# Patient Record
Sex: FEMALE | Race: WHITE | NOT HISPANIC OR LATINO | Employment: STUDENT | URBAN - METROPOLITAN AREA
[De-identification: names, ages, dates, MRNs, and addresses within clinical notes are randomized per-mention and may not be internally consistent; named-entity substitution may affect disease eponyms.]

---
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
## 2019-01-01 NOTE — DISCHARGE SUMMARY
[facility]  Orthopedics  Discharge Summary      Patient Name: Denise Mosher  MRN: [id number]  Admission Date: 12/13/2018  Hospital Length of Stay: 1 days  Discharge Date and Time: 12/15/2018  1:41 PM  Attending Physician: No att. providers found   Discharging Provider: Rosalia Cooper MD  Primary Care Provider: Hayde Roman MD    HPI: see HPI    Procedure(s) (LRB):  OSTEOTOMY, FEMUR - distal to correct valgus (Orthopediatrics plate, MTF bone wedge).  Right knee arthroscopy with MPFL reconstruction, gracilis allograft (Linvatec).  3 hrs. (Right)      Hospital Course: the patient arrived to pre-op area for proper pre-operative management.  Upon completion of pre-operative preparation, the patient was taken back to the operative theatre.  A Right femur osteotomy with MPFL reconstruction was performed without complication and the patient was transported to the post anesthesia care unit in stable condition. After appropriate recovery from the anaesthetic agents used during the surgery the patient was transferred to the floor. She stayed two days, received PT and OT and pain medication and then was safely discharged home.      Consults (From admission, onward)        Status Ordering Provider     Inpatient consult to Social Work  Once     Provider:  (Not yet assigned)    Completed ROSALIA COOPER          Significant Diagnostic Studies: No pertinent studies.    Pending Diagnostic Studies:     None        Final Active Diagnoses:    Diagnosis Date Noted POA    PRINCIPAL PROBLEM:  Patellar instability of right knee [M25.361] 04/21/2018 Yes      Problems Resolved During this Admission:      Discharged Condition: stable    Disposition: Home or Self Care    Follow Up:  Follow-up Information     Ranulfo House MD.    Specialty:  Pediatric Orthopedic Surgery  Why:  For wound re-check  Contact information:  [address]  [facility] [address]  [phone]                 Patient  Instructions:   No discharge procedures on file.  Medications:  Reconciled Home Medications:      Medication List      START taking these medications    aspirin 81 MG EC tablet  Commonly known as:  ECOTRIN  Take 1 tablet (81 mg total) by mouth 2 (two) times daily.     polyethylene glycol 17 gram Pwpk  Commonly known as:  GLYCOLAX  Take 17 g by mouth once daily.        CONTINUE taking these medications    FLUoxetine 10 MG Tab  Take 40 mg by mouth once daily.     naproxen 500 MG tablet  Commonly known as:  NAPROSYN  Take 1 tablet by mouth as needed.     omeprazole 40 MG capsule  Commonly known as:  PRILOSEC  Take 1 capsule by mouth every evening.     ondansetron 8 MG Tbdl  Commonly known as:  ZOFRAN-ODT  Take 1 tablet (8 mg total) by mouth every 8 (eight) hours as needed (nausea or vomiting).        STOP taking these medications    folic acid 1 MG tablet  Commonly known as:  FOLVITE        ASK your doctor about these medications    methocarbamol 500 MG Tab  Commonly known as:  ROBAXIN  Take 1 tablet (500 mg total) by mouth 4 (four) times daily. for 10 days  Ask about: Should I take this medication?            Thomas Cooper MD  Orthopedics  [facility]

## 2019-01-02 ENCOUNTER — TELEPHONE (OUTPATIENT)
Dept: ORTHOPEDICS | Facility: CLINIC | Age: 19
End: 2019-01-02

## 2019-01-02 ENCOUNTER — OFFICE VISIT (OUTPATIENT)
Dept: ORTHOPEDICS | Facility: CLINIC | Age: 19
End: 2019-01-02
Payer: MEDICAID

## 2019-01-02 VITALS — WEIGHT: 200 LBS | BODY MASS INDEX: 37.79 KG/M2

## 2019-01-02 DIAGNOSIS — M25.361 PATELLAR INSTABILITY OF RIGHT KNEE: ICD-10-CM

## 2019-01-02 DIAGNOSIS — M21.061 ACQUIRED GENU VALGUM OF RIGHT KNEE: Primary | ICD-10-CM

## 2019-01-02 PROCEDURE — 99212 OFFICE O/P EST SF 10 MIN: CPT | Mod: PBBFAC | Performed by: ORTHOPAEDIC SURGERY

## 2019-01-02 PROCEDURE — 99024 PR POST-OP FOLLOW-UP VISIT: ICD-10-PCS | Mod: ,,, | Performed by: ORTHOPAEDIC SURGERY

## 2019-01-02 PROCEDURE — 99999 PR PBB SHADOW E&M-EST. PATIENT-LVL II: ICD-10-PCS | Mod: PBBFAC,,, | Performed by: ORTHOPAEDIC SURGERY

## 2019-01-02 PROCEDURE — 99024 POSTOP FOLLOW-UP VISIT: CPT | Mod: ,,, | Performed by: ORTHOPAEDIC SURGERY

## 2019-01-02 PROCEDURE — 99999 PR PBB SHADOW E&M-EST. PATIENT-LVL II: CPT | Mod: PBBFAC,,, | Performed by: ORTHOPAEDIC SURGERY

## 2019-01-02 RX ORDER — HYDROXYZINE HYDROCHLORIDE 25 MG/1
25 TABLET, FILM COATED ORAL 3 TIMES DAILY PRN
Qty: 40 TABLET | Refills: 1 | Status: SHIPPED | OUTPATIENT
Start: 2019-01-02 | End: 2019-01-16

## 2019-01-02 NOTE — TELEPHONE ENCOUNTER
----- Message from Misty Carrillo sent at 1/2/2019  8:04 AM CST -----  Contact: Mom [phone]  She says the pt is having some issues with her leg in regards to itching and would like to come in before 1/4/19. Dr House is out until then so she wants to know if she can see someone else today. Please advise mom.

## 2019-01-02 NOTE — TELEPHONE ENCOUNTER
Spoke with patient mother will schedule with jeremiah hill np for a follow up. She will  Be scheduled at 4 in hopes of dr saldivar being back at [facility] from an outside clinic day.

## 2019-01-04 ENCOUNTER — CLINICAL SUPPORT (OUTPATIENT)
Dept: REHABILITATION | Facility: HOSPITAL | Age: 19
End: 2019-01-04
Attending: ORTHOPAEDIC SURGERY
Payer: MEDICAID

## 2019-01-04 DIAGNOSIS — M25.561 ACUTE PAIN OF RIGHT KNEE: Primary | ICD-10-CM

## 2019-01-04 PROCEDURE — 97110 THERAPEUTIC EXERCISES: CPT

## 2019-01-04 NOTE — PROGRESS NOTES
Physical Therapy Daily Treatment Note     Name: Denise Mosher  Clinic Number: [id number]    Therapy Diagnosis:   Encounter Diagnosis   Name Primary?    Acute pain of right knee Yes     Physician: Ranulfo House MD    Visit Date: 1/4/2019    Physician Orders: PT Eval and Treat Right Knee  12/13/18 distal femoral osteotomy and MPFL reconstruction. PWB with knee brace. ROM, quad strengthening, no restrictions. Please start PT next week.   Medical Diagnosis from Referral: M21.061 (ICD-10-CM) - Acquired genu valgum of right knee M25.361 (ICD-10-CM) - Patellar instability of right knee   Evaluation Date: 12/21/2018  Authorization Period Expiration: 1/31/2019  Plan of Care Expiration: 03/22/2019  Visit # / Visits authorized: 1/1     Time In: 1305  Time Out: 1430   Total Billable Time: 27 minutes     Precautions: Standard    Subjective     Pt reports: w/ mild pn in R knee.  Pt mentioned taking pn meds 30 min prior to tx.  Pt mentioned at the end of the session she has not had anything to eat since last night.    She was compliant with home exercise program.  Response to previous treatment: No adverse effects   Functional change: No change     Pain:3/10  Location: right knee      Objective     Denise received therapeutic exercises to develop strength, endurance, ROM, flexibility, posture and core stabilization for 17 minutes including:  QS 2 x 10 3 sec hold   EOT knee flexion 5 x 20 sec manual assist     Denise received the following supervised modalities after being cleared for contradictions: NMES Electrical Stimulation:  Denise OSBORN received NMES Electrical Stimulation to elicit muscle contraction of the R quad. Pt received stimulation at a rate of 50 pps with symmetric current, ramp of 2 seconds with 10 second on time and 10 second off time. Patient tolerated treatment well without any adverse effects.   E-stim applied for 10 min w/ quad sets     Denise received cold pack for 10 minutes to R knee.      Home  Exercises Provided and Patient Education Provided     Education provided:   - Pt edu on proper exercise technique.  Pt also edu on the importance of eating while taking pn meds and prior to performing exercises.     Written Home Exercises Provided: Patient instructed to cont prior HEP.  Exercises were reviewed and Denise was able to demonstrate them prior to the end of the session.  Denise demonstrated good  understanding of the education provided.       Assessment     Pt mayuri tx well.  Pn level remained same prior to and after tx session.  Tx stopped 2* patient feeling weak requiring several minutes to regain strength.  Pt cont to lack quad tone and function as well as ROM.    Denise is progressing well towards her goals.   Pt prognosis is Good.     Pt will continue to benefit from skilled outpatient physical therapy to address the deficits listed in the problem list box on initial evaluation, provide pt/family education and to maximize pt's level of independence in the home and community environment.     Pt's spiritual, cultural and educational needs considered and pt agreeable to plan of care and goals.    Anticipated barriers to physical therapy: none    Goals: Short Term GOALS: 6 weeks  1. Patient demonstrates independence with HEP. (progressing, not met)   2. Patient demonstrates increased right knee ROM to 90 degrees flexion to improve tolerance to functional activities with no more than 2/10 pain.(progressing, not met)   3. Patient demonstrates improved overall function per FOTO Knee Survey to 30% Limitation or less.(progressing, not met)   4. Patient will ambulate will least restrictive device.(progressing, not met)      Long Term GOALS: 12 weeks  1. Patient demonstrates increased right knee ROM to be equal to left to improve tolerance to functional activities pain free. (progressing, not met)   2. Patient demonstrates increased strength BLE's to 5/5 or greater to improve tolerance to functional  activities pain free. (progressing, not met)   3. Patient demonstrates improved overall function per FOTO Knee Survey to 10% Limitation or less. (progressing, not met)   4. Patient will return to work with no reports of pain or restrictions.(progressing, not met)   5. Patient will ambulate independently with normalized gait pattern.(progressing, not met)       Plan     Cont to progress towards goals set by PT.  Work to improve quad function and ROM next visit.      Ramiro Payne, PTA

## 2019-01-07 NOTE — PROGRESS NOTES
CC: Post-op    HPI: Denise Mosher is now 3 weeks post-op following   DATE OF PROCEDURE:  12/13/18     PREOPERATIVE DIAGNOSES:  1.  Right knee pain.  2.  Right patellar instability.  3.  Right genu valgum.     POSTOPERATIVE DIAGNOSES:  1.  Right knee pain.  2.  Right patellar instability.  3.  Right genu valgum.     PROCEDURES:  1.  Right knee arthroscopy  2.  Right medial patellofemoral ligament reconstruction with hamstring allograft  3.  Right distal femoral varus-producing osteotomy with plate fixation.  Doing well, pain controlled.  Concerned about itching.    PE: Incisions well-healed with no sign of infection.  Well-perfused, neurovascularly intact distally.  Very stiff knee ROM.    Clinical decision-making: Doing well.  Atarax for itching.  Work on ROM, PT.  RTC 3 weeks, X-rays of knee.

## 2019-01-08 ENCOUNTER — CLINICAL SUPPORT (OUTPATIENT)
Dept: REHABILITATION | Facility: HOSPITAL | Age: 19
End: 2019-01-08
Attending: ORTHOPAEDIC SURGERY
Payer: MEDICAID

## 2019-01-08 DIAGNOSIS — M25.561 ACUTE PAIN OF RIGHT KNEE: Primary | ICD-10-CM

## 2019-01-08 PROCEDURE — 97110 THERAPEUTIC EXERCISES: CPT

## 2019-01-08 RX ORDER — HYDROCODONE BITARTRATE AND ACETAMINOPHEN 5; 325 MG/1; MG/1
1 TABLET ORAL EVERY 6 HOURS PRN
Qty: 20 TABLET | Refills: 0 | Status: ON HOLD | OUTPATIENT
Start: 2019-01-08 | End: 2019-12-19 | Stop reason: HOSPADM

## 2019-01-08 NOTE — PROGRESS NOTES
Physical Therapy Daily Treatment Note     Name: Denise Mosher  Clinic Number: [id number]    Therapy Diagnosis:   Encounter Diagnosis   Name Primary?    Acute pain of right knee Yes     Physician: Ranulfo House MD    Visit Date: 1/8/2019    Physician Orders: PT Eval and Treat Right Knee  12/13/18 distal femoral osteotomy and MPFL reconstruction. PWB with knee brace. ROM, quad strengthening, no restrictions. Please start PT next week.   Medical Diagnosis from Referral: M21.061 (ICD-10-CM) - Acquired genu valgum of right knee M25.361 (ICD-10-CM) - Patellar instability of right knee   Evaluation Date: 12/21/2018  Authorization Period Expiration: 1/31/2019  Plan of Care Expiration: 03/22/2019  Visit # / Visits authorized: 2/12  Total visits: 3     Time In: 1400  Time Out: 1500   Total Billable Time: 50 minutes     Precautions: Standard    Subjective     Pt states feeling okay w/ some soreness in R knee but no c/o pn currently.  Pt mentioned being able to perform some knee flexion when getting out of the bed but is very limited 2* pn.  Pt unable to perform more than 2 x 10 QS a day 2* pn as well.  Pt reported that she is out of pn meds and has not been approved by her insurance for more.    She was compliant with home exercise program.  Response to previous treatment: No adverse effects   Functional change: No change     Pain:3/10  Location: right knee      Objective     Denise received therapeutic exercises to develop strength, endurance, ROM, flexibility, posture and core stabilization for 50 minutes including:  QS 3 x 10 3 sec hold   APs 30 x   GSS w/ strap 2 min   EOT knee flexion 5 x 20 sec manual assist unable to perform today 2* pn.      Denise received the following supervised modalities after being cleared for contradictions: NMES Electrical Stimulation:  Denise OSBORN received NMES Electrical Stimulation to elicit muscle contraction of the R quad. Pt received stimulation at a rate of 50 pps with  symmetric current, ramp of 2 seconds with 10 second on time and 10 second off time. Patient tolerated treatment well without any adverse effects.   E-stim applied for 0 min w/ quad sets     Denise received cold pack for 10 minutes to R knee.      Home Exercises Provided and Patient Education Provided     Education provided:   - Pt edu on proper exercise technique.  Pt also edu on the importance of eating while taking pn meds and prior to performing exercises.     Written Home Exercises Provided: Patient instructed to cont prior HEP.  Exercises were reviewed and Denise was able to demonstrate them prior to the end of the session.  Denise demonstrated good  understanding of the education provided.       Assessment   Increased muscle twitch w/ therex today.  Pt had decreased mayuri to pn during therex despite taking pn meds.  Pt has severe guarding when attempting knee flexion.  Pt experienced significant pn today as well which limited therex.       Denise is progressing well towards her goals.   Pt prognosis is Good.     Pt will continue to benefit from skilled outpatient physical therapy to address the deficits listed in the problem list box on initial evaluation, provide pt/family education and to maximize pt's level of independence in the home and community environment.     Pt's spiritual, cultural and educational needs considered and pt agreeable to plan of care and goals.    Anticipated barriers to physical therapy: none    Goals: Short Term GOALS: 6 weeks  1. Patient demonstrates independence with HEP. (progressing, not met)   2. Patient demonstrates increased right knee ROM to 90 degrees flexion to improve tolerance to functional activities with no more than 2/10 pain.(progressing, not met)   3. Patient demonstrates improved overall function per FOTO Knee Survey to 30% Limitation or less.(progressing, not met)   4. Patient will ambulate will least restrictive device.(progressing, not met)      Long Term  GOALS: 12 weeks  1. Patient demonstrates increased right knee ROM to be equal to left to improve tolerance to functional activities pain free. (progressing, not met)   2. Patient demonstrates increased strength BLE's to 5/5 or greater to improve tolerance to functional activities pain free. (progressing, not met)   3. Patient demonstrates improved overall function per FOTO Knee Survey to 10% Limitation or less. (progressing, not met)   4. Patient will return to work with no reports of pain or restrictions.(progressing, not met)   5. Patient will ambulate independently with normalized gait pattern.(progressing, not met)       Plan     Cont to progress towards goals set by PT.  Work to improve quad function and ROM next visit.      Ramiro Payne, PTA

## 2019-01-11 ENCOUNTER — CLINICAL SUPPORT (OUTPATIENT)
Dept: REHABILITATION | Facility: HOSPITAL | Age: 19
End: 2019-01-11
Attending: ORTHOPAEDIC SURGERY
Payer: MEDICAID

## 2019-01-11 DIAGNOSIS — M25.561 ACUTE PAIN OF RIGHT KNEE: Primary | ICD-10-CM

## 2019-01-11 PROCEDURE — 97110 THERAPEUTIC EXERCISES: CPT

## 2019-01-11 NOTE — PROGRESS NOTES
Physical Therapy Daily Treatment Note     Name: Denise Mosher  Clinic Number: [id number]    Therapy Diagnosis:   Encounter Diagnosis   Name Primary?    Acute pain of right knee Yes     Physician: Ranulfo House MD    Visit Date: 1/11/2019    Physician Orders: PT Eval and Treat Right Knee  12/13/18 distal femoral osteotomy and MPFL reconstruction. PWB with knee brace. ROM, quad strengthening, no restrictions. Please start PT next week.   Medical Diagnosis from Referral: M21.061 (ICD-10-CM) - Acquired genu valgum of right knee M25.361 (ICD-10-CM) - Patellar instability of right knee   Evaluation Date: 12/21/2018  Authorization Period Expiration: 1/31/2019  Plan of Care Expiration: 03/22/2019  Visit # / Visits authorized: 3/12  Total visits: 4     Time In: 1500  Time Out: 1600   Total Billable Time: 50 minutes     Precautions: Standard    Subjective     Pt reports w/ no c/o pn in R knee currently and has noticed increased flexion at home.    She was compliant with home exercise program.  Response to previous treatment: No adverse effects   Functional change: No change     Pain:0/10  Location: right knee      Objective     Denise received therapeutic exercises to develop strength, endurance, ROM, flexibility, posture and core stabilization for 50 minutes including:  QS 3 x 10 3 sec hold   APs 30 x yellow band   SLR x 5 manual assist mod-max  GSS w/ strap 2 min   EOT knee flexion 5 x 20 sec manual assist   Heel slides 10 x 10 sec hold manual assist     Denise received the following supervised modalities after being cleared for contradictions: NMES Electrical Stimulation:  Denise OSBORN received NMES Electrical Stimulation to elicit muscle contraction of the R quad. Pt received stimulation at a rate of 50 pps with symmetric current, ramp of 2 seconds with 10 second on time and 10 second off time. Patient tolerated treatment well without any adverse effects.   E-stim applied for 0 min w/ quad sets     Denise  received cold pack for 10 minutes to R knee.      Home Exercises Provided and Patient Education Provided     Education provided:   - Pt edu on proper exercise technique.  Pt also edu on the importance of eating while taking pn meds and prior to performing exercises.     Written Home Exercises Provided: Patient instructed to cont prior HEP.  Exercises were reviewed and Denise was able to demonstrate them prior to the end of the session.  Denise demonstrated good  understanding of the education provided.       Assessment   Increased muscle twitch w/ therex today.  Pt had decreased mayuri to pn during therex despite taking pn meds.  Pt has severe guarding when attempting knee flexion.  Pt experienced significant pn today as well which limited therex.       Denise is progressing well towards her goals.   Pt prognosis is Good.     Pt will continue to benefit from skilled outpatient physical therapy to address the deficits listed in the problem list box on initial evaluation, provide pt/family education and to maximize pt's level of independence in the home and community environment.     Pt's spiritual, cultural and educational needs considered and pt agreeable to plan of care and goals.    Anticipated barriers to physical therapy: none    Goals: Short Term GOALS: 6 weeks  1. Patient demonstrates independence with HEP. (progressing, not met)   2. Patient demonstrates increased right knee ROM to 90 degrees flexion to improve tolerance to functional activities with no more than 2/10 pain.(progressing, not met)   3. Patient demonstrates improved overall function per FOTO Knee Survey to 30% Limitation or less.(progressing, not met)   4. Patient will ambulate will least restrictive device.(progressing, not met)      Long Term GOALS: 12 weeks  1. Patient demonstrates increased right knee ROM to be equal to left to improve tolerance to functional activities pain free. (progressing, not met)   2. Patient demonstrates increased  strength BLE's to 5/5 or greater to improve tolerance to functional activities pain free. (progressing, not met)   3. Patient demonstrates improved overall function per FOTO Knee Survey to 10% Limitation or less. (progressing, not met)   4. Patient will return to work with no reports of pain or restrictions.(progressing, not met)   5. Patient will ambulate independently with normalized gait pattern.(progressing, not met)       Plan     Cont to progress towards goals set by PT.  Work to improve quad function and ROM next visit.      Ramiro Payne, PTA

## 2019-01-14 ENCOUNTER — OFFICE VISIT (OUTPATIENT)
Dept: PEDIATRIC UROLOGY | Facility: CLINIC | Age: 19
End: 2019-01-14
Payer: MEDICAID

## 2019-01-14 ENCOUNTER — HOSPITAL ENCOUNTER (OUTPATIENT)
Dept: RADIOLOGY | Facility: HOSPITAL | Age: 19
Discharge: HOME | End: 2019-01-14
Attending: UROLOGY
Payer: MEDICAID

## 2019-01-14 VITALS — WEIGHT: 180.75 LBS | BODY MASS INDEX: 34.13 KG/M2 | HEIGHT: 61 IN | TEMPERATURE: 98 F

## 2019-01-14 DIAGNOSIS — M25.361 PATELLAR INSTABILITY OF RIGHT KNEE: ICD-10-CM

## 2019-01-14 DIAGNOSIS — K59.00 CONSTIPATION, UNSPECIFIED CONSTIPATION TYPE: ICD-10-CM

## 2019-01-14 DIAGNOSIS — F44.5 PSYCHOGENIC NONEPILEPTIC SEIZURE: ICD-10-CM

## 2019-01-14 DIAGNOSIS — R32 INCONTINENCE IN FEMALE: ICD-10-CM

## 2019-01-14 DIAGNOSIS — E66.01 MORBID OBESITY WITH BMI OF 40.0-44.9, ADULT: ICD-10-CM

## 2019-01-14 DIAGNOSIS — F41.9 ANXIETY: ICD-10-CM

## 2019-01-14 DIAGNOSIS — R32 INCONTINENCE IN FEMALE: Primary | ICD-10-CM

## 2019-01-14 PROCEDURE — 99999 PR PBB SHADOW E&M-EST. PATIENT-LVL III: CPT | Mod: PBBFAC,,, | Performed by: UROLOGY

## 2019-01-14 PROCEDURE — 51798 US URINE CAPACITY MEASURE: CPT | Mod: PBBFAC | Performed by: UROLOGY

## 2019-01-14 PROCEDURE — 74018 RADEX ABDOMEN 1 VIEW: CPT | Mod: TC,PO

## 2019-01-14 PROCEDURE — 99204 OFFICE O/P NEW MOD 45 MIN: CPT | Mod: S$PBB,,, | Performed by: UROLOGY

## 2019-01-14 PROCEDURE — 99999 PR PBB SHADOW E&M-EST. PATIENT-LVL III: ICD-10-PCS | Mod: PBBFAC,,, | Performed by: UROLOGY

## 2019-01-14 PROCEDURE — 81002 URINALYSIS NONAUTO W/O SCOPE: CPT | Mod: PBBFAC | Performed by: UROLOGY

## 2019-01-14 PROCEDURE — 87086 URINE CULTURE/COLONY COUNT: CPT

## 2019-01-14 PROCEDURE — 99213 OFFICE O/P EST LOW 20 MIN: CPT | Mod: PBBFAC,25 | Performed by: UROLOGY

## 2019-01-14 PROCEDURE — 99204 PR OFFICE/OUTPT VISIT, NEW, LEVL IV, 45-59 MIN: ICD-10-PCS | Mod: S$PBB,,, | Performed by: UROLOGY

## 2019-01-14 PROCEDURE — 74018 XR ABDOMEN AP 1 VIEW: ICD-10-PCS | Mod: 26,,, | Performed by: RADIOLOGY

## 2019-01-14 PROCEDURE — 74018 RADEX ABDOMEN 1 VIEW: CPT | Mod: 26,,, | Performed by: RADIOLOGY

## 2019-01-14 NOTE — PROGRESS NOTES
Chief Complaint: No chief complaint on file.      HPI: Denise is a pleasant,17 yo referred by Dr. House for incontinence. This started after her truong catheter was removed after knee surgery in December. She had the catheter for 2 days she states. She has had numerous knee surgeries and has never had a catheter before for those. She went to the ER for the incontinence. She states she was told she may have a UTI and bladder paralysis from her catheter. She was then referred to me. She says initially she really just was incontinent. She could not make it to restroom and would soak herself. She started wearing depends. She now says that she is out of the depends and feels like she is getting back to normal. She can tell when she needs to void and feels like she sits on the toilet now better to void. At the time of the surgery she was on norco and a muscle relaxant. She has chronic constipation. As a younger child she remember 'getting a clean out' and had been to the doctor numerous times for abdominal pain. She has taken miralax in the past but has never been on a consistent regimen for more than 2 weeks. She says now she has hard BM every 2-3 days. She has normal periods. She has never had a UTI she states. She denies dysuria currently. She is overweight and takes prozac for depression. She is here today with her best friend.  She denies any concerns for neurogenic further concerns.     Allergies:  Review of patient's allergies indicates:  No Known Allergies    Medications:  Current Outpatient Medications   Medication Sig Dispense Refill    aspirin (ECOTRIN) 81 MG EC tablet Take 1 tablet (81 mg total) by mouth 2 (two) times daily. 120 tablet 0    fluoxetine (PROZAC) 10 MG Tab Take 40 mg by mouth once daily.       HYDROcodone-acetaminophen (NORCO) 5-325 mg per tablet Take 1 tablet by mouth every 6 (six) hours as needed for Pain. 20 tablet 0    hydrOXYzine HCl (ATARAX) 25 MG tablet Take 1 tablet (25 mg total)  by mouth 3 (three) times daily as needed for Itching. 40 tablet 1    naproxen (NAPROSYN) 500 MG tablet Take 1 tablet by mouth as needed.  1    omeprazole (PRILOSEC) 40 MG capsule Take 1 capsule by mouth every evening.  1    ondansetron (ZOFRAN-ODT) 8 MG TbDL Take 1 tablet (8 mg total) by mouth every 8 (eight) hours as needed (nausea or vomiting). 6 tablet 0    polyethylene glycol (GLYCOLAX) 17 gram PwPk Take 17 g by mouth once daily. 30 each 0     No current facility-administered medications for this visit.        Review of Systems:  General: No fever, chills, fatigability, or weight loss.  Skin: No rashes, itching, or changes in color or texture of skin.  Chest: Denies ESTRADA, cyanosis, wheezing, cough, and sputum production.  Abdomen: Appetite fine. No weight loss. Denies diarrhea, abdominal pain, hematemesis, or blood in stool.  Musculoskeletal: No joint stiffness or swelling. Denies back pain.  : As above.  All other review of systems negative.    PMH:  Past Medical History:   Diagnosis Date    Anxiety     reports panic attacks    Depression     Fibromyalgia     per pt    Insomnia     Precocious female puberty     Seizures     reports 2 incidents of possible seizures while giving blood    Syncope and collapse     Wrist fracture, bilateral        PSH:  Past Surgical History:   Procedure Laterality Date    ARTHROSCOPY-KNEE Right 7/16/2015    [facility]    ARTHROSCOPY-KNEE Left 2/16/2015    [facility] at [facility]    ARTHROSCOPY-KNEE, removal of two Synthes 4-5 cortical screws Right 6/15/2017    [facility] at [facility]    chip      chip implant    closed  manipulation under anesthesia, cortisone injection ARTHROSCOPY-KNEE was aborted Right 11/19/2015    Per[facility] at [facility]    HEMANGIOMA EXCISION      scalp    INGUINAL HERNIA REPAIR Left     KNEE SURGERY Left 2/16/15     OSTEOTOMY, FEMUR - distal to correct valgus (Orthopediatrics plate, MTF bone wedge).  Right knee arthroscopy with MPFL reconstruction, gracilis allograft ([employer]).  3 hrs. Right 12/13/2018    Performed by Ranulfo House MD at [facility]    OSTEOTOMY-TIBIA (Tibial tubercle transfer osteotomy) Right 7/16/2015    Performed by Ranulfo House MD at [facility]ST [facility]    OSTEOTOMY-TIBIA (Tibial tubercle) Left 2/16/2015    Per[facility] by [facility]ulfo [facility] at [facility]    TOE SURGERY Right     5 th toe       FamHx:  Family History   Problem Relation Age of Onset    Seizures Father     Anxiety disorder Father     Depression Father     Mental illness Father     Schizophrenia Father     Deep vein thrombosis Father        SocHx:  Social History     Socioeconomic History    Marital status: Single     Spouse name: Not on file    Number of children: Not on file    Years of education: Not on file    Highest education level: Not on file   Social Needs    Financial resource strain: Not on file    Food insecurity - worry: Not on file    Food insecurity - inability: Not on file    Transportation needs - medical: Not on file    Transportation needs - non-medical: Not on file   Occupational History    Not on file   Tobacco Use    Smoking status: Never Smoker    Smokeless tobacco: Never Used   Substance and Sexual Activity    Alcohol use: No    Drug use: No    Sexual activity: No     Birth control/protection: Abstinence   Other Topics Concern    Not on file   Social History Narrative    9th grader at [employer].  Lives at home with mother and father.       Physical Exam:  Vitals: There were no vitals filed for this visit.  General: A&Ox3. No apparent distress. No deformities.  Neck: No masses. Normal thyroid.  Lungs: CTA yandy. No use of accessory muscles.  Heart: RRR. No arrhythmias.  Abdomen: Soft. NT. ND. No masses. No hernias. No hepatosplenomegaly.  Lymphatic: Neck and groin nodes  negative.  Skin: The skin is warm and dry. No jaundice.  Ext: right leg fixed and in brace in wheel [place]  : External genitalia not examined today    Labs/Studies: urine culture from Er was negative, UA seems contaminated more than infected  No renal imaging    PVR via ultrasound today showed about 15 cc about 10 min after voiding- within normal    Impression/Plan: post surgical incontinence now essentially resolved per patient.   Chronic constipation, obesity, narcotics/muscle relaxants and catheter use likely contributors to this.     I told her we see this commonly in adults and as she ages, her weight, her constipation will affect bladder function. She is at risk for further incontinence in general and tried to encourage her in weight loss and we must correct the constipation.     I explained in detail how bowel function works and will get a KUB today to quantify her constipation- she hasn't had a BM in at least 2 days. She needs likely a clean out and I stressed to her that daily stool softener whether miralax or docusate sodium is needed. She says she hates vitamins and such so does not want to take fiber etc supplements but I stressed need to increase her fiber and water intake.    She will have more surgeries and I told her this incontinence or even retention could happen again and if needed I would be able to help. If we can get her in good place with bowel function at least and ideally weight loss this would help lessen the risk.   She says she understands and knows that if incontinence returns or any voiding troubles, she needs to see me.   Otherwise I will see her in about a month- her friend is travelling for a week in Feb so as long as no issues, she can come see after that so that her friend can drive her.   Will get another KUB at time of her follow up.

## 2019-01-14 NOTE — LETTER
January 14, 2019      Ranulfo House MD  [address]  [facility] [address]           [facility]  [address]  [facility] [address]  Phone: [phone]          Patient: Denise Mosher   MR Number: [id number]   YOB: 2000   Date of Visit: 1/14/2019       Dear Dr. Ranulfo House:    Thank you for referring Denise Mosher to me for evaluation. Attached you will find relevant portions of my assessment and plan of care.    If you have questions, please do not hesitate to call me. I look forward to following Denise Mosher along with you.    Sincerely,    Radha Green MD    Enclosure  CC:  No Recipients    If you would like to receive this communication electronically, please contact [email or web address] or [phone] to request more information on [employer] access.    For providers and/or their staff who would like to refer a patient to [facility], please contact us through our one-stop-shop provider referral line, [facility] , at [phone].    If you feel you have received this communication in error or would no longer like to receive these types of communications, please e-mail [email or web address]

## 2019-01-15 ENCOUNTER — CLINICAL SUPPORT (OUTPATIENT)
Dept: REHABILITATION | Facility: HOSPITAL | Age: 19
End: 2019-01-15
Attending: ORTHOPAEDIC SURGERY
Payer: MEDICAID

## 2019-01-15 DIAGNOSIS — M25.561 ACUTE PAIN OF RIGHT KNEE: Primary | ICD-10-CM

## 2019-01-15 PROCEDURE — 97110 THERAPEUTIC EXERCISES: CPT

## 2019-01-15 NOTE — PROGRESS NOTES
Physical Therapy Daily Treatment Note     Name: Denise Mosher  Clinic Number: [id number]    Therapy Diagnosis:   Encounter Diagnosis   Name Primary?    Acute pain of right knee Yes     Physician: Ranulfo House MD    Visit Date: 1/15/2019    Physician Orders: PT Eval and Treat Right Knee  12/13/18 distal femoral osteotomy and MPFL reconstruction. PWB with knee brace. ROM, quad strengthening, no restrictions. Please start PT next week.   Medical Diagnosis from Referral: M21.061 (ICD-10-CM) - Acquired genu valgum of right knee M25.361 (ICD-10-CM) - Patellar instability of right knee   Evaluation Date: 12/21/2018  Authorization Period Expiration: 1/31/2019  Plan of Care Expiration: 03/22/2019  Visit # / Visits authorized: 4/12  Total visits: 5     Time In: 1455  Time Out: 1600   Total Billable Time: 50 minutes     Precautions: Standard    Subjective     Pt states feeling okay w/light bruising distal of R knee.  Pt reports w/ no c/o pn in R knee currently.  Pt also mentioned not iching as much but now does have her medication for this problem.    She was compliant with home exercise program.  Response to previous treatment: No adverse effects   Functional change: No change     Pain:0/10  Location: right knee      Objective   ROM: 20 deg knee flexion PROM 1/17/19    Denise received therapeutic exercises to develop strength, endurance, ROM, flexibility, posture and core stabilization for 50 minutes including:  QS 3 x 10 3 sec hold   APs 30 x yellow band   SLR x 5 manual assist mod-max  GSS w/ strap 2 min   EOT knee flexion 5 x 20 sec manual assist   Heel slides 10 x 10 sec hold manual assist     Denise received the following supervised modalities after being cleared for contradictions: NMES Electrical Stimulation:  Denise OSBORN received NMES Electrical Stimulation to elicit muscle contraction of the R quad. Pt received stimulation at a rate of 50 pps with symmetric current, ramp of 2 seconds with 10 second on  time and 10 second off time. Patient tolerated treatment well without any adverse effects.   E-stim applied for 10 min w/ quad sets     Denise received cold pack for 10 minutes to R knee w/ e-stim.      Home Exercises Provided and Patient Education Provided     Education provided:   - Pt edu on proper exercise technique.  Pt also edu on the importance of eating while taking pn meds and prior to performing exercises.     Written Home Exercises Provided: Patient instructed to cont prior HEP.  Exercises were reviewed and Denise was able to demonstrate them prior to the end of the session.  Denise demonstrated good  understanding of the education provided.       Assessment   I  Pt cont to have increased quad activation but cont to lack strength to actively move R leg.  Pt has severe muscle guarding and lacks knee flexion.  Pt mayuri tx was fair.  Pn level remained same prior to and after tx session.    Denise is progressing well towards her goals.   Pt prognosis is Good.     Pt will continue to benefit from skilled outpatient physical therapy to address the deficits listed in the problem list box on initial evaluation, provide pt/family education and to maximize pt's level of independence in the home and community environment.     Pt's spiritual, cultural and educational needs considered and pt agreeable to plan of care and goals.    Anticipated barriers to physical therapy: none    Goals: Short Term GOALS: 6 weeks  1. Patient demonstrates independence with HEP. (progressing, not met)   2. Patient demonstrates increased right knee ROM to 90 degrees flexion to improve tolerance to functional activities with no more than 2/10 pain.(progressing, not met)   3. Patient demonstrates improved overall function per FOTO Knee Survey to 30% Limitation or less.(progressing, not met)   4. Patient will ambulate will least restrictive device.(progressing, not met)      Long Term GOALS: 12 weeks  1. Patient demonstrates increased  right knee ROM to be equal to left to improve tolerance to functional activities pain free. (progressing, not met)   2. Patient demonstrates increased strength BLE's to 5/5 or greater to improve tolerance to functional activities pain free. (progressing, not met)   3. Patient demonstrates improved overall function per FOTO Knee Survey to 10% Limitation or less. (progressing, not met)   4. Patient will return to work with no reports of pain or restrictions.(progressing, not met)   5. Patient will ambulate independently with normalized gait pattern.(progressing, not met)       Plan     Cont to progress towards goals set by PT.  Work to improve quad function and ROM next visit.      Ramiro Payne, PTA

## 2019-01-16 LAB
BACTERIA UR CULT: NORMAL
BACTERIA UR CULT: NORMAL

## 2019-01-18 ENCOUNTER — TELEPHONE (OUTPATIENT)
Dept: ORTHOPEDICS | Facility: CLINIC | Age: 19
End: 2019-01-18

## 2019-01-18 ENCOUNTER — CLINICAL SUPPORT (OUTPATIENT)
Dept: REHABILITATION | Facility: HOSPITAL | Age: 19
End: 2019-01-18
Attending: ORTHOPAEDIC SURGERY
Payer: MEDICAID

## 2019-01-18 DIAGNOSIS — M25.561 ACUTE PAIN OF RIGHT KNEE: Primary | ICD-10-CM

## 2019-01-18 PROCEDURE — 97110 THERAPEUTIC EXERCISES: CPT

## 2019-01-18 RX ORDER — MELOXICAM 7.5 MG/1
7.5 TABLET ORAL DAILY
Qty: 30 TABLET | Refills: 0 | Status: SHIPPED | OUTPATIENT
Start: 2019-01-18 | End: 2019-02-17

## 2019-01-18 NOTE — PROGRESS NOTES
Physical Therapy Daily Treatment Note     Name: Denise Mosher  Clinic Number: [id number]    Therapy Diagnosis:   Encounter Diagnosis   Name Primary?    Acute pain of right knee Yes     Physician: Ranulfo House MD    Visit Date: 1/18/2019    Physician Orders: PT Eval and Treat Right Knee  12/13/18 distal femoral osteotomy and MPFL reconstruction. PWB with knee brace. ROM, quad strengthening, no restrictions. Please start PT next week.   Medical Diagnosis from Referral: M21.061 (ICD-10-CM) - Acquired genu valgum of right knee M25.361 (ICD-10-CM) - Patellar instability of right knee   Evaluation Date: 12/21/2018  Authorization Period Expiration: 1/31/2019  Plan of Care Expiration: 03/22/2019  Visit # / Visits authorized: 5/12  Total visits: 6     Time In: 1300  Time Out: 1400  Total Billable Time: 25 minutes     Precautions: Standard    Subjective     Pt reports w/ n o c/o pn in R knee and no c/o itcing in R LE.     She was compliant with home exercise program.  Response to previous treatment: No adverse effects   Functional change: No change     Pain:0/10  Location: right knee      Objective   ROM: 27 deg knee flexion PROM 1/18/19    Denise received therapeutic exercises to develop strength, endurance, ROM, flexibility, posture and core stabilization for  25 minutes including:    GSS w/ strap 2 min   EOT knee flexion 3 x 20 sec manual assist   Heel slides 10 x 10 sec hold manual assist   Knee flexion on towel roll 3 different sizes 2 min ea (PROM flexion 27 degrees)    Not performed today:   QS 3 x 10 3 sec hold   APs 30 x yellow band   SLR x 5 manual assist mod-max    Denise received the following supervised modalities after being cleared for contradictions: NMES Electrical Stimulation:  Denise OSBORN received NMES Electrical Stimulation to elicit muscle contraction of the R quad. Pt received stimulation at a rate of 50 pps with symmetric current, ramp of 2 seconds with 10 second on time and 10 second off  time. Patient tolerated treatment well without any adverse effects.   E-stim applied for 10 min w/ quad sets and cp    Denise received cold pack for 10 minutes to R knee.       Home Exercises Provided and Patient Education Provided     Education provided:   - Pt edu on proper exercise technique.  Pt also edu on the importance of eating while taking pn meds and prior to performing exercises.     Written Home Exercises Provided: Patient instructed to cont prior HEP.  Exercises were reviewed and Denise was able to demonstrate them prior to the end of the session.  Denise demonstrated good  understanding of the education provided.       Assessment   Pt had increased knee flexion although still lacking.  Pt mayuri tx well.  Pn level remained same prior to and after tx session.  Pt showed increased quad activation as well.  Pt became lethargic at the end of treatment today but remained coherent.      Denise is progressing well towards her goals.   Pt prognosis is Good.     Pt will continue to benefit from skilled outpatient physical therapy to address the deficits listed in the problem list box on initial evaluation, provide pt/family education and to maximize pt's level of independence in the home and community environment.     Pt's spiritual, cultural and educational needs considered and pt agreeable to plan of care and goals.    Anticipated barriers to physical therapy: none    Goals: Short Term GOALS: 6 weeks  1. Patient demonstrates independence with HEP. (progressing, not met)   2. Patient demonstrates increased right knee ROM to 90 degrees flexion to improve tolerance to functional activities with no more than 2/10 pain.(progressing, not met)   3. Patient demonstrates improved overall function per FOTO Knee Survey to 30% Limitation or less.(progressing, not met)   4. Patient will ambulate will least restrictive device.(progressing, not met)      Long Term GOALS: 12 weeks  1. Patient demonstrates increased  right knee ROM to be equal to left to improve tolerance to functional activities pain free. (progressing, not met)   2. Patient demonstrates increased strength BLE's to 5/5 or greater to improve tolerance to functional activities pain free. (progressing, not met)   3. Patient demonstrates improved overall function per FOTO Knee Survey to 10% Limitation or less. (progressing, not met)   4. Patient will return to work with no reports of pain or restrictions.(progressing, not met)   5. Patient will ambulate independently with normalized gait pattern.(progressing, not met)       Plan     Cont to progress towards goals set by PT.  Work to improve quad function and ROM next visit.      Ramiro Payne, PTA

## 2019-01-18 NOTE — TELEPHONE ENCOUNTER
Spoke with patient sw sending mobic to the pharmacy patient will call Monday if not feeling better

## 2019-01-18 NOTE — TELEPHONE ENCOUNTER
----- Message from Rosalba Victor sent at 1/18/2019  2:56 PM CST -----  Contact: Self/ [phone]  Patient would like a call back to speak with Dr. House.

## 2019-01-22 ENCOUNTER — CLINICAL SUPPORT (OUTPATIENT)
Dept: REHABILITATION | Facility: HOSPITAL | Age: 19
End: 2019-01-22
Attending: ORTHOPAEDIC SURGERY
Payer: MEDICAID

## 2019-01-22 DIAGNOSIS — R52 PAIN: ICD-10-CM

## 2019-01-22 PROCEDURE — 97014 ELECTRIC STIMULATION THERAPY: CPT | Performed by: PHYSICAL THERAPIST

## 2019-01-22 PROCEDURE — 97110 THERAPEUTIC EXERCISES: CPT | Performed by: PHYSICAL THERAPIST

## 2019-01-22 NOTE — PROGRESS NOTES
"  Physical Therapy Daily Treatment Note     Name: Denise Mosher  Clinic Number: [id number]    Therapy Diagnosis:   Encounter Diagnosis   Name Primary?    Pain      Physician: Ranulfo House MD    Visit Date: 1/22/2019    Physician Orders: PT Eval and Treat Right Knee  12/13/18 distal femoral osteotomy and MPFL reconstruction. PWB with knee brace. ROM, quad strengthening, no restrictions. Please start PT next week.   Medical Diagnosis from Referral: M21.061 (ICD-10-CM) - Acquired genu valgum of right knee M25.361 (ICD-10-CM) - Patellar instability of right knee   Evaluation Date: 12/21/2018  Authorization Period Expiration: 1/31/2019  Plan of Care Expiration: 03/22/2019  Visit # / Visits authorized: 6/12  Total visits: 7     Time In: 1:10 pm  Time Out: 2:05 pm  Total Billable Time: 35 minutes     Precautions: Standard    Subjective     Pt reports R knee is "sore"  She was compliant with home exercise program.  Response to previous treatment: No adverse effects   Functional change: No change     Pain: 3/10  Location: right knee      Objective   R knee AROM is 0-30 deg flexion, 32 passive.  POOR QS ability.  Unable to perform a SLR.  Ambulates slowly with crutches, pt est 50% WB on R, while wearing knee brace.  TTP peripatella.  Mild swelling.      Denise received therapeutic exercises to develop strength, endurance, ROM, flexibility, posture and core stabilization for  25 minutes including:    GSS w/ strap 2 min   EOT knee flexion 3 x 20 sec manual assist   Heel slides x 5 min  Knee flexion on towel roll 3 different sizes 2 min ea (PROM flexion 27 degrees)  QS  GS  Manual resisted hip abd  Isometric hip add  NMES quads x 10 min    Denise received cold pack for 10 minutes to R knee.       Home Exercises Provided and Patient Education Provided     Education provided:   - Pt educated on proper exercise technique.  Pt also educated on the importance of eating while taking pn meds and prior to performing " exercises.     Written Home Exercises Provided: Patient instructed to cont prior HEP.  Exercises were reviewed and Denise was able to demonstrate them prior to the end of the session.  Denise demonstrated good  understanding of the education provided.       Assessment   Tolerated treatment well but with much fatigue noted with MIN exercise.  Progressing very slowly.  Low pain tolerance      Denise is progressing well towards her goals.   Pt prognosis is Good.     Pt will continue to benefit from skilled outpatient physical therapy to address the deficits listed in the problem list box on initial evaluation, provide pt/family education and to maximize pt's level of independence in the home and community environment.     Pt's spiritual, cultural and educational needs considered and pt agreeable to plan of care and goals.    Anticipated barriers to physical therapy: none    Goals: Short Term GOALS: 6 weeks  1. Patient demonstrates independence with HEP. (progressing, not met)   2. Patient demonstrates increased right knee ROM to 90 degrees flexion to improve tolerance to functional activities with no more than 2/10 pain.(progressing, not met)   3. Patient demonstrates improved overall function per FOTO Knee Survey to 30% Limitation or less.(progressing, not met)   4. Patient will ambulate will least restrictive device.(progressing, not met)      Long Term GOALS: 12 weeks  1. Patient demonstrates increased right knee ROM to be equal to left to improve tolerance to functional activities pain free. (progressing, not met)   2. Patient demonstrates increased strength BLE's to 5/5 or greater to improve tolerance to functional activities pain free. (progressing, not met)   3. Patient demonstrates improved overall function per FOTO Knee Survey to 10% Limitation or less. (progressing, not met)   4. Patient will return to work with no reports of pain or restrictions.(progressing, not met)   5. Patient will ambulate  independently with normalized gait pattern.(progressing, not met)       Plan     Cont to progress towards goals set by PT.       Ney Epstein, PT

## 2019-01-24 DIAGNOSIS — M25.569 KNEE PAIN, UNSPECIFIED CHRONICITY, UNSPECIFIED LATERALITY: Primary | ICD-10-CM

## 2019-01-25 ENCOUNTER — OFFICE VISIT (OUTPATIENT)
Dept: ORTHOPEDICS | Facility: CLINIC | Age: 19
End: 2019-01-25
Payer: MEDICAID

## 2019-01-25 ENCOUNTER — CLINICAL SUPPORT (OUTPATIENT)
Dept: REHABILITATION | Facility: HOSPITAL | Age: 19
End: 2019-01-25
Attending: ORTHOPAEDIC SURGERY
Payer: MEDICAID

## 2019-01-25 ENCOUNTER — HOSPITAL ENCOUNTER (OUTPATIENT)
Dept: RADIOLOGY | Facility: HOSPITAL | Age: 19
Discharge: HOME | End: 2019-01-25
Attending: ORTHOPAEDIC SURGERY
Payer: MEDICAID

## 2019-01-25 DIAGNOSIS — M25.361 PATELLAR INSTABILITY OF RIGHT KNEE: ICD-10-CM

## 2019-01-25 DIAGNOSIS — M25.561 ACUTE PAIN OF RIGHT KNEE: Primary | ICD-10-CM

## 2019-01-25 DIAGNOSIS — M25.569 KNEE PAIN, UNSPECIFIED CHRONICITY, UNSPECIFIED LATERALITY: ICD-10-CM

## 2019-01-25 DIAGNOSIS — M21.061 ACQUIRED GENU VALGUM OF RIGHT KNEE: Primary | ICD-10-CM

## 2019-01-25 PROCEDURE — 99999 PR PBB SHADOW E&M-EST. PATIENT-LVL I: CPT | Mod: PBBFAC,,, | Performed by: ORTHOPAEDIC SURGERY

## 2019-01-25 PROCEDURE — 73560 X-RAY EXAM OF KNEE 1 OR 2: CPT | Mod: 26,RT,, | Performed by: RADIOLOGY

## 2019-01-25 PROCEDURE — 99024 POSTOP FOLLOW-UP VISIT: CPT | Mod: ,,, | Performed by: ORTHOPAEDIC SURGERY

## 2019-01-25 PROCEDURE — 73560 X-RAY EXAM OF KNEE 1 OR 2: CPT | Mod: TC,PO,RT

## 2019-01-25 PROCEDURE — 97110 THERAPEUTIC EXERCISES: CPT

## 2019-01-25 PROCEDURE — 73560 XR KNEE 1 OR 2 VIEW RIGHT: ICD-10-PCS | Mod: 26,RT,, | Performed by: RADIOLOGY

## 2019-01-25 PROCEDURE — 99211 OFF/OP EST MAY X REQ PHY/QHP: CPT | Mod: PBBFAC,25 | Performed by: ORTHOPAEDIC SURGERY

## 2019-01-25 PROCEDURE — 99024 PR POST-OP FOLLOW-UP VISIT: ICD-10-PCS | Mod: ,,, | Performed by: ORTHOPAEDIC SURGERY

## 2019-01-25 PROCEDURE — 99999 PR PBB SHADOW E&M-EST. PATIENT-LVL I: ICD-10-PCS | Mod: PBBFAC,,, | Performed by: ORTHOPAEDIC SURGERY

## 2019-01-25 NOTE — PROGRESS NOTES
Physical Therapy Daily Treatment Note     Name: Denise Mosher  Clinic Number: [id number]    Therapy Diagnosis:   Encounter Diagnosis   Name Primary?    Acute pain of right knee Yes     Physician: Ranulfo House MD    Visit Date: 1/25/2019    Physician Orders: PT Eval and Treat Right Knee  12/13/18 distal femoral osteotomy and MPFL reconstruction. PWB with knee brace. ROM, quad strengthening, no restrictions. Please start PT next week.   Medical Diagnosis from Referral: M21.061 (ICD-10-CM) - Acquired genu valgum of right knee M25.361 (ICD-10-CM) - Patellar instability of right knee   Evaluation Date: 12/21/2018  Authorization Period Expiration: 1/31/2019  Plan of Care Expiration: 03/22/2019  Visit # / Visits authorized: 7/12  Total visits: 8     Time In: 1322  Time Out: 1430  Total Billable Time: 55 minutes     Precautions: Standard    Subjective     Pt states feeling okay w/ no c/o pn in R knee currently.  Pt unable to take pn meds 2* drowsiness that happens as a result.  Patient's mother mentioned the physician put in a new order for a different pn med.    She was compliant with home exercise program.  Response to previous treatment: No adverse effects   Functional change: No change     Pain: 3/10  Location: right knee      Objective     Denise received therapeutic exercises to develop strength, endurance, ROM, flexibility, posture and core stabilization for  55 minutes including:    GSS w/ strap 2 min   HSS plinthe 2 min  Knee flexion on towel roll 3 different sizes 2 min ea (PROM flexion  degrees)  EOT knee flexion 3 x 20 sec manual assist   QS 30 x 5 sec hold   Manual resisted hip abd 30 x 3 sec hold   Isometric hip add ball sq 30 x 3 sec hold     Denise received cold pack for 10 minutes to R knee w/ NMES R quads x 10 min      Home Exercises Provided and Patient Education Provided     Education provided:   - Pt educated on proper exercise technique.  Pt also educated on the importance of eating while  taking pn meds and prior to performing exercises.     Written Home Exercises Provided: Patient instructed to cont prior HEP.  Exercises were reviewed and Denise was able to demonstrate them prior to the end of the session.  Denise demonstrated good  understanding of the education provided.       Assessment     Pt unable to mayuri pn today 2* not taking pn med.  Pt becomes lethargic and somewhat unresponsive at times when on pn medication.  Pt experiences moments of severe pn one second followed by no c/o pn the next.  Knee flexion ROM increased by 2 degrees but cont to be very limited.  Quad activation remains poor.    Denise is progressing well towards her goals.   Pt prognosis is Good.     Pt will continue to benefit from skilled outpatient physical therapy to address the deficits listed in the problem list box on initial evaluation, provide pt/family education and to maximize pt's level of independence in the home and community environment.     Pt's spiritual, cultural and educational needs considered and pt agreeable to plan of care and goals.    Anticipated barriers to physical therapy: none    Goals: Short Term GOALS: 6 weeks  1. Patient demonstrates independence with HEP. (progressing, not met)   2. Patient demonstrates increased right knee ROM to 90 degrees flexion to improve tolerance to functional activities with no more than 2/10 pain.(progressing, not met)   3. Patient demonstrates improved overall function per FOTO Knee Survey to 30% Limitation or less.(progressing, not met)   4. Patient will ambulate will least restrictive device.(progressing, not met)      Long Term GOALS: 12 weeks  1. Patient demonstrates increased right knee ROM to be equal to left to improve tolerance to functional activities pain free. (progressing, not met)   2. Patient demonstrates increased strength BLE's to 5/5 or greater to improve tolerance to functional activities pain free. (progressing, not met)   3. Patient  demonstrates improved overall function per FOTO Knee Survey to 10% Limitation or less. (progressing, not met)   4. Patient will return to work with no reports of pain or restrictions.(progressing, not met)   5. Patient will ambulate independently with normalized gait pattern.(progressing, not met)       Plan     Cont to progress towards goals set by PT.   Pt work to increase ROM next visit.      Ramiro Payne, PTA

## 2019-01-28 ENCOUNTER — CLINICAL SUPPORT (OUTPATIENT)
Dept: REHABILITATION | Facility: HOSPITAL | Age: 19
End: 2019-01-28
Attending: ORTHOPAEDIC SURGERY
Payer: MEDICAID

## 2019-01-28 DIAGNOSIS — R52 PAIN: ICD-10-CM

## 2019-01-28 PROCEDURE — 97110 THERAPEUTIC EXERCISES: CPT

## 2019-01-28 NOTE — PROGRESS NOTES
Physical Therapy Daily Treatment Note     Name: Denise Mosher  Clinic Number: [id number]    Therapy Diagnosis:   No diagnosis found.  Physician: Ranulfo House MD    Visit Date: 1/28/2019    Physician Orders: PT Eval and Treat Right Knee  12/13/18 distal femoral osteotomy and MPFL reconstruction. PWB with knee brace. ROM, quad strengthening, no restrictions. Please start PT next week.   Medical Diagnosis from Referral: M21.061 (ICD-10-CM) - Acquired genu valgum of right knee M25.361 (ICD-10-CM) - Patellar instability of right knee   Evaluation Date: 12/21/2018  Authorization Period Expiration: 1/31/2019  Plan of Care Expiration: 03/22/2019  Visit # / Visits authorized: 8/12  Total visits: 9     Time In: 1400  Time Out: 1505  Total Billable Time: 25 minutes     Precautions: Standard    Subjective     Pt reports w/ no c/o pn in L knee currently.  Pt mentioned taking mother's flexeril for pn 2* not being able to receive prescription form the pharmacy.     She was compliant with home exercise program.  Response to previous treatment: No adverse effects   Functional change: No change     Pain: 3/10  Location: right knee      Objective     Denise received therapeutic exercises to develop strength, endurance, ROM, flexibility, posture and core stabilization for 25  minutes including:    GSS w/ strap 2 min   HSS plinthe 2 min  Knee flexion on towel roll 3 different sizes 2 min ea (PROM flexion  degrees)  EOT knee flexion 3 x 20 sec manual assist   QS 30 x 5 sec hold   SLR 2 x 5 max- mod manual assist     Not performed today  Manual resisted hip abd 30 x 3 sec hold   Isometric hip add ball sq 30 x 3 sec hold     Denise received cold pack for 10 minutes to R knee w/ NMES R quads x 10 min      Home Exercises Provided and Patient Education Provided     Education provided:   - Pt educated on proper exercise technique.  Pt also educated on the importance of eating while taking pn meds and prior to performing exercises.      Written Home Exercises Provided: Patient instructed to cont prior HEP.  Exercises were reviewed and Denise was able to demonstrate them prior to the end of the session.  Denise demonstrated good  understanding of the education provided.       Assessment   Pt cont to be limited in knee flexion 2* pn and stiffness.  Pt able to mayuri manual assistive leg raise for quad strength.  Pt mayuri to tx was fair.      Denise is progressing well towards her goals.   Pt prognosis is Good.     Pt will continue to benefit from skilled outpatient physical therapy to address the deficits listed in the problem list box on initial evaluation, provide pt/family education and to maximize pt's level of independence in the home and community environment.     Pt's spiritual, cultural and educational needs considered and pt agreeable to plan of care and goals.    Anticipated barriers to physical therapy: none    Goals: Short Term GOALS: 6 weeks  1. Patient demonstrates independence with HEP. (progressing, not met)   2. Patient demonstrates increased right knee ROM to 90 degrees flexion to improve tolerance to functional activities with no more than 2/10 pain.(progressing, not met)   3. Patient demonstrates improved overall function per FOTO Knee Survey to 30% Limitation or less.(progressing, not met)   4. Patient will ambulate will least restrictive device.(progressing, not met)      Long Term GOALS: 12 weeks  1. Patient demonstrates increased right knee ROM to be equal to left to improve tolerance to functional activities pain free. (progressing, not met)   2. Patient demonstrates increased strength BLE's to 5/5 or greater to improve tolerance to functional activities pain free. (progressing, not met)   3. Patient demonstrates improved overall function per FOTO Knee Survey to 10% Limitation or less. (progressing, not met)   4. Patient will return to work with no reports of pain or restrictions.(progressing, not met)   5. Patient will  ambulate independently with normalized gait pattern.(progressing, not met)       Plan     Cont to progress towards goals set by PT.   Cont to increase ROM next visit.      Ramiro Payne, PTA

## 2019-01-28 NOTE — PROGRESS NOTES
CC: Post-op    HPI: Denise Mosher is now 6 weeks post-op following   DATE OF PROCEDURE:  12/13/18     PREOPERATIVE DIAGNOSES:  1.  Right knee pain.  2.  Right patellar instability.  3.  Right genu valgum.     POSTOPERATIVE DIAGNOSES:  1.  Right knee pain.  2.  Right patellar instability.  3.  Right genu valgum.     PROCEDURES:  1.  Right knee arthroscopy  2.  Right medial patellofemoral ligament reconstruction with hamstring allograft  3.  Right distal femoral varus-producing osteotomy with plate fixation.  Doing well, pain controlled.  Has a new bruise around tibial tubercle.    PE: Incisions well-healed with no sign of infection.  Well-perfused, neurovascularly intact distally.  Very stiff knee ROM (0-45 deg).  Bruising over tibial tubercle.  Tender.  No evidence of infection.    X-ray: healing osteotomy in good alignment.    Clinical decision-making: Doing well.  Need better flexion.  Encouraged to push ROM.  Unclear cause of bruising, will monitor.  RTC 6 weeks, EOS hip to ankle X-rays and right knee X-rays.

## 2019-01-31 DIAGNOSIS — M25.361 PATELLAR INSTABILITY OF RIGHT KNEE: ICD-10-CM

## 2019-01-31 DIAGNOSIS — M24.661 FIBROSIS OF RIGHT KNEE JOINT: ICD-10-CM

## 2019-01-31 DIAGNOSIS — M21.061 ACQUIRED GENU VALGUM OF RIGHT KNEE: Primary | ICD-10-CM

## 2019-02-01 ENCOUNTER — CLINICAL SUPPORT (OUTPATIENT)
Dept: REHABILITATION | Facility: HOSPITAL | Age: 19
End: 2019-02-01
Attending: ORTHOPAEDIC SURGERY
Payer: MEDICAID

## 2019-02-01 DIAGNOSIS — R52 PAIN: ICD-10-CM

## 2019-02-01 PROCEDURE — 97110 THERAPEUTIC EXERCISES: CPT

## 2019-02-01 NOTE — PROGRESS NOTES
Physical Therapy Daily Treatment Note     Name: Denise Mosher  Clinic Number: [id number]    Therapy Diagnosis:   No diagnosis found.  Physician: Ranulfo House MD    Visit Date: 2/1/2019    Physician Orders: PT Eval and Treat Right Knee  12/13/18 distal femoral osteotomy and MPFL reconstruction. PWB with knee brace. ROM, quad strengthening, no restrictions. Please start PT next week.   Medical Diagnosis from Referral: M21.061 (ICD-10-CM) - Acquired genu valgum of right knee M25.361 (ICD-10-CM) - Patellar instability of right knee   Evaluation Date: 12/21/2018  Authorization Period Expiration: 1/31/2019  Plan of Care Expiration: 03/22/2019  Visit # / Visits authorized: 9/12  Total visits: 10     Time In: 1320  Time Out: 1405  Total Billable Time: 17 minutes     Precautions: Standard    Subjective     Pt cont to have stiffness in R knee but no c/o pn currently.      She was compliant with home exercise program.  Response to previous treatment: No adverse effects   Functional change: No change     Pain: 0/10  Location: right knee      Objective   FOTO: 2/1/19  68% limitation     Denise received therapeutic exercises to develop strength, endurance, ROM, flexibility, posture and core stabilization for 17 minutes including:    GSS w/ strap 1 min   HSS plinthe 1 min  Knee flexion on towel roll 3 different sizes 2 min ea (PROM flexion  Degrees)  SLR 2 x 5 max- mod manual assist     Not performed today  Manual resisted hip abd 30 x 3 sec hold   Isometric hip add ball sq 30 x 3 sec hold   EOT knee flexion 3 x 20 sec manual assist   QS 30 x 5 sec hold     Denise received cold pack for 10 minutes to R knee w/ NMES R quads x 10 min      Home Exercises Provided and Patient Education Provided     Education provided:   - Pt educated on proper exercise technique.  Pt also educated on the importance of eating while taking pn meds and prior to performing exercises.     Written Home Exercises Provided: Patient instructed to  cont prior HEP.  Exercises were reviewed and Denise was able to demonstrate them prior to the end of the session.  Denise demonstrated good  understanding of the education provided.       Assessment     Therex reduced 2* Pt arriving 20 min late.  Pt displayed increased knee flexion during therex.  Pt cont to have ROM and quad strength deficits.        Denise is progressing well towards her goals.   Pt prognosis is Good.     Pt will continue to benefit from skilled outpatient physical therapy to address the deficits listed in the problem list box on initial evaluation, provide pt/family education and to maximize pt's level of independence in the home and community environment.     Pt's spiritual, cultural and educational needs considered and pt agreeable to plan of care and goals.    Anticipated barriers to physical therapy: none    Goals: Short Term GOALS: 6 weeks  1. Patient demonstrates independence with HEP. (progressing, not met)   2. Patient demonstrates increased right knee ROM to 90 degrees flexion to improve tolerance to functional activities with no more than 2/10 pain.(progressing, not met)   3. Patient demonstrates improved overall function per FOTO Knee Survey to 30% Limitation or less.(progressing, not met)   4. Patient will ambulate will least restrictive device.(progressing, not met)      Long Term GOALS: 12 weeks  1. Patient demonstrates increased right knee ROM to be equal to left to improve tolerance to functional activities pain free. (progressing, not met)   2. Patient demonstrates increased strength BLE's to 5/5 or greater to improve tolerance to functional activities pain free. (progressing, not met)   3. Patient demonstrates improved overall function per FOTO Knee Survey to 10% Limitation or less. (progressing, not met)   4. Patient will return to work with no reports of pain or restrictions.(progressing, not met)   5. Patient will ambulate independently with normalized gait  pattern.(progressing, not met)       Plan     Cont to progress towards goals set by PT.   Cont to increase ROM next visit.      Ramiro Payne, PTA

## 2019-02-04 ENCOUNTER — TELEPHONE (OUTPATIENT)
Dept: ORTHOPEDICS | Facility: CLINIC | Age: 19
End: 2019-02-04

## 2019-02-04 DIAGNOSIS — M25.361 PATELLAR INSTABILITY OF RIGHT KNEE: Primary | ICD-10-CM

## 2019-02-05 ENCOUNTER — CLINICAL SUPPORT (OUTPATIENT)
Dept: REHABILITATION | Facility: HOSPITAL | Age: 19
End: 2019-02-05
Attending: ORTHOPAEDIC SURGERY
Payer: MEDICAID

## 2019-02-05 DIAGNOSIS — R52 PAIN: ICD-10-CM

## 2019-02-05 PROCEDURE — 97110 THERAPEUTIC EXERCISES: CPT

## 2019-02-05 NOTE — PROGRESS NOTES
Physical Therapy Daily Treatment Note     Name: Denise Mosher  Clinic Number: [id number]    Therapy Diagnosis:   Encounter Diagnosis   Name Primary?    Pain      Physician: Ranulfo House MD    Visit Date: 2/5/2019    Physician Orders: PT Eval and Treat Right Knee  12/13/18 distal femoral osteotomy and MPFL reconstruction. PWB with knee brace. ROM, quad strengthening, no restrictions. Please start PT next week.   Medical Diagnosis from Referral: M21.061 (ICD-10-CM) - Acquired genu valgum of right knee M25.361 (ICD-10-CM) - Patellar instability of right knee   Evaluation Date: 12/21/2018  Authorization Period Expiration: 1/31/2019  Plan of Care Expiration: 03/22/2019  Visit # / Visits authorized: 10/12  Total visits: 11     Time In: 1500  Time Out: 1605  Total Billable Time: 27 minutes     Precautions: Standard    Subjective     Pt reports w/ no c/o pn in R knee.  Pt mentioned getting 60 degrees of flexion on CPM at home since last PT visit.      She was compliant with home exercise program.  Response to previous treatment: No adverse effects   Functional change: No change     Pain: 0/10  Location: right knee      Objective   FOTO: 2/1/19  68% limitation   Pt amb into clinic w/out crutches and not performing any knee flexion w/ gait pattern.    ROM: 2/5/19  42 degrees knee flexion PROM    Denise received therapeutic exercises to develop strength, endurance, ROM, flexibility, posture and core stabilization for 27 minutes including:    GSS w/ strap 1 min   HSS plinthe 1 min  Heel slides 2 x 10 x 10 sec hold   SLR 3 x 8 max- mod manual assist   QS 30 x 5 sec hold   EOT knee flexion 3 x 20 sec manual assist     Not performed today  Manual resisted hip abd 30 x 3 sec hold   Isometric hip add ball sq 30 x 3 sec hold   Knee flexion on towel roll 3 different sizes 2 min ea    Denise received cold pack for 10 minutes to R knee w/ NMES R quads x 10 min      Home Exercises Provided and Patient Education Provided      Education provided:   - Pt educated on proper exercise technique.  Pt also educated on the importance of eating while taking pn meds and prior to performing exercises.     Written Home Exercises Provided: Patient instructed to cont prior HEP.  Exercises were reviewed and Denise was able to demonstrate them prior to the end of the session.  Denise demonstrated good  understanding of the education provided.       Assessment     Pt mayuri tx well.  Pn level remained same prior to and after tx session.  Pt showed improved knee flexion and quad activation during therex.  Pt cont to lack ROM and strength.         Denise is progressing well towards her goals.   Pt prognosis is Good.     Pt will continue to benefit from skilled outpatient physical therapy to address the deficits listed in the problem list box on initial evaluation, provide pt/family education and to maximize pt's level of independence in the home and community environment.     Pt's spiritual, cultural and educational needs considered and pt agreeable to plan of care and goals.    Anticipated barriers to physical therapy: none    Goals: Short Term GOALS: 6 weeks  1. Patient demonstrates independence with HEP. (progressing, not met)   2. Patient demonstrates increased right knee ROM to 90 degrees flexion to improve tolerance to functional activities with no more than 2/10 pain.(progressing, not met)   3. Patient demonstrates improved overall function per FOTO Knee Survey to 30% Limitation or less.(progressing, not met)   4. Patient will ambulate will least restrictive device.(progressing, not met)      Long Term GOALS: 12 weeks  1. Patient demonstrates increased right knee ROM to be equal to left to improve tolerance to functional activities pain free. (progressing, not met)   2. Patient demonstrates increased strength BLE's to 5/5 or greater to improve tolerance to functional activities pain free. (progressing, not met)   3. Patient demonstrates  improved overall function per FOTO Knee Survey to 10% Limitation or less. (progressing, not met)   4. Patient will return to work with no reports of pain or restrictions.(progressing, not met)   5. Patient will ambulate independently with normalized gait pattern.(progressing, not met)       Plan     Cont to progress towards goals set by PT.   Cont to increase ROM next visit.      Ramiro Payne, PTA

## 2019-02-08 ENCOUNTER — CLINICAL SUPPORT (OUTPATIENT)
Dept: REHABILITATION | Facility: HOSPITAL | Age: 19
End: 2019-02-08
Attending: ORTHOPAEDIC SURGERY
Payer: MEDICAID

## 2019-02-08 DIAGNOSIS — R52 PAIN: ICD-10-CM

## 2019-02-08 PROCEDURE — 97110 THERAPEUTIC EXERCISES: CPT | Performed by: PHYSICAL THERAPIST

## 2019-02-08 PROCEDURE — 97014 ELECTRIC STIMULATION THERAPY: CPT | Performed by: PHYSICAL THERAPIST

## 2019-02-08 NOTE — PROGRESS NOTES
"  Physical Therapy Daily Treatment Note     Name: Denise Mosher  Clinic Number: [id number]    Therapy Diagnosis:   Encounter Diagnosis   Name Primary?    Pain      Physician: Ranulfo House MD    Visit Date: 2/8/2019    Physician Orders: PT Eval and Treat Right Knee  12/13/18 distal femoral osteotomy and MPFL reconstruction. PWB with knee brace. ROM, quad strengthening, no restrictions. Please start PT next week.   Medical Diagnosis from Referral: M21.061 (ICD-10-CM) - Acquired genu valgum of right knee M25.361 (ICD-10-CM) - Patellar instability of right knee   Evaluation Date: 12/21/2018  Authorization Period Expiration: 1/31/2019  Plan of Care Expiration: 03/22/2019  Visit # / Visits authorized: 11/12     Time In: 1:30 pm  Time Out: 2:30 pm  Total Billable Time: 45 minutes     Precautions: Standard    Subjective     Pt reports R knee is "feeling OK"  She was compliant with home exercise program.  Response to previous treatment: No adverse effects   Functional change: No change     Pain: 0/10  Location: right knee      Objective   R knee AROM is 0-45 deg flexion, 50 passive.  FAIR QS ability.  Able to perform a SLR with MOD assistance.  Ambulates slowly without crutches.  Hyper sensitive to light touch peripatella.  Mild swelling.      Denise received therapeutic exercises to develop strength, endurance, ROM, flexibility, posture and core stabilization for  25 minutes including:    GSS w/ strap 2 min   EOT knee flexion 3 x 20 sec manual assist   Heel slides x 5 min  Knee flexion  QS  GS  Manual resisted hip abd  Isometric hip add  NMES quads x 10 min    Denise received cold pack for 10 minutes to R knee.       Home Exercises Provided and Patient Education Provided     Education provided:   - Pt educated on proper exercise technique.  Pt also educated on the importance of eating while taking pn meds and prior to performing exercises.     Written Home Exercises Provided: Patient instructed to cont prior " HEP.  Exercises were reviewed and Denise was able to demonstrate them prior to the end of the session.  Denise demonstrated good  understanding of the education provided.       Assessment   Tolerated treatment well but still exhibits POOR flexion and is scared to allow passive flexion..        Denise is progressing well towards her goals.   Pt prognosis is Good.     Pt will continue to benefit from skilled outpatient physical therapy to address the deficits listed in the problem list box on initial evaluation, provide pt/family education and to maximize pt's level of independence in the home and community environment.     Pt's spiritual, cultural and educational needs considered and pt agreeable to plan of care and goals.    Anticipated barriers to physical therapy: none    Goals: Short Term GOALS: 6 weeks  1. Patient demonstrates independence with HEP. (progressing, not met)   2. Patient demonstrates increased right knee ROM to 90 degrees flexion to improve tolerance to functional activities with no more than 2/10 pain.(progressing, not met)   3. Patient demonstrates improved overall function per FOTO Knee Survey to 30% Limitation or less.(progressing, not met)   4. Patient will ambulate will least restrictive device.(progressing, not met)      Long Term GOALS: 12 weeks  1. Patient demonstrates increased right knee ROM to be equal to left to improve tolerance to functional activities pain free. (progressing, not met)   2. Patient demonstrates increased strength BLE's to 5/5 or greater to improve tolerance to functional activities pain free. (progressing, not met)   3. Patient demonstrates improved overall function per FOTO Knee Survey to 10% Limitation or less. (progressing, not met)   4. Patient will return to work with no reports of pain or restrictions.(progressing, not met)   5. Patient will ambulate independently with normalized gait pattern.(progressing, not met)       Plan     Cont to progress towards  goals set by PT.       Ney Epstein, PT

## 2019-02-12 ENCOUNTER — CLINICAL SUPPORT (OUTPATIENT)
Dept: REHABILITATION | Facility: HOSPITAL | Age: 19
End: 2019-02-12
Attending: ORTHOPAEDIC SURGERY
Payer: MEDICAID

## 2019-02-12 DIAGNOSIS — R52 PAIN: ICD-10-CM

## 2019-02-12 PROCEDURE — 97110 THERAPEUTIC EXERCISES: CPT

## 2019-02-12 NOTE — PROGRESS NOTES
Physical Therapy Daily Treatment Note     Name: Denise Mosher  Clinic Number: [id number]    Therapy Diagnosis:   Encounter Diagnosis   Name Primary?    Pain      Physician: Ranulfo House MD    Visit Date: 2/12/2019    Physician Orders: PT Eval and Treat Right Knee  12/13/18 distal femoral osteotomy and MPFL reconstruction. PWB with knee brace. ROM, quad strengthening, no restrictions. Please start PT next week.   Medical Diagnosis from Referral: M21.061 (ICD-10-CM) - Acquired genu valgum of right knee M25.361 (ICD-10-CM) - Patellar instability of right knee   Evaluation Date: 12/21/2018  Authorization Period Expiration: 1/31/2019  Plan of Care Expiration: 03/22/2019  Visit # / Visits authorized: 12/12   Total visits: 13  Time In: 1500  Time Out: 1600  Total Billable Time: 50 minutes     Precautions: Standard    Subjective     Pt states feeling okay w/ no c/o pn currently.    She was compliant with home exercise program.  Response to previous treatment: No adverse effects   Functional change: No change     Pain: 0/10  Location: right knee      Objective   PROM: 50 degrees flexion 2/12/19    Denise received therapeutic exercises to develop strength, endurance, ROM, flexibility, posture and core stabilization for 50 minutes including:    GSS w/ strap 2 min   HSS plinthe 2 min   EOT knee flexion 3 x 20 sec manual assist   Heel slides 10 x 10 sec hold   SLR 3 x 8 min-mod assist   Seated knee flexion in chair w/ strap 2 x 30 sec   QS 30 x 5 sec hold   Manual PROM knee flexion     Not performed today   GS  Manual resisted hip abd  Isometric hip add  NMES quads x 10 min      Denise received cold pack for 10 minutes to R knee.       Home Exercises Provided and Patient Education Provided     Education provided:   - Pt educated on proper exercise technique.      Written Home Exercises Provided: Patient instructed to cont prior HEP.  Exercises were reviewed and Denise was able to demonstrate them prior to the end  of the session.  Denise demonstrated good  understanding of the education provided.       Assessment     Pt mayuri to tx was fair.  Pt demonstrated improved quad contraction as mentioned above with SLR.  Pt cont to lack quad strength and ROM.    Denise is progressing well towards her goals.   Pt prognosis is Good.     Pt will continue to benefit from skilled outpatient physical therapy to address the deficits listed in the problem list box on initial evaluation, provide pt/family education and to maximize pt's level of independence in the home and community environment.     Pt's spiritual, cultural and educational needs considered and pt agreeable to plan of care and goals.    Anticipated barriers to physical therapy: none    Goals: Short Term GOALS: 6 weeks  1. Patient demonstrates independence with HEP. (progressing, not met)   2. Patient demonstrates increased right knee ROM to 90 degrees flexion to improve tolerance to functional activities with no more than 2/10 pain.(progressing, not met)   3. Patient demonstrates improved overall function per FOTO Knee Survey to 30% Limitation or less.(progressing, not met)   4. Patient will ambulate will least restrictive device.(progressing, not met)      Long Term GOALS: 12 weeks  1. Patient demonstrates increased right knee ROM to be equal to left to improve tolerance to functional activities pain free. (progressing, not met)   2. Patient demonstrates increased strength BLE's to 5/5 or greater to improve tolerance to functional activities pain free. (progressing, not met)   3. Patient demonstrates improved overall function per FOTO Knee Survey to 10% Limitation or less. (progressing, not met)   4. Patient will return to work with no reports of pain or restrictions.(progressing, not met)   5. Patient will ambulate independently with normalized gait pattern.(progressing, not met)       Plan     Cont to progress towards goals set by PT.       Ramiro Payne, PTA

## 2019-02-15 ENCOUNTER — CLINICAL SUPPORT (OUTPATIENT)
Dept: REHABILITATION | Facility: HOSPITAL | Age: 19
End: 2019-02-15
Attending: ORTHOPAEDIC SURGERY
Payer: MEDICAID

## 2019-02-15 DIAGNOSIS — R52 PAIN: ICD-10-CM

## 2019-02-15 PROCEDURE — 97110 THERAPEUTIC EXERCISES: CPT

## 2019-02-15 NOTE — PROGRESS NOTES
Physical Therapy Daily Treatment Note     Name: Denise Mosher  Clinic Number: [id number]    Therapy Diagnosis:   Encounter Diagnosis   Name Primary?    Pain      Physician: Ranulfo House MD    Visit Date: 2/15/2019    Physician Orders: PT Eval and Treat Right Knee  12/13/18 distal femoral osteotomy and MPFL reconstruction. PWB with knee brace. ROM, quad strengthening, no restrictions. Please start PT next week.   Medical Diagnosis from Referral: M21.061 (ICD-10-CM) - Acquired genu valgum of right knee M25.361 (ICD-10-CM) - Patellar instability of right knee   Evaluation Date: 12/21/2018  Authorization Period Expiration: 1/31/2019  Plan of Care Expiration: 03/22/2019  Visit # / Visits authorized: 12/12   Total visits: 14  Time In: 0900  Time Out: 1003   Total Billable Time: 50 minutes     Precautions: Standard    Subjective     Pt reports w/ no c/o pn in R knee but cont to have some stiffness.  Pt has stopped using the CPM 2* not being able to achieve more than 80 degrees for a week.    She was compliant with home exercise program.  Response to previous treatment: No adverse effects   Functional change: No change     Pain: 0/10  Location: right knee      Objective   PROM: 54 degrees flexion 2/15/19    Denise received therapeutic exercises to develop strength, endurance, ROM, flexibility, posture and core stabilization for 50 minutes including:    GSS w/ strap 2 min   HSS plinthe 2 min   Heel slides 10 x 10 sec hold   Seated knee flexion over bolster and towel 3 x 30 sec   SLR 3 x 8   LLR 3 x 8   SLS 4 x 30 sec   Knee flexion over cone for gait 2 x 30   QS 30 x 5 sec hold w/ ball sq     Not performed today   GS  Manual resisted hip abd  Isometric hip add  NMES quads x 10 min      Denise received cold pack for 10 minutes to R knee.       Home Exercises Provided and Patient Education Provided     Education provided:   - Pt educated on proper exercise technique.      Written Home Exercises Provided: Patient  instructed to cont prior HEP.  Exercises were reviewed and Denise was able to demonstrate them prior to the end of the session.  Denise demonstrated good  understanding of the education provided.       Assessment   Pt instructed to cont using CPM at home and contact physician in regards to ROM plateau experienced for the past week.  Pt appeared to get stronger with the more sets of SLR she performed today.  Pt showed increased quad strength and improved knee flexion with gait activity during therex.  Pt cont to lack some strength and knee flexion.  Pt mayuri to tx was fair.  Pn level..after tx session.      Denise is progressing well towards her goals.   Pt prognosis is Good.     Pt will continue to benefit from skilled outpatient physical therapy to address the deficits listed in the problem list box on initial evaluation, provide pt/family education and to maximize pt's level of independence in the home and community environment.     Pt's spiritual, cultural and educational needs considered and pt agreeable to plan of care and goals.    Anticipated barriers to physical therapy: none    Goals: Short Term GOALS: 6 weeks  1. Patient demonstrates independence with HEP. (progressing, not met)   2. Patient demonstrates increased right knee ROM to 90 degrees flexion to improve tolerance to functional activities with no more than 2/10 pain.(progressing, not met)   3. Patient demonstrates improved overall function per FOTO Knee Survey to 30% Limitation or less.(progressing, not met)   4. Patient will ambulate will least restrictive device.(progressing, not met)      Long Term GOALS: 12 weeks  1. Patient demonstrates increased right knee ROM to be equal to left to improve tolerance to functional activities pain free. (progressing, not met)   2. Patient demonstrates increased strength BLE's to 5/5 or greater to improve tolerance to functional activities pain free. (progressing, not met)   3. Patient demonstrates improved  overall function per FOTO Knee Survey to 10% Limitation or less. (progressing, not met)   4. Patient will return to work with no reports of pain or restrictions.(progressing, not met)   5. Patient will ambulate independently with normalized gait pattern.(progressing, not met)       Plan     Cont to progress towards goals set by PT.   Cont to increase ROM and strength next visit.      Ramiro Payne, PTA

## 2019-02-19 ENCOUNTER — CLINICAL SUPPORT (OUTPATIENT)
Dept: REHABILITATION | Facility: HOSPITAL | Age: 19
End: 2019-02-19
Attending: ORTHOPAEDIC SURGERY
Payer: MEDICAID

## 2019-02-19 DIAGNOSIS — R52 PAIN: ICD-10-CM

## 2019-02-19 PROCEDURE — 97110 THERAPEUTIC EXERCISES: CPT

## 2019-02-19 NOTE — PROGRESS NOTES
Physical Therapy Daily Treatment Note     Name: Denise Mosher  Clinic Number: [id number]    Therapy Diagnosis:   Encounter Diagnosis   Name Primary?    Pain      Physician: Ranulfo House MD    Visit Date: 2/19/2019    Physician Orders: PT Eval and Treat Right Knee  12/13/18 distal femoral osteotomy and MPFL reconstruction. PWB with knee brace. ROM, quad strengthening, no restrictions. Please start PT next week.   Medical Diagnosis from Referral: M21.061 (ICD-10-CM) - Acquired genu valgum of right knee M25.361 (ICD-10-CM) - Patellar instability of right knee   Evaluation Date: 12/21/2018  Authorization Period Expiration: 1/31/2019  Plan of Care Expiration: 03/22/2019  Visit # / Visits authorized: 13/12   Total visits: 15  Time In: 1500  Time Out: 1600  Total Billable Time:25  minutes     Precautions: Standard    Subjective     Pt states feeling well w/ no c/o pn in R knee.  Pt mentioned cont to use CPM and reached 82 degrees at most.    She was compliant with home exercise program.  Response to previous treatment: No adverse effects   Functional change: No change     Pain: 0/10  Location: right knee      Objective   PROM: 54 degrees flexion 2/15/19    Denise received therapeutic exercises to develop strength, endurance, ROM, flexibility, posture and core stabilization for 25 minutes including:    GSS w/ strap 2 min   HSS plinthe 2 min   EOT knee PROM flexion 3 min   Seated knee flexion over bolster and towel 3 min    SLR 3 x 8   LLR 3 x 10  SLS 3 x 30 sec on blue oval   Knee flexion over cone for gait 3 x 20     Not performed today   GS  Manual resisted hip abd  Isometric hip add  NMES quads x 10 min  QS 30 x 5 sec hold w/ ball sq     Denise received cold pack for 10 minutes to R knee.       Home Exercises Provided and Patient Education Provided     Education provided:   - Pt educated on proper exercise technique.      Written Home Exercises Provided: Patient instructed to cont prior HEP.  Exercises were  reviewed and Denise was able to demonstrate them prior to the end of the session.  Denise demonstrated good  understanding of the education provided.       Assessment     Pt mayuri tx well.  Pn level remained same prior to and after tx session.  Pt demonstrated improved balance and strength.  Pt cont to lack PROM knee flexion.    Denise is progressing well towards her goals.   Pt prognosis is Good.     Pt will continue to benefit from skilled outpatient physical therapy to address the deficits listed in the problem list box on initial evaluation, provide pt/family education and to maximize pt's level of independence in the home and community environment.     Pt's spiritual, cultural and educational needs considered and pt agreeable to plan of care and goals.    Anticipated barriers to physical therapy: none    Goals: Short Term GOALS: 6 weeks  1. Patient demonstrates independence with HEP. (progressing, not met)   2. Patient demonstrates increased right knee ROM to 90 degrees flexion to improve tolerance to functional activities with no more than 2/10 pain.(progressing, not met)   3. Patient demonstrates improved overall function per FOTO Knee Survey to 30% Limitation or less.(progressing, not met)   4. Patient will ambulate will least restrictive device.(progressing, not met)      Long Term GOALS: 12 weeks  1. Patient demonstrates increased right knee ROM to be equal to left to improve tolerance to functional activities pain free. (progressing, not met)   2. Patient demonstrates increased strength BLE's to 5/5 or greater to improve tolerance to functional activities pain free. (progressing, not met)   3. Patient demonstrates improved overall function per FOTO Knee Survey to 10% Limitation or less. (progressing, not met)   4. Patient will return to work with no reports of pain or restrictions.(progressing, not met)   5. Patient will ambulate independently with normalized gait pattern.(progressing, not met)        Plan     Cont to progress towards goals set by PT.   Cont to increase ROM and strength next visit.      Ramiro Payne, PTA

## 2019-02-19 NOTE — PROGRESS NOTES
Expand All Collapse All          Acute Pain Service Progress Note     Denise Mosher is a 18 y.o., female, [id number].     Surgery:  Femur Osteotomy and MPFL reconstruction     Post Op Day #: 2     Catheter type: perineural  femoral     Infusion type: Ropivacaine 0.2%  8 basal     Problem List:    There are no hospital problems to display for this patient.        Subjective:                 General appearance of alert, oriented, no complaints              Pain with rest: 1    Numbers              Pain with movement: 6    Numbers              Side Effects                          1. Pruritis No                          2. Nausea No                          3. Motor Blockade No, 0=Ability to raise lower extremities off bed                          4. Sedation No, 1=awake and alert     Objective:                               Catheter site clean, dry, intact                                          Vitals   There were no vitals filed for this visit.                 Labs            No visits with results within 2 Day(s) from this visit.   Latest known visit with results is:   Admission on 11/03/2017, Discharged on 11/04/2017   Component Date Value Ref Range Status    WBC 11/03/2017 12.85* 3.90 - 12.70 K/uL Final    RBC 11/03/2017 4.69  4.00 - 5.40 M/uL Final    Hemoglobin 11/03/2017 12.1  12.0 - 16.0 g/dL Final    Hematocrit 11/03/2017 37.0  37.0 - 48.5 % Final    MCV 11/03/2017 79* 82 - 98 fL Final    MCH 11/03/2017 25.8* 27.0 - 31.0 pg Final    MCHC 11/03/2017 32.7  32.0 - 36.0 g/dL Final    RDW 11/03/2017 14.5  11.5 - 14.5 % Final    Platelets 11/03/2017 351* 150 - 350 K/uL Final    MPV 11/03/2017 9.7  9.2 - 12.9 fL Final    Immature Granulocytes 11/03/2017 0.4  0.0 - 0.5 % Final    Gran # (ANC) 11/03/2017 7.8* 1.8 - 7.7 K/uL Final    Immature Grans (Abs) 11/03/2017 0.05* 0.00 - 0.04 K/uL Final    Lymph # 11/03/2017 3.9  1.0 - 4.8 K/uL Final    Mono # 11/03/2017 0.9  0.3 - 1.0 K/uL Final     Eos # 11/03/2017 0.2  0.0 - 0.5 K/uL Final    Baso # 11/03/2017 0.04  0.00 - 0.20 K/uL Final    nRBC 11/03/2017 0  0 /100 WBC Final    Gran% 11/03/2017 60.6  38.0 - 73.0 % Final    Lymph% 11/03/2017 30.6  18.0 - 48.0 % Final    Mono% 11/03/2017 6.8  4.0 - 15.0 % Final    Eosinophil% 11/03/2017 1.3  0.0 - 8.0 % Final    Basophil% 11/03/2017 0.3  0.0 - 1.9 % Final    Differential Method 11/03/2017 Automated    Final    D-Dimer 11/03/2017 0.48  <0.50 mg/L FEU Final    Sodium 11/03/2017 139  136 - 145 mmol/L Final    Potassium 11/03/2017 3.8  3.5 - 5.1 mmol/L Final    Chloride 11/03/2017 104  95 - 110 mmol/L Final    CO2 11/03/2017 26  23 - 29 mmol/L Final    Glucose 11/03/2017 85  70 - 110 mg/dL Final    BUN, Bld 11/03/2017 10  6 - 20 mg/dL Final    Creatinine 11/03/2017 0.8  0.5 - 1.4 mg/dL Final    Calcium 11/03/2017 9.4  8.7 - 10.5 mg/dL Final    Total Protein 11/03/2017 7.5  6.0 - 8.4 g/dL Final    Albumin 11/03/2017 3.5  3.2 - 4.7 g/dL Final    Total Bilirubin 11/03/2017 0.4  0.1 - 1.0 mg/dL Final    Alkaline Phosphatase 11/03/2017 98  52 - 171 U/L Final    AST 11/03/2017 19  10 - 40 U/L Final    ALT 11/03/2017 39  10 - 44 U/L Final    Anion Gap 11/03/2017 9  8 - 16 mmol/L Final    eGFR if African American 11/03/2017 >60.0  >60 mL/min/1.73 m^2 Final    eGFR if non African American 11/03/2017 >60.0  >60 mL/min/1.73 m^2 Final    POC Preg Test, Ur 11/03/2017 Negative  Negative Final     Acceptable 11/03/2017 Yes    Final                     Meds   Current Medications   No current facility-administered medications for this visit.              Current Outpatient Medications   Medication Sig Dispense Refill    aspirin (ECOTRIN) 81 MG EC tablet Take 1 tablet (81 mg total) by mouth 2 (two) times daily. 120 tablet 0    methocarbamol (ROBAXIN) 500 MG Tab Take 1 tablet (500 mg total) by mouth 4 (four) times daily. for 10 days 40 tablet 0    oxyCODONE-acetaminophen (PERCOCET)  5-325 mg per tablet Take 1 tablet by mouth every 4 (four) hours as needed for Pain. 50 tablet 0    polyethylene glycol (GLYCOLAX) 17 gram PwPk Take 17 g by mouth once daily. 30 each 0                Facility-Administered Medications Ordered in Other Visits   Medication Dose Route Frequency Provider Last Rate Last Dose    acetaminophen tablet 1,000 mg  1,000 mg Oral Q6H Nikunj Mosley MD   1,000 mg at 12/15/18 0620    aspirin EC tablet 81 mg  81 mg Oral BID Thomas Cooper MD   81 mg at 12/15/18 0858    methocarbamol tablet 500 mg  500 mg Oral TID Thomas Cooper MD   500 mg at 12/15/18 0858    morphine injection 2 mg  2 mg Intravenous Q1H PRN Ranulfo House MD   2 mg at 12/14/18 2111    morphine injection 2 mg  2 mg Intravenous Q1H PRN Ranulfo House MD        morphine injection 2 mg  2 mg Intravenous Q1H PRN Ranulfo House MD   2 mg at 12/14/18 1637    mupirocin 2 % ointment 1 g  1 g Nasal BID Thomas Cooper MD   1 g at 12/15/18 0858    ondansetron injection 4 mg  4 mg Intravenous Q12H PRN Nikunj Mosley MD   4 mg at 12/13/18 2208    oxyCODONE immediate release tablet 10 mg  10 mg Oral Q3H PRN Nikunj Mosley MD   10 mg at 12/15/18 0858    oxyCODONE immediate release tablet 5 mg  5 mg Oral Q3H PRN Nikunj Mosley MD        pregabalin capsule 150 mg  150 mg Oral QHS Nikunj Mosley MD   150 mg at 12/14/18 2102    promethazine (PHENERGAN) 6.25 mg in dextrose 5 % 50 mL IVPB  6.25 mg Intravenous Q6H PRN Nikunj Mosley MD        ropivacaine (PF) 2 mg/ml (0.2%) infusion  8 mL/hr Perineural Continuous Nikunj Moslye MD 8 mL/hr at 12/15/18 0244 8 mL/hr at 12/15/18 0244    ropivacaine 0.2% ON-Q C-BLOC 400 ML (SELECT A FLOW)   Perineural Continuous Mal C. Yoo, MD                                 Assessment:                 Pain control adequate     Plan:                 Patient doing well, continue present treatment. Muscle spasms well controlled with  joaquín. Plan to discharge home with OnQ today.     Evaluator Pedro Angelo          Cosigned by: Hema Nowak MD at 1/14/2019  8:58 AM   I have reviewed and concur with the resident's history, physical, assessment, and plan.  I have personally interviewed and examined the patient at bedside.  See below addendum for my evaluation and additional findings.  Hema Nowak MD

## 2019-02-20 DIAGNOSIS — M25.569 KNEE PAIN, UNSPECIFIED CHRONICITY, UNSPECIFIED LATERALITY: ICD-10-CM

## 2019-02-20 DIAGNOSIS — M21.70 LEG LENGTH DIFFERENCE, ACQUIRED: Primary | ICD-10-CM

## 2019-02-22 ENCOUNTER — CLINICAL SUPPORT (OUTPATIENT)
Dept: REHABILITATION | Facility: HOSPITAL | Age: 19
End: 2019-02-22
Attending: ORTHOPAEDIC SURGERY
Payer: MEDICAID

## 2019-02-22 ENCOUNTER — HOSPITAL ENCOUNTER (OUTPATIENT)
Dept: RADIOLOGY | Facility: HOSPITAL | Age: 19
Discharge: HOME | End: 2019-02-22
Attending: ORTHOPAEDIC SURGERY
Payer: MEDICAID

## 2019-02-22 ENCOUNTER — OFFICE VISIT (OUTPATIENT)
Dept: ORTHOPEDICS | Facility: CLINIC | Age: 19
End: 2019-02-22
Payer: MEDICAID

## 2019-02-22 DIAGNOSIS — M25.361 PATELLAR INSTABILITY OF RIGHT KNEE: ICD-10-CM

## 2019-02-22 DIAGNOSIS — M25.569 KNEE PAIN, UNSPECIFIED CHRONICITY, UNSPECIFIED LATERALITY: ICD-10-CM

## 2019-02-22 DIAGNOSIS — M21.061 ACQUIRED GENU VALGUM OF RIGHT KNEE: Primary | ICD-10-CM

## 2019-02-22 DIAGNOSIS — M21.70 LEG LENGTH DIFFERENCE, ACQUIRED: ICD-10-CM

## 2019-02-22 DIAGNOSIS — M24.661 FIBROSIS OF RIGHT KNEE JOINT: ICD-10-CM

## 2019-02-22 PROCEDURE — 99024 PR POST-OP FOLLOW-UP VISIT: ICD-10-PCS | Mod: ,,, | Performed by: ORTHOPAEDIC SURGERY

## 2019-02-22 PROCEDURE — 97110 THERAPEUTIC EXERCISES: CPT

## 2019-02-22 PROCEDURE — 77073 XR HIP TO ANKLE: ICD-10-PCS | Mod: 26,,, | Performed by: RADIOLOGY

## 2019-02-22 PROCEDURE — 99024 POSTOP FOLLOW-UP VISIT: CPT | Mod: ,,, | Performed by: ORTHOPAEDIC SURGERY

## 2019-02-22 PROCEDURE — 77073 BONE LENGTH STUDIES: CPT | Mod: TC

## 2019-02-22 PROCEDURE — 73560 X-RAY EXAM OF KNEE 1 OR 2: CPT | Mod: TC,RT

## 2019-02-22 PROCEDURE — 99999 PR PBB SHADOW E&M-EST. PATIENT-LVL II: ICD-10-PCS | Mod: PBBFAC,,, | Performed by: ORTHOPAEDIC SURGERY

## 2019-02-22 PROCEDURE — 99999 PR PBB SHADOW E&M-EST. PATIENT-LVL II: CPT | Mod: PBBFAC,,, | Performed by: ORTHOPAEDIC SURGERY

## 2019-02-22 PROCEDURE — 77073 BONE LENGTH STUDIES: CPT | Mod: 26,,, | Performed by: RADIOLOGY

## 2019-02-22 PROCEDURE — 73560 XR KNEE 1 OR 2 VIEW RIGHT: ICD-10-PCS | Mod: 26,59,RT, | Performed by: RADIOLOGY

## 2019-02-22 PROCEDURE — 99212 OFFICE O/P EST SF 10 MIN: CPT | Mod: PBBFAC,25 | Performed by: ORTHOPAEDIC SURGERY

## 2019-02-22 PROCEDURE — 73560 X-RAY EXAM OF KNEE 1 OR 2: CPT | Mod: 26,59,RT, | Performed by: RADIOLOGY

## 2019-02-22 NOTE — PROGRESS NOTES
Physical Therapy Daily Treatment Note     Name: Denise Mosher  Clinic Number: [id number]    Therapy Diagnosis:   No diagnosis found.  Physician: Ranulfo House MD    Visit Date: 2/22/2019    Physician Orders: PT Eval and Treat Right Knee  12/13/18 distal femoral osteotomy and MPFL reconstruction. PWB with knee brace. ROM, quad strengthening, no restrictions. Please start PT next week.   Medical Diagnosis from Referral: M21.061 (ICD-10-CM) - Acquired genu valgum of right knee M25.361 (ICD-10-CM) - Patellar instability of right knee   Evaluation Date: 12/21/2018  Authorization Period Expiration: 1/31/2019  Plan of Care Expiration: 03/22/2019  Visit # / Visits authorized: 14/12   Total visits: 16  Time In: 1400  Time Out: 1505  Total Billable Time:25  minutes     Precautions: Standard    Subjective     Pt reports w/ 6/10 R knee pn today.  Pt mentioned she will be walking in a 5K race on [place]arch 9th.  Pt also mentioned her physician wants to cont tx for 6 more weeks in order to achieve more flexion prior to attempting a manipulation.    She was compliant with home exercise program.  Response to previous treatment: No adverse effects   Functional change: No change     Pain: 6/10  Location: right knee      Objective   PROM: 60 degrees flexion 2/22/19    Denise received therapeutic exercises to develop strength, endurance, ROM, flexibility, posture and core stabilization for 25 minutes including:    GSS w/ strap 2 min   HSS plinthe 2 min   EOT knee PROM flexion 3 x 30 sec   Seated knee flexion over bolster and towel 3 min    SLR 3 x 8   LLR 3 x 10    Not performed today   GS  Manual resisted hip abd  Isometric hip add  NMES quads x 10 min  QS 30 x 5 sec hold w/ ball sq   SLS 3 x 30 sec on blue oval   Knee flexion over cone for gait 3 x 20     Manual treatment:   Joint mobes to R knee 7 min    Denise received cold pack for 10 minutes to R knee.       Home Exercises Provided and Patient Education Provided      Education provided:   - Pt educated on proper exercise technique.      Written Home Exercises Provided: Patient instructed to cont prior HEP.  Exercises were reviewed and Denise was able to demonstrate them prior to the end of the session.  Denise demonstrated good  understanding of the education provided.       Assessment   Pt cont to lack strength and ROM.  Pt showed an increase in knee flexion during therex.  Pt mayuri to tx was fair.      Denise is progressing well towards her goals.   Pt prognosis is Good.     Pt will continue to benefit from skilled outpatient physical therapy to address the deficits listed in the problem list box on initial evaluation, provide pt/family education and to maximize pt's level of independence in the home and community environment.     Pt's spiritual, cultural and educational needs considered and pt agreeable to plan of care and goals.    Anticipated barriers to physical therapy: none    Goals: Short Term GOALS: 6 weeks  1. Patient demonstrates independence with HEP. (progressing, not met)   2. Patient demonstrates increased right knee ROM to 90 degrees flexion to improve tolerance to functional activities with no more than 2/10 pain.(progressing, not met)   3. Patient demonstrates improved overall function per FOTO Knee Survey to 30% Limitation or less.(progressing, not met)   4. Patient will ambulate will least restrictive device.(progressing, not met)      Long Term GOALS: 12 weeks  1. Patient demonstrates increased right knee ROM to be equal to left to improve tolerance to functional activities pain free. (progressing, not met)   2. Patient demonstrates increased strength BLE's to 5/5 or greater to improve tolerance to functional activities pain free. (progressing, not met)   3. Patient demonstrates improved overall function per FOTO Knee Survey to 10% Limitation or less. (progressing, not met)   4. Patient will return to work with no reports of pain or  restrictions.(progressing, not met)   5. Patient will ambulate independently with normalized gait pattern.(progressing, not met)       Plan     Cont to progress towards goals set by PT.   Cont to increase ROM and strength next visit.      Ramiro Payne, PTA

## 2019-02-25 NOTE — PROGRESS NOTES
CC: Post-op    HPI: Denise Mosher is now 12 weeks post-op following   DATE OF PROCEDURE:  12/13/18     PREOPERATIVE DIAGNOSES:  1.  Right knee pain.  2.  Right patellar instability.  3.  Right genu valgum.     POSTOPERATIVE DIAGNOSES:  1.  Right knee pain.  2.  Right patellar instability.  3.  Right genu valgum.     PROCEDURES:  1.  Right knee arthroscopy  2.  Right medial patellofemoral ligament reconstruction with hamstring allograft  3.  Right distal femoral varus-producing osteotomy with plate fixation.  Doing well, pain controlled.     PE: Incisions well-healed with no sign of infection.  Well-perfused, neurovascularly intact distally.  Very stiff knee ROM (0-60 deg).  Nontender.  No evidence of infection.    X-ray: healing osteotomy in good alignment.    Clinical decision-making: Doing well.  Need better flexion.  Encouraged to push ROM.  Will try dynamic flexion brace.   RTC 6 weeks, right knee X-rays.  Scope/OMID if no improvement.

## 2019-02-26 ENCOUNTER — CLINICAL SUPPORT (OUTPATIENT)
Dept: REHABILITATION | Facility: HOSPITAL | Age: 19
End: 2019-02-26
Attending: ORTHOPAEDIC SURGERY
Payer: MEDICAID

## 2019-02-26 ENCOUNTER — TELEPHONE (OUTPATIENT)
Dept: ORTHOPEDICS | Facility: CLINIC | Age: 19
End: 2019-02-26

## 2019-02-26 DIAGNOSIS — R52 PAIN: ICD-10-CM

## 2019-02-26 PROCEDURE — 97110 THERAPEUTIC EXERCISES: CPT

## 2019-02-26 NOTE — PROGRESS NOTES
Physical Therapy Daily Treatment Note     Name: Denise Mosher  Clinic Number: [id number]    Therapy Diagnosis:   Encounter Diagnosis   Name Primary?    Pain      Physician: Ranulfo House MD    Visit Date: 2/26/2019    Physician Orders: PT Eval and Treat Right Knee  12/13/18 distal femoral osteotomy and MPFL reconstruction. PWB with knee brace. ROM, quad strengthening, no restrictions. Please start PT next week.   Medical Diagnosis from Referral: M21.061 (ICD-10-CM) - Acquired genu valgum of right knee M25.361 (ICD-10-CM) - Patellar instability of right knee   Evaluation Date: 12/21/2018  Authorization Period Expiration: 1/31/2019  Plan of Care Expiration: 03/22/2019  Visit # / Visits authorized: 15/12   Total visits: 17  Time In: 1400  Time Out: 1500   Total Billable Time: 19 minutes     Precautions: Standard    Subjective     Pt states feeling okay with no c/o pn in R knee currently.    She was compliant with home exercise program.  Response to previous treatment: No adverse effects   Functional change: No change     Pain: 0/10  Location: right knee      Objective   PROM: 60 degrees flexion 2/26/19    Denise received therapeutic exercises to develop strength, endurance, ROM, flexibility, posture and core stabilization for 19 minutes including:    GSS w/ strap 2 min   HSS plinthe 2 min   EOT knee PROM flexion 3 x 30 sec   Seated knee flexion over bolster and towel 3 min    SLR 3 x 10   LLR 3 x 10  QS 3 x 10 10 sec hold     Not performed today   GS  Manual resisted hip abd  Isometric hip add  NMES quads x 10 min  SLS 3 x 30 sec on blue oval   Knee flexion over cone for gait 3 x 20     Manual treatment:   Joint mobes to R knee 5 min    Denise received cold pack for 10 minutes to R knee.       Home Exercises Provided and Patient Education Provided     Education provided:   - Pt educated on proper exercise technique.      Written Home Exercises Provided: Patient instructed to cont prior HEP.  Exercises were  reviewed and Denise was able to demonstrate them prior to the end of the session.  Denise demonstrated good  understanding of the education provided.       Assessment       Pt demonstrated increased muscular endurance and strength.  Pt cont to lack knee flexion.  Pt had no adverse effects from tx.    Denise is progressing well towards her goals.   Pt prognosis is Good.     Pt will continue to benefit from skilled outpatient physical therapy to address the deficits listed in the problem list box on initial evaluation, provide pt/family education and to maximize pt's level of independence in the home and community environment.     Pt's spiritual, cultural and educational needs considered and pt agreeable to plan of care and goals.    Anticipated barriers to physical therapy: none    Goals: Short Term GOALS: 6 weeks  1. Patient demonstrates independence with HEP. (progressing, not met)   2. Patient demonstrates increased right knee ROM to 90 degrees flexion to improve tolerance to functional activities with no more than 2/10 pain.(progressing, not met)   3. Patient demonstrates improved overall function per FOTO Knee Survey to 30% Limitation or less.(progressing, not met)   4. Patient will ambulate will least restrictive device.(progressing, not met)      Long Term GOALS: 12 weeks  1. Patient demonstrates increased right knee ROM to be equal to left to improve tolerance to functional activities pain free. (progressing, not met)   2. Patient demonstrates increased strength BLE's to 5/5 or greater to improve tolerance to functional activities pain free. (progressing, not met)   3. Patient demonstrates improved overall function per FOTO Knee Survey to 10% Limitation or less. (progressing, not met)   4. Patient will return to work with no reports of pain or restrictions.(progressing, not met)   5. Patient will ambulate independently with normalized gait pattern.(progressing, not met)       Plan     Cont to progress  towards goals set by PT.   Cont to increase ROM and strength next visit.      Ramiro Payne, PTA

## 2019-02-26 NOTE — TELEPHONE ENCOUNTER
----- Message from Ranulfo House MD sent at 2/26/2019  8:02 AM CST -----  Regarding: FW:   Please let Denise's mom know that [facility] does not do the lab test she requested, so I can't order it.  See message from Saleem below.  ----- Message -----  From: Doe Monge MD  Sent: 2/25/2019   4:26 PM  To: Ranulfo House MD    No [facility] specifically does not allow this testing because it's useless and over-abused. I stopped seeing patients for this because they have too many complaints that have nothing to do with MTHFR.  ----- Message -----  From: Ranulfo House MD  Sent: 2/25/2019  12:58 PM  To: MD Doe Angeles,    This patient's parents both have MTHFR mutations.  She wanted to know if she had it and wanted me to order it.  Is this an important test to order?  If so, where can she go to have it drawn?  Thanks.    Ranulfo

## 2019-03-01 ENCOUNTER — CLINICAL SUPPORT (OUTPATIENT)
Dept: REHABILITATION | Facility: HOSPITAL | Age: 19
End: 2019-03-01
Attending: ORTHOPAEDIC SURGERY
Payer: MEDICAID

## 2019-03-01 DIAGNOSIS — R52 PAIN: ICD-10-CM

## 2019-03-01 PROCEDURE — 97110 THERAPEUTIC EXERCISES: CPT

## 2019-03-01 NOTE — PROGRESS NOTES
Physical Therapy Daily Treatment Note     Name: Denise Mosher  Clinic Number: [id number]    Therapy Diagnosis:   Encounter Diagnosis   Name Primary?    Pain      Physician: Ranulfo House MD    Visit Date: 3/1/2019    Physician Orders: PT Eval and Treat Right Knee  12/13/18 distal femoral osteotomy and MPFL reconstruction. PWB with knee brace. ROM, quad strengthening, no restrictions. Please start PT next week.   Medical Diagnosis from Referral: M21.061 (ICD-10-CM) - Acquired genu valgum of right knee M25.361 (ICD-10-CM) - Patellar instability of right knee   Evaluation Date: 12/21/2018  Authorization Period Expiration: 1/31/2019  Plan of Care Expiration: 03/22/2019  Visit # / Visits authorized: 16/12   Total visits: 18  Time In: 1356  Time Out:  1500  Total Billable Time: 54 minutes     Precautions: Standard    Subjective     Pt reports w/ no c/o pn in R knee.    She was compliant with home exercise program.  Response to previous treatment: No adverse effects   Functional change: No change     Pain: 0/10  Location: right knee      Objective   PROM: 63 degrees flexion 3/1/19    Denise received therapeutic exercises to develop strength, endurance, ROM, flexibility, posture and core stabilization for 54 minutes including:    GSS w/ strap 2 min   HSS plinthe 2 min   EOT knee PROM flexion 3 x 30 sec   Seated knee flexion over bolster and towel 3 min    SLR 3 x 10   LLR 3 x 10  TKE 30 xgreen band   SLS 3 x 30 sec on blue oval   QS on 1/2 roll 3 x 10 3 sec hold   Supine knee ext stretch 3 min w/ strap and bolster     Not performed today   GS  Manual resisted hip abd  Isometric hip add  NMES quads x 10 min    Gait training: 3 min   Knee flexion over cone for gait 30 x (toe off to heel strike)     Manual treatment:   Joint mobes to R knee 5 min    Denise received cold pack for 10 minutes to R knee.       Home Exercises Provided and Patient Education Provided     Education provided:   - Pt educated on proper  exercise technique.      Written Home Exercises Provided: Patient instructed to cont prior HEP.  Exercises were reviewed and Denise was able to demonstrate them prior to the end of the session.  Denise demonstrated good  understanding of the education provided.       Assessment    R knee remains limited in flexion but cont to slowly improve.  Pt cont to lack some strength and quad control.  Pt showed increased ROM and hip strength during therex.  Pt had no adverse effects from tx.      Denise is progressing well towards her goals.   Pt prognosis is Good.     Pt will continue to benefit from skilled outpatient physical therapy to address the deficits listed in the problem list box on initial evaluation, provide pt/family education and to maximize pt's level of independence in the home and community environment.     Pt's spiritual, cultural and educational needs considered and pt agreeable to plan of care and goals.    Anticipated barriers to physical therapy: none    Goals: Short Term GOALS: 6 weeks  1. Patient demonstrates independence with HEP. (progressing, not met)   2. Patient demonstrates increased right knee ROM to 90 degrees flexion to improve tolerance to functional activities with no more than 2/10 pain.(progressing, not met)   3. Patient demonstrates improved overall function per FOTO Knee Survey to 30% Limitation or less.(progressing, not met)   4. Patient will ambulate will least restrictive device.(progressing, not met)      Long Term GOALS: 12 weeks  1. Patient demonstrates increased right knee ROM to be equal to left to improve tolerance to functional activities pain free. (progressing, not met)   2. Patient demonstrates increased strength BLE's to 5/5 or greater to improve tolerance to functional activities pain free. (progressing, not met)   3. Patient demonstrates improved overall function per FOTO Knee Survey to 10% Limitation or less. (progressing, not met)   4. Patient will return to work  with no reports of pain or restrictions.(progressing, not met)   5. Patient will ambulate independently with normalized gait pattern.(progressing, not met)       Plan     Cont to progress towards goals set by PT.   Cont to increase ROM and strength next visit.      Ramiro Payne, PTA

## 2019-03-04 ENCOUNTER — CLINICAL SUPPORT (OUTPATIENT)
Dept: REHABILITATION | Facility: HOSPITAL | Age: 19
End: 2019-03-04
Attending: ORTHOPAEDIC SURGERY
Payer: MEDICAID

## 2019-03-04 DIAGNOSIS — R52 PAIN: ICD-10-CM

## 2019-03-04 PROCEDURE — 97110 THERAPEUTIC EXERCISES: CPT | Performed by: PHYSICAL THERAPIST

## 2019-03-04 PROCEDURE — 97014 ELECTRIC STIMULATION THERAPY: CPT | Performed by: PHYSICAL THERAPIST

## 2019-03-04 NOTE — PROGRESS NOTES
Physical Therapy Daily Treatment Note     Name: Denise Mosher  Clinic Number: [id number]    Therapy Diagnosis:   Encounter Diagnosis   Name Primary?    Pain      Physician: Ranulfo House MD    Visit Date: 3/4/2019    Physician Orders: PT Eval and Treat Right Knee  12/13/18 distal femoral osteotomy and MPFL reconstruction. PWB with knee brace. ROM, quad strengthening, no restrictions. Please start PT next week.   Medical Diagnosis from Referral: M21.061 (ICD-10-CM) - Acquired genu valgum of right knee M25.361 (ICD-10-CM) - Patellar instability of right knee   Evaluation Date: 12/21/2018  Authorization Period Expiration: 1/31/2019  Plan of Care Expiration: 03/22/2019  Visit # / Visits authorized: 17/12   Total visits: 19  Time In: 9:30 am  Time Out: 10:30 am  Total Billable Time: 50 minutes     Precautions: Standard    Subjective     Pt reports w/ no c/o pn in R knee.    She was compliant with home exercise program.  Response to previous treatment: No adverse effects   Functional change: No change     Pain: 0/10  Location: right knee      Objective   PROM: 67 degrees flexion.  Ambulates unassisted with little knee flexion present.  QS ability increased.     Denise received therapeutic exercises to develop strength, endurance, ROM, flexibility, posture and core stabilization for 40 minutes including:    GSS w/ strap 2 min   HSS plinthe 2 min   PROM flexion x 5 min   Seated knee flexion over bolster and towel 3 min    SLR 3 x 10   LLR 3 x 10  TKE 30 xgreen band   SLS 3 x 30 sec on blue oval   QS on 1/2 roll 3 x 10 3 sec hold   Supine knee ext stretch 3 min w/ strap and bolster   NMES quads  CP x 10 min    Denise received cold pack for 10 minutes to R knee.       Home Exercises Provided and Patient Education Provided     Education provided:   - Pt educated on proper exercise technique.      Written Home Exercises Provided: Patient instructed to cont prior HEP.  Exercises were reviewed and Denise was able  to demonstrate them prior to the end of the session.  Denise demonstrated good  understanding of the education provided.       Assessment   Flexion improving slowly.  Better tolerance to exercise.      Denise is progressing well towards her goals.   Pt prognosis is Good.     Pt will continue to benefit from skilled outpatient physical therapy to address the deficits listed in the problem list box on initial evaluation, provide pt/family education and to maximize pt's level of independence in the home and community environment.     Pt's spiritual, cultural and educational needs considered and pt agreeable to plan of care and goals.    Anticipated barriers to physical therapy: none    Goals: Short Term GOALS: 6 weeks  1. Patient demonstrates independence with HEP. (progressing, not met)   2. Patient demonstrates increased right knee ROM to 90 degrees flexion to improve tolerance to functional activities with no more than 2/10 pain.(progressing, not met)   3. Patient demonstrates improved overall function per FOTO Knee Survey to 30% Limitation or less.(progressing, not met)   4. Patient will ambulate will least restrictive device.(progressing, not met)      Long Term GOALS: 12 weeks  1. Patient demonstrates increased right knee ROM to be equal to left to improve tolerance to functional activities pain free. (progressing, not met)   2. Patient demonstrates increased strength BLE's to 5/5 or greater to improve tolerance to functional activities pain free. (progressing, not met)   3. Patient demonstrates improved overall function per FOTO Knee Survey to 10% Limitation or less. (progressing, not met)   4. Patient will return to work with no reports of pain or restrictions.(progressing, not met)   5. Patient will ambulate independently with normalized gait pattern.(progressing, not met)       Plan     Cont to progress towards goals set by PT.   Cont to increase ROM and strength next visit.      Ney Epstein, PT

## 2019-03-08 ENCOUNTER — CLINICAL SUPPORT (OUTPATIENT)
Dept: REHABILITATION | Facility: HOSPITAL | Age: 19
End: 2019-03-08
Attending: ORTHOPAEDIC SURGERY
Payer: MEDICAID

## 2019-03-08 DIAGNOSIS — R52 PAIN: ICD-10-CM

## 2019-03-08 PROCEDURE — 97110 THERAPEUTIC EXERCISES: CPT

## 2019-03-08 NOTE — PROGRESS NOTES
Physical Therapy Daily Treatment Note     Name: Denise Mosher  Clinic Number: [id number]    Therapy Diagnosis:   Encounter Diagnosis   Name Primary?    Pain      Physician: Ranulfo House MD    Visit Date: 3/8/2019    Physician Orders: PT Eval and Treat Right Knee  18 distal femoral osteotomy and MPFL reconstruction. PWB with knee brace. ROM, quad strengthening, no restrictions. Please start PT next week.   Medical Diagnosis from Referral: M21.061 (ICD-10-CM) - Acquired genu valgum of right knee M25.361 (ICD-10-CM) - Patellar instability of right knee   Evaluation Date: 2018  Authorization Period Expiration: 2019  Plan of Care Expiration: 2019  Visit # / Visits authorized:    Total visits: 20  Time In: 1400  Time Out: 1500   Total Billable Time: 50 minutes     Precautions: Standard    Subjective   FOTO: 3/8/19  50% limitation    Pt states cont to have stiffness in R knee but no c/o pn.    She was compliant with home exercise program.  Response to previous treatment: No adverse effects   Functional change: No change     Pain: 0/10  Location: right knee      Objective   PROM: 3/8/19  71-    Denise received therapeutic exercises to develop strength, endurance, ROM, flexibility, posture and core stabilization for 50 minutes including:    GSS w/ strap 2 min   HSS plinthe 2 min   PROM flexion x 5 min EOT   SLR 3 x 10   LLR 2 x 15    Not performed today:   TKE 30 xgreen band   SLS 3 x 30 sec on blue oval   Seated knee flexion over bolster and towel 3 min    QS on / roll 3 x 10 3 sec hold     Gait trainin min   Cone hesitation walk 3 laps B UE assist      Denise received cold pack for 10 minutes to R knee.       Home Exercises Provided and Patient Education Provided     Education provided:   - Pt educated on proper exercise technique.      Written Home Exercises Provided: Patient instructed to cont prior HEP.  Exercises were reviewed and Denise was able to demonstrate them  prior to the end of the session.  Denise demonstrated good  understanding of the education provided.       Assessment   Flexion improving slowly.  Better tolerance to exercise.      Denise is progressing well towards her goals.   Pt prognosis is Good.     Pt will continue to benefit from skilled outpatient physical therapy to address the deficits listed in the problem list box on initial evaluation, provide pt/family education and to maximize pt's level of independence in the home and community environment.     Pt's spiritual, cultural and educational needs considered and pt agreeable to plan of care and goals.    Anticipated barriers to physical therapy: none    Goals: Short Term GOALS: 6 weeks  1. Patient demonstrates independence with HEP. (progressing, not met)   2. Patient demonstrates increased right knee ROM to 90 degrees flexion to improve tolerance to functional activities with no more than 2/10 pain.(progressing, not met)   3. Patient demonstrates improved overall function per FOTO Knee Survey to 30% Limitation or less.(progressing, not met)   4. Patient will ambulate will least restrictive device.(progressing, not met)      Long Term GOALS: 12 weeks  1. Patient demonstrates increased right knee ROM to be equal to left to improve tolerance to functional activities pain free. (progressing, not met)   2. Patient demonstrates increased strength BLE's to 5/5 or greater to improve tolerance to functional activities pain free. (progressing, not met)   3. Patient demonstrates improved overall function per FOTO Knee Survey to 10% Limitation or less. (progressing, not met)   4. Patient will return to work with no reports of pain or restrictions.(progressing, not met)   5. Patient will ambulate independently with normalized gait pattern.(progressing, not met)       Plan     Cont to progress towards goals set by PT.   Cont to increase ROM and strength next visit.      Ramiro Payne, MIKAYLA

## 2019-03-13 ENCOUNTER — CLINICAL SUPPORT (OUTPATIENT)
Dept: REHABILITATION | Facility: HOSPITAL | Age: 19
End: 2019-03-13
Attending: ORTHOPAEDIC SURGERY
Payer: MEDICAID

## 2019-03-13 DIAGNOSIS — R52 PAIN: ICD-10-CM

## 2019-03-13 PROCEDURE — 97110 THERAPEUTIC EXERCISES: CPT | Performed by: PHYSICAL THERAPIST

## 2019-03-13 PROCEDURE — 97014 ELECTRIC STIMULATION THERAPY: CPT | Performed by: PHYSICAL THERAPIST

## 2019-03-13 NOTE — PROGRESS NOTES
"  Physical Therapy Daily Treatment Note     Name: Denise Mosher  Clinic Number: [id number]    Therapy Diagnosis:   Encounter Diagnosis   Name Primary?    Pain      Physician: Ranulfo House MD    Visit Date: 3/13/2019    Physician Orders: PT Eval and Treat Right Knee  12/13/18 distal femoral osteotomy and MPFL reconstruction. PWB with knee brace. ROM, quad strengthening, no restrictions. Please start PT next week.   Medical Diagnosis from Referral: M21.061 (ICD-10-CM) - Acquired genu valgum of right knee M25.361 (ICD-10-CM) - Patellar instability of right knee   Evaluation Date: 12/21/2018  Authorization Period Expiration: 1/31/2019  Plan of Care Expiration: 03/22/2019  Visit # / Visits authorized: 17/12 (3-19-19 last visit approved)   Total visits: 21  Time In: 7:40 am  Time Out: 8:40 am  Total Billable Time: 45 minutes     Precautions: Standard    Subjective     Pt reports R knee feels "OK" and rates pain as 0/10.  She was compliant with home exercise program.  Response to previous treatment: No adverse effects   Functional change: No change     Pain: 0/10  Location: right knee      Objective     AROM 0-70 deg flexion, 73 passive with end range pain.  Ambulates unassisted with little knee flexion present.  QS ability increased. Able to perform SLR.  MIN swelling. Pt still sensitive to touch patella and lower quad.    Denise received therapeutic exercises to develop strength, endurance, ROM, flexibility, posture and core stabilization for 40 minutes including:    GSS   HSS    Pat MOBS/PROM flexion x 8 min   SLR x 3 ways  SAQ  Hams curls  Standing BTB TKE  Heel slides  GTR with emphasis on knee flexion   NMES quads    Denise received cold pack for 10 minutes to R knee.       Home Exercises Provided and Patient Education Provided     Education provided:   - Pt educated on proper exercise technique.      Written Home Exercises Provided: Patient instructed to cont prior HEP.  Exercises were reviewed and " Denise was able to demonstrate them prior to the end of the session.  Denise demonstrated good  understanding of the education provided.       Assessment   Flexion improving slowly.  Tolerated treatment well.      Denise is progressing well towards her goals.   Pt prognosis is Good.     Pt will continue to benefit from skilled outpatient physical therapy to address the deficits listed in the problem list box on initial evaluation, provide pt/family education and to maximize pt's level of independence in the home and community environment.     Pt's spiritual, cultural and educational needs considered and pt agreeable to plan of care and goals.    Anticipated barriers to physical therapy: none    Goals: Short Term GOALS: 6 weeks  1. Patient demonstrates independence with HEP. (progressing, not met)   2. Patient demonstrates increased right knee ROM to 90 degrees flexion to improve tolerance to functional activities with no more than 2/10 pain.(progressing, not met)   3. Patient demonstrates improved overall function per FOTO Knee Survey to 30% Limitation or less.(progressing, not met)   4. Patient will ambulate will least restrictive device.(progressing, not met)      Long Term GOALS: 12 weeks  1. Patient demonstrates increased right knee ROM to be equal to left to improve tolerance to functional activities pain free. (progressing, not met)   2. Patient demonstrates increased strength BLE's to 5/5 or greater to improve tolerance to functional activities pain free. (progressing, not met)   3. Patient demonstrates improved overall function per FOTO Knee Survey to 10% Limitation or less. (progressing, not met)   4. Patient will return to work with no reports of pain or restrictions.(progressing, not met)   5. Patient will ambulate independently with normalized gait pattern.(progressing, not met)       Plan     Cont to progress towards goals set by PT.   Cont to increase ROM and strength next visit.      Ney SHAH  Britsch, PT

## 2019-03-15 ENCOUNTER — CLINICAL SUPPORT (OUTPATIENT)
Dept: REHABILITATION | Facility: HOSPITAL | Age: 19
End: 2019-03-15
Attending: ORTHOPAEDIC SURGERY
Payer: MEDICAID

## 2019-03-15 DIAGNOSIS — R52 PAIN: ICD-10-CM

## 2019-03-15 PROCEDURE — 97116 GAIT TRAINING THERAPY: CPT

## 2019-03-15 PROCEDURE — 97110 THERAPEUTIC EXERCISES: CPT

## 2019-03-15 NOTE — PROGRESS NOTES
Physical Therapy Daily Treatment Note     Name: Denise Mosehr  Clinic Number: [id number]    Therapy Diagnosis:   No diagnosis found.  Physician: Ranulfo House MD    Visit Date: 3/15/2019    Physician Orders: PT Eval and Treat Right Knee  12/13/18 distal femoral osteotomy and MPFL reconstruction. PWB with knee brace. ROM, quad strengthening, no restrictions. Please start PT next week.   Medical Diagnosis from Referral: M21.061 (ICD-10-CM) - Acquired genu valgum of right knee M25.361 (ICD-10-CM) - Patellar instability of right knee   Evaluation Date: 12/21/2018  Authorization Period Expiration: 1/31/2019  Plan of Care Expiration: 03/22/2019  Visit # / Visits authorized: 17/12 (3-19-19 last visit approved)   Total visits: 22  Time In: 1400   Time Out: 1505  Total Billable Time: 32 minutes     Precautions: Standard    Subjective     Pt states feeling well w/ no c/o pn in R knee but does have some increased joint stiffness today.    She was compliant with home exercise program.  Response to previous treatment: No adverse effects   Functional change: No change     Pain: 0/10  Location: right knee      Objective       Denise received therapeutic exercises to develop strength, endurance, ROM, flexibility, posture and core stabilization for 22 minutes including:    GSS 2 min   HSS  2 min   SLR x 3 ways 30 x ea   Knee flexion on stairs 3 x 30 sec   Heel slides 10 x 10 sec hold   B HR 30 x     Gsit training: 10 min    with emphasis on knee flexion  Cone hesitation walk 5 cone 3 laps single UE assist     Denise received cold pack for 10 minutes to R knee.       Home Exercises Provided and Patient Education Provided     Education provided:   - Pt educated on proper exercise technique.      Written Home Exercises Provided: Patient instructed to cont prior HEP.  Exercises were reviewed and Denise was able to demonstrate them prior to the end of the session.  Denise demonstrated good  understanding of the education  provided.       Assessment     Pt had no adverse effects from tx.  Gait pattern improved during gait training some but cont to lack knee AROM knee flexion.  Pt showed increased muscular endurance during therex.    Denise is progressing well towards her goals.   Pt prognosis is Good.     Pt will continue to benefit from skilled outpatient physical therapy to address the deficits listed in the problem list box on initial evaluation, provide pt/family education and to maximize pt's level of independence in the home and community environment.     Pt's spiritual, cultural and educational needs considered and pt agreeable to plan of care and goals.    Anticipated barriers to physical therapy: none    Goals: Short Term GOALS: 6 weeks  1. Patient demonstrates independence with HEP. (progressing, not met)   2. Patient demonstrates increased right knee ROM to 90 degrees flexion to improve tolerance to functional activities with no more than 2/10 pain.(progressing, not met)   3. Patient demonstrates improved overall function per FOTO Knee Survey to 30% Limitation or less.(progressing, not met)   4. Patient will ambulate will least restrictive device.(progressing, not met)      Long Term GOALS: 12 weeks  1. Patient demonstrates increased right knee ROM to be equal to left to improve tolerance to functional activities pain free. (progressing, not met)   2. Patient demonstrates increased strength BLE's to 5/5 or greater to improve tolerance to functional activities pain free. (progressing, not met)   3. Patient demonstrates improved overall function per FOTO Knee Survey to 10% Limitation or less. (progressing, not met)   4. Patient will return to work with no reports of pain or restrictions.(progressing, not met)   5. Patient will ambulate independently with normalized gait pattern.(progressing, not met)       Plan     Cont to progress towards goals set by PT.   Cont to increase ROM and strength next visit.      Ramiro Payne,  PTA

## 2019-03-19 ENCOUNTER — CLINICAL SUPPORT (OUTPATIENT)
Dept: REHABILITATION | Facility: HOSPITAL | Age: 19
End: 2019-03-19
Attending: ORTHOPAEDIC SURGERY
Payer: MEDICAID

## 2019-03-19 DIAGNOSIS — R52 PAIN: ICD-10-CM

## 2019-03-19 PROCEDURE — 97110 THERAPEUTIC EXERCISES: CPT

## 2019-03-19 NOTE — PROGRESS NOTES
Physical Therapy Daily Treatment Note     Name: Denise Mosher  Clinic Number: [id number]    Therapy Diagnosis:   Encounter Diagnosis   Name Primary?    Pain      Physician: Ranulfo House MD    Visit Date: 3/19/2019    Physician Orders: PT Eval and Treat Right Knee  18 distal femoral osteotomy and MPFL reconstruction. PWB with knee brace. ROM, quad strengthening, no restrictions. Please start PT next week.   Medical Diagnosis from Referral: M21.061 (ICD-10-CM) - Acquired genu valgum of right knee M25.361 (ICD-10-CM) - Patellar instability of right knee   Evaluation Date: 2018  Authorization Period Expiration: 2019  Plan of Care Expiration: 2019  Visit # / Visits authorized:  (3-19-19 last visit approved)   Total visits: 23  Time In: 1520  Time Out: 1605  Total Billable Time: 15 minutes     Precautions: Standard    Subjective     Pt reports w/ 6/10 R knee pn at rest and 9/10 pn when walking or standing.  Pt mentioned twisting her knee while walking yesterday and feeling as if her knee was unstable today.    She was compliant with home exercise program.  Response to previous treatment: No adverse effects   Functional change: No change     Pain: 0/10  Location: right knee      Objective   ROM: 3/19/19   KneeFlexion 70 degrees  Knee Ext 0 AROM 4 at rest     MMT: 3/19/19  knee ext 3+/5  Knee flexion 4+/5    Denise received therapeutic exercises to develop strength, endurance, ROM, flexibility, posture and core stabilization for 15 minutes including:    GSS 2 min   HSS  2 min   SLR 2 x 15  LLR 2 x 15   Prone hip ext x 30     Not performed today:   Knee flexion on stairs 3 x 30 sec   Heel slides 10 x 10 sec hold   B HR 30 x     Gsit trainin min    with emphasis on knee flexion  Cone hesitation walk 5 cone 3 laps single UE assist     Denise received cold pack for 10 minutes to R knee.       Home Exercises Provided and Patient Education Provided     Education provided:   - Pt  educated on proper exercise technique and to use one crutch when ambulating until instability improves.      Written Home Exercises Provided: Patient instructed to cont prior HEP.  Exercises were reviewed and Denise was able to demonstrate them prior to the end of the session.  Denise demonstrated good  understanding of the education provided.       Assessment   Therex limited 2* pn.  Pt showed increased quad and hip endurance during therex.  Pt cont to lack some ROM and strength.  Pt arrived 20 min late to tx today.       Denise is progressing well towards her goals.   Pt prognosis is Good.     Pt will continue to benefit from skilled outpatient physical therapy to address the deficits listed in the problem list box on initial evaluation, provide pt/family education and to maximize pt's level of independence in the home and community environment.     Pt's spiritual, cultural and educational needs considered and pt agreeable to plan of care and goals.    Anticipated barriers to physical therapy: none    Goals: Short Term GOALS: 6 weeks  1. Patient demonstrates independence with HEP. (progressing, not met)   2. Patient demonstrates increased right knee ROM to 90 degrees flexion to improve tolerance to functional activities with no more than 2/10 pain.(progressing, not met)   3. Patient demonstrates improved overall function per FOTO Knee Survey to 30% Limitation or less.(progressing, not met)   4. Patient will ambulate will least restrictive device.(progressing, not met)      Long Term GOALS: 12 weeks  1. Patient demonstrates increased right knee ROM to be equal to left to improve tolerance to functional activities pain free. (progressing, not met)   2. Patient demonstrates increased strength BLE's to 5/5 or greater to improve tolerance to functional activities pain free. (progressing, not met)   3. Patient demonstrates improved overall function per FOTO Knee Survey to 10% Limitation or less. (progressing, not  met)   4. Patient will return to work with no reports of pain or restrictions.(progressing, not met)   5. Patient will ambulate independently with normalized gait pattern.(progressing, not met)       Plan     Cont to progress towards goals set by PT.   Cont to increase ROM and strength next visit.      Ramiro Payne, PTA

## 2019-03-22 ENCOUNTER — CLINICAL SUPPORT (OUTPATIENT)
Dept: REHABILITATION | Facility: HOSPITAL | Age: 19
End: 2019-03-22
Attending: ORTHOPAEDIC SURGERY
Payer: MEDICAID

## 2019-03-22 DIAGNOSIS — R52 PAIN: ICD-10-CM

## 2019-03-22 PROCEDURE — 97110 THERAPEUTIC EXERCISES: CPT

## 2019-03-22 NOTE — PROGRESS NOTES
"  Physical Therapy Daily Treatment Note     Name: Denise Mosher  Clinic Number: [id number]    Therapy Diagnosis:   Encounter Diagnosis   Name Primary?    Pain      Physician: Ranulfo House MD    Visit Date: 3/22/2019    Physician Orders: PT Eval and Treat Right Knee  18 distal femoral osteotomy and MPFL reconstruction. PWB with knee brace. ROM, quad strengthening, no restrictions. Please start PT next week.   Medical Diagnosis from Referral: M21.061 (ICD-10-CM) - Acquired genu valgum of right knee M25.361 (ICD-10-CM) - Patellar instability of right knee   Evaluation Date: 2018  Authorization Period Expiration: 2019  Plan of Care Expiration: 2019  Visit # / Visits authorized:  (3-19-19 last visit approved)   Total visits: 24  Time In: 1500  Time Out: 1550  Total Billable Time: 50 minutes     Precautions: Standard    Subjective     Pt states " feeling 3/10 pn ini R knee and increased stiffness 2* working and constantly being on the run."   She was compliant with home exercise program.  Response to previous treatment: No adverse effects   Functional change: No change     Pain: 3/10  Location: right knee      Objective   ROM: 3/19/19   KneeFlexion 70 degrees  Knee Ext 0 AROM 4 at rest     Denise received therapeutic exercises to develop strength, endurance, ROM, flexibility, posture and core stabilization for 50 minutes including:    GSS 2 min   HSS  2 min   Heel slides 10 x 10 sec hold   Shuttle leg press 50# x 20 Slow ecc lowering with B LE    Not performed today:   Knee flexion on stairs 3 x 30 sec   Heel slides 10 x 10 sec hold   B HR 30 x   SLR 2 x 15  LLR 2 x 15   Prone hip ext x 30     Gsit trainin min   Heel toe rock w/ R knee flexion B 30 x ea     Denise received cold pack for 0 minutes to R knee.       Home Exercises Provided and Patient Education Provided     Education provided:   - Pt educated on proper exercise technique and to use one crutch when ambulating until " instability improves.      Written Home Exercises Provided: Patient instructed to cont prior HEP.  Exercises were reviewed and Denise was able to demonstrate them prior to the end of the session.  Denise demonstrated good  understanding of the education provided.       Assessment      Pt showed improved gait and strength during therex.   Pt cont to lack some strength and ROM.  Pt mayuri tx wewll.  Pn level decreased some after tx session.    Denise is progressing well towards her goals.   Pt prognosis is Good.     Pt will continue to benefit from skilled outpatient physical therapy to address the deficits listed in the problem list box on initial evaluation, provide pt/family education and to maximize pt's level of independence in the home and community environment.     Pt's spiritual, cultural and educational needs considered and pt agreeable to plan of care and goals.    Anticipated barriers to physical therapy: none    Goals: Short Term GOALS: 6 weeks  1. Patient demonstrates independence with HEP. (progressing, not met)   2. Patient demonstrates increased right knee ROM to 90 degrees flexion to improve tolerance to functional activities with no more than 2/10 pain.(progressing, not met)   3. Patient demonstrates improved overall function per FOTO Knee Survey to 30% Limitation or less.(progressing, not met)   4. Patient will ambulate will least restrictive device.(progressing, not met)      Long Term GOALS: 12 weeks  1. Patient demonstrates increased right knee ROM to be equal to left to improve tolerance to functional activities pain free. (progressing, not met)   2. Patient demonstrates increased strength BLE's to 5/5 or greater to improve tolerance to functional activities pain free. (progressing, not met)   3. Patient demonstrates improved overall function per FOTO Knee Survey to 10% Limitation or less. (progressing, not met)   4. Patient will return to work with no reports of pain or  restrictions.(progressing, not met)   5. Patient will ambulate independently with normalized gait pattern.(progressing, not met)       Plan     Cont to progress towards goals set by PT.   Cont to increase ROM and strength next visit.      Ramiro Payne, PTA

## 2019-03-26 ENCOUNTER — CLINICAL SUPPORT (OUTPATIENT)
Dept: REHABILITATION | Facility: HOSPITAL | Age: 19
End: 2019-03-26
Attending: ORTHOPAEDIC SURGERY
Payer: MEDICAID

## 2019-03-26 DIAGNOSIS — R52 PAIN: ICD-10-CM

## 2019-03-26 PROCEDURE — 97110 THERAPEUTIC EXERCISES: CPT

## 2019-03-26 NOTE — PROGRESS NOTES
Physical Therapy Daily Treatment Note     Name: Denise Mosher  Clinic Number: [id number]    Therapy Diagnosis:   Encounter Diagnosis   Name Primary?    Pain      Physician: Ranulfo House MD    Visit Date: 3/26/2019    Physician Orders: PT Eval and Treat Right Knee  18 distal femoral osteotomy and MPFL reconstruction. PWB with knee brace. ROM, quad strengthening, no restrictions. Please start PT next week.   Medical Diagnosis from Referral: M21.061 (ICD-10-CM) - Acquired genu valgum of right knee M25.361 (ICD-10-CM) - Patellar instability of right knee   Evaluation Date: 2018  Authorization Period Expiration: 2019  Plan of Care Expiration: 2019  Visit # / Visits authorized:  (3-19-19 last visit approved)   Total visits: 25  Time In: 1300  Time Out: 1400  Total Billable Time: 25 minutes     Precautions: Standard    Subjective     Pt reports w/ no c/o pn in R knee but did mention twisting at the knee a few days ago while in a crowd.    She was compliant with home exercise program.  Response to previous treatment: No adverse effects   Functional change: No change     Pain: 0/10  Location: right knee      Objective   ROM: 3/19/19   KneeFlexion 78 degrees  Knee Ext 0 AROM 4 at rest     Denise received therapeutic exercises to develop strength, endurance, ROM, flexibility, posture and core stabilization for 25 minutes including:    GSS 2 min   HSS  2 min   Heel slides 10 x 10 sec hold   Shuttle leg press 50# x 20 Slow ecc lowering with B LE  SLR 2 x 15  LLR 2 x 10 5 sec hold   EOT knee flexion manual 3 x 20 sec    Not performed today:   Knee flexion on stairs 3 x 30 sec   B HR 30 x   Prone hip ext x 30     Gsit trainin min   Heel toe rock w/ R knee flexion B 30 x ea   Cone hesitation walk 5 cone w/ B UE assist     Denise received cold pack for 10 minutes to R knee.       Home Exercises Provided and Patient Education Provided     Education provided:   - Pt educated on proper  exercise technique and to use one crutch when ambulating until instability improves.      Written Home Exercises Provided: Patient instructed to cont prior HEP.  Exercises were reviewed and Denise was able to demonstrate them prior to the end of the session.  Denise demonstrated good  understanding of the education provided.       Assessment   Pt unable to amb w/ necessary knee flexion 2* popping and pn in R knee.   Pt had no adverse effects from tx.  Pt displayed increased PROM and muscular endurance during therex.      Denise is progressing well towards her goals.   Pt prognosis is Good.     Pt will continue to benefit from skilled outpatient physical therapy to address the deficits listed in the problem list box on initial evaluation, provide pt/family education and to maximize pt's level of independence in the home and community environment.     Pt's spiritual, cultural and educational needs considered and pt agreeable to plan of care and goals.    Anticipated barriers to physical therapy: none    Goals: Short Term GOALS: 6 weeks  1. Patient demonstrates independence with HEP. (progressing, not met)   2. Patient demonstrates increased right knee ROM to 90 degrees flexion to improve tolerance to functional activities with no more than 2/10 pain.(progressing, not met)   3. Patient demonstrates improved overall function per FOTO Knee Survey to 30% Limitation or less.(progressing, not met)   4. Patient will ambulate will least restrictive device.(progressing, not met)      Long Term GOALS: 12 weeks  1. Patient demonstrates increased right knee ROM to be equal to left to improve tolerance to functional activities pain free. (progressing, not met)   2. Patient demonstrates increased strength BLE's to 5/5 or greater to improve tolerance to functional activities pain free. (progressing, not met)   3. Patient demonstrates improved overall function per FOTO Knee Survey to 10% Limitation or less. (progressing, not  met)   4. Patient will return to work with no reports of pain or restrictions.(progressing, not met)   5. Patient will ambulate independently with normalized gait pattern.(progressing, not met)       Plan     Cont to progress towards goals set by PT.   Cont to increase ROM and strength next visit.      Ramiro Payne, PTA

## 2019-04-02 ENCOUNTER — CLINICAL SUPPORT (OUTPATIENT)
Dept: REHABILITATION | Facility: HOSPITAL | Age: 19
End: 2019-04-02
Attending: ORTHOPAEDIC SURGERY
Payer: MEDICAID

## 2019-04-02 DIAGNOSIS — R52 PAIN: ICD-10-CM

## 2019-04-02 PROCEDURE — 97110 THERAPEUTIC EXERCISES: CPT | Performed by: PHYSICAL THERAPIST

## 2019-04-02 PROCEDURE — 97140 MANUAL THERAPY 1/> REGIONS: CPT | Performed by: PHYSICAL THERAPIST

## 2019-04-02 NOTE — PROGRESS NOTES
"  Physical Therapy Daily Treatment Note     Name: Denise Mosher  Clinic Number: [id number]    Therapy Diagnosis:   Encounter Diagnosis   Name Primary?    Pain      Physician: Ranulfo House MD    Visit Date: 4/2/2019    Physician Orders: PT Eval and Treat Right Knee  12/13/18 distal femoral osteotomy and MPFL reconstruction. PWB with knee brace. ROM, quad strengthening, no restrictions. Please start PT next week.   Medical Diagnosis from Referral: M21.061 (ICD-10-CM) - Acquired genu valgum of right knee M25.361 (ICD-10-CM) - Patellar instability of right knee   Evaluation Date: 12/21/2018  Authorization Period Expiration: 1/31/2019  Plan of Care Expiration: 05/07/2019  Visit # / Visits authorized: 3/12      Time In: 12:45 pm  Time Out: 1:45 pm  Total Billable Time: 45 minutes     Precautions: Standard    Subjective     Pt reports R knee feels "OK" and rates pain as 0/10.  States depressed over losing her job yesterday.  She was compliant with home exercise program.  Response to previous treatment: No adverse effects   Functional change: No change     Pain: 0/10  Location: right knee      Objective     AROM 0-75 deg flexion, 80 passive with end range pain.  Ambulates unassisted with knee flexion gait.  QS ability increased.  No lag with SLR.  MIN swelling.  Pt still sensitive to touch over patella and lower quad.    Denise received therapeutic exercises to develop strength, endurance, ROM, flexibility, posture and core stabilization for 40 minutes including:    GSS   HSS    Pat MOBS/PROM flexion x 10 min   SLR x 3 ways  SAQ  Hams curls  Standing BTB TKE  Heel slides  Shuttle leg press and toe raises with 2 bands  Single leg press with 1 band   GTR  CR quads for increased flexion  HEP review    Denise received cold pack for 10 minutes to R knee.       Home Exercises Provided and Patient Education Provided     Education provided:   - Pt educated on proper exercise technique.      Written Home Exercises " Provided: Patient instructed to cont prior HEP.  Exercises were reviewed and Denise was able to demonstrate them prior to the end of the session.  Denise demonstrated good  understanding of the education provided.       Assessment   Flexion improving slowly.  Tolerated treatment well.      Denise is progressing well towards her goals.   Pt prognosis is Good.     Pt will continue to benefit from skilled outpatient physical therapy to address the deficits listed in the problem list box on initial evaluation, provide pt/family education and to maximize pt's level of independence in the home and community environment.     Pt's spiritual, cultural and educational needs considered and pt agreeable to plan of care and goals.    Anticipated barriers to physical therapy: none    Goals: Short Term GOALS: 6 weeks  1. Patient demonstrates independence with HEP. (progressing, not met)   2. Patient demonstrates increased right knee ROM to 90 degrees flexion to improve tolerance to functional activities with no more than 2/10 pain.(progressing, not met)   3. Patient demonstrates improved overall function per FOTO Knee Survey to 30% Limitation or less.(progressing, not met)   4. Patient will ambulate will least restrictive device.(met)      Long Term GOALS: 12 weeks  1. Patient demonstrates increased right knee ROM to be equal to left to improve tolerance to functional activities pain free. (progressing, not met)   2. Patient demonstrates increased strength BLE's to 5/5 or greater to improve tolerance to functional activities pain free. (progressing, not met)   3. Patient demonstrates improved overall function per FOTO Knee Survey to 10% Limitation or less. (progressing, not met)   4. Patient will return to work with no reports of pain or restrictions.(progressing, not met)   5. Patient will ambulate independently with normalized gait pattern.(progressing, not met)       Plan     Cont PT to increase ROM and strength per POC.       Ney Epstein, PT

## 2019-04-05 ENCOUNTER — HOSPITAL ENCOUNTER (OUTPATIENT)
Dept: RADIOLOGY | Facility: HOSPITAL | Age: 19
Discharge: HOME | End: 2019-04-05
Attending: ORTHOPAEDIC SURGERY
Payer: MEDICAID

## 2019-04-05 ENCOUNTER — OFFICE VISIT (OUTPATIENT)
Dept: ORTHOPEDICS | Facility: CLINIC | Age: 19
End: 2019-04-05
Payer: MEDICAID

## 2019-04-05 VITALS — WEIGHT: 180.75 LBS | HEIGHT: 61 IN | BODY MASS INDEX: 34.13 KG/M2

## 2019-04-05 DIAGNOSIS — M25.361 PATELLAR INSTABILITY OF RIGHT KNEE: ICD-10-CM

## 2019-04-05 DIAGNOSIS — M21.061 ACQUIRED GENU VALGUM OF RIGHT KNEE: Primary | ICD-10-CM

## 2019-04-05 DIAGNOSIS — M25.569 KNEE PAIN, UNSPECIFIED CHRONICITY, UNSPECIFIED LATERALITY: ICD-10-CM

## 2019-04-05 DIAGNOSIS — M25.569 KNEE PAIN, UNSPECIFIED CHRONICITY, UNSPECIFIED LATERALITY: Primary | ICD-10-CM

## 2019-04-05 PROCEDURE — 99212 OFFICE O/P EST SF 10 MIN: CPT | Mod: PBBFAC,25 | Performed by: ORTHOPAEDIC SURGERY

## 2019-04-05 PROCEDURE — 99999 PR PBB SHADOW E&M-EST. PATIENT-LVL II: ICD-10-PCS | Mod: PBBFAC,,, | Performed by: ORTHOPAEDIC SURGERY

## 2019-04-05 PROCEDURE — 99213 PR OFFICE/OUTPT VISIT, EST, LEVL III, 20-29 MIN: ICD-10-PCS | Mod: S$PBB,,, | Performed by: ORTHOPAEDIC SURGERY

## 2019-04-05 PROCEDURE — 73562 XR KNEE 3 VIEW RIGHT: ICD-10-PCS | Mod: 26,RT,, | Performed by: RADIOLOGY

## 2019-04-05 PROCEDURE — 73562 X-RAY EXAM OF KNEE 3: CPT | Mod: TC,PO,RT

## 2019-04-05 PROCEDURE — 73562 X-RAY EXAM OF KNEE 3: CPT | Mod: 26,RT,, | Performed by: RADIOLOGY

## 2019-04-05 PROCEDURE — 99213 OFFICE O/P EST LOW 20 MIN: CPT | Mod: S$PBB,,, | Performed by: ORTHOPAEDIC SURGERY

## 2019-04-05 PROCEDURE — 99999 PR PBB SHADOW E&M-EST. PATIENT-LVL II: CPT | Mod: PBBFAC,,, | Performed by: ORTHOPAEDIC SURGERY

## 2019-04-06 NOTE — PROGRESS NOTES
CC: Post-op    HPI: Denise Mosher is now 4 months post-op following   DATE OF PROCEDURE:  12/13/18     PREOPERATIVE DIAGNOSES:  1.  Right knee pain.  2.  Right patellar instability.  3.  Right genu valgum.     POSTOPERATIVE DIAGNOSES:  1.  Right knee pain.  2.  Right patellar instability.  3.  Right genu valgum.     PROCEDURES:  1.  Right knee arthroscopy  2.  Right medial patellofemoral ligament reconstruction with hamstring allograft  3.  Right distal femoral varus-producing osteotomy with plate fixation.  Doing well, pain controlled.     PE: Incisions well-healed with no sign of infection.  Well-perfused, neurovascularly intact distally.  Very stiff knee ROM (0-80 deg).  Nontender.  No evidence of infection.    X-ray: healing osteotomy in good alignment.    Clinical decision-making: Doing well.  Need better flexion.  Encouraged to push ROM.  I recommended scope/OMID, but patient does not want surgery.  She wants to continue with PT as long as she is making progress.  RTC 2 months, right knee AP/lat XR.

## 2019-04-09 ENCOUNTER — CLINICAL SUPPORT (OUTPATIENT)
Dept: REHABILITATION | Facility: HOSPITAL | Age: 19
End: 2019-04-09
Attending: ORTHOPAEDIC SURGERY
Payer: MEDICAID

## 2019-04-09 DIAGNOSIS — R52 PAIN: ICD-10-CM

## 2019-04-09 PROCEDURE — 97110 THERAPEUTIC EXERCISES: CPT

## 2019-04-09 NOTE — PROGRESS NOTES
"  Physical Therapy Daily Treatment Note     Name: Denise Mosher  Clinic Number: [id number]    Therapy Diagnosis:   Encounter Diagnosis   Name Primary?    Pain      Physician: Ranulfo House MD    Visit Date: 4/9/2019    Physician Orders: PT Eval and Treat Right Knee  12/13/18 distal femoral osteotomy and MPFL reconstruction. PWB with knee brace. ROM, quad strengthening, no restrictions. Please start PT next week.   Medical Diagnosis from Referral: M21.061 (ICD-10-CM) - Acquired genu valgum of right knee M25.361 (ICD-10-CM) - Patellar instability of right knee   Evaluation Date: 12/21/2018  Authorization Period Expiration: 1/31/2019  Plan of Care Expiration: 05/07/2019  Visit # / Visits authorized: 4/12    Total visits: 27  Time In: 1400  Time Out: 1500   Total Billable Time: 25 minutes     Precautions: Standard    Subjective     Pt states feeling well w/ no c/o pn in R knee currently.  She was compliant with home exercise program.  Response to previous treatment: No adverse effects   Functional change: No change     Pain: 0/10  Location: right knee      Objective     ROM: 4/9/19  Flexion 82     Denise received therapeutic exercises to develop strength, endurance, ROM, flexibility, posture and core stabilization for 25 minutes including:    GSS 2 min strap   HSS  2 min plinthe   Heel slides 10 x 10 sec hold   Shuttle leg press and toe raises with 2 bands 2 x 15  Single leg press with 1 band 3 x 10  Step ups 3" 3 x 10     Not performed today:   SLR x 3 ways  SAQ  Hams curls  Standing BTB TKE    Gait training 5 min   Cone hesitation walk 5 cones 5 laps    Manual treatment:   Pat MOBS/PROM flexion x 7 min     Denise received cold pack for 10 minutes to R knee.       Home Exercises Provided and Patient Education Provided     Education provided:   - Pt educated on proper exercise technique.      Written Home Exercises Provided: Patient instructed to cont prior HEP.  Exercises were reviewed and Denise was able " to demonstrate them prior to the end of the session.  Denise demonstrated good  understanding of the education provided.       Assessment   Pt had no adverse effects from tx.  Pt showed increased strength and improved gait during therex.  Pt would benefit from further skilled care to cont to improve ROm, gait and LE strength.    Denise is progressing well towards her goals.   Pt prognosis is Good.     Pt will continue to benefit from skilled outpatient physical therapy to address the deficits listed in the problem list box on initial evaluation, provide pt/family education and to maximize pt's level of independence in the home and community environment.     Pt's spiritual, cultural and educational needs considered and pt agreeable to plan of care and goals.    Anticipated barriers to physical therapy: none    Goals: Short Term GOALS: 6 weeks  1. Patient demonstrates independence with HEP. (progressing, not met)   2. Patient demonstrates increased right knee ROM to 90 degrees flexion to improve tolerance to functional activities with no more than 2/10 pain.(progressing, not met)   3. Patient demonstrates improved overall function per FOTO Knee Survey to 30% Limitation or less.(progressing, not met)   4. Patient will ambulate will least restrictive device.(met)      Long Term GOALS: 12 weeks  1. Patient demonstrates increased right knee ROM to be equal to left to improve tolerance to functional activities pain free. (progressing, not met)   2. Patient demonstrates increased strength BLE's to 5/5 or greater to improve tolerance to functional activities pain free. (progressing, not met)   3. Patient demonstrates improved overall function per FOTO Knee Survey to 10% Limitation or less. (progressing, not met)   4. Patient will return to work with no reports of pain or restrictions.(progressing, not met)   5. Patient will ambulate independently with normalized gait pattern.(progressing, not met)       Plan     Cont PT  to increase ROM and strength per POC.      Ramiro Payne, PTA

## 2019-04-15 ENCOUNTER — HOSPITAL ENCOUNTER (EMERGENCY)
Facility: HOSPITAL | Age: 19
Discharge: HOME | End: 2019-04-16
Attending: EMERGENCY MEDICINE
Payer: MEDICAID

## 2019-04-15 DIAGNOSIS — R07.9 CHEST PAIN: ICD-10-CM

## 2019-04-15 DIAGNOSIS — R05.9 COUGH: ICD-10-CM

## 2019-04-15 DIAGNOSIS — J69.0 ASPIRATION PNEUMONITIS: Primary | ICD-10-CM

## 2019-04-15 LAB
B-HCG UR QL: NEGATIVE
CTP QC/QA: YES

## 2019-04-15 PROCEDURE — 93010 EKG 12-LEAD: ICD-10-PCS | Mod: ,,, | Performed by: PEDIATRICS

## 2019-04-15 PROCEDURE — 93010 ELECTROCARDIOGRAM REPORT: CPT | Mod: ,,, | Performed by: PEDIATRICS

## 2019-04-15 PROCEDURE — 81025 URINE PREGNANCY TEST: CPT | Performed by: PHYSICIAN ASSISTANT

## 2019-04-15 PROCEDURE — 99284 EMERGENCY DEPT VISIT MOD MDM: CPT | Mod: ,,, | Performed by: PHYSICIAN ASSISTANT

## 2019-04-15 PROCEDURE — 85379 FIBRIN DEGRADATION QUANT: CPT

## 2019-04-15 PROCEDURE — 93005 ELECTROCARDIOGRAM TRACING: CPT

## 2019-04-15 PROCEDURE — 99285 EMERGENCY DEPT VISIT HI MDM: CPT | Mod: 25

## 2019-04-15 PROCEDURE — 85025 COMPLETE CBC W/AUTO DIFF WBC: CPT

## 2019-04-15 PROCEDURE — 80048 BASIC METABOLIC PNL TOTAL CA: CPT

## 2019-04-15 PROCEDURE — 99284 PR EMERGENCY DEPT VISIT,LEVEL IV: ICD-10-PCS | Mod: ,,, | Performed by: PHYSICIAN ASSISTANT

## 2019-04-15 RX ORDER — ALBUTEROL SULFATE 90 UG/1
2 AEROSOL, METERED RESPIRATORY (INHALATION) ONCE
Status: COMPLETED | OUTPATIENT
Start: 2019-04-16 | End: 2019-04-15

## 2019-04-15 RX ADMIN — ALBUTEROL SULFATE 2 PUFF: 90 AEROSOL, METERED RESPIRATORY (INHALATION) at 11:04

## 2019-04-16 ENCOUNTER — CLINICAL SUPPORT (OUTPATIENT)
Dept: REHABILITATION | Facility: HOSPITAL | Age: 19
End: 2019-04-16
Attending: ORTHOPAEDIC SURGERY
Payer: MEDICAID

## 2019-04-16 VITALS
HEIGHT: 60 IN | SYSTOLIC BLOOD PRESSURE: 123 MMHG | OXYGEN SATURATION: 97 % | BODY MASS INDEX: 41.82 KG/M2 | TEMPERATURE: 98 F | WEIGHT: 213 LBS | HEART RATE: 73 BPM | RESPIRATION RATE: 17 BRPM | DIASTOLIC BLOOD PRESSURE: 81 MMHG

## 2019-04-16 DIAGNOSIS — R52 PAIN: ICD-10-CM

## 2019-04-16 LAB
ANION GAP SERPL CALC-SCNC: 10 MMOL/L (ref 8–16)
BASOPHILS # BLD AUTO: 0.05 K/UL (ref 0–0.2)
BASOPHILS NFR BLD: 0.5 % (ref 0–1.9)
BUN SERPL-MCNC: 12 MG/DL (ref 6–20)
CALCIUM SERPL-MCNC: 10 MG/DL (ref 8.7–10.5)
CHLORIDE SERPL-SCNC: 102 MMOL/L (ref 95–110)
CO2 SERPL-SCNC: 28 MMOL/L (ref 23–29)
CREAT SERPL-MCNC: 0.8 MG/DL (ref 0.5–1.4)
D DIMER PPP IA.FEU-MCNC: 1.09 MG/L FEU
DIFFERENTIAL METHOD: ABNORMAL
EOSINOPHIL # BLD AUTO: 0.1 K/UL (ref 0–0.5)
EOSINOPHIL NFR BLD: 1.1 % (ref 0–8)
ERYTHROCYTE [DISTWIDTH] IN BLOOD BY AUTOMATED COUNT: 14.7 % (ref 11.5–14.5)
EST. GFR  (AFRICAN AMERICAN): >60 ML/MIN/1.73 M^2
EST. GFR  (NON AFRICAN AMERICAN): >60 ML/MIN/1.73 M^2
GLUCOSE SERPL-MCNC: 81 MG/DL (ref 70–110)
HCT VFR BLD AUTO: 43.2 % (ref 37–48.5)
HGB BLD-MCNC: 13.4 G/DL (ref 12–16)
IMM GRANULOCYTES # BLD AUTO: 0.04 K/UL (ref 0–0.04)
IMM GRANULOCYTES NFR BLD AUTO: 0.4 % (ref 0–0.5)
LYMPHOCYTES # BLD AUTO: 3.2 K/UL (ref 1–4.8)
LYMPHOCYTES NFR BLD: 30.7 % (ref 18–48)
MCH RBC QN AUTO: 24.4 PG (ref 27–31)
MCHC RBC AUTO-ENTMCNC: 31 G/DL (ref 32–36)
MCV RBC AUTO: 79 FL (ref 82–98)
MONOCYTES # BLD AUTO: 0.9 K/UL (ref 0.3–1)
MONOCYTES NFR BLD: 8.9 % (ref 4–15)
NEUTROPHILS # BLD AUTO: 6.2 K/UL (ref 1.8–7.7)
NEUTROPHILS NFR BLD: 58.4 % (ref 38–73)
NRBC BLD-RTO: 0 /100 WBC
PLATELET # BLD AUTO: 335 K/UL (ref 150–350)
PMV BLD AUTO: 9.5 FL (ref 9.2–12.9)
POTASSIUM SERPL-SCNC: 3.5 MMOL/L (ref 3.5–5.1)
RBC # BLD AUTO: 5.5 M/UL (ref 4–5.4)
SODIUM SERPL-SCNC: 140 MMOL/L (ref 136–145)
WBC # BLD AUTO: 10.55 K/UL (ref 3.9–12.7)

## 2019-04-16 PROCEDURE — 25000242 PHARM REV CODE 250 ALT 637 W/ HCPCS: Performed by: PHYSICIAN ASSISTANT

## 2019-04-16 PROCEDURE — 25500020 PHARM REV CODE 255: Performed by: EMERGENCY MEDICINE

## 2019-04-16 PROCEDURE — 97110 THERAPEUTIC EXERCISES: CPT

## 2019-04-16 RX ORDER — AZITHROMYCIN 250 MG/1
TABLET, FILM COATED ORAL
Qty: 6 TABLET | Refills: 0 | Status: ON HOLD | OUTPATIENT
Start: 2019-04-16 | End: 2019-07-05

## 2019-04-16 RX ADMIN — IOHEXOL 100 ML: 350 INJECTION, SOLUTION INTRAVENOUS at 12:04

## 2019-04-16 NOTE — DISCHARGE INSTRUCTIONS
Take oral antibiotics as directed.   Use inhaler every 6 hours for coughing and shortness of breath.  Call and follow up with your primary care physician.   Return to the emergency department for new or worsening.    Future Appointments   Date Time Provider Department Center   4/16/2019  3:00 PM Ramiro Payne, PTA [facility] [place]   4/18/2019  1:00 PM Ramiro Payne, PTA [facility] [place]   4/23/2019  1:00 PM Ramiro Payne PTA [facility] [place]   4/26/2019  1:30 PM Ney Epstein, PT [facility] [place]   6/7/2019  9:00 AM Ranulfo House MD [facility] [address] [facility]       Our goal in the emergency department is to always give you outstanding care and exceptional service. You may receive a survey by mail or e-mail in the next week regarding your experience in our ED. We would greatly appreciate your completing and returning the survey. Your feedback provides us with a way to recognize our staff who give very good care and it helps us learn how to improve when your experience was below our aspiration of excellence.

## 2019-04-16 NOTE — ED PROVIDER NOTES
"Encounter Date: 4/15/2019    SCRIBE #1 NOTE: I, Shima Walton, am scribing for, and in the presence of,  Dr. Curry. I have scribed the following portions of the note - the EKG reading.       History     Chief Complaint   Patient presents with    Chest Pain     reports substernal cp that started tonight, reports SOB and cough      11:05 PM  Patient is a 18-year-old female with a history of anxiety, fibromyalgia, insomnia, depression, seizures presents the ED with cough, chest pain, and shortness of breath.  Patient states that she did the like light run on Saturday, 2 days ago.  She states that she inhaled powder, because she was spitting up neon Powder and blowing out of her nose.  Yesterday, she started having substernal chest pain with associated cough and shortness of breath after coughing spells.  She she is complaining of 6/10 pain to her upper and middle sternal region.  She is coughing throughout exam.  She denies history of asthma.  She denies tobacco use, hormone use, history of DVT/PE, lower extremity pain or swelling, recent travel or surgery. Father reports strong family history of "genetic disorder" that causes VTEs; he has a hx of DVT and is on Xalrelto.        Review of patient's allergies indicates:  No Known Allergies  Past Medical History:   Diagnosis Date    Anxiety     reports panic attacks    Depression     Fibromyalgia     per pt    Insomnia     Precocious female puberty     Seizures     reports 2 incidents of possible seizures while giving blood    Syncope and collapse     Wrist fracture, bilateral      Past Surgical History:   Procedure Laterality Date    ARTHROSCOPY-KNEE Right 7/16/2015    Performed by Ranulfo House MD at [facility] OR [address] [place] [address]    ARTHROSCOPY-KNEE Left 2/16/2015    Performed by Ranulfo House MD at [facility] OR [address] [place] [address]    ARTHROSCOPY-KNEE, removal of two Synthes 4-5 cortical screws Right 6/15/2017    Performed by Ranulfo House MD at [facility] OR [address] [place] [address]    chip      chip " implant    closed  manipulation under anesthesia, cortisone injection ARTHROSCOPY-KNEE was aborted Right 11/19/2015    Performed by [facility]ulfo [facility] at [facility] OR [facility]    HEMANGIOMA EXCISION      scalp    INGUINAL HERNIA REPAIR Left     KNEE SURGERY Left 2/16/15    OSTEOTOMY, FEMUR - distal to correct valgus (Orthopediatrics plate, MTF bone wedge).  Right knee arthroscopy with MPFL reconstruction, gracilis allograft (Linvatec).  3 hrs. Right 12/13/2018    Performed by Ranulfo House [facility] at [facility] OR [facility]    OSTEOTOMY-TIBIA (Tibial tubercle transfer osteotomy) Right 7/16/2015    Performed by Ranulfo [facility] at [facility] OR [facility]    OSTEOTOMY-TIBIA (Tibial tubercle) Left 2/16/2015    Performed by Ranulfo House [facility] at [facility] OR [facility]    TOE SURGERY Right     5 th toe     Family History   Problem Relation Age of Onset    Seizures Father     Anxiety disorder Father     Depression Father     Mental illness Father     Schizophrenia Father     Deep vein thrombosis Father      Social History     Tobacco Use    Smoking status: Never Smoker    Smokeless tobacco: Never Used   Substance Use Topics    Alcohol use: No    Drug use: No     Review of Systems    Physical Exam     Initial Vitals [04/15/19 2222]   BP Pulse Resp Temp SpO2   133/74 94 18 98.2 °F (36.8 °C) 98 %      MAP       --         Physical Exam    Vitals reviewed.  Constitutional: She appears well-developed and well-nourished. She is not diaphoretic. No distress.   HENT:   Head: Normocephalic and atraumatic.   Nose: Nose normal.   Eyes: Conjunctivae and EOM are normal.   Neck: Normal range of motion.   Cardiovascular: Normal rate, regular rhythm and normal heart sounds. Exam reveals no friction rub.    No murmur heard.  Pulmonary/Chest: Breath sounds normal. No respiratory distress. She has no wheezes. She has no rales. She exhibits bony tenderness.       Coughing throughout exam.   Abdominal: Soft. Bowel sounds are normal. She exhibits no  distension. There is no tenderness. There is no rebound.   Musculoskeletal: Normal range of motion.   Neurological: She is alert and oriented to person, place, and time. She has normal strength. No sensory deficit.   Skin: Skin is warm and dry. No erythema. No pallor.   Psychiatric: She has a normal mood and affect. Her behavior is normal. Judgment and thought content normal.         ED Course   Procedures  Labs Reviewed   CBC W/ AUTO DIFFERENTIAL - Abnormal; Notable for the following components:       Result Value    RBC 5.50 (*)     MCV 79 (*)     MCH 24.4 (*)     MCHC 31.0 (*)     RDW 14.7 (*)     All other components within normal limits   D DIMER, QUANTITATIVE - Abnormal; Notable for the following components:    D-Dimer 1.09 (*)     All other components within normal limits   BASIC METABOLIC PANEL   POCT URINE PREGNANCY     EKG Readings: (Independently Interpreted)   Initial Reading: No STEMI. Rhythm: Normal Sinus Rhythm. Heart Rate: 85.   Baseline artifact present making interpretation difficult.       ECG Results          EKG 12-lead (Final result)  Result time 04/16/19 08:14:05    Final result by Interface, Lab In [facility] (04/16/19 08:14:05)                 Narrative:    Test Reason : R07.9,    Vent. Rate : 085 BPM     Atrial Rate : 085 BPM     P-R Int : 150 ms          QRS Dur : 092 ms      QT Int : 344 ms       P-R-T Axes : 044 044 016 degrees     QTc Int : 409 ms    Normal sinus rhythm  When compared with ECG of 07-OCT-2016 13:12,  No significant change was found  Confirmed by Justin SAUL, Cee Canales ([id number]) on 4/16/2019 8:13:57 AM    Referred By: JAMES   SELF           Confirmed By:Cee Anderson MD                            Imaging Results          CTA Chest Non-Coronary - PE Study (Final result)  Result time 04/16/19 01:27:24    Final result by Speedy Daniel MD (04/16/19 01:27:24)                 Impression:      Airspace opacification in the lower lobe of right lung, possibly  representing early pneumonia or aspiration pneumonitis.  There is some reactive lymph node enlargement in the right hilum.    No evidence of pulmonary thromboembolism up to the segmental arteries.    Electronically signed by resident: Annette Marie  Date:    04/16/2019  Time:    00:52    Electronically signed by: Speedy Daniel MD  Date:    04/16/2019  Time:    01:27             Narrative:    EXAMINATION:  CTA CHEST NON CORONARY    CLINICAL HISTORY:  chest pain, sob, cough, elevated d-dimer;    TECHNIQUE:  Low dose axial images, sagittal and coronal reformations were obtained from the thoracic inlet to the lung bases following the IV administration of 100 mL of Omnipaque 350.  Contrast timing was optimized to evaluate the pulmonary arteries.  MIP images were performed.    COMPARISON:  None    FINDINGS:  Neck base: Unremarkable.    Chest wall/axilla: Unremarkable.    Lungs/pleura: Airspace opacification in the lower lobe of right lung, possibly representing inflammation from infectious or non infectious process, or aspiration pneumonitis.  There is some reactive lymph node enlargement in the right hilum.  No pleural effusion or pneumothorax.    Heart/pericardium/mediastinum: Heart is normal in size.  No pericardial effusion.    Vasculature: 3 vessels left aortic arch.  Aorta is normal in course and caliber without aneurysmal dilatation.  No evidence of pulmonary thromboembolism up to the segmental arteries.    Upper abdomen: Unremarkable.    Bones: No suspicious lytic or blastic lesion.                               X-Ray Chest PA And Lateral (Final result)  Result time 04/15/19 23:48:36    Final result by Speedy Daniel MD (04/15/19 23:48:36)                 Impression:      No acute cardiopulmonary process.      Electronically signed by: Speedy Daniel MD  Date:    04/15/2019  Time:    23:48             Narrative:    EXAMINATION:  XR CHEST PA AND LATERAL    CLINICAL HISTORY:  Cough    TECHNIQUE:  PA and  "lateral views of the chest were performed.    COMPARISON:  None.    FINDINGS:  There is no consolidation, effusion, or pneumothorax.    Cardiomediastinal silhouette is unremarkable.    Regional osseous structures are unremarkable.                                 Medical Decision Making:   History:   Old Medical Records: I decided to obtain old medical records.  Initial Assessment:   Patient is a 18 year old female that presents to the ED with cough, SOB, and chest pains.   Differential Diagnosis:   Includes but is not limited to musculoskeletal pain, bronchitis, aspiration pneumonia.  I have considered venous suspect ACS given no risk factors for CAD.  She is PERC negative.  Independently Interpreted Test(s):   I have ordered and independently interpreted EKG Reading(s) - see prior notes  Clinical Tests:   Lab Tests: Reviewed and Ordered  Radiological Study: Ordered and Reviewed  Medical Tests: Ordered and Reviewed  ED Management:  Patient with S1Q3T3 sign on ECG. No STEMI. Her father reports family history of "genetic disorder" that causes VTEs. Will initiate work up including d-dimer, and continue to monitor. Will give puff of albuterol rescue inhaler.    Elevated d-dimer. Will obtain CTA.    CTA negative for PE. Airspace opacification in the lower lobe of R lung representing early pneumonia or aspiration pnuemonitis.     I will treat patient with Z-pack. She is to continue albuterol inhaler prn. All questions answered. Instructions given to follow up with PCP and/or return to ED for new or worsening symptoms. Father and patient voice their understanding.     I have reviewed patient's chart and discussed this case with my supervising MD.     Lorna Rehman PA-C  Emergent Department  [facility]  Spectralink #[phone] or #[phone]      Additional MDM:   PERC Rule:   Age is greater than or equal to 50 = 0.0  Heart Rate is greater than or equal to 100 = 0.0  SaO2 on room air < 95% = 0.0  Unilateral leg swelling = " 0.0  Hemoptysis = 0.0  Recent surgery or trauma = 0.0  Prior PE or DVT =  0.0  Hormone use = 0.00  PERC Score = 0         Scribe Attestation:   Scribe #1: I performed the above scribed service and the documentation accurately describes the services I performed. I attest to the accuracy of the note.    Attending Attestation:     Physician Attestation Statement for NP/PA:   I discussed this assessment and plan of this patient with the NP/PA, but I did not personally examine the patient. The face to face encounter was performed by the NP/PA.                     Clinical Impression:       ICD-10-CM ICD-9-CM   1. Aspiration pneumonitis J69.0 507.0   2. Chest pain R07.9 786.50   3. Cough R05 786.2         Disposition:   Disposition: Discharged  Condition: Stable                        Lorna Rehman PA-C  04/17/19 0204

## 2019-04-16 NOTE — PROGRESS NOTES
"  Physical Therapy Daily Treatment Note     Name: Denise Mosher  Clinic Number: [id number]    Therapy Diagnosis:   No diagnosis found.  Physician: Ranulfo House MD    Visit Date: 4/16/2019    Physician Orders: PT Eval and Treat Right Knee  12/13/18 distal femoral osteotomy and MPFL reconstruction. PWB with knee brace. ROM, quad strengthening, no restrictions. Please start PT next week.   Medical Diagnosis from Referral: M21.061 (ICD-10-CM) - Acquired genu valgum of right knee M25.361 (ICD-10-CM) - Patellar instability of right knee   Evaluation Date: 12/21/2018  Authorization Period Expiration: 1/31/2019  Plan of Care Expiration: 05/07/2019  Visit # / Visits authorized: 5/12    Total visits: 28  Time In: 1500  Time Out: 1600   Total Billable Time: 25 minutes     Precautions: Standard    Subjective     Pt reports w/ no c/o pn in R knee.  Pt mentioned inhaling powder at a race and having difficulty breathing since then.  Pt "checked into the ER for this issue and was given an inhaler. "  She was compliant with home exercise program.  Response to previous treatment: No adverse effects   Functional change: No change     Pain: 0/10  Location: right knee      Objective       Denise received therapeutic exercises to develop strength, endurance, ROM, flexibility, posture and core stabilization for 25 minutes including:    GSS 2 min strap   HSS  2 min plinthe   Heel slides 10 x 10 sec hold    leg press and toe raises    Single leg press with 50 # 3 x 10  Step ups 3" 3 x 10   LLR 32 x 10    Not performed today:   SLR x 3 ways  SAQ  Hams curls  Standing BTB TKE    Gait training 0 min   Cone hesitation walk 5 cones 5 laps    Manual treatment:   Pat MOBS/PROM flexion x 0 min     Denise received cold pack for 10 minutes to R knee.       Home Exercises Provided and Patient Education Provided     Education provided:   - Pt educated on proper exercise technique.      Written Home Exercises Provided: Patient instructed to " cont prior HEP.  Exercises were reviewed and Denise was able to demonstrate them prior to the end of the session.  Denise demonstrated good  understanding of the education provided.       Assessment   Pt had no adverse effects from tx.  Pt showed increased strength and improved gait during therex.  Pt would benefit from further skilled care to cont to improve ROm, gait and LE strength.    Denise is progressing well towards her goals.   Pt prognosis is Good.     Pt will continue to benefit from skilled outpatient physical therapy to address the deficits listed in the problem list box on initial evaluation, provide pt/family education and to maximize pt's level of independence in the home and community environment.     Pt's spiritual, cultural and educational needs considered and pt agreeable to plan of care and goals.    Anticipated barriers to physical therapy: none    Goals: Short Term GOALS: 6 weeks  1. Patient demonstrates independence with HEP. (progressing, not met)   2. Patient demonstrates increased right knee ROM to 90 degrees flexion to improve tolerance to functional activities with no more than 2/10 pain.(progressing, not met)   3. Patient demonstrates improved overall function per FOTO Knee Survey to 30% Limitation or less.(progressing, not met)   4. Patient will ambulate will least restrictive device.(met)      Long Term GOALS: 12 weeks  1. Patient demonstrates increased right knee ROM to be equal to left to improve tolerance to functional activities pain free. (progressing, not met)   2. Patient demonstrates increased strength BLE's to 5/5 or greater to improve tolerance to functional activities pain free. (progressing, not met)   3. Patient demonstrates improved overall function per FOTO Knee Survey to 10% Limitation or less. (progressing, not met)   4. Patient will return to work with no reports of pain or restrictions.(progressing, not met)   5. Patient will ambulate independently with  normalized gait pattern.(progressing, not met)       Plan     Cont to progress towards goals set by PT.  Work to improve ROM and strength next visit.      Ramiro Payne, PTA

## 2019-04-16 NOTE — ED NOTES
PT had a vasovagal episode and fainted while IV was being inserted. PT was lying on bd and did not hit head. Provider was made aware and gave instructions to give IV bolus of 1000 ml normal saline.

## 2019-04-23 ENCOUNTER — CLINICAL SUPPORT (OUTPATIENT)
Dept: REHABILITATION | Facility: HOSPITAL | Age: 19
End: 2019-04-23
Attending: ORTHOPAEDIC SURGERY
Payer: MEDICAID

## 2019-04-23 DIAGNOSIS — R52 PAIN: ICD-10-CM

## 2019-04-23 PROCEDURE — 97110 THERAPEUTIC EXERCISES: CPT

## 2019-04-23 PROCEDURE — 97140 MANUAL THERAPY 1/> REGIONS: CPT

## 2019-04-23 NOTE — PROGRESS NOTES
"  Physical Therapy Daily Treatment Note     Name: Denise Mosher  Clinic Number: [id number]    Therapy Diagnosis:   Encounter Diagnosis   Name Primary?    Pain      Physician: Ranulfo House MD    Visit Date: 4/23/2019    Physician Orders: PT Eval and Treat Right Knee  12/13/18 distal femoral osteotomy and MPFL reconstruction. PWB with knee brace. ROM, quad strengthening, no restrictions. Please start PT next week.   Medical Diagnosis from Referral: M21.061 (ICD-10-CM) - Acquired genu valgum of right knee M25.361 (ICD-10-CM) - Patellar instability of right knee   Evaluation Date: 12/21/2018  Authorization Period Expiration: 1/31/2019  Plan of Care Expiration: 05/07/2019  Visit # / Visits authorized: 6/12    Total visits: 30  Time In: 1300  Time Out: 1400   Total Billable Time: 28 minutes     Precautions: Standard    Subjective     Pt staates feeling better but cont to have breathing issues.  Pt has no c/o pn in R knee.    She was compliant with home exercise program.  Response to previous treatment: No adverse effects   Functional change: No change     Pain: 0/10  Location: right knee      Objective   ROM: 4/23/19  88 degrees of R knee flexion     Denise received therapeutic exercises to develop strength, endurance, ROM, flexibility, posture and core stabilization for 20 minutes including:    GSS 2 min strap   HSS  2 min plinthe   EOT flexion 5 x 20 sec hold   Bike 5 min for ROM  Lat step turf 1 lap   Heel slides 10 x 10 sec hold   Single leg press shuttle with 1 band 3 x 10  Step ups 4" 3 x 10       Not performed today:   SLR x 3 ways  SAQ  Hams curls  Standing BTB TKE  LLR 2 x 15   leg press and toe raises      Gait training 0 min   Cone hesitation walk 5 cones 5 laps    Manual treatment:   Pat MOBS/PROM flexion x 8 min     Denise received cold pack for 10 minutes to R knee.       Home Exercises Provided and Patient Education Provided     Education provided:   - Pt educated on proper exercise technique.  "     Written Home Exercises Provided: Patient instructed to cont prior HEP.  Exercises were reviewed and Denise was able to demonstrate them prior to the end of the session.  Denise demonstrated good  understanding of the education provided.       Assessment     Pt cont to have slight R sided hip drop during gait and some weakness.  ROM cont to be lacking but has improved with knee flexion since last visit.  Pt mayuri tx well w/ no c/o pn.  Pt showed increased strength during therex.    Denise is progressing well towards her goals.   Pt prognosis is Good.     Pt will continue to benefit from skilled outpatient physical therapy to address the deficits listed in the problem list box on initial evaluation, provide pt/family education and to maximize pt's level of independence in the home and community environment.     Pt's spiritual, cultural and educational needs considered and pt agreeable to plan of care and goals.    Anticipated barriers to physical therapy: none    Goals: Short Term GOALS: 6 weeks  1. Patient demonstrates independence with HEP. (progressing, not met)   2. Patient demonstrates increased right knee ROM to 90 degrees flexion to improve tolerance to functional activities with no more than 2/10 pain.(progressing, not met)   3. Patient demonstrates improved overall function per FOTO Knee Survey to 30% Limitation or less.(progressing, not met)   4. Patient will ambulate will least restrictive device.(met)      Long Term GOALS: 12 weeks  1. Patient demonstrates increased right knee ROM to be equal to left to improve tolerance to functional activities pain free. (progressing, not met)   2. Patient demonstrates increased strength BLE's to 5/5 or greater to improve tolerance to functional activities pain free. (progressing, not met)   3. Patient demonstrates improved overall function per FOTO Knee Survey to 10% Limitation or less. (progressing, not met)   4. Patient will return to work with no reports of  pain or restrictions.(progressing, not met)   5. Patient will ambulate independently with normalized gait pattern.(progressing, not met)       Plan     Cont to progress towards goals set by PT.  Work to improve ROM and strength next visit.      Ramiro Payne, PTA

## 2019-04-26 ENCOUNTER — CLINICAL SUPPORT (OUTPATIENT)
Dept: REHABILITATION | Facility: HOSPITAL | Age: 19
End: 2019-04-26
Attending: ORTHOPAEDIC SURGERY
Payer: MEDICAID

## 2019-04-26 DIAGNOSIS — R52 PAIN: ICD-10-CM

## 2019-04-26 PROCEDURE — 97110 THERAPEUTIC EXERCISES: CPT | Performed by: PHYSICAL THERAPIST

## 2019-04-26 NOTE — PROGRESS NOTES
"  Physical Therapy Daily Treatment Note     Name: Denise Mosher  Clinic Number: [id number]    Therapy Diagnosis:   Encounter Diagnosis   Name Primary?    Pain      Physician: Ranulfo House MD    Visit Date: 4/26/2019    Physician Orders: PT Eval and Treat Right Knee  12/13/18 distal femoral osteotomy and MPFL reconstruction. PWB with knee brace. ROM, quad strengthening, no restrictions. Please start PT next week.   Medical Diagnosis from Referral: M21.061 (ICD-10-CM) - Acquired genu valgum of right knee M25.361 (ICD-10-CM) - Patellar instability of right knee   Evaluation Date: 12/21/2018  Authorization Period Expiration: 1/31/2019  Plan of Care Expiration: 5/07/2019  Visit # / Visits authorized: 7/12      Time In: 1:20 pm  Time Out: 2:15 pm  Total Billable Time: 45 minutes     Precautions: Standard    Subjective     Pt reports R knee feels "good" and rates pain as 0/10.    She was compliant with home exercise program.  Response to previous treatment: No adverse effects   Functional change: improved gait     Pain: 0/10  Location: right knee      Objective     AROM 0-92 deg flexion, 96 passive with end range pain.  Ambulates unassisted with MIN knee flexion gait.  QS ability increased.  No lag with SLR.  MIN swelling.  Pt still sensitive to touch over patella and lower quad.    Denise received therapeutic exercises to develop strength, endurance, ROM, flexibility, posture and core stabilization for 40 minutes including:    GSS   HSS    Pat MOBS/PROM flexion x 10 min   SLR x 3 ways  SAQ  Hams curls  Standing BTB TKE  Heel slides  Shuttle leg press and toe raises with 2.5 bands  Single leg press with 1.5 band   GTR  CR quads for increased flexion  HEP review    Denise received cold pack for 10 minutes to R knee.       Home Exercises Provided and Patient Education Provided     Education provided:   - Pt educated on proper exercise technique.      Written Home Exercises Provided: Patient instructed to cont " prior HEP.  Exercises were reviewed and Denise was able to demonstrate them prior to the end of the session.  Denise demonstrated good  understanding of the education provided.       Assessment   Progressing with flexion.  No pain with exercise.    Denise is progressing well towards her goals.   Pt prognosis is Good.     Pt will continue to benefit from skilled outpatient physical therapy to address the deficits listed in the problem list box on initial evaluation, provide pt/family education and to maximize pt's level of independence in the home and community environment.     Pt's spiritual, cultural and educational needs considered and pt agreeable to plan of care and goals.    Anticipated barriers to physical therapy: none    Goals: Short Term GOALS: 6 weeks  1. Patient demonstrates independence with HEP. (progressing, not met)   2. Patient demonstrates increased right knee ROM to 90 degrees flexion to improve tolerance to functional activities with no more than 2/10 pain.(progressing, not met)   3. Patient demonstrates improved overall function per FOTO Knee Survey to 30% Limitation or less.(progressing, not met)   4. Patient will ambulate will least restrictive device.(met)      Long Term GOALS: 12 weeks  1. Patient demonstrates increased right knee ROM to be equal to left to improve tolerance to functional activities pain free. (progressing, not met)   2. Patient demonstrates increased strength BLE's to 5/5 or greater to improve tolerance to functional activities pain free. (progressing, not met)   3. Patient demonstrates improved overall function per FOTO Knee Survey to 10% Limitation or less. (progressing, not met)   4. Patient will return to work with no reports of pain or restrictions.(progressing, not met)   5. Patient will ambulate independently with normalized gait pattern.(progressing, not met)       Plan     Cont PT to increase ROM and strength per POC.      Ney Epstein, PT

## 2019-05-07 ENCOUNTER — CLINICAL SUPPORT (OUTPATIENT)
Dept: REHABILITATION | Facility: HOSPITAL | Age: 19
End: 2019-05-07
Attending: ORTHOPAEDIC SURGERY
Payer: MEDICAID

## 2019-05-07 DIAGNOSIS — R52 PAIN: ICD-10-CM

## 2019-05-07 PROCEDURE — 97110 THERAPEUTIC EXERCISES: CPT

## 2019-05-07 NOTE — PROGRESS NOTES
Physical Therapy Daily Treatment Note     Name: Denise Mosher  Clinic Number: [id number]    Therapy Diagnosis:   Encounter Diagnosis   Name Primary?    Pain      Physician: Ranulfo House MD    Visit Date: 5/7/2019    Physician Orders: PT Eval and Treat Right Knee  12/13/18 distal femoral osteotomy and MPFL reconstruction. PWB with knee brace. ROM, quad strengthening, no restrictions. Please start PT next week.   Medical Diagnosis from Referral: M21.061 (ICD-10-CM) - Acquired genu valgum of right knee M25.361 (ICD-10-CM) - Patellar instability of right knee   Evaluation Date: 12/21/2018  Authorization Period Expiration: 1/31/2019  Plan of Care Expiration: 05/07/2019  Visit # / Visits authorized: 8/12      Time In: 1305  Time Out: 1345  Total Billable Time: 20 minutes     Precautions: Standard    Subjective     Pt states feeling well w/ no c/o pn in R knee currently.     She was compliant with home exercise program.  Response to previous treatment: No adverse effects   Functional change: improved gait     Pain: 0/10  Location: right knee      Objective       Denise received therapeutic exercises to develop strength, endurance, ROM, flexibility, posture and core stabilization for 20 minutes including:    GSS 1 min strap  HSS  1 min plinthe   Pat MOBS/PROM flexion x 10 min   Heel slides 10 x 10 sec hold   EOT flexion 4 x 20 sec hold 95 degrees  Bike 5 min (stopped 2* pt feeling weak, faint and tired    Not performed today:   SLR x 3 ways  SAQ  Hams curls  Standing BTB TKE  Shuttle leg press and toe raises with 2.5 bands  Single leg press with 1.5 band   GTR  CR quads for increased flexion      Denise received cold pack for 0 minutes to R knee.       Home Exercises Provided and Patient Education Provided     Education provided:   - Pt educated on proper exercise technique.      Written Home Exercises Provided: Patient instructed to cont prior HEP.  Exercises were reviewed and Denise was able to demonstrate  them prior to the end of the session.  Denise demonstrated good  understanding of the education provided.       Assessment   Therex stopped 2* pt feeling weak, faint and tired.  Pt needed assistance getting off of bike.  PTA assessed pt prior to her departure from clinic.  Pt appeared alert and stable upon leaving today.      Denise is progressing well towards her goals.   Pt prognosis is Good.     Pt will continue to benefit from skilled outpatient physical therapy to address the deficits listed in the problem list box on initial evaluation, provide pt/family education and to maximize pt's level of independence in the home and community environment.     Pt's spiritual, cultural and educational needs considered and pt agreeable to plan of care and goals.    Anticipated barriers to physical therapy: none    Goals: Short Term GOALS: 6 weeks  1. Patient demonstrates independence with HEP. (progressing, not met)   2. Patient demonstrates increased right knee ROM to 90 degrees flexion to improve tolerance to functional activities with no more than 2/10 pain.(progressing, not met)   3. Patient demonstrates improved overall function per FOTO Knee Survey to 30% Limitation or less.(progressing, not met)   4. Patient will ambulate will least restrictive device.(met)      Long Term GOALS: 12 weeks  1. Patient demonstrates increased right knee ROM to be equal to left to improve tolerance to functional activities pain free. (progressing, not met)   2. Patient demonstrates increased strength BLE's to 5/5 or greater to improve tolerance to functional activities pain free. (progressing, not met)   3. Patient demonstrates improved overall function per FOTO Knee Survey to 10% Limitation or less. (progressing, not met)   4. Patient will return to work with no reports of pain or restrictions.(progressing, not met)   5. Patient will ambulate independently with normalized gait pattern.(progressing, not met)       Plan     Cont to  progress towards goals set by SHIRLEY Payne, PTA

## 2019-05-10 ENCOUNTER — CLINICAL SUPPORT (OUTPATIENT)
Dept: REHABILITATION | Facility: HOSPITAL | Age: 19
End: 2019-05-10
Attending: ORTHOPAEDIC SURGERY
Payer: MEDICAID

## 2019-05-10 DIAGNOSIS — R52 PAIN: ICD-10-CM

## 2019-05-10 PROCEDURE — 97110 THERAPEUTIC EXERCISES: CPT

## 2019-05-10 NOTE — PROGRESS NOTES
Physical Therapy Daily Treatment Note     Name: Denise Mosher  Clinic Number: [id number]    Therapy Diagnosis:   Encounter Diagnosis   Name Primary?    Pain      Physician: Ranulfo House MD    Visit Date: 5/10/2019    Physician Orders: PT Eval and Treat Right Knee  12/13/18 distal femoral osteotomy and MPFL reconstruction. PWB with knee brace. ROM, quad strengthening, no restrictions. Please start PT next week.   Medical Diagnosis from Referral: M21.061 (ICD-10-CM) - Acquired genu valgum of right knee M25.361 (ICD-10-CM) - Patellar instability of right knee   Evaluation Date: 12/21/2018  Authorization Period Expiration: 1/31/2019  Plan of Care Expiration: 05/07/2019  Visit # / Visits authorized: 9/12    Total visits: 33  Time In: 1500  Time Out: 1605  Total Billable Time:55  minutes     Precautions: Standard    Subjective     Pt reports feeling better and has no c/o pn in R knee.      She was compliant with home exercise program.  Response to previous treatment: No adverse effects   Functional change: improved gait     Pain: 0/10  Location: right knee      Objective       Denise received therapeutic exercises to develop strength, endurance, ROM, flexibility, posture and core stabilization for 55 minutes including:    Recumbent Bike 5 min to increase ROM and blood flow   GSS 1 min strap  HSS  1 min plinthe   Heel slides 10 x 10 sec hold   Pat MOBS/PROM flexion x 5 min   Mini squats 3 x 10   B HR 30 x   LLR 3 x 10  QS 30 x 3 sec hold     Not performed today:   SAQ  Hams curls  Standing BTB TKE  Shuttle leg press and toe raises with 2.5 bands  Single leg press with 1.5 band   GTR  CR quads for increased flexion    Denise received cold pack for 10 minutes to R knee.       Home Exercises Provided and Patient Education Provided     Education provided:   - Pt educated on proper exercise technique.      Written Home Exercises Provided: Patient instructed to cont prior HEP.  Exercises were reviewed and Denise  was able to demonstrate them prior to the end of the session.  Denise demonstrated good  understanding of the education provided.       Assessment     Pt mayuri tx well w/ no c/om pn in R knee.  Pt showed increased strength and knee flexion during therex.  Pt cont to lack some ROM and strength.    Denise is progressing well towards her goals.   Pt prognosis is Good.     Pt will continue to benefit from skilled outpatient physical therapy to address the deficits listed in the problem list box on initial evaluation, provide pt/family education and to maximize pt's level of independence in the home and community environment.     Pt's spiritual, cultural and educational needs considered and pt agreeable to plan of care and goals.    Anticipated barriers to physical therapy: none    Goals: Short Term GOALS: 6 weeks  1. Patient demonstrates independence with HEP. (progressing, not met)   2. Patient demonstrates increased right knee ROM to 90 degrees flexion to improve tolerance to functional activities with no more than 2/10 pain.(progressing, not met)   3. Patient demonstrates improved overall function per FOTO Knee Survey to 30% Limitation or less.(progressing, not met)   4. Patient will ambulate will least restrictive device.(met)      Long Term GOALS: 12 weeks  1. Patient demonstrates increased right knee ROM to be equal to left to improve tolerance to functional activities pain free. (progressing, not met)   2. Patient demonstrates increased strength BLE's to 5/5 or greater to improve tolerance to functional activities pain free. (progressing, not met)   3. Patient demonstrates improved overall function per FOTO Knee Survey to 10% Limitation or less. (progressing, not met)   4. Patient will return to work with no reports of pain or restrictions.(progressing, not met)   5. Patient will ambulate independently with normalized gait pattern.(progressing, not met)       Plan     Cont to progress towards goals set by PT.   Cont to increase ROM and strength.      Ramiro Payne, PTA

## 2019-05-14 ENCOUNTER — CLINICAL SUPPORT (OUTPATIENT)
Dept: REHABILITATION | Facility: HOSPITAL | Age: 19
End: 2019-05-14
Attending: ORTHOPAEDIC SURGERY
Payer: MEDICAID

## 2019-05-14 DIAGNOSIS — R52 PAIN: ICD-10-CM

## 2019-05-14 PROCEDURE — 97164 PT RE-EVAL EST PLAN CARE: CPT | Performed by: PHYSICAL THERAPIST

## 2019-05-14 PROCEDURE — 97140 MANUAL THERAPY 1/> REGIONS: CPT | Performed by: PHYSICAL THERAPIST

## 2019-05-14 PROCEDURE — 97110 THERAPEUTIC EXERCISES: CPT | Performed by: PHYSICAL THERAPIST

## 2019-05-14 NOTE — PLAN OF CARE
"  Physical Therapy Daily Treatment Note     Name: Denise Mosher  Clinic Number: [id number]    Therapy Diagnosis:   Encounter Diagnosis   Name Primary?    Pain      Physician: Ranulfo House MD    Visit Date: 5/14/2019    Physician Orders: PT Eval and Treat Right Knee  12/13/18 distal femoral osteotomy and MPFL reconstruction. PWB with knee brace. ROM, quad strengthening, no restrictions. Please start PT next week.   Medical Diagnosis from Referral: M21.061 (ICD-10-CM) - Acquired genu valgum of right knee M25.361 (ICD-10-CM) - Patellar instability of right knee   Evaluation Date: 12/21/2018  Authorization Period Expiration: 1/31/2019  Plan of Care Expiration: 7/07/2019  Visit # / Visits authorized: 8/12      Time In: 2:00 pm  Time Out: 3:00 pm  Total Billable Time: 45 minutes     Precautions: Standard    Subjective     Pt reports R knee feels "good" and rates pain as 0/10.    She was compliant with home exercise program.  Response to previous treatment: No adverse effects   Functional change: improved gait     Pain: 0/10  Location: right knee      Objective     AROM 0-94 deg flexion, 100 passive with end range pain.  Ambulates unassisted with MIN knee flexion gait.  QS ability increased.  No lag with SLR.  MIN swelling.  Gross strength 4+/5 quads and 5/5 hams..    Denise received therapeutic exercises to develop strength, endurance, ROM, flexibility, posture and core stabilization for 40 minutes including:    GSS   HSS    Pat MOBS/PROM flexion x 10 min   SLR x 3 ways with 3#  SAQ with 3#  Hams curls  Standing BTB TKE  Heel slides  Shuttle leg press and toe raises with 2.5 bands  Single leg press with 1.5 band   CR quads for increased flexion  HEP review    Denise received cold pack for 10 minutes to R knee.       Home Exercises Provided and Patient Education Provided     Education provided:   - Pt educated on proper exercise technique.      Written Home Exercises Provided: Patient instructed to cont prior " HEP.  Exercises were reviewed and Denise was able to demonstrate them prior to the end of the session.  Denise demonstrated good  understanding of the education provided.       Assessment   Progressing well with flexion.    Denise is progressing well towards her goals.   Pt prognosis is Good.     Pt will continue to benefit from skilled outpatient physical therapy to address the deficits listed in the problem list box on initial evaluation, provide pt/family education and to maximize pt's level of independence in the home and community environment.     Pt's spiritual, cultural and educational needs considered and pt agreeable to plan of care and goals.    Anticipated barriers to physical therapy: none    Goals: Short Term GOALS: 6 weeks  1. Patient demonstrates independence with HEP. (progressing, not met)   2. Patient demonstrates increased right knee ROM to 90 degrees flexion to improve tolerance to functional activities with no more than 2/10 pain.(progressing, not met)   3. Patient demonstrates improved overall function per FOTO Knee Survey to 30% Limitation or less.(progressing, not met)   4. Patient will ambulate will least restrictive device.(met)      Long Term GOALS: 12 weeks  1. Patient demonstrates increased right knee ROM to be equal to left to improve tolerance to functional activities pain free. (progressing, not met)   2. Patient demonstrates increased strength BLE's to 5/5 or greater to improve tolerance to functional activities pain free. (progressing, not met)   3. Patient demonstrates improved overall function per FOTO Knee Survey to 10% Limitation or less. (progressing, not met)   4. Patient will return to work with no reports of pain or restrictions.(progressing, not met)   5. Patient will ambulate independently with normalized gait pattern.(progressing, not met)       Plan     Cont PT to increase ROM and strength per updated POC.      Ney Epstein, PT

## 2019-05-21 ENCOUNTER — CLINICAL SUPPORT (OUTPATIENT)
Dept: REHABILITATION | Facility: HOSPITAL | Age: 19
End: 2019-05-21
Attending: ORTHOPAEDIC SURGERY
Payer: MEDICAID

## 2019-05-21 DIAGNOSIS — R52 PAIN: ICD-10-CM

## 2019-05-21 PROCEDURE — 97110 THERAPEUTIC EXERCISES: CPT

## 2019-05-21 NOTE — PROGRESS NOTES
Physical Therapy Daily Treatment Note     Name: Denise Mosher  Clinic Number: [id number]    Therapy Diagnosis:   Encounter Diagnosis   Name Primary?    Pain      Physician: Ranulfo House MD    Visit Date: 5/21/2019    Physician Orders: PT Eval and Treat Right Knee  12/13/18 distal femoral osteotomy and MPFL reconstruction. PWB with knee brace. ROM, quad strengthening, no restrictions. Please start PT next week.   Medical Diagnosis from Referral: M21.061 (ICD-10-CM) - Acquired genu valgum of right knee M25.361 (ICD-10-CM) - Patellar instability of right knee   Evaluation Date: 12/21/2018  Authorization Period Expiration: 1/31/2019  Plan of Care Expiration: 05/07/2019  Visit # / Visits authorized: 11/12    Total visits: 35  Time In: 0800  Time Out: 900  Total Billable Time:   minutes     Precautions: Standard    Subjective     Pt states having no c/o pn in R knee.  Pt mentioned not eating anything this morning and being awake for the past 30 hours.  Despite her condition pt would like to attempt treatment today.    She was compliant with home exercise program.  Response to previous treatment: No adverse effects   Functional change: improved gait     Pain: 0/10  Location: right knee      Objective   ROM: 5/21/19  Flexion 100  Extension 1    Denise received therapeutic exercises to develop strength, endurance, ROM, flexibility, posture and core stabilization for 50 minutes including:      GSS 1 min strap  HSS  1 min plinthe   Heel slides 10 x 10 sec hold   Pat MOBS/PROM flexion x 5 min   B HR 30 x   LLR 3 x 10 ( not performed 2* L hip pn)   Supine clamshells btb 5 sec hold SL  SLR 3 x 10   Bridges 3 x 10   QS 30 x 3 sec hold     Not performed today:   Recumbent Bike 5 min to increase ROM and blood flow   Mini squats 3 x 10   SAQ  Hams curls  Standing BTB TKE  Shuttle leg press and toe raises with 2.5 bands  Single leg press with 1.5 band   GTR  CR quads for increased flexion    Denise received cold pack for  10 minutes to R knee.       Home Exercises Provided and Patient Education Provided     Education provided:   - Pt educated on proper exercise technique.      Written Home Exercises Provided: Patient instructed to cont prior HEP.  Exercises were reviewed and Denise was able to demonstrate them prior to the end of the session.  Denise demonstrated good  understanding of the education provided.       Assessment     Pt mayuri tx well w/ no c/om pn in R knee.  Pt showed increased strength and knee flexion during therex.  Pt cont to lack some ROM and strength.    Denise is progressing well towards her goals.   Pt prognosis is Good.     Pt will continue to benefit from skilled outpatient physical therapy to address the deficits listed in the problem list box on initial evaluation, provide pt/family education and to maximize pt's level of independence in the home and community environment.     Pt's spiritual, cultural and educational needs considered and pt agreeable to plan of care and goals.    Anticipated barriers to physical therapy: none    Goals: Short Term GOALS: 6 weeks  1. Patient demonstrates independence with HEP. (progressing, not met)   2. Patient demonstrates increased right knee ROM to 90 degrees flexion to improve tolerance to functional activities with no more than 2/10 pain.(progressing, not met)   3. Patient demonstrates improved overall function per FOTO Knee Survey to 30% Limitation or less.(progressing, not met)   4. Patient will ambulate will least restrictive device.(met)      Long Term GOALS: 12 weeks  1. Patient demonstrates increased right knee ROM to be equal to left to improve tolerance to functional activities pain free. (progressing, not met)   2. Patient demonstrates increased strength BLE's to 5/5 or greater to improve tolerance to functional activities pain free. (progressing, not met)   3. Patient demonstrates improved overall function per FOTO Knee Survey to 10% Limitation or less.  (progressing, not met)   4. Patient will return to work with no reports of pain or restrictions.(progressing, not met)   5. Patient will ambulate independently with normalized gait pattern.(progressing, not met)       Plan     Cont to progress towards goals set by PT.  Cont to increase ROM and strength.      Ramiro Payne, PTA

## 2019-05-24 ENCOUNTER — CLINICAL SUPPORT (OUTPATIENT)
Dept: REHABILITATION | Facility: HOSPITAL | Age: 19
End: 2019-05-24
Attending: ORTHOPAEDIC SURGERY
Payer: MEDICAID

## 2019-05-24 DIAGNOSIS — R52 PAIN: ICD-10-CM

## 2019-05-24 PROCEDURE — 97110 THERAPEUTIC EXERCISES: CPT

## 2019-05-24 NOTE — PROGRESS NOTES
"  Physical Therapy Daily Treatment Note     Name: Denise Mosher  Clinic Number: [id number]    Therapy Diagnosis:   Encounter Diagnosis   Name Primary?    Pain      Physician: Ranulfo House MD    Visit Date: 5/24/2019    Physician Orders: PT Eval and Treat Right Knee  12/13/18 distal femoral osteotomy and MPFL reconstruction. PWB with knee brace. ROM, quad strengthening, no restrictions. Please start PT next week.   Medical Diagnosis from Referral: M21.061 (ICD-10-CM) - Acquired genu valgum of right knee M25.361 (ICD-10-CM) - Patellar instability of right knee   Evaluation Date: 12/21/2018  Authorization Period Expiration: 1/31/2019  Plan of Care Expiration: 05/07/2019  Visit # / Visits authorized: 12/12    Total visits: 35  Time In: 1320  Time Out: 1420  Total Billable Time:  40 minutes  TX time: 60'     Precautions: Standard    Subjective     Pt states mod pain with R knee today. No problems driving but problem squatting down and kneeling. Pt has difficulty putting on socks and uses slip on shoes.   She was compliant with home exercise program.  Response to previous treatment: no recollection   Functional change: improved gait     Pain: 0/10  Location: right knee      Objective     Denise received therapeutic exercises to develop strength, endurance, ROM, flexibility, posture and core stabilization for 40 minutes including:    Recumbent Bike 10 min to increase ROM, increased circulation, and mm endurance   GSS 1 min strap  HSS  1 min plinthe   Heel slides 10x/10"  B HR 30x   Supine clamshells BTB R LE  3x10 (BTB aggravated knee)   SLR 3x10   Bridges 3x10  QS 30 x 3 sec hold     Manual therapy for R knee including patella mobs, GSS, HSS, and knee flexion 10'    Denise received cold pack for 10 minutes to R knee for decreased circulation, edema, and pain        Home Exercises Provided and Patient Education Provided     Education provided:   - Pt educated on proper exercise technique.      Written Home " Exercises Provided: Patient instructed to cont prior HEP.  Exercises were reviewed and Denise was able to demonstrate them prior to the end of the session.  Denise demonstrated good  understanding of the education provided.       Assessment     Pt tolerating tx fair. R knee sensitivity with patella mobs. Continued with manual therapy for increased knee flexion to achieving marivel/doffing socks and shoes w/o problem along with kneeling if needed. VC/TC for correcting form/technique.   Denise is progressing well towards her goals.   Pt prognosis is Good.     Pt will continue to benefit from skilled outpatient physical therapy to address the deficits listed in the problem list box on initial evaluation, provide pt/family education and to maximize pt's level of independence in the home and community environment.     Pt's spiritual, cultural and educational needs considered and pt agreeable to plan of care and goals.    Anticipated barriers to physical therapy: none    Goals: Short Term GOALS: 6 weeks  1. Patient demonstrates independence with HEP. (progressing, not met)   2. Patient demonstrates increased right knee ROM to 90 degrees flexion to improve tolerance to functional activities with no more than 2/10 pain.(progressing, not met)   3. Patient demonstrates improved overall function per FOTO Knee Survey to 30% Limitation or less.(progressing, not met)   4. Patient will ambulate will least restrictive device.(met)      Long Term GOALS: 12 weeks  1. Patient demonstrates increased right knee ROM to be equal to left to improve tolerance to functional activities pain free. (progressing, not met)   2. Patient demonstrates increased strength BLE's to 5/5 or greater to improve tolerance to functional activities pain free. (progressing, not met)   3. Patient demonstrates improved overall function per FOTO Knee Survey to 10% Limitation or less. (progressing, not met)   4. Patient will return to work with no reports of  pain or restrictions.(progressing, not met)   5. Patient will ambulate independently with normalized gait pattern.(progressing, not met)       Plan     Cont to progress towards goals set by PT.  Cont to increase ROM and strength.      Vern Garduno, PTA, STS

## 2019-05-28 ENCOUNTER — CLINICAL SUPPORT (OUTPATIENT)
Dept: REHABILITATION | Facility: HOSPITAL | Age: 19
End: 2019-05-28
Attending: ORTHOPAEDIC SURGERY
Payer: MEDICAID

## 2019-05-28 DIAGNOSIS — R52 PAIN: ICD-10-CM

## 2019-05-28 PROCEDURE — 97110 THERAPEUTIC EXERCISES: CPT

## 2019-05-28 NOTE — PROGRESS NOTES
"  Physical Therapy Daily Treatment Note     Name: Denise Mosher  Clinic Number: [id number]    Therapy Diagnosis:   Encounter Diagnosis   Name Primary?    Pain      Physician: Ranulfo House MD    Visit Date: 5/28/2019    Physician Orders: PT Eval and Treat Right Knee  12/13/18 distal femoral osteotomy and MPFL reconstruction. PWB with knee brace. ROM, quad strengthening, no restrictions. Please start PT next week.   Medical Diagnosis from Referral: M21.061 (ICD-10-CM) - Acquired genu valgum of right knee M25.361 (ICD-10-CM) - Patellar instability of right knee   Evaluation Date: 12/21/2018  Authorization Period Expiration: 1/31/2019  Plan of Care Expiration: 05/07/2019  Visit # / Visits authorized: /12    Total visits: 35  Time In: 1500  Time Out: 1605  Total Billable Time: 55  minutes     Precautions: Standard    Subjective     Pt reports w/ no c/o pn in R knee.    She was compliant with home exercise program.  Response to previous treatment: No adverse effects   Functional change: no chnage    Pain: 0/10  Location: right knee      Objective     Denise received therapeutic exercises to develop strength, endurance, ROM, flexibility, posture and core stabilization for 55 minutes including:    Recumbent Bike 10 min to increase ROM, increased circulation, and mm endurance  GSS 2 min strap  HSS  2 min plinthe   Heel slides 10x/10"  Mini squats 3 x 10   Shuttle Sl press 25# 3 x 10   Step ups 3 x 10 4"   LLR 30 x     Not performed today:   B HR 30x 3 sec hold   Supine clamshells BTB R LE  3x10 (BTB aggravated knee)   SLR 3x10   Bridges 3x10  QS 30 x 3 sec hold     Manual therapy for R knee including patella mobs and knee flexion 0'    Denise received cold pack for 10 minutes to R knee for decreased circulation, edema, and pain        Home Exercises Provided and Patient Education Provided     Education provided:   - Pt educated on proper exercise technique.      Written Home Exercises Provided: Patient instructed " to cont prior HEP.  Exercises were reviewed and Denise was able to demonstrate them prior to the end of the session.  Denise demonstrated good  understanding of the education provided.       Assessment   Pt displayed increased quad strength during therex.  Pt cont to have knee flexion deficit and LE weakness.  Pt mayuri tx well.  Pn level remained same prior to and after tx session.       Denise is progressing well towards her goals.   Pt prognosis is Good.     Pt will continue to benefit from skilled outpatient physical therapy to address the deficits listed in the problem list box on initial evaluation, provide pt/family education and to maximize pt's level of independence in the home and community environment.     Pt's spiritual, cultural and educational needs considered and pt agreeable to plan of care and goals.    Anticipated barriers to physical therapy: none    Goals: Short Term GOALS: 6 weeks  1. Patient demonstrates independence with HEP. (progressing, not met)   2. Patient demonstrates increased right knee ROM to 90 degrees flexion to improve tolerance to functional activities with no more than 2/10 pain.(progressing, not met)   3. Patient demonstrates improved overall function per FOTO Knee Survey to 30% Limitation or less.(progressing, not met)   4. Patient will ambulate will least restrictive device.(met)      Long Term GOALS: 12 weeks  1. Patient demonstrates increased right knee ROM to be equal to left to improve tolerance to functional activities pain free. (progressing, not met)   2. Patient demonstrates increased strength BLE's to 5/5 or greater to improve tolerance to functional activities pain free. (progressing, not met)   3. Patient demonstrates improved overall function per FOTO Knee Survey to 10% Limitation or less. (progressing, not met)   4. Patient will return to work with no reports of pain or restrictions.(progressing, not met)   5. Patient will ambulate independently with normalized  gait pattern.(progressing, not met)       Plan     Cont to progress towards goals set by PT.  Cont to increase ROM and strength.      Ramiro Payne, PTA

## 2019-05-31 ENCOUNTER — CLINICAL SUPPORT (OUTPATIENT)
Dept: REHABILITATION | Facility: HOSPITAL | Age: 19
End: 2019-05-31
Attending: ORTHOPAEDIC SURGERY
Payer: MEDICAID

## 2019-05-31 DIAGNOSIS — R52 PAIN: ICD-10-CM

## 2019-05-31 PROCEDURE — 97110 THERAPEUTIC EXERCISES: CPT

## 2019-05-31 NOTE — PROGRESS NOTES
"  Physical Therapy Daily Treatment Note     Name: Denise Mosher  Clinic Number: [id number]    Therapy Diagnosis:   Encounter Diagnosis   Name Primary?    Pain      Physician: Ranulfo House MD    Visit Date: 5/31/2019    Physician Orders: PT Eval and Treat Right Knee  12/13/18 distal femoral osteotomy and MPFL reconstruction. PWB with knee brace. ROM, quad strengthening, no restrictions. Please start PT next week.   Medical Diagnosis from Referral: M21.061 (ICD-10-CM) - Acquired genu valgum of right knee M25.361 (ICD-10-CM) - Patellar instability of right knee   Evaluation Date: 12/21/2018  Authorization Period Expiration: 1/31/2019  Plan of Care Expiration: 05/07/2019  Visit # / Visits authorized: /12    Total visits: 36  Time In: 1605  Time Out: 1705   Total Billable Time: 25 minutes     Precautions: Standard    Subjective     Pt reports w/ no c/o pn in R knee.    She was compliant with home exercise program.  Response to previous treatment: No adverse effects   Functional change: no chnage    Pain: 0/10  Location: right knee      Objective     Denise received therapeutic exercises to develop strength, endurance, ROM, flexibility, posture and core stabilization for 25 minutes including:    Recumbent Bike 10 min to increase ROM, increased circulation, and mm endurance  GSS 2 min strap  HSS  2 min plinthe   Heel slides 10x/10"  Mini squats 3 x 10   Shuttle Sl press 25# 3 x 10   Step ups 3 x 10 4"   Step down 2" 3 x 10   LLR 30 x     Not performed today:   B HR 30x 3 sec hold   Supine clamshells BTB R LE  3x10 (BTB aggravated knee)   SLR 3x10   Bridges 3x10  QS 30 x 3 sec hold     Manual therapy for R knee including patella mobs and knee flexion 0'    Denise received cold pack for 10 minutes to R knee for decreased circulation, edema, and pain        Home Exercises Provided and Patient Education Provided     Education provided:   - Pt educated on proper exercise technique.      Written Home Exercises " Provided: Patient instructed to cont prior HEP.  Exercises were reviewed and Denise was able to demonstrate them prior to the end of the session.  Denise demonstrated good  understanding of the education provided.       Assessment   Pt cont to lack knee flexion beyond 100 degrees.  Pt mayuri tx w/ no c/o pn.  Pt demonstrated increased quad and hip strength.    Denise is progressing well towards her goals.   Pt prognosis is Good.     Pt will continue to benefit from skilled outpatient physical therapy to address the deficits listed in the problem list box on initial evaluation, provide pt/family education and to maximize pt's level of independence in the home and community environment.     Pt's spiritual, cultural and educational needs considered and pt agreeable to plan of care and goals.    Anticipated barriers to physical therapy: none    Goals: Short Term GOALS: 6 weeks  1. Patient demonstrates independence with HEP. (progressing, not met)   2. Patient demonstrates increased right knee ROM to 90 degrees flexion to improve tolerance to functional activities with no more than 2/10 pain.(progressing, not met)   3. Patient demonstrates improved overall function per FOTO Knee Survey to 30% Limitation or less.(progressing, not met)   4. Patient will ambulate will least restrictive device.(met)      Long Term GOALS: 12 weeks  1. Patient demonstrates increased right knee ROM to be equal to left to improve tolerance to functional activities pain free. (progressing, not met)   2. Patient demonstrates increased strength BLE's to 5/5 or greater to improve tolerance to functional activities pain free. (progressing, not met)   3. Patient demonstrates improved overall function per FOTO Knee Survey to 10% Limitation or less. (progressing, not met)   4. Patient will return to work with no reports of pain or restrictions.(progressing, not met)   5. Patient will ambulate independently with normalized gait pattern.(progressing,  not met)       Plan     Cont to progress towards goals set by PT.  Cont to increase ROM and strength.      Ramiro Payne, PTA

## 2019-06-03 ENCOUNTER — CLINICAL SUPPORT (OUTPATIENT)
Dept: REHABILITATION | Facility: HOSPITAL | Age: 19
End: 2019-06-03
Attending: ORTHOPAEDIC SURGERY
Payer: MEDICAID

## 2019-06-03 DIAGNOSIS — R52 PAIN: ICD-10-CM

## 2019-06-03 PROCEDURE — 97110 THERAPEUTIC EXERCISES: CPT

## 2019-06-03 NOTE — PROGRESS NOTES
"  Physical Therapy Daily Treatment Note     Name: Denise Mosher  Clinic Number: [id number]    Therapy Diagnosis:   Encounter Diagnosis   Name Primary?    Pain      Physician: Ranulfo House MD    Visit Date: 6/3/2019    Physician Orders: PT Eval and Treat Right Knee  12/13/18 distal femoral osteotomy and MPFL reconstruction. PWB with knee brace. ROM, quad strengthening, no restrictions. Please start PT next week.   Medical Diagnosis from Referral: M21.061 (ICD-10-CM) - Acquired genu valgum of right knee M25.361 (ICD-10-CM) - Patellar instability of right knee   Evaluation Date: 12/21/2018  Authorization Period Expiration: 1/31/2019  Plan of Care Expiration: 05/07/2019  Visit # / Visits authorized: 5/12    Total visits: 37  Time In: 1510  Time Out:  1605  Total Billable Time: 45 minutes     Precautions: Standard    Subjective     Pt states feeling okay w/ no c/o pn in R knee but does have tenderness 2* being sun burnt.  She was compliant with home exercise program.  Response to previous treatment: No adverse effects   Functional change: no chnage    Pain: 0/10  Location: right knee      Objective     Denise received therapeutic exercises to develop strength, endurance, ROM, flexibility, posture and core stabilization for 45 minutes including:    Recumbent Bike 5 min to increase ROM, increased circulation, and mm endurance  GSS 2 min strap  HSS  2 min plinthe   Heel slides 10x/10"  Mini squats 2 x 15  Shuttle Sl press 25# 30 x   Step ups 2 x 15 4"   Step down 2" 3 x 10 (VCs needed)   LLR 30 x     Not performed today:   B HR 30x 3 sec hold   Supine clamshells BTB R LE  3x10 (BTB aggravated knee)   SLR 3x10   Bridges 3x10  QS 30 x 3 sec hold     Manual therapy for R knee including patella mobs and knee flexion 0'    Denise received cold pack for 10 minutes to R knee for decreased circulation, edema, and pain        Home Exercises Provided and Patient Education Provided     Education provided:   - Pt educated " on proper exercise technique.      Written Home Exercises Provided: Patient instructed to cont prior HEP.  Exercises were reviewed and Denise was able to demonstrate them prior to the end of the session.  Denise demonstrated good  understanding of the education provided.       Assessment       Pt arrived 8 min late to tx today.  Pt mayuri tx well w/ no c/o pn.  Pt cont to need skilled care to increase knee flexion and quad/hip strength.  Pt showed improved muscular endurance during therex.    Denise is progressing well towards her goals.   Pt prognosis is Good.     Pt will continue to benefit from skilled outpatient physical therapy to address the deficits listed in the problem list box on initial evaluation, provide pt/family education and to maximize pt's level of independence in the home and community environment.     Pt's spiritual, cultural and educational needs considered and pt agreeable to plan of care and goals.    Anticipated barriers to physical therapy: none    Goals: Short Term GOALS: 6 weeks  1. Patient demonstrates independence with HEP. (progressing, not met)   2. Patient demonstrates increased right knee ROM to 90 degrees flexion to improve tolerance to functional activities with no more than 2/10 pain.(progressing, not met)   3. Patient demonstrates improved overall function per FOTO Knee Survey to 30% Limitation or less.(progressing, not met)   4. Patient will ambulate will least restrictive device.(met)      Long Term GOALS: 12 weeks  1. Patient demonstrates increased right knee ROM to be equal to left to improve tolerance to functional activities pain free. (progressing, not met)   2. Patient demonstrates increased strength BLE's to 5/5 or greater to improve tolerance to functional activities pain free. (progressing, not met)   3. Patient demonstrates improved overall function per FOTO Knee Survey to 10% Limitation or less. (progressing, not met)   4. Patient will return to work with no  reports of pain or restrictions.(progressing, not met)   5. Patient will ambulate independently with normalized gait pattern.(progressing, not met)       Plan   PT/PTA met face to face to discuss patient's treatment plan and progress towards established goals.  Patient will be seen by physical therapist every sixth visit and minimally once per month.    Cont to progress towards goals set by PT.  Cont to increase ROM and strength.      Ramiro Payne, PTA

## 2019-06-04 DIAGNOSIS — M25.569 KNEE PAIN, UNSPECIFIED CHRONICITY, UNSPECIFIED LATERALITY: Primary | ICD-10-CM

## 2019-06-07 ENCOUNTER — OFFICE VISIT (OUTPATIENT)
Dept: ORTHOPEDICS | Facility: CLINIC | Age: 19
End: 2019-06-07
Payer: MEDICAID

## 2019-06-07 ENCOUNTER — HOSPITAL ENCOUNTER (OUTPATIENT)
Dept: RADIOLOGY | Facility: HOSPITAL | Age: 19
Discharge: HOME | End: 2019-06-07
Attending: ORTHOPAEDIC SURGERY
Payer: MEDICAID

## 2019-06-07 VITALS — WEIGHT: 212.94 LBS | HEIGHT: 60 IN | BODY MASS INDEX: 41.81 KG/M2

## 2019-06-07 DIAGNOSIS — M24.661 FIBROSIS OF RIGHT KNEE JOINT: Primary | ICD-10-CM

## 2019-06-07 DIAGNOSIS — M25.569 KNEE PAIN, UNSPECIFIED CHRONICITY, UNSPECIFIED LATERALITY: ICD-10-CM

## 2019-06-07 DIAGNOSIS — M25.361 PATELLAR INSTABILITY OF RIGHT KNEE: ICD-10-CM

## 2019-06-07 PROCEDURE — 73560 X-RAY EXAM OF KNEE 1 OR 2: CPT | Mod: 26,RT,, | Performed by: RADIOLOGY

## 2019-06-07 PROCEDURE — 99212 OFFICE O/P EST SF 10 MIN: CPT | Mod: PBBFAC,25 | Performed by: ORTHOPAEDIC SURGERY

## 2019-06-07 PROCEDURE — 99999 PR PBB SHADOW E&M-EST. PATIENT-LVL II: ICD-10-PCS | Mod: PBBFAC,,, | Performed by: ORTHOPAEDIC SURGERY

## 2019-06-07 PROCEDURE — 99213 OFFICE O/P EST LOW 20 MIN: CPT | Mod: S$PBB,,, | Performed by: ORTHOPAEDIC SURGERY

## 2019-06-07 PROCEDURE — 73560 XR KNEE 1 OR 2 VIEW RIGHT: ICD-10-PCS | Mod: 26,RT,, | Performed by: RADIOLOGY

## 2019-06-07 PROCEDURE — 99999 PR PBB SHADOW E&M-EST. PATIENT-LVL II: CPT | Mod: PBBFAC,,, | Performed by: ORTHOPAEDIC SURGERY

## 2019-06-07 PROCEDURE — 99213 PR OFFICE/OUTPT VISIT, EST, LEVL III, 20-29 MIN: ICD-10-PCS | Mod: S$PBB,,, | Performed by: ORTHOPAEDIC SURGERY

## 2019-06-07 PROCEDURE — 73560 X-RAY EXAM OF KNEE 1 OR 2: CPT | Mod: TC,PO,RT

## 2019-06-08 NOTE — PROGRESS NOTES
CC: Post-op    HPI: Denise Mosher is now 6 months post-op following   DATE OF PROCEDURE:  12/13/18     PREOPERATIVE DIAGNOSES:  1.  Right knee pain.  2.  Right patellar instability.  3.  Right genu valgum.     POSTOPERATIVE DIAGNOSES:  1.  Right knee pain.  2.  Right patellar instability.  3.  Right genu valgum.     PROCEDURES:  1.  Right knee arthroscopy  2.  Right medial patellofemoral ligament reconstruction with hamstring allograft  3.  Right distal femoral varus-producing osteotomy with plate fixation.  Doing well, pain controlled.     PE: Incisions well-healed with no sign of infection.  Well-perfused, neurovascularly intact distally.  Very stiff knee ROM (0-100 deg).  Nontender.  No evidence of infection.    X-ray: healing osteotomy in good alignment.    Clinical decision-making: Flexion has plateaued.  Will check MRI.  Will decide further treatment depending on MRI.

## 2019-06-11 ENCOUNTER — CLINICAL SUPPORT (OUTPATIENT)
Dept: REHABILITATION | Facility: HOSPITAL | Age: 19
End: 2019-06-11
Attending: ORTHOPAEDIC SURGERY
Payer: MEDICAID

## 2019-06-11 DIAGNOSIS — R52 PAIN: ICD-10-CM

## 2019-06-11 PROCEDURE — 97140 MANUAL THERAPY 1/> REGIONS: CPT | Performed by: PHYSICAL THERAPIST

## 2019-06-11 PROCEDURE — 97110 THERAPEUTIC EXERCISES: CPT | Performed by: PHYSICAL THERAPIST

## 2019-06-11 NOTE — PROGRESS NOTES
"  Physical Therapy Daily Treatment Note     Name: Denise Mosher  Clinic Number: [id number]    Therapy Diagnosis:   Encounter Diagnosis   Name Primary?    Pain      Physician: Ranulfo House MD    Visit Date: 6/11/2019    Physician Orders: PT Eval and Treat Right Knee  12/13/18 distal femoral osteotomy and MPFL reconstruction. PWB with knee brace. ROM, quad strengthening, no restrictions. Please start PT next week.   Medical Diagnosis from Referral: M21.061 (ICD-10-CM) - Acquired genu valgum of right knee M25.361 (ICD-10-CM) - Patellar instability of right knee   Evaluation Date: 12/21/2018  Authorization Period Expiration: 1/31/2019  Plan of Care Expiration: 05/07/2019  Visit # / Visits authorized: 6/12      Time In: 10:30 am  Time Out: 11:20 am   Total Billable Time: 40 minutes     Precautions: Standard    Subjective     Pt states R knee feels "pretty good".  States having MRI in 3 days.  She was compliant with home exercise program.  Response to previous treatment: No adverse effects   Functional change: no chnage    Pain: 0/10  Location: right knee      Objective     AROM is 0-100 deg flexion, 105 passive.  FAIR QS ability.  4/5 quad strength.  MIN swelling.  MIN limp with gait.    Denise received therapeutic exercises to develop strength, endurance, ROM, flexibility, posture and core stabilization for 40 minutes including:    Recumbent Bike 5 min to increase ROM, increased circulation, and mm endurance  GSS 2 min strap  HSS  2 min plinthe   Heel slides 10x/10"  Mini squats 2 x 15  Shuttle leg press 25#   Step ups 2 x 15 4"   Step down 2" 3 x 10   Standing hip abd and ext   BTB TKE in standing  STM with rolling stick  Pat MOBS/PROM x 6 min  CR for flexion x 5 min  CP x 10 min  HEP review      Home Exercises Provided and Patient Education Provided     Education provided:   - Pt educated on proper exercise technique.      Written Home Exercises Provided: Patient instructed to cont prior HEP.  Exercises were " reviewed and Denise was able to demonstrate them prior to the end of the session.  Denise demonstrated good  understanding of the education provided.       Assessment       Pt tolerated exercises well and without pain.  Flexion plateauing.  Denise is progressing well towards her goals.   Pt prognosis is Good.     Pt will continue to benefit from skilled outpatient physical therapy to address the deficits listed in the problem list box on initial evaluation, provide pt/family education and to maximize pt's level of independence in the home and community environment.     Pt's spiritual, cultural and educational needs considered and pt agreeable to plan of care and goals.    Anticipated barriers to physical therapy: none    Goals: Short Term GOALS: 6 weeks  1. Patient demonstrates independence with HEP. (progressing, not met)   2. Patient demonstrates increased right knee ROM to 90 degrees flexion to improve tolerance to functional activities with no more than 2/10 pain.(progressing, not met)   3. Patient demonstrates improved overall function per FOTO Knee Survey to 30% Limitation or less.(progressing, not met)   4. Patient will ambulate will least restrictive device.(met)      Long Term GOALS: 12 weeks  1. Patient demonstrates increased right knee ROM to be equal to left to improve tolerance to functional activities pain free. (progressing, not met)   2. Patient demonstrates increased strength BLE's to 5/5 or greater to improve tolerance to functional activities pain free. (progressing, not met)   3. Patient demonstrates improved overall function per FOTO Knee Survey to 10% Limitation or less. (progressing, not met)   4. Patient will return to work with no reports of pain or restrictions.(progressing, not met)   5. Patient will ambulate independently with normalized gait pattern.(progressing, not met)       Plan     No further PT until Dr. House reviews MRI and issues new order.      Ney Epstein, PT

## 2019-06-14 ENCOUNTER — HOSPITAL ENCOUNTER (OUTPATIENT)
Dept: RADIOLOGY | Facility: HOSPITAL | Age: 19
Discharge: HOME | End: 2019-06-14
Attending: ORTHOPAEDIC SURGERY
Payer: MEDICAID

## 2019-06-14 DIAGNOSIS — M25.361 PATELLAR INSTABILITY OF RIGHT KNEE: ICD-10-CM

## 2019-06-14 DIAGNOSIS — M24.661 FIBROSIS OF RIGHT KNEE JOINT: ICD-10-CM

## 2019-06-14 PROCEDURE — 73721 MRI JNT OF LWR EXTRE W/O DYE: CPT | Mod: TC,RT

## 2019-06-14 PROCEDURE — 73721 MRI KNEE WITHOUT CONTRAST RIGHT: ICD-10-PCS | Mod: 26,RT,, | Performed by: RADIOLOGY

## 2019-06-14 PROCEDURE — 73721 MRI JNT OF LWR EXTRE W/O DYE: CPT | Mod: 26,RT,, | Performed by: RADIOLOGY

## 2019-06-17 ENCOUNTER — CLINICAL SUPPORT (OUTPATIENT)
Dept: REHABILITATION | Facility: HOSPITAL | Age: 19
End: 2019-06-17
Attending: ORTHOPAEDIC SURGERY
Payer: MEDICAID

## 2019-06-17 ENCOUNTER — TELEPHONE (OUTPATIENT)
Dept: ORTHOPEDICS | Facility: CLINIC | Age: 19
End: 2019-06-17

## 2019-06-17 DIAGNOSIS — R52 PAIN: ICD-10-CM

## 2019-06-17 PROCEDURE — 97110 THERAPEUTIC EXERCISES: CPT

## 2019-06-17 NOTE — PROGRESS NOTES
"  Physical Therapy Daily Treatment Note     Name: Denise Mosher  Clinic Number: [id number]    Therapy Diagnosis:   Encounter Diagnosis   Name Primary?    Pain      Physician: Ranulfo House MD    Visit Date: 6/17/2019    Physician Orders: PT Eval and Treat Right Knee  12/13/18 distal femoral osteotomy and MPFL reconstruction. PWB with knee brace. ROM, quad strengthening, no restrictions. Please start PT next week.   Medical Diagnosis from Referral: M21.061 (ICD-10-CM) - Acquired genu valgum of right knee M25.361 (ICD-10-CM) - Patellar instability of right knee   Evaluation Date: 12/21/2018  Authorization Period Expiration: 1/31/2019  Plan of Care Expiration: 05/07/2019  Visit # / Visits authorized: 7/12   Total visits: 39  Time In: 1500  Time Out: 1605   Total Billable Time: 25 minutes     Precautions: Standard    Subjective     Pt reports w/ no c/o pn in R knee currently.  Pt mentioned her physician suggested possible surgery to address knee flexion deficit.    She was compliant with home exercise program.  Response to previous treatment: No adverse effects   Functional change: no chnage    Pain: 0/10  Location: right knee      Objective     ROM: 6/17/19  Flex 101  Ext 4 hyper extension      MMT: 6/17/19  Quad 4+/5  Hamstring 4+/5      Denise received therapeutic exercises to develop strength, endurance, ROM, flexibility, posture and core stabilization for 25 minutes including:    Recumbent Bike 5 min to increase ROM, increased circulation, and mm endurance  GSS 2 min strap  HSS  2 min plinthe   Heel slides 10x/10"  Mini squats 2 x 15  B HR 30 x   LLR 3 x 10 3#     Not performed today:   Standing hip abd and ext   BTB TKE in standing  STM with rolling stick  Pat MOBS/PROM x 6 min  CR for flexion x 5 min  Step ups 2 x 15 4"   Step down 2" 3 x 10   Shuttle leg press 25#     CP x 10 min to R knee.     HEP review      Home Exercises Provided and Patient Education Provided     Education provided:   - Pt educated " on proper exercise technique.      Written Home Exercises Provided: Patient instructed to cont prior HEP.  Exercises were reviewed and Denise was able to demonstrate them prior to the end of the session.  Denise demonstrated good  understanding of the education provided.       Assessment     Pt had no adverse effects from tx.  Pt showed increased glute med strength during therex.  Pt cont to lack some knee flexion.  Therex limited 2* pt motivation being low.  PTA discussed with pt possible discharge with in the next two weeks.     Denise is progressing well towards her goals.   Pt prognosis is Good.     Pt will continue to benefit from skilled outpatient physical therapy to address the deficits listed in the problem list box on initial evaluation, provide pt/family education and to maximize pt's level of independence in the home and community environment.     Pt's spiritual, cultural and educational needs considered and pt agreeable to plan of care and goals.    Anticipated barriers to physical therapy: none    Goals: Short Term GOALS: 6 weeks  1. Patient demonstrates independence with HEP. (progressing, not met)   2. Patient demonstrates increased right knee ROM to 90 degrees flexion to improve tolerance to functional activities with no more than 2/10 pain.(progressing, not met)   3. Patient demonstrates improved overall function per FOTO Knee Survey to 30% Limitation or less.(progressing, not met)   4. Patient will ambulate will least restrictive device.(met)      Long Term GOALS: 12 weeks  1. Patient demonstrates increased right knee ROM to be equal to left to improve tolerance to functional activities pain free. (progressing, not met)   2. Patient demonstrates increased strength BLE's to 5/5 or greater to improve tolerance to functional activities pain free. (progressing, not met)   3. Patient demonstrates improved overall function per FOTO Knee Survey to 10% Limitation or less. (progressing, not met)   4.  Patient will return to work with no reports of pain or restrictions.(progressing, not met)   5. Patient will ambulate independently with normalized gait pattern.(progressing, not met)       Plan     Cont to progress towards goals set by PT.      Ramiro Payne, MIKAYLA

## 2019-06-17 NOTE — TELEPHONE ENCOUNTER
----- Message from Ranulfo House MD sent at 6/17/2019  1:08 PM CDT -----  MRI looks perfect.  Only option for her limited ROM is manipulation/scope in OR.  It's up to her.

## 2019-06-17 NOTE — TELEPHONE ENCOUNTER
Spoke with patient and discussed mri results. Patient will call back when she decides what she would like to do.

## 2019-06-20 ENCOUNTER — CLINICAL SUPPORT (OUTPATIENT)
Dept: REHABILITATION | Facility: HOSPITAL | Age: 19
End: 2019-06-20
Attending: ORTHOPAEDIC SURGERY
Payer: MEDICAID

## 2019-06-20 DIAGNOSIS — R52 PAIN: ICD-10-CM

## 2019-06-20 PROCEDURE — 97110 THERAPEUTIC EXERCISES: CPT

## 2019-06-20 NOTE — PROGRESS NOTES
"  Physical Therapy Daily Treatment Note     Name: Denise Mosher  Clinic Number: [id number]    Therapy Diagnosis:   Encounter Diagnosis   Name Primary?    Pain      Physician: Ranulfo House MD    Visit Date: 6/20/2019    Physician Orders: PT Eval and Treat Right Knee  12/13/18 distal femoral osteotomy and MPFL reconstruction. PWB with knee brace. ROM, quad strengthening, no restrictions. Please start PT next week.   Medical Diagnosis from Referral: M21.061 (ICD-10-CM) - Acquired genu valgum of right knee M25.361 (ICD-10-CM) - Patellar instability of right knee   Evaluation Date: 12/21/2018  Authorization Period Expiration: 1/31/2019  Plan of Care Expiration: 05/07/2019  Visit # / Visits authorized: 8/12   Total visits: 40  Time In:  1300  Time Out: 1403   Total Billable Time: 25 minutes     Precautions: Standard    Subjective     Pt states feeling 4/10 pn in R knee.  Pt mentioned she opted to have surgery suggested to improve knee flexion.    She was compliant with home exercise program.  Response to previous treatment: No adverse effects   Functional change: no chnage    Pain: 0/10  Location: right knee      Objective         Denise received therapeutic exercises to develop strength, endurance, ROM, flexibility, posture and core stabilization for 25 minutes including:    GSS 2 min strap  HSS  2 min plinthe   Heel slides 10x/10"  LAQ 2# 3 x 10   Mini squats 2 x 15  B HR 30 x   Clamshells supine 30 x 5 sec hold orange band       Not performed today:   Recumbent Bike 5 min to increase ROM, increased circulation, and mm endurance  Standing hip abd and ext   BTB TKE in standing  STM with rolling stick  Pat MOBS/PROM x 6 min  CR for flexion x 5 min  Step ups 2 x 15 4"   Step down 2" 3 x 10   Shuttle leg press 25#     CP x 10 min to R knee.           Home Exercises Provided and Patient Education Provided     Education provided:   - Pt educated on proper exercise technique.      Written Home Exercises Provided: " Patient instructed to cont prior HEP.  Exercises were reviewed and Denise was able to demonstrate them prior to the end of the session.  Denise demonstrated good  understanding of the education provided.       Assessment      Pt dispalyed increased muscular endurance during therex.  Pt mayuri to tx was fair.  Therex limited 2* R hip pn.  Pt cont to lack knee flexion and strength.    Denise is progressing well towards her goals.   Pt prognosis is Good.     Pt will continue to benefit from skilled outpatient physical therapy to address the deficits listed in the problem list box on initial evaluation, provide pt/family education and to maximize pt's level of independence in the home and community environment.     Pt's spiritual, cultural and educational needs considered and pt agreeable to plan of care and goals.    Anticipated barriers to physical therapy: none    Goals: Short Term GOALS: 6 weeks  1. Patient demonstrates independence with HEP. (progressing, not met)   2. Patient demonstrates increased right knee ROM to 90 degrees flexion to improve tolerance to functional activities with no more than 2/10 pain.(progressing, not met)   3. Patient demonstrates improved overall function per FOTO Knee Survey to 30% Limitation or less.(progressing, not met)   4. Patient will ambulate will least restrictive device.(met)      Long Term GOALS: 12 weeks  1. Patient demonstrates increased right knee ROM to be equal to left to improve tolerance to functional activities pain free. (progressing, not met)   2. Patient demonstrates increased strength BLE's to 5/5 or greater to improve tolerance to functional activities pain free. (progressing, not met)   3. Patient demonstrates improved overall function per FOTO Knee Survey to 10% Limitation or less. (progressing, not met)   4. Patient will return to work with no reports of pain or restrictions.(progressing, not met)   5. Patient will ambulate independently with normalized gait  pattern.(progressing, not met)       Plan     Cont to progress towards goals set by PT.      Ramiro Payne, PTA

## 2019-06-21 DIAGNOSIS — M24.661 ARTHROFIBROSIS OF KNEE JOINT, RIGHT: Primary | ICD-10-CM

## 2019-06-21 RX ORDER — MUPIROCIN 20 MG/G
OINTMENT TOPICAL
Status: CANCELLED | OUTPATIENT
Start: 2019-06-21

## 2019-06-21 RX ORDER — SODIUM CHLORIDE 9 MG/ML
INJECTION, SOLUTION INTRAVENOUS CONTINUOUS
Status: CANCELLED | OUTPATIENT
Start: 2019-06-21

## 2019-06-24 ENCOUNTER — CLINICAL SUPPORT (OUTPATIENT)
Dept: REHABILITATION | Facility: HOSPITAL | Age: 19
End: 2019-06-24
Attending: ORTHOPAEDIC SURGERY
Payer: MEDICAID

## 2019-06-24 DIAGNOSIS — R52 PAIN: ICD-10-CM

## 2019-06-24 PROCEDURE — 97110 THERAPEUTIC EXERCISES: CPT | Performed by: PHYSICAL THERAPIST

## 2019-06-24 NOTE — PROGRESS NOTES
"  Physical Therapy Daily Treatment Note     Name: Denise Mosher  Clinic Number: [id number]    Therapy Diagnosis:   Encounter Diagnosis   Name Primary?    Pain      Physician: Ranulfo House MD    Visit Date: 6/24/2019    Physician Orders: PT Eval and Treat Right Knee  12/13/18 distal femoral osteotomy and MPFL reconstruction. PWB with knee brace. ROM, quad strengthening, no restrictions. Please start PT next week.   Medical Diagnosis from Referral: M21.061 (ICD-10-CM) - Acquired genu valgum of right knee M25.361 (ICD-10-CM) - Patellar instability of right knee   Evaluation Date: 12/21/2018  Authorization Period Expiration: 1/31/2019  Plan of Care Expiration: 05/07/2019  Visit # / Visits authorized: 9/12      Time In: 7:40 am  Time Out: 8:40 am   Total Billable Time: 40 minutes     Precautions: Standard    Subjective     Pt states R knee feels "sore".  States having R knee manipulation in 2 wks.  She was compliant with home exercise program.  Response to previous treatment: No adverse effects   Functional change: no chnage    Pain: 3/10  Location: right knee      Objective     AROM is 0-100 deg flexion, 107 passive.  FAIR QS ability.  4/5 quad strength.  MIN swelling.  MIN limp with gait.    Denise received therapeutic exercises to develop strength, endurance, ROM, flexibility, posture and core stabilization for 40 minutes including:    Bike 10 min to increase ROM, increased circulation, and mm endurance  GSS 2 min strap  HSS  2 min plinthe   Heel slides 10x/10"  Mini squats 2 x 15  Shuttle leg press 25#   Step ups 2 x 15 4"   Step down 2" 3 x 10   Standing hip abd and ext   BTB TKE in standing  STM with rolling stick  Pat MOBS/PROM x 6 min  CP x 10 min  HEP review      Home Exercises Provided and Patient Education Provided     Education provided:   - Pt educated on proper exercise technique.      Written Home Exercises Provided: Patient instructed to cont prior HEP.  Exercises were reviewed and Denise was " able to demonstrate them prior to the end of the session.  Denise demonstrated good  understanding of the education provided.       Assessment       Pt tolerated exercises well and without pain.  Reached PT rehab MAX potential.      Plan     D/C PT with HEP secondary to pt plateauing.  To have manipulation soon.    Ney Epstein, PT

## 2019-07-03 ENCOUNTER — TELEPHONE (OUTPATIENT)
Dept: ORTHOPEDICS | Facility: CLINIC | Age: 19
End: 2019-07-03

## 2019-07-03 ENCOUNTER — ANESTHESIA EVENT (OUTPATIENT)
Dept: SURGERY | Facility: HOSPITAL | Age: 19
End: 2019-07-03
Payer: MEDICAID

## 2019-07-04 NOTE — ANESTHESIA PREPROCEDURE EVALUATION
[facility]  Anesthesia Pre-Operative Evaluation         Patient Name: Denise Mosher  YOB: 2000  MRN: [id number]    SUBJECTIVE:     Pre-operative evaluation for Procedure(s) (LRB):  ARTHROSCOPY, KNEE (Right) - lysis of adhesions and manipulation under anesthesia.  Possible removal of distal femoral plate and screws (Orthopediatrics). (Right)     07/04/2019    Denise Mosher is a 19 y.o. female w/ a significant PMHx of anxiety, depression, fibromyalgia, and knee pain who now presents for the above procedures.  Patient underwent osteotomy right femur to correct valgus deformity with MPFL reconstruction.  APS was consulted for postoperative pain management at that time.       LDA: None documented.    Prev airway:   Direct laryngoscopy; Inserted by: CRNA; Airway Device: Endotracheal Tube; Mask Ventilation: Easy; Intubated: Postinduction; Blade: Esteban #2; Airway Device Size: 7.0; Style: Cuffed; Cuff Inflation: Minimal occlusive pressure; Inflation Amount: 7; Placement Verified By: Auscultation, Capnometry, ETT Condensation; Grade: Grade I; Complicating Factors: Obesity (Patient ramped)    Drips: None documented.    Patient Active Problem List   Diagnosis    Bilateral hand pain    Right knee pain    Bilateral knee pain    Wound infection after surgery    Arthrofibrosis of knee joint    Altered mental status    Cervicalgia    Neck pain    Abnormal EEG    Family history of epilepsy    Anxiety    Syncope    Morbid obesity with BMI of 40.0-44.9, adult    Psychogenic nonepileptic seizure    Trochanteric bursitis of both hips    Chronic pain of both knees    Patellofemoral pain syndrome of right knee    Pain from implanted hardware    Right calf pain    Patellar instability of right knee    Acquired genu valgum of right knee    Incontinence in female    Pain       Review of patient's allergies indicates:  No Known Allergies    Current Outpatient  Medications:  No current facility-administered medications for this encounter.     Current Outpatient Medications:     aspirin (ECOTRIN) 81 MG EC tablet, Take 1 tablet (81 mg total) by mouth 2 (two) times daily., Disp: 120 tablet, Rfl: 0    azithromycin (Z-LINDA) 250 MG tablet, Take 2 tablets by mouth on day 1; Take 1 tablet by mouth on days 2-5, Disp: 6 tablet, Rfl: 0    fluoxetine (PROZAC) 10 MG Tab, Take 40 mg by mouth once daily. , Disp: , Rfl:     HYDROcodone-acetaminophen (NORCO) 5-325 mg per tablet, Take 1 tablet by mouth every 6 (six) hours as needed for Pain., Disp: 20 tablet, Rfl: 0    naproxen (NAPROSYN) 500 MG tablet, Take 1 tablet by mouth as needed., Disp: , Rfl: 1    omeprazole (PRILOSEC) 40 MG capsule, Take 1 capsule by mouth every evening., Disp: , Rfl: 1    ondansetron (ZOFRAN-ODT) 8 MG TbDL, Take 1 tablet (8 mg total) by mouth every 8 (eight) hours as needed (nausea or vomiting)., Disp: 6 tablet, Rfl: 0    polyethylene glycol (GLYCOLAX) 17 gram PwPk, Take 17 g by mouth once daily., Disp: 30 each, Rfl: 0    Past Surgical History:   Procedure Laterality Date    ARTHROSCOPY-KNEE Right 7/16/2015    [facility]    ARTHROSCOPY-KNEE Left 2/16/2015    [facility]    ARTHROSCOPY-KNEE, removal of two Synthes 4-5 cortical screws Right 6/15/2017    Performed by Ranulfo House MD at [facility] OR [address] [place] [address]    chip      chip implant    closed  manipulation under anesthesia, cortisone injection ARTHROSCOPY-KNEE was aborted Right 11/19/2015    Performed by Ranulfo House MD at [facility] OR [address] [place] [address]    HEMANGIOMA EXCISION      scalp    INGUINAL HERNIA REPAIR Left     KNEE SURGERY Left 2/16/15    OSTEOTOMY, FEMUR - distal to correct valgus (Orthopediatrics plate, MTF bone wedge).  Right knee arthroscopy with MPFL reconstruction, gracilis allograft (Linvatec).  3 hrs. Right 12/13/2018    Per[facility] by Ranulfo [facility] at [facility] OR  [facility]    OSTEOTOMY-TIBIA (Tibial tubercle transfer osteotomy) Right 7/16/2015    [facility]    OSTEOTOMY-TIBIA (Tibial tubercle) Left 2/16/2015    [facility]    TOE SURGERY Right     5 th toe       Social History     Socioeconomic History    Marital status: Single     Spouse name: Not on file    Number of children: Not on file    Years of education: Not on file    Highest education level: Not on file   Occupational History    Not on file   Social Needs    Financial resource strain: Not on file    Food insecurity:     Worry: Not on file     Inability: Not on file    Transportation needs:     Medical: Not on file     Non-medical: Not on file   Tobacco Use    Smoking status: Never Smoker    Smokeless tobacco: Never Used   Substance and Sexual Activity    Alcohol use: No    Drug use: No    Sexual activity: Never     Birth control/protection: Abstinence   Lifestyle    Physical activity:     Days per week: Not on file     Minutes per session: Not on file    Stress: Not on file   Relationships    Social connections:     Talks on phone: Not on file     Gets together: Not on file     Attends Yazdanism service: Not on file     Active member of club or organization: Not on file     Attends meetings of clubs or organizations: Not on file     Relationship status: Not on file   Other Topics Concern    Not on file   Social History Narrative    Lives at home with mother and father.       OBJECTIVE:     Vital Signs Range (Last 24H):         Significant Labs:  Lab Results   Component Value Date    WBC 10.55 04/15/2019    HGB 13.4 04/15/2019    HCT 43.2 04/15/2019     04/15/2019    ALT 39 11/03/2017    AST 19 11/03/2017     04/15/2019    K 3.5 04/15/2019     04/15/2019    CREATININE 0.8 04/15/2019    BUN 12 04/15/2019    CO2 28 04/15/2019    TSH 1.13 05/12/2010       Diagnostic Studies:    EKG:  Vent. Rate : 085  BPM     Atrial Rate : 085 BPM     P-R Int : 150 ms          QRS Dur : 092 ms      QT Int : 344 ms       P-R-T Axes : 044 044 016 degrees     QTc Int : 409 ms    Normal sinus rhythm  When compared with ECG of 07-OCT-2016 13:12,  No significant change was found  Confirmed by Justin SAUL, Cee Canales ([id number]) on 4/16/2019 8:13:57 AM        ASSESSMENT/PLAN:       Anesthesia Evaluation    I have reviewed the Patient Summary Reports.    I have reviewed the Nursing Notes.   I have reviewed the Medications.     Review of Systems  Anesthesia Hx:  No problems with previous Anesthesia  History of prior surgery of interest to airway management or planning: Denies Family Hx of Anesthesia complications.   Denies Personal Hx of Anesthesia complications.   Social:  Non-Smoker, No Alcohol Use    Hematology/Oncology:     Oncology Normal     EENT/Dental:EENT/Dental Normal   Cardiovascular:  Cardiovascular Normal Exercise tolerance: good     Pulmonary:  Pulmonary Normal    Renal/:  Renal/ Normal     Hepatic/GI:   Denies GERD.    Neurological:   Seizures, well controlled Patient had syncopal episode with associated loss of tone. EEG in 2016 with spikes concerning for epileptic activity.  Multiple EEG since that time have been normal.  Patient has not been on anti-epileptic medications.    Patient diagnosed with fibromyalgia   Endocrine:  Endocrine Normal    Psych:   anxiety depression          Physical Exam  General:  Obesity    Airway/Jaw/Neck:  Airway Findings: Mouth Opening: Normal Tongue: Normal  General Airway Assessment: Adult  Mallampati: I  TM Distance: Normal, at least 6 cm        Eyes/Ears/Nose:  EYES/EARS/NOSE FINDINGS: Normal   Dental:  DENTAL FINDINGS: Normal   Chest/Lungs:  Chest/Lungs Findings: Clear to auscultation, Normal Respiratory Rate     Heart/Vascular:  Heart Findings: Rate: Normal  Rhythm: Regular Rhythm        Mental Status:  Mental Status Findings:  Cooperative, Alert and Oriented         Anesthesia  Plan  Type of Anesthesia, risks & benefits discussed:  Anesthesia Type:  general  Patient's Preference:   Intra-op Monitoring Plan: standard ASA monitors  Intra-op Monitoring Plan Comments:   Post Op Pain Control Plan: per primary service following discharge from PACU, IV/PO Opioids PRN and multimodal analgesia  Post Op Pain Control Plan Comments:   Induction:   IV  Beta Blocker:  Patient is not currently on a Beta-Blocker (No further documentation required).       Informed Consent: Patient understands risks and agrees with Anesthesia plan.  Questions answered. Anesthesia consent signed with patient.  ASA Score: 2     Day of Surgery Review of History & Physical:    H&P update referred to the surgeon.         Ready For Surgery From Anesthesia Perspective.

## 2019-07-05 ENCOUNTER — ANESTHESIA (OUTPATIENT)
Dept: SURGERY | Facility: HOSPITAL | Age: 19
End: 2019-07-05
Payer: MEDICAID

## 2019-07-05 ENCOUNTER — HOSPITAL ENCOUNTER (OUTPATIENT)
Facility: HOSPITAL | Age: 19
Discharge: HOME | End: 2019-07-05
Attending: ORTHOPAEDIC SURGERY | Admitting: ORTHOPAEDIC SURGERY
Payer: MEDICAID

## 2019-07-05 DIAGNOSIS — M24.661 ARTHROFIBROSIS OF KNEE JOINT, RIGHT: ICD-10-CM

## 2019-07-05 LAB
B-HCG UR QL: NEGATIVE
CTP QC/QA: YES

## 2019-07-05 PROCEDURE — 25000003 PHARM REV CODE 250: Performed by: NURSE ANESTHETIST, CERTIFIED REGISTERED

## 2019-07-05 PROCEDURE — D9220A PRA ANESTHESIA: Mod: ANES,,, | Performed by: ANESTHESIOLOGY

## 2019-07-05 PROCEDURE — 01380 ANES ALL CLSD PX KNEE JOINT: CPT | Performed by: ORTHOPAEDIC SURGERY

## 2019-07-05 PROCEDURE — 27570 PR MANIPULATN KNEE JT+ANESTHESIA: ICD-10-PCS | Mod: RT,,, | Performed by: ORTHOPAEDIC SURGERY

## 2019-07-05 PROCEDURE — 36000705 HC OR TIME LEV I EA ADD 15 MIN: Performed by: ORTHOPAEDIC SURGERY

## 2019-07-05 PROCEDURE — 25000003 PHARM REV CODE 250: Performed by: ANESTHESIOLOGY

## 2019-07-05 PROCEDURE — 63600175 PHARM REV CODE 636 W HCPCS: Performed by: ANESTHESIOLOGY

## 2019-07-05 PROCEDURE — 27570 FIXATION OF KNEE JOINT: CPT | Mod: RT,,, | Performed by: ORTHOPAEDIC SURGERY

## 2019-07-05 PROCEDURE — 27201423 OPTIME MED/SURG SUP & DEVICES STERILE SUPPLY: Performed by: ORTHOPAEDIC SURGERY

## 2019-07-05 PROCEDURE — 36000704 HC OR TIME LEV I 1ST 15 MIN: Performed by: ORTHOPAEDIC SURGERY

## 2019-07-05 PROCEDURE — 20610 DRAIN/INJ JOINT/BURSA W/O US: CPT | Mod: 59,51,RT, | Performed by: ORTHOPAEDIC SURGERY

## 2019-07-05 PROCEDURE — 37000009 HC ANESTHESIA EA ADD 15 MINS: Performed by: ORTHOPAEDIC SURGERY

## 2019-07-05 PROCEDURE — 63600175 PHARM REV CODE 636 W HCPCS: Performed by: NURSE ANESTHETIST, CERTIFIED REGISTERED

## 2019-07-05 PROCEDURE — 71000015 HC POSTOP RECOV 1ST HR: Performed by: ORTHOPAEDIC SURGERY

## 2019-07-05 PROCEDURE — 25000003 PHARM REV CODE 250: Performed by: ORTHOPAEDIC SURGERY

## 2019-07-05 PROCEDURE — 81025 URINE PREGNANCY TEST: CPT | Performed by: ORTHOPAEDIC SURGERY

## 2019-07-05 PROCEDURE — D9220A PRA ANESTHESIA: ICD-10-PCS | Mod: CRNA,,, | Performed by: NURSE ANESTHETIST, CERTIFIED REGISTERED

## 2019-07-05 PROCEDURE — 63600175 PHARM REV CODE 636 W HCPCS: Performed by: ORTHOPAEDIC SURGERY

## 2019-07-05 PROCEDURE — D9220A PRA ANESTHESIA: Mod: CRNA,,, | Performed by: NURSE ANESTHETIST, CERTIFIED REGISTERED

## 2019-07-05 PROCEDURE — D9220A PRA ANESTHESIA: ICD-10-PCS | Mod: ANES,,, | Performed by: ANESTHESIOLOGY

## 2019-07-05 PROCEDURE — 20610 PR DRAIN/INJECT LARGE JOINT/BURSA: ICD-10-PCS | Mod: 59,51,RT, | Performed by: ORTHOPAEDIC SURGERY

## 2019-07-05 PROCEDURE — 71000044 HC DOSC ROUTINE RECOVERY FIRST HOUR: Performed by: ORTHOPAEDIC SURGERY

## 2019-07-05 PROCEDURE — 71000016 HC POSTOP RECOV ADDL HR: Performed by: ORTHOPAEDIC SURGERY

## 2019-07-05 PROCEDURE — 37000008 HC ANESTHESIA 1ST 15 MINUTES: Performed by: ORTHOPAEDIC SURGERY

## 2019-07-05 RX ORDER — FENTANYL CITRATE 50 UG/ML
25 INJECTION, SOLUTION INTRAMUSCULAR; INTRAVENOUS EVERY 5 MIN PRN
Status: DISCONTINUED | OUTPATIENT
Start: 2019-07-05 | End: 2020-07-09 | Stop reason: HOSPADM

## 2019-07-05 RX ORDER — DEXMEDETOMIDINE HYDROCHLORIDE 100 UG/ML
INJECTION, SOLUTION INTRAVENOUS
Status: COMPLETED
Start: 2019-07-05 | End: 2019-07-05

## 2019-07-05 RX ORDER — EPINEPHRINE 1 MG/ML
INJECTION INTRAMUSCULAR; INTRAVENOUS; SUBCUTANEOUS
Status: DISCONTINUED
Start: 2019-07-05 | End: 2019-07-05 | Stop reason: HOSPADM

## 2019-07-05 RX ORDER — GLYCOPYRROLATE 0.2 MG/ML
INJECTION INTRAMUSCULAR; INTRAVENOUS
Status: DISCONTINUED | OUTPATIENT
Start: 2019-07-05 | End: 2019-07-05

## 2019-07-05 RX ORDER — TRIAMCINOLONE ACETONIDE 40 MG/ML
INJECTION, SUSPENSION INTRA-ARTICULAR; INTRAMUSCULAR
Status: DISCONTINUED
Start: 2019-07-05 | End: 2019-07-05 | Stop reason: HOSPADM

## 2019-07-05 RX ORDER — KETAMINE HCL IN 0.9 % NACL 50 MG/5 ML
SYRINGE (ML) INTRAVENOUS
Status: DISCONTINUED
Start: 2019-07-05 | End: 2019-07-05 | Stop reason: HOSPADM

## 2019-07-05 RX ORDER — BUPIVACAINE HYDROCHLORIDE 2.5 MG/ML
INJECTION, SOLUTION EPIDURAL; INFILTRATION; INTRACAUDAL
Status: DISCONTINUED | OUTPATIENT
Start: 2019-07-05 | End: 2019-07-05 | Stop reason: HOSPADM

## 2019-07-05 RX ORDER — SODIUM CHLORIDE 0.9 % (FLUSH) 0.9 %
10 SYRINGE (ML) INJECTION
Status: DISCONTINUED | OUTPATIENT
Start: 2019-07-05 | End: 2019-07-05 | Stop reason: HOSPADM

## 2019-07-05 RX ORDER — ACETAMINOPHEN 10 MG/ML
INJECTION, SOLUTION INTRAVENOUS
Status: DISCONTINUED
Start: 2019-07-05 | End: 2019-07-05 | Stop reason: HOSPADM

## 2019-07-05 RX ORDER — LIDOCAINE HCL/PF 100 MG/5ML
SYRINGE (ML) INTRAVENOUS
Status: DISCONTINUED | OUTPATIENT
Start: 2019-07-05 | End: 2019-07-05

## 2019-07-05 RX ORDER — KETAMINE HCL IN 0.9 % NACL 50 MG/5 ML
SYRINGE (ML) INTRAVENOUS
Status: DISCONTINUED | OUTPATIENT
Start: 2019-07-05 | End: 2019-07-05

## 2019-07-05 RX ORDER — MIDAZOLAM HYDROCHLORIDE 1 MG/ML
INJECTION, SOLUTION INTRAMUSCULAR; INTRAVENOUS
Status: DISCONTINUED | OUTPATIENT
Start: 2019-07-05 | End: 2019-07-05

## 2019-07-05 RX ORDER — SODIUM CHLORIDE 9 MG/ML
INJECTION, SOLUTION INTRAVENOUS CONTINUOUS
Status: DISCONTINUED | OUTPATIENT
Start: 2019-07-05 | End: 2019-07-05 | Stop reason: HOSPADM

## 2019-07-05 RX ORDER — FENTANYL CITRATE 50 UG/ML
INJECTION, SOLUTION INTRAMUSCULAR; INTRAVENOUS
Status: DISCONTINUED | OUTPATIENT
Start: 2019-07-05 | End: 2019-07-05

## 2019-07-05 RX ORDER — ONDANSETRON 2 MG/ML
4 INJECTION INTRAMUSCULAR; INTRAVENOUS DAILY PRN
Status: DISCONTINUED | OUTPATIENT
Start: 2019-07-05 | End: 2019-07-05 | Stop reason: HOSPADM

## 2019-07-05 RX ORDER — PROPOFOL 10 MG/ML
VIAL (ML) INTRAVENOUS
Status: DISCONTINUED | OUTPATIENT
Start: 2019-07-05 | End: 2019-07-05

## 2019-07-05 RX ORDER — TRIAMCINOLONE ACETONIDE 40 MG/ML
INJECTION, SUSPENSION INTRA-ARTICULAR; INTRAMUSCULAR
Status: DISCONTINUED | OUTPATIENT
Start: 2019-07-05 | End: 2019-07-05 | Stop reason: HOSPADM

## 2019-07-05 RX ORDER — BUPIVACAINE HYDROCHLORIDE 2.5 MG/ML
INJECTION, SOLUTION EPIDURAL; INFILTRATION; INTRACAUDAL
Status: DISCONTINUED
Start: 2019-07-05 | End: 2019-07-05 | Stop reason: HOSPADM

## 2019-07-05 RX ORDER — MIDAZOLAM HYDROCHLORIDE 1 MG/ML
0.5 INJECTION INTRAMUSCULAR; INTRAVENOUS
Status: DISCONTINUED | OUTPATIENT
Start: 2019-07-05 | End: 2020-07-10

## 2019-07-05 RX ORDER — DEXMEDETOMIDINE HYDROCHLORIDE 100 UG/ML
INJECTION, SOLUTION INTRAVENOUS
Status: DISCONTINUED | OUTPATIENT
Start: 2019-07-05 | End: 2019-07-05

## 2019-07-05 RX ORDER — ONDANSETRON 2 MG/ML
INJECTION INTRAMUSCULAR; INTRAVENOUS
Status: DISCONTINUED | OUTPATIENT
Start: 2019-07-05 | End: 2019-07-05

## 2019-07-05 RX ORDER — MUPIROCIN 20 MG/G
OINTMENT TOPICAL
Status: DISCONTINUED | OUTPATIENT
Start: 2019-07-05 | End: 2019-07-05 | Stop reason: HOSPADM

## 2019-07-05 RX ADMIN — ONDANSETRON 4 MG: 2 INJECTION INTRAMUSCULAR; INTRAVENOUS at 10:07

## 2019-07-05 RX ADMIN — Medication 20 MG: at 10:07

## 2019-07-05 RX ADMIN — PROMETHAZINE HYDROCHLORIDE 6.25 MG: 25 INJECTION INTRAMUSCULAR; INTRAVENOUS at 01:07

## 2019-07-05 RX ADMIN — LIDOCAINE HYDROCHLORIDE 100 MG: 20 INJECTION, SOLUTION INTRAVENOUS at 10:07

## 2019-07-05 RX ADMIN — FENTANYL CITRATE 50 MCG: 50 INJECTION, SOLUTION INTRAMUSCULAR; INTRAVENOUS at 10:07

## 2019-07-05 RX ADMIN — GLYCOPYRROLATE 0.2 MG: 0.2 INJECTION, SOLUTION INTRAMUSCULAR; INTRAVENOUS at 10:07

## 2019-07-05 RX ADMIN — PROPOFOL 200 MG: 10 INJECTION, EMULSION INTRAVENOUS at 10:07

## 2019-07-05 RX ADMIN — SODIUM CHLORIDE: 0.9 INJECTION, SOLUTION INTRAVENOUS at 09:07

## 2019-07-05 RX ADMIN — MUPIROCIN: 20 OINTMENT TOPICAL at 09:07

## 2019-07-05 RX ADMIN — DEXMEDETOMIDINE HYDROCHLORIDE 4 MCG: 100 INJECTION, SOLUTION, CONCENTRATE INTRAVENOUS at 10:07

## 2019-07-05 RX ADMIN — MIDAZOLAM HYDROCHLORIDE 2 MG: 1 INJECTION, SOLUTION INTRAMUSCULAR; INTRAVENOUS at 10:07

## 2019-07-05 NOTE — TRANSFER OF CARE
"Anesthesia Transfer of Care Note    Patient: Denise Mosher    Procedure(s) Performed: Procedure(s) (LRB):  MANIPULATION, KNEE (Right)  INJECTION, STEROID (Right)    Patient location: [facility]    Anesthesia Type: general    Transport from OR: Transported from OR on 6-10 L/min O2 by face mask with adequate spontaneous ventilation    Post pain: adequate analgesia    Post assessment: no apparent anesthetic complications    Post vital signs: stable    Level of consciousness: awake, alert and oriented    Nausea/Vomiting: no nausea/vomiting    Complications: none    Transfer of care protocol was followed      Last vitals:   Visit Vitals  /60 (BP Location: Left arm, Patient Position: Lying)   Pulse 65   Temp 36.6 °C (97.9 °F) (Skin)   Resp 18   Ht 5' 0.5" (1.537 m)   Wt 99.8 kg (220 lb)   SpO2 99%   Breastfeeding? No   BMI 42.26 kg/m²     "

## 2019-07-05 NOTE — H&P
Subjective:    Denise Mosher is here for pre-op.    Past Medical History:   Diagnosis Date    Anxiety     reports panic attacks    Depression     Fibromyalgia     per pt    Insomnia     Precocious female puberty     Seizures     reports 2 incidents of possible seizures while giving blood    Syncope and collapse     Wrist fracture, bilateral        Past Surgical History:   Procedure Laterality Date    ARTHROSCOPY-KNEE Right 7/16/2015    [facility]    ARTHROSCOPY-KNEE Left 2/16/2015    [facility]    ARTHROSCOPY-KNEE, removal of two Synthes 4-5 cortical screws Right 6/15/2017    Performed by Ranulfo House MD at [facility] OR [address] [place] [address]    chip      chip implant    closed  manipulation under anesthesia, cortisone injection ARTHROSCOPY-KNEE was aborted Right 11/19/2015    Performed by Ranulfo House MD at [facility] OR [address] [place] [address]    HEMANGIOMA EXCISION      scalp    INGUINAL HERNIA REPAIR Left     KNEE SURGERY Left 2/16/15    OSTEOTOMY, FEMUR - distal to correct valgus (Orthopediatrics plate, MTF bone wedge).  Right knee arthroscopy with MPFL reconstruction, gracilis allograft (Linvatec).  3 hrs. Right 12/13/2018    Performed by [facility]ul[facility] at [facility]    OSTEOTOMY-TIBIA (Tibial tubercle transfer osteotomy) Right 7/16/2015    Performed by [facility]ul[facility] at [facility]    OSTEOTOMY-TIBIA (Tibial tubercle) Left 2/16/2015    Performed by [facility] at [facility]    TOE SURGERY Right     5 th toe       No current facility-administered medications on file prior to encounter.      Current Outpatient Medications on File Prior to Encounter   Medication Sig Dispense Refill    aspirin (ECOTRIN) 81 MG EC tablet Take 1 tablet (81 mg total) by mouth 2 (two) times daily. 120 tablet 0    azithromycin (Z-LINDA) 250 MG tablet Take 2 tablets by mouth on day 1; Take 1 tablet by mouth on days 2-5 6 tablet 0     fluoxetine (PROZAC) 10 MG Tab Take 40 mg by mouth once daily.       HYDROcodone-acetaminophen (NORCO) 5-325 mg per tablet Take 1 tablet by mouth every 6 (six) hours as needed for Pain. 20 tablet 0    naproxen (NAPROSYN) 500 MG tablet Take 1 tablet by mouth as needed.  1    omeprazole (PRILOSEC) 40 MG capsule Take 1 capsule by mouth every evening.  1    ondansetron (ZOFRAN-ODT) 8 MG TbDL Take 1 tablet (8 mg total) by mouth every 8 (eight) hours as needed (nausea or vomiting). 6 tablet 0    polyethylene glycol (GLYCOLAX) 17 gram PwPk Take 17 g by mouth once daily. 30 each 0       Review of patient's allergies indicates:  No Known Allergies    Social History     Socioeconomic History    Marital status: Single     Spouse name: Not on file    Number of children: Not on file    Years of education: Not on file    Highest education level: Not on file   Occupational History    Not on file   Social Needs    Financial resource strain: Not on file    Food insecurity:     Worry: Not on file     Inability: Not on file    Transportation needs:     Medical: Not on file     Non-medical: Not on file   Tobacco Use    Smoking status: Never Smoker    Smokeless tobacco: Never Used   Substance and Sexual Activity    Alcohol use: No    Drug use: No    Sexual activity: Never     Birth control/protection: Abstinence   Lifestyle    Physical activity:     Days per week: Not on file     Minutes per session: Not on file    Stress: Not on file   Relationships    Social connections:     Talks on phone: Not on file     Gets together: Not on file     Attends Jain service: Not on file     Active member of club or organization: Not on file     Attends meetings of clubs or organizations: Not on file     Relationship status: Not on file   Other Topics Concern    Not on file   Social History Narrative    Lives at home with mother and father.         Objective:    There were no vitals filed for this visit.    Afebrile, Vital  signs stable   Gen - well-developed, well-nourished, no acute distress  HEENT - Pupils equal/round/reactive to light, normocephalic, atraumatic   Neuro - Normal reflexes, normal sensation, normal motor exam  CV - Regular rate and rhythm, palpable distal pulses   Pulm - Good inspiratory effort with unlaboured breathing  Abd - +Bowel sounds, non-tender, non-distended      Plan: right knee scope, OMID    I have discussed the risks, benefits, and alternatives of surgery with the patient's guardian and obtained informed consent.

## 2019-07-05 NOTE — OP NOTE
Orthopaedic Surgery Operative Report      DATE OF PROCEDURE: 07/05/2019   PREOPERATIVE DIAGNOSIS: Arthrofibrosis of knee joint, right [M24.661], Right knee pain  POSTOPERATIVE DIAGNOSIS: Arthrofibrosis of Right knee joint, Right knee pain  PROCEDURE PERFORMED: Manipulation of right knee under anesthesia, Cortisone injection into right knee joint  SURGEON: Ranulfo House M.D.   ASSISTANT: Linda Sanders  ANESTHESIA: general   ESTIMATED BLOOD LOSS: 0 cc.     INDICATIONS FOR PROCEDURE: The patient is an 19 y.o. female who has a hx of R knee arthroscopy and right femoral osteotomy for valgus deformity. It was decided that the best plan of action was to take the patient back to the operative theatre for manipulation of right knee under anesthesia versus arthroscopic lysis of adhesions.  This procedure, as well as, alternatives to this procedure was discussed at length with the patient. Risks and benefits were also discussed. Risks include but are not limited to bleeding, infection, numbness, scarring, damage to major neurovascular structures, limb length/rotation discrepancy, failure of hardware, need for further surgery, loss of function, myocardial infarction, deep venous thrombosis, pulmonary embolism, nonunion, malunion and death. Patient understood these well and consented for the procedure as described.    PROCEDURE IN DETAIL: The patient was identified in the preoperative holding area and site was marked. The patient was wheeled into the Operating Room and general anesthesia was induced. Attempt was made to range right knee with successful manipulation of right knee from 0-130 degrees without further intervention. The right knee was then sterilly prepped with chloraprep and an intrarticular CSI was injected into the right knee using a medial approach.      The patient was extubated, awakened and taken to the post anesthesia care unit in stable condition.     PLAN FOR THE PATIENT: Pt will followup in clinic in 2  weeks. WBAT RLE. Return to PT for aggressive ROM.    I was present and participated in all pertinent parts of the operation.  I agree with the operative report as dictated by the resident.    Ranulfo House

## 2019-07-05 NOTE — PROGRESS NOTES
Pt's parents at bedside, discharge instructions reviewed with mother and father, questions answered, verbalized understanding. Pt still very drowsy, VSS, c/o sore throat, no nausea. WCTM

## 2019-07-05 NOTE — BRIEF OP NOTE
[facility]  Brief Operative Note     SUMMARY     Surgery Date: 7/5/2019     Surgeon(s) and Role:     * Ranulfo House MD - Primary     * Stanley Gillis MD - Resident - Assisting        Pre-op Diagnosis:  Arthrofibrosis of knee joint, right [M24.661]    Post-op Diagnosis:  Post-Op Diagnosis Codes:     * Arthrofibrosis of knee joint, right [M24.661]    Procedure(s) (LRB):  ARTHROSCOPY, KNEE (Right) - lysis of adhesions and manipulation under anesthesia.  Possible removal of distal femoral plate and screws (Orthopediatrics). (Right)    Anesthesia: General    Description of the findings of the procedure: R knee manipulation with CSI    Findings/Key Components: R knee manipulation    Estimated Blood Loss: 0         Specimens:   Specimen (12h ago, onward)    None

## 2019-07-05 NOTE — DISCHARGE SUMMARY
Discharge Note    SUMMARY     Admit Date: 7/5/2019    Discharge Date and Time:  07/05/2019 11:13 AM    Hospital Course (synopsis of major diagnoses, care, treatment, and services provided during the course of the hospital stay): Pt admitted for outpatient procedure, tolerated well.  Recovered in PACU and was discharged home on day of surgery.        Final Diagnosis: Post-Op Diagnosis Codes:     * Arthrofibrosis of knee joint, right [M24.661]    Disposition: Home or Self Care    Follow Up/Patient Instructions:     Medications:  Reconciled Home Medications:      Medication List      ASK your doctor about these medications    HYDROcodone-acetaminophen 5-325 mg per tablet  Commonly known as:  NORCO  Take 1 tablet by mouth every 6 (six) hours as needed for Pain.          No discharge procedures on file.

## 2019-07-05 NOTE — DISCHARGE INSTRUCTIONS
Anesthesia: General Anesthesia     You are watched continuously during your procedure by your anesthesia provider.     Youre due to have surgery. During surgery, youll be given medicine called anesthesia or anesthetic. This will keep you comfortable and pain-free. Your anesthesia provider will use general anesthesia.  What is general anesthesia?  General anesthesia puts you into a state like deep sleep. It goes into the bloodstream (IV anesthetics), into the lungs (gas anesthetics), or both. You feel nothing during the procedure. You will not remember it. During the procedure, the anesthesia provider monitors you continuously. He or she checks your heart rate and rhythm, blood pressure, breathing, and blood oxygen.  · IV anesthetics. IV anesthetics are given through an IV line in your arm. Theyre often given first. This is so you are asleep before a gas anesthetic is started. Some kinds of IV anesthetics relieve pain. Others relax you. Your doctor will decide which kind is best in your case.  · Gas anesthetics. Gas anesthetics are breathed into the lungs. They are often used to keep you asleep. They can be given through a facemask or a tube placed in your larynx or trachea (breathing tube).  ¨ If you have a facemask, your anesthesia provider will most likely place it over your nose and mouth while youre still awake. Youll breathe oxygen through the mask as your IV anesthetic is started. Gas anesthetic may be added through the mask.  ¨ If you have a tube in the larynx or trachea, it will be inserted into your throat after youre asleep.  Anesthesia tools and medicines  You will likely have:  · IV anesthetics. These are put into an IV line into your bloodstream.  · Gas anesthetics. You breathe these anesthetics into your lungs, where they pass into your bloodstream.  · Pulse oximeter. This is a small clip that is attached to the end of your finger. This measures your blood oxygen  level.  · Electrocardiography leads (electrodes). These are small sticky pads that are placed on your chest. They record your heart rate and rhythm.  · Blood pressure cuff. This reads your blood pressure.  Risks and possible complications  General anesthesia has some risks. These include:  · Breathing problems  · Nausea and vomiting  · Sore throat or hoarseness (usually temporary)  · Allergic reaction to the anesthetic  · Irregular heartbeat (rare)  · Cardiac arrest (rare)   Anesthesia safety  · Follow all instructions you are given for how long not to eat or drink before your procedure.  · Be sure your doctor knows what medicines and drugs you take. This includes over-the-counter medicines, herbs, supplements, alcohol or other drugs. You will be asked when those were last taken.  · Have an adult family member or friend drive you home after the procedure.  · For the first 24 hours after your surgery:  ¨ Do not drive or use heavy equipment.  ¨ Do not make important decisions or sign legal documents. If important decisions or signing legal documents is necessary during the first 24 hours after surgery, have a trusted family member or spouse act on your behalf.  ¨ Avoid alcohol.  ¨ Have a responsible adult stay with you. He or she can watch for problems and help keep you safe.  Date Last Reviewed: 12/1/2016  © 2957-6595 [employer]. [address] [place] [address], [place] [address]. All rights reserved. This information is not intended as a substitute for professional medical care. Always follow your healthcare professional's instructions.

## 2019-07-07 DIAGNOSIS — M22.2X1 PATELLOFEMORAL PAIN SYNDROME OF RIGHT KNEE: ICD-10-CM

## 2019-07-07 DIAGNOSIS — M24.661 ARTHROFIBROSIS OF KNEE JOINT, RIGHT: Primary | ICD-10-CM

## 2019-07-08 VITALS
TEMPERATURE: 98 F | HEIGHT: 61 IN | HEART RATE: 81 BPM | RESPIRATION RATE: 20 BRPM | DIASTOLIC BLOOD PRESSURE: 61 MMHG | WEIGHT: 220 LBS | BODY MASS INDEX: 41.54 KG/M2 | SYSTOLIC BLOOD PRESSURE: 106 MMHG | OXYGEN SATURATION: 99 %

## 2019-07-08 NOTE — ANESTHESIA POSTPROCEDURE EVALUATION
Anesthesia Post Evaluation    Patient: Denise Mosher    Procedure(s) Performed: Procedure(s) (LRB):  MANIPULATION, KNEE (Right)  INJECTION, STEROID (Right)    Final Anesthesia Type: general  Patient location during evaluation: PACU  Patient participation: Yes- Able to Participate  Level of consciousness: awake and alert and oriented  Post-procedure vital signs: reviewed and stable  Pain management: adequate  Airway patency: patent  PONV status at discharge: No PONV  Anesthetic complications: no      Cardiovascular status: blood pressure returned to baseline  Respiratory status: unassisted  Hydration status: euvolemic  Follow-up not needed.      Patient complaining of sore throat. Explained to her that she was not intubated (as she believes she was) and that soreness was a normal side effect of LMA placement.      Vitals Value Taken Time   BP 90/53 7/5/2019  1:46 PM   Temp 36.7 °C (98.1 °F) 7/5/2019  1:45 PM   Pulse 84 7/5/2019  1:57 PM   Resp 28 7/5/2019  1:57 PM   SpO2 99 % 7/5/2019  1:56 PM   Vitals shown include unvalidated device data.      No case tracking events are documented in the log.      Pain/Fabiola Score: No data recorded

## 2019-07-09 ENCOUNTER — CLINICAL SUPPORT (OUTPATIENT)
Dept: REHABILITATION | Facility: HOSPITAL | Age: 19
End: 2019-07-09
Attending: ORTHOPAEDIC SURGERY
Payer: MEDICAID

## 2019-07-09 DIAGNOSIS — R53.1 WEAKNESS: ICD-10-CM

## 2019-07-09 DIAGNOSIS — M25.60 RANGE OF MOTION DEFICIT: ICD-10-CM

## 2019-07-09 PROCEDURE — 97162 PT EVAL MOD COMPLEX 30 MIN: CPT

## 2019-07-09 PROCEDURE — 97110 THERAPEUTIC EXERCISES: CPT

## 2019-07-09 NOTE — PROGRESS NOTES
"[facility]  Physical Therapy Initial Evaluation    Name: Denise Mosher  Clinic Number: [id number]    Therapy Diagnosis:   Encounter Diagnoses   Name Primary?    Range of motion deficit     Weakness      Physician: Ranulfo House MD    Physician Orders: PT Eval and Treat   Medical Diagnosis from Referral:   M24.661 (ICD-10-CM) - Arthrofibrosis of knee joint, right   M22.2X1 (ICD-10-CM) - Patellofemoral pain syndrome of right knee     Surgery Date (OMID): 7/5/2019  Evaluation Date: 7/9/2019  Authorization Period Expiration: 7/6/2020  Plan of Care Expiration: 9/3/2019  Visit # / Visits authorized: 1/ 1    Time In: 11:00  Time Out: 11:55  Total Billable Time: 55 minutes    Precautions: Standard    Subjective   Date of onset: s/p OMID on 7/5/2019  History of current condition - Denise reports: she's been feeling "ok" since her manipulation.   She states she still has the most difficulty with squatting (bending down to reach for object, bending down to clean, etc).     Pt goes to [employer], she states she has no difficulty with getting to/from class or in/out of her car.   Pt currently doesn't work but is looking for a job.        Past Medical History:   Diagnosis Date    Anxiety     reports panic attacks    Depression     Fibromyalgia     per pt    Insomnia     Precocious female puberty     Seizures     reports 2 incidents of possible seizures while giving blood    Syncope and collapse     Wrist fracture, bilateral      Denise Mosher  has a past surgical history that includes Knee surgery (Left, 2/16/15); Toe Surgery (Right); Inguinal hernia repair (Left); Hemangioma excision; chip; Femur Osteotomy (Right, 12/13/2018); Knee joint manipulation (Right, 7/5/2019); and Injection of steroid (Right, 7/5/2019).    Denise OSBORN has a current medication list which includes the following prescription(s): hydrocodone-acetaminophen, and the following Facility-Administered Medications: " fentanyl and midazolam.    Review of patient's allergies indicates:  No Known Allergies     Imaging, MRI studies: (R) knee 6/7/2019  FINDINGS:  Menisci:  There is no tear of the medial or lateral meniscus.The root attachment of the menisci are normal.    Ligaments:  ACL and PCL are intact.MCL, and LCL complex are intact.    Tendons:  The quadriceps and patellar tendons are normal.    Cartilage:    Patellofemoral: The medial and lateral patellar facets demonstrate normal articular cartilagewith the exception of minimal hyperintensity identified, focally at the level of the patellar ridge on a single image..  The retinacular attachments are intact.    Medial tibiofemoral: Articular cartilage is maintained.    Lateral tibiofemoral: Articular cartilage is maintained.    Bone: No fracture or marrow replacing process.  Chronic postoperative changes level of the tibia in addition to femoral osteotomy site.  Mild pretibial edema identified.    Miscellaneous: There is small joint effusion.  Mild subcutaneous edema identified.  Mild edema identified level of the VMO (vastus medialis).  Posterior medial and posterior lateral corners of the knee are intact.The gastrocnemius muscles are normal.Superolateral fat pad edema which may represent patellar tendon lateral femoral condyle friction syndrome (possibly related to patella maltracking).    S/P MPFL reconstruction.  S/P distal femoral osteotomy.  Osteotomy site visualized.  The MPFL hamstring graft is seen to be intact in its entirety.  No definite evidence for arthrofibrosis.      Impression       S/P MPFL repair, and distal femoral osteotomy as noted above with intact MPFL graft.    Small joint effusion as above.  Minimal cartilaginous abnormality at the level of the patellar ridge.  Additional findings as above.         Prior Therapy: Previously known to this facility for treatment after previous knee surgeries.   Social History: She lives with their family in a  single-story home.   Occupation: student  Prior Level of Function: Limited mobility due to pain/dysfunction  Current Level of Function: Pt has not attempted squatting since her surgery, but has had her dad help her bend her knee.       Pain:  Current 0/10, worst 8/10, best 0/10   Location: right knee   Description: Aching  Aggravating Factors: bending her knee  Easing Factors: rest    Pts goals: be able to bend knee without pain.     Objective     Observation: Pt is stated age, pleasant, no acute distress, oriented x3.       Gait: normal, efficient      Range of Motion: A(PROM):    Knee Left Right   Extension  0 degrees  0 degrees   Flexion  130(145) degrees  100(110) degrees       Strength:    Knee Left Right   Extension 5/5 4/5   Flexion 5/5 4+/5       Special Tests:  Not indicated     Joint Mobility: firm end-feel noted end ROM (R) knee flexion. Hypomobility sup/inf patellar glides      TREATMENT   Treatment Time In: 11:30  Treatment Time Out: 11:55  Total Treatment time separate from Evaluation: 25 minutes    Denise received therapeutic exercises to develop strength and ROM for 25 minutes including:  - supine bridging - VC for increased knee flexion after each set 3x10, 3x/day for HEP  - lunge on steps for passive flexion 3x20, 3x/day for HEP   Time includes education  See Patient Instructions for HEP2go handout       Home Exercises and Patient Education Provided    Education provided:   - Findings of evaluation, prognosis, PT plan of care, HEP  - need to participate in PT 4-5x/week for 2 weeks for aggressive ROM    Written Home Exercises Provided: yes.  Exercises were reviewed and Denise was able to demonstrate them prior to the end of the session.  Denise demonstrated good  understanding of the education provided.     See EMR under Patient Instructions for exercises provided 7/9/2019.    Assessment   Denise OSBORN is a 19 y.o. female referred to outpatient Physical Therapy with a medical diagnosis of  "  M24.661 (ICD-10-CM) - Arthrofibrosis of knee joint, right   M22.2X1 (ICD-10-CM) - Patellofemoral pain syndrome of right knee     Pt presents with joint hypomobility and ROM restrictions of (R) knee. Improvement has been noted following OMID compared to previous measurements, however, ROM is still lacking compared bilaterally.     Patient was educated on prognosis and need to attend PT 4-5x/week for 2 weeks following OMID, however, pt stated that "twice a week is already pushing it" and declined to attend more than twice weekly despite education and rationale for increased visits. Patient informed that she needed to perform HEP 3x/day "religiously" in order to promote improved mobility since she would not be attending PT more than 2x/week, pt stated she understood.     Pt prognosis is Good.   Pt will benefit from skilled outpatient Physical Therapy to address the deficits stated above and in the chart below, provide pt/family education, and to maximize pt's level of independence.     Plan of care discussed with patient: Yes  Pt's spiritual, cultural and educational needs considered and patient is agreeable to the plan of care and goals as stated below:     Anticipated Barriers for therapy: Chronicity of condition.     Medical Necessity is demonstrated by the following  History  Co-morbidities and personal factors that may impact the plan of care Co-morbidities:   Anxiety   reports panic attacks   Depression   Fibromyalgia   per pt   Insomnia   Precocious female puberty   Seizures   reports 2 incidents of possible seizures while giving blood   Syncope and collapse   Wrist fracture, bilateral       Personal Factors:   age  coping style  social background  lifestyle  character  attitudes     high   Examination  Body Structures and Functions, activity limitations and participation restrictions that may impact the plan of care Body Regions:   lower extremities    Body Systems:    gross symmetry  ROM  strength  gross " coordinated movement  motor control    Participation Restrictions:   Bending, squatting, stairs    Activity limitations:   Learning and applying knowledge  no deficits    General Tasks and Commands  no deficits    Communication  no deficits    Mobility  lifting and carrying objects  bending/squatting, stairs    Self care  no deficits    Domestic Life  shopping  doing house work (cleaning house, washing dishes, laundry)  assisting others    Interactions/Relationships  complex interpersonal interactions    Life Areas  employment  basic economic transactions    Community and Social Life  community life  recreation and leisure         moderate   Clinical Presentation evolving clinical presentation with changing clinical characteristics moderate   Decision Making/ Complexity Score: moderate     Goals:  Short Term Goals: 2 weeks   - Pt will demonstrate excellent knowledge and adherence to HEP to facilitate optimal recovery.  - Pt will demonstrate (R) knee PROM of 0 to 120 deg or greater for increased mobility tolerance.     Long Term Goals: 8 weeks   - Pt will demonstrate (R) knee AROM of 0 to 120 deg or greater for increased mobility tolerance.   - Pt will demonstrate (R) knee strength of 5/5 MMT for increased stability needed for functional activity.   - Pt will report at least 75% improvement in activity tolerance with respect to (R) knee mobility.     Plan   Plan of care Certification: 7/9/2019 to 9/3/2019.    Outpatient Physical Therapy 2 times weekly for 8 weeks to include the following interventions: Gait Training, Manual Therapy, Moist Heat/ Ice, Neuromuscular Re-ed, Patient Education, Therapeutic Activites and Therapeutic Exercise.     Lucia Barnett, PT

## 2019-07-10 PROBLEM — R52 PAIN: Status: RESOLVED | Noted: 2019-01-22 | Resolved: 2019-07-10

## 2019-07-10 PROBLEM — R53.1 WEAKNESS: Status: ACTIVE | Noted: 2019-07-10

## 2019-07-10 PROBLEM — M25.60 RANGE OF MOTION DEFICIT: Status: ACTIVE | Noted: 2019-07-10

## 2019-07-19 ENCOUNTER — CLINICAL SUPPORT (OUTPATIENT)
Dept: REHABILITATION | Facility: HOSPITAL | Age: 19
End: 2019-07-19
Attending: ORTHOPAEDIC SURGERY
Payer: MEDICAID

## 2019-07-19 DIAGNOSIS — R53.1 WEAKNESS: ICD-10-CM

## 2019-07-19 DIAGNOSIS — M25.60 RANGE OF MOTION DEFICIT: ICD-10-CM

## 2019-07-19 PROCEDURE — 97110 THERAPEUTIC EXERCISES: CPT

## 2019-07-19 NOTE — PROGRESS NOTES
"Physical Therapy Daily Treatment Note     Name: Denise Mosher  Clinic Number: [id number]    Therapy Diagnosis:   Encounter Diagnoses   Name Primary?    Range of motion deficit     Weakness      Physician: Ranulfo House MD    Visit Date: 7/19/2019  Physician Orders: PT Eval and Treat   Medical Diagnosis from Referral:   M24.661 (ICD-10-CM) - Arthrofibrosis of knee joint, right   M22.2X1 (ICD-10-CM) - Patellofemoral pain syndrome of right knee      Surgery Date (OMID): 7/5/2019  Evaluation Date: 7/9/2019  Authorization Period Expiration: 7/6/2020  Plan of Care Expiration: 9/3/2019  Visit # / Visits authorized: 10/ 12      Time In: 11:00  Time Out: 11:49  Total Billable Time: 49 minutes    Precautions: Standard    Subjective     Pt reports: knee is tired.  She was compliant with home exercise program.  Response to previous treatment: N/A  Functional change: improved ROM    Pain: 0/10  Location: right knee      Objective     FL PROM: 115    Denise received therapeutic exercises to develop strength, endurance, ROM and core stabilization for 49 minutes including:  Bike x 5 min lvl 2.5  QS w/ SLR 20x 3" hold  Heel slides 20x 5" hold  Prone Quad S 10x10"  LAQ 2x10 3" hold  Bridge 20x 5" hold  Shuttle 1 blk  ecc R 2x10  TKE w/ L hip FL yellow crossfit band 2x10  Gait S 20x 5" hold  RDL to cone     PROM FL    Denise participated in dynamic functional therapeutic activities to improve functional performance for 00  minutes, including:  Sit to stand  Step up yellow step    received the following manual therapy techniques: Joint mobilizations and Soft tissue Mobilization were applied to the: R knee for 00 minutes, including:  Patella mobs        Home Exercises Provided and Patient Education Provided     Education provided:   - cont HEP    Written Home Exercises Provided: Patient instructed to cont prior HEP.  Exercises were reviewed and Denise was able to demonstrate them prior to the end of the session.  Denise " demonstrated good  understanding of the education provided.     See EMR under Media for exercises provided prior visit.    Assessment     Pt w/ improved knee FL ROM. challenged w/ balance on RDL.  Denise is progressing well towards her goals.   Pt prognosis is Good.     Pt will continue to benefit from skilled outpatient physical therapy to address the deficits listed in the problem list box on initial evaluation, provide pt/family education and to maximize pt's level of independence in the home and community environment.     Pt's spiritual, cultural and educational needs considered and pt agreeable to plan of care and goals.    Anticipated barriers to physical therapy: Chronicity of condition    Goals: Short Term Goals: 2 weeks   - Pt will demonstrate excellent knowledge and adherence to HEP to facilitate optimal recovery.  - Pt will demonstrate (R) knee PROM of 0 to 120 deg or greater for increased mobility tolerance.      Long Term Goals: 8 weeks   - Pt will demonstrate (R) knee AROM of 0 to 120 deg or greater for increased mobility tolerance.   - Pt will demonstrate (R) knee strength of 5/5 MMT for increased stability needed for functional activity.   - Pt will report at least 75% improvement in activity tolerance with respect to (R) knee mobility.     Plan     Cont w/ poc to work on R knee ROM and LE strength/stability    Sejal El, PTA

## 2019-07-23 ENCOUNTER — CLINICAL SUPPORT (OUTPATIENT)
Dept: REHABILITATION | Facility: HOSPITAL | Age: 19
End: 2019-07-23
Attending: ORTHOPAEDIC SURGERY
Payer: MEDICAID

## 2019-07-23 DIAGNOSIS — M25.60 RANGE OF MOTION DEFICIT: ICD-10-CM

## 2019-07-23 DIAGNOSIS — R53.1 WEAKNESS: ICD-10-CM

## 2019-07-23 PROCEDURE — 97110 THERAPEUTIC EXERCISES: CPT

## 2019-07-23 NOTE — PROGRESS NOTES
"Physical Therapy Daily Treatment Note     Name: Denise Mosher  Clinic Number: [id number]    Therapy Diagnosis:   Encounter Diagnoses   Name Primary?    Range of motion deficit     Weakness      Physician: Ranulfo House MD    Visit Date: 7/23/2019  Physician Orders: PT Eval and Treat   Medical Diagnosis from Referral:   M24.661 (ICD-10-CM) - Arthrofibrosis of knee joint, right   M22.2X1 (ICD-10-CM) - Patellofemoral pain syndrome of right knee      Surgery Date (OMID): 7/5/2019  Evaluation Date: 7/9/2019  Authorization Period Expiration: 7/6/2020  Plan of Care Expiration: 9/3/2019  Visit # / Visits authorized: 3/12    Time In: 2:00  Time Out: 2:53  Total Billable Time: 53 minutes    Precautions: Standard    Subjective     Pt reports: she feels fine  She was compliant with home exercise program.  Response to previous treatment: tolerated well  Functional change: improved ROM    Pain: 0/10  Location: right knee      Objective     AROM flexion: 120 deg    Denise received therapeutic exercises to develop strength, endurance, ROM and core stabilization for 53 minutes including:  Bike x 10 min lvl 3.5  Supine bridging 4x10 increased knee flexion each set  QS w/ SLR 2.5# 3x10 3" hold  Heel slides 20x 5" hold  Prone Quad S 10x10"  LAQ 2.5# 3x10 3" hold  Shuttle 1 blk  ecc R 2x15  Lunge stretch on stairs 3x15  TKE w/ L hip FL yellow crossfit band 2x10  Gait S 20x 5" hold  RDL to cone     PROM FL    Denise participated in dynamic functional therapeutic activities to improve functional performance for 00  minutes, including:  Sit to stand  Step up yellow step    received the following manual therapy techniques: Joint mobilizations and Soft tissue Mobilization were applied to the: R knee for 00 minutes, including:  Patella mobs        Home Exercises Provided and Patient Education Provided     Education provided:   - cont HEP    Written Home Exercises Provided: Patient instructed to cont prior HEP.  Exercises were " reviewed and Denise was able to demonstrate them prior to the end of the session.  Denise demonstrated good  understanding of the education provided.     See EMR under Media for exercises provided prior visit.    Assessment     Pt demonstrated AROM flexion of 120 deg after using bike. Continued with emphasis on mobility and quad strength. No symptom provocation during session. Patient reported feeling well upon departure.     Denise is progressing well towards her goals.   Pt prognosis is Good.     Pt will continue to benefit from skilled outpatient physical therapy to address the deficits listed in the problem list box on initial evaluation, provide pt/family education and to maximize pt's level of independence in the home and community environment.     Pt's spiritual, cultural and educational needs considered and pt agreeable to plan of care and goals.    Anticipated barriers to physical therapy: Chronicity of condition    Goals: Short Term Goals: 2 weeks   - Pt will demonstrate excellent knowledge and adherence to HEP to facilitate optimal recovery.  - Pt will demonstrate (R) knee PROM of 0 to 120 deg or greater for increased mobility tolerance.      Long Term Goals: 8 weeks   - Pt will demonstrate (R) knee AROM of 0 to 120 deg or greater for increased mobility tolerance.   - Pt will demonstrate (R) knee strength of 5/5 MMT for increased stability needed for functional activity.   - Pt will report at least 75% improvement in activity tolerance with respect to (R) knee mobility.     Plan     Continue with emphasis on increasing flexion and LE strength.     Lucia Barnett, PT, DPT

## 2019-07-30 ENCOUNTER — CLINICAL SUPPORT (OUTPATIENT)
Dept: REHABILITATION | Facility: HOSPITAL | Age: 19
End: 2019-07-30
Attending: ORTHOPAEDIC SURGERY
Payer: MEDICAID

## 2019-07-30 DIAGNOSIS — R53.1 WEAKNESS: ICD-10-CM

## 2019-07-30 DIAGNOSIS — M25.60 RANGE OF MOTION DEFICIT: ICD-10-CM

## 2019-07-30 PROCEDURE — 97110 THERAPEUTIC EXERCISES: CPT

## 2019-07-30 NOTE — PROGRESS NOTES
"Physical Therapy Daily Treatment Note     Name: Denise Mosher  Clinic Number: [id number]    Therapy Diagnosis:   Encounter Diagnoses   Name Primary?    Range of motion deficit     Weakness      Physician: Ranulfo House MD    Visit Date: 7/30/2019  Physician Orders: PT Eval and Treat   Medical Diagnosis from Referral:   M24.661 (ICD-10-CM) - Arthrofibrosis of knee joint, right   M22.2X1 (ICD-10-CM) - Patellofemoral pain syndrome of right knee      Surgery Date (OMID): 7/5/2019  Evaluation Date: 7/9/2019  Authorization Period Expiration: 7/6/2020  Plan of Care Expiration: 9/3/2019  Visit # / Visits authorized: 4/12    Time In: 11:00  Time Out: 12:00  Total Billable Time: 30 minutes    Precautions: Standard    Subjective     Pt reports: she feels fine. She hasn't done her HEP in 3 days because she recently got a job and she has been busy starting that.   She was compliant with home exercise program.  Response to previous treatment: tolerated well  Functional change: improved ROM    Pain: 0/10  Location: right knee      Objective     AROM flexion: 117 deg    Denise received therapeutic exercises to develop strength, endurance, ROM and core stabilization for 53 minutes including:  Bike x 10 min lvl 3.5  Supine bridging 4x10 increased knee flexion each set  QS w/ SLR 2.5# 3x10 3" hold  Heel slides 20x 5" hold  Prone Quad S 10x10"  LAQ 2.5# 3x10 3" hold  DL leg press 80# 3x10  Shuttle 1 blk  ecc R 2x15  Lunge stretch on stairs 3x15  TKE w/ L hip FL yellow crossfit band 2x10  Gait S 20x 5" hold  RDL to cone     PROM FL    Denise participated in dynamic functional therapeutic activities to improve functional performance for 00  minutes, including:  Sit to stand  Step up yellow step    received the following manual therapy techniques: Joint mobilizations and Soft tissue Mobilization were applied to the: R knee for 00 minutes, including:  Patella mobs        Home Exercises Provided and Patient Education Provided "     Education provided:   - cont HEP    Written Home Exercises Provided: Patient instructed to cont prior HEP.  Exercises were reviewed and Dneise was able to demonstrate them prior to the end of the session.  Denise demonstrated good  understanding of the education provided.     See EMR under Media for exercises provided prior visit.    Assessment     Pt demonstrated AROM flexion of 117 deg at end of session; slight decrease in ROM likely due to pt's non-compliance with HEP over the last several days. Continued with emphasis on mobility and quad strength. No symptom provocation during session. Patient reported feeling well upon departure.     Denise is progressing well towards her goals.   Pt prognosis is Good.     Pt will continue to benefit from skilled outpatient physical therapy to address the deficits listed in the problem list box on initial evaluation, provide pt/family education and to maximize pt's level of independence in the home and community environment.     Pt's spiritual, cultural and educational needs considered and pt agreeable to plan of care and goals.    Anticipated barriers to physical therapy: Chronicity of condition    Goals: Short Term Goals: 2 weeks   - Pt will demonstrate excellent knowledge and adherence to HEP to facilitate optimal recovery.  - Pt will demonstrate (R) knee PROM of 0 to 120 deg or greater for increased mobility tolerance.      Long Term Goals: 8 weeks   - Pt will demonstrate (R) knee AROM of 0 to 120 deg or greater for increased mobility tolerance.   - Pt will demonstrate (R) knee strength of 5/5 MMT for increased stability needed for functional activity.   - Pt will report at least 75% improvement in activity tolerance with respect to (R) knee mobility.     Plan     Continue with emphasis on increasing flexion and LE strength.     Lucia Barnett, PT, DPT

## 2019-08-08 ENCOUNTER — OFFICE VISIT (OUTPATIENT)
Dept: OBSTETRICS AND GYNECOLOGY | Facility: CLINIC | Age: 19
End: 2019-08-08
Payer: MEDICAID

## 2019-08-08 VITALS
WEIGHT: 217.94 LBS | BODY MASS INDEX: 42.79 KG/M2 | DIASTOLIC BLOOD PRESSURE: 74 MMHG | HEIGHT: 60 IN | SYSTOLIC BLOOD PRESSURE: 116 MMHG

## 2019-08-08 DIAGNOSIS — N92.6 IRREGULAR PERIODS/MENSTRUAL CYCLES: ICD-10-CM

## 2019-08-08 DIAGNOSIS — Z01.419 WELL WOMAN EXAM WITH ROUTINE GYNECOLOGICAL EXAM: Primary | ICD-10-CM

## 2019-08-08 PROCEDURE — 99999 PR PBB SHADOW E&M-EST. PATIENT-LVL III: ICD-10-PCS | Mod: PBBFAC,,, | Performed by: OBSTETRICS & GYNECOLOGY

## 2019-08-08 PROCEDURE — 99213 OFFICE O/P EST LOW 20 MIN: CPT | Mod: PBBFAC | Performed by: OBSTETRICS & GYNECOLOGY

## 2019-08-08 PROCEDURE — 99385 PREV VISIT NEW AGE 18-39: CPT | Mod: S$PBB,,, | Performed by: OBSTETRICS & GYNECOLOGY

## 2019-08-08 PROCEDURE — 99385 PR PREVENTIVE VISIT,NEW,18-39: ICD-10-PCS | Mod: S$PBB,,, | Performed by: OBSTETRICS & GYNECOLOGY

## 2019-08-08 PROCEDURE — 99999 PR PBB SHADOW E&M-EST. PATIENT-LVL III: CPT | Mod: PBBFAC,,, | Performed by: OBSTETRICS & GYNECOLOGY

## 2019-08-08 RX ORDER — ACETAMINOPHEN AND CODEINE PHOSPHATE 120; 12 MG/5ML; MG/5ML
1 SOLUTION ORAL DAILY
Qty: 90 TABLET | Refills: 3 | Status: SHIPPED | OUTPATIENT
Start: 2019-08-08 | End: 2022-12-21

## 2019-08-16 ENCOUNTER — HOSPITAL ENCOUNTER (OUTPATIENT)
Dept: RADIOLOGY | Facility: HOSPITAL | Age: 19
Discharge: HOME | End: 2019-08-16
Attending: ORTHOPAEDIC SURGERY
Payer: MEDICAID

## 2019-08-16 DIAGNOSIS — M24.661 ARTHROFIBROSIS OF KNEE JOINT, RIGHT: ICD-10-CM

## 2019-08-16 PROCEDURE — 73560 X-RAY EXAM OF KNEE 1 OR 2: CPT | Mod: TC,RT

## 2019-08-16 PROCEDURE — 73560 XR KNEE 1 OR 2 VIEW RIGHT: ICD-10-PCS | Mod: 26,RT,, | Performed by: RADIOLOGY

## 2019-08-16 PROCEDURE — 73560 X-RAY EXAM OF KNEE 1 OR 2: CPT | Mod: 26,RT,, | Performed by: RADIOLOGY

## 2019-08-19 NOTE — PROGRESS NOTES
[facility]  Ambulatory Clinic  Obstetrics & Gynecology    Visit Date:  8/8/2019    Chief Complaint:  Annual GYN exam, irregular periods    History of Present Illness:      Denise Mosher is a 19 y.o. G0, new pt to me, here for a gynecologic exam with c/o irregular periods.      Menses are irregular, q3-4 months, last 3-5 days, not heavy or painful.  Pt reports precocious puberty.      Pt current method of family planning is abstinence, and reports no problems with this method.      Pt denies family h/o adverse reaction to hormonal contraception.    Pt denies h/o abnormal pap.    Pt denies active sexually transmitted infections.    Pt performs monthly self breast examination, non-smoker, uses seat belts, and denies abuse.     Pt denies vaginal discharge, dysmenorrhea, dyspareunia, pelvic pain, bloating, early satiety, unintentional weight loss, breast mass/skin changes, incontinence, GI or urinary complaints.      Otherwise, the pt is in her usual state of health.    Pt mother present for visit.    Past History:  Gynecologic history as noted above.    Review of Systems:      GENERAL:  No fever, fatigue, excessive weight gain or loss  HEENT:  No headaches, hearing changes, visual disturbance  RESPIRATORY:  No cough, shortness of breath  CARDIOVASCULAR:  No chest pain, heart palpitations, leg swelling  BREAST:  No lump, pain, nipple discharge, skin changes  GASTROINTESTINAL:  No nausea, vomiting, constipation, diarrhea, abd pain, rectal bleeding   GENITOURINARY:  See HPI  ENDOCRINE:  No heat or cold intolerance  HEMATOLOGIC:  No easy bruisability or bleeding   LYMPHATICS:  No enlarged nodes  MUSCULOSKELETAL:  Chronic knee arthritis. No acute swellling or pain today.  SKIN:  No rash, lesions, jaundice  NEUROLOGIC:  No dizziness, weakness, syncope  PSYCHIATRIC:  No significant mood changes, homicidal/suicidal ideations    Physical Exam:     /74 (BP Location: Right arm, Patient Position:  Sitting)   Ht 5' (1.524 m)   Wt 98.9 kg (217 lb 14.8 oz)   LMP 07/29/2019 (Exact Date)   BMI 42.56 kg/m²   Pulse 70, Resp rate 16     GENERAL:  No acute distress, well-nourished  HEENT:  Atraumatic, anicteric, moist mucus membranes. Neck supple w/o masses.  BREAST:  Pt declined.  LUNGS:  Clear to auscultation  HEART:  Regular rate and rhythm, no murmurs, gallops, or rubs  ABDOMEN:  Soft, non-tender, non-distended, normoactive bowel sounds, no obvious organomegaly  EXT:  Symmetric w/o cramping, claudication, or edema. +2 distal pulses.  SKIN:  No rashes or bruising  PSYCH:  Mood and affect appropriate  NEURO:  Grossly intact bilaterally  PELVIC:  Pt declined.    Chaperone present for exam.    Assessment:     19 y.o. G0:    1. Well woman gynecologic exam  2. Irregular menses    Plan:    A gynecologic health assessment was performed with age appropriate counseling.    Cervical cancer screening - no pap until 20.    STI screening - pt declined.  Safe sex discussed.      D/w pt her irregular menses likely secondary to anovulation.  UPT negative today.  Order TSH, LH, FSH.  Pt is interested in medical mgt with OCP (micronor).  Risks, benefits, and alternatives to OCP reviewed.  Pt was instructed on proper use of OCP.  Risk of smoking with OCP discussed.  Bleeding/pregnancy precautions.     Encourage healthy lifestyle modifications, monthly self breast exams, encourage HPV vaccination, Ca/Vit D.    F/u with PCP for health maintenance.    Return 3 months f/u menses, or sooner as needed.  Pt advised to call and schedule.  All questions answered, pt voiced understanding.        Kyler Calero MD

## 2019-10-08 ENCOUNTER — OFFICE VISIT (OUTPATIENT)
Dept: ORTHOPEDICS | Facility: CLINIC | Age: 19
End: 2019-10-08
Payer: MEDICAID

## 2019-10-08 VITALS — HEIGHT: 60 IN | BODY MASS INDEX: 42.81 KG/M2 | WEIGHT: 218.06 LBS

## 2019-10-08 DIAGNOSIS — M21.061 ACQUIRED GENU VALGUM OF RIGHT KNEE: Primary | ICD-10-CM

## 2019-10-08 DIAGNOSIS — M25.361 PATELLAR INSTABILITY OF RIGHT KNEE: ICD-10-CM

## 2019-10-08 PROCEDURE — 99999 PR PBB SHADOW E&M-EST. PATIENT-LVL III: ICD-10-PCS | Mod: PBBFAC,,, | Performed by: ORTHOPAEDIC SURGERY

## 2019-10-08 PROCEDURE — 99213 OFFICE O/P EST LOW 20 MIN: CPT | Mod: PBBFAC | Performed by: ORTHOPAEDIC SURGERY

## 2019-10-08 PROCEDURE — 99214 PR OFFICE/OUTPT VISIT, EST, LEVL IV, 30-39 MIN: ICD-10-PCS | Mod: S$PBB,,, | Performed by: ORTHOPAEDIC SURGERY

## 2019-10-08 PROCEDURE — 99214 OFFICE O/P EST MOD 30 MIN: CPT | Mod: S$PBB,,, | Performed by: ORTHOPAEDIC SURGERY

## 2019-10-08 PROCEDURE — 99999 PR PBB SHADOW E&M-EST. PATIENT-LVL III: CPT | Mod: PBBFAC,,, | Performed by: ORTHOPAEDIC SURGERY

## 2019-10-08 NOTE — PROGRESS NOTES
H&P  Orthopaedics    SUBJECTIVE:     History of Present Illness:  Patient is a 19 y.o. female with recurrent right knee pain. Pt is s/p OMID R knee with cortisone inject. Valgus correction with MPFL correction previously. Pt c/o increased R knee pain for past month. Pt states her knees pop, the left a leaves crunching sound, the right a loud pop. Pt c/o difficulty going up and down stairs and a sharp pain in the center of her knee and on the lateral side, overlying the plate. Pt works as a  and has to climb stairs regularly. Pt denies recent trauma.       Review of patient's allergies indicates:  No Known Allergies    Past Medical History:   Diagnosis Date    Anxiety     reports panic attacks    Depression     Fibromyalgia     per pt    Insomnia     Precocious female puberty     Seizures     reports 2 incidents of possible seizures while giving blood    Syncope and collapse     Wrist fracture, bilateral      Past Surgical History:   Procedure Laterality Date    chip      chip implant    FEMUR OSTEOTOMY Right 12/13/2018    Procedure: OSTEOTOMY, FEMUR - distal to correct valgus (Orthopediatrics plate, MTF bone wedge).  Right knee arthroscopy with MPFL reconstruction, gracilis allograft (Linvatec).  3 hrs.;  Surgeon: Ranulfo House MD;  Location: [facility] OR [address] [place] [address];  Service: Orthopedics;  Laterality: Right;    HEMANGIOMA EXCISION      scalp    INGUINAL HERNIA REPAIR Left     INJECTION OF STEROID Right 7/5/2019    Procedure: INJECTION, STEROID;  Surgeon: Ranulfo House MD;  Location: [address];  Service: Orthopedics;  Laterality: Right;    KNEE JOINT MANIPULATION Right 7/5/2019    Procedure: MANIPULATION, KNEE;  Surgeon: Ranulfo House MD;  Location: [address];  Service: Orthopedics;  Laterality: Right;    KNEE SURGERY Left 2/16/15    TOE SURGERY Right     5 th toe     Family History   Problem Relation Age of Onset    Seizures Father     Anxiety disorder Father      Depression Father     Mental illness Father     Schizophrenia Father     Deep vein thrombosis Father      Social History     Tobacco Use    Smoking status: Never Smoker    Smokeless tobacco: Never Used   Substance Use Topics    Alcohol use: No    Drug use: No        Review of Systems:  Patient denies constitutional symptoms, cardiac symptoms, respiratory symptoms, GI symptoms.  The remainder of the musculoskeletal ROS is included in the HPI.      OBJECTIVE:     Physical Exam:  Gen:  No acute distress  CV:  Peripherally well-perfused.  Pulses 2+ bilaterally.  Lungs:  Normal respiratory effort.  Abdomen:  Soft, non-tender, non-distended  Head/Neck:  Normocephalic.  Atraumatic. No TTP, AROM and PROM intact without pain  Neuro:  CN intact without deficit, SILT throughout B/L Upper & Lower Extremities    MSK:  Right/Left Lower Extremity Exam    - Skin intact, no deformity, no ecchymoses  - Compartments soft and compressible  - ROM at knee 0 - 110  - TTP along lateral condyle, lateral patella, inferior patella.   - SILT throughout  - DP and PT palpated  2+  - Capillary Refill <3s        ASSESSMENT/PLAN:     A/P: Denise Mosher is a 19 y.o. chronic right knee pain, possibly 2/2 hardware    Plan:  - Pt emotional today and requesting maximal work up given continued knee pain despite several previous realignment operations.  - Referral to Dr. Macedo's clinic for second opinion  - Bilateral hip to ankle x-ray  - Bilateral rotational profile CT scans  - Will f/u once imaging is obtained

## 2019-10-09 ENCOUNTER — HOSPITAL ENCOUNTER (OUTPATIENT)
Dept: RADIOLOGY | Facility: HOSPITAL | Age: 19
Discharge: HOME | End: 2019-10-09
Attending: ORTHOPAEDIC SURGERY
Payer: MEDICAID

## 2019-10-09 DIAGNOSIS — M25.361 PATELLAR INSTABILITY OF RIGHT KNEE: ICD-10-CM

## 2019-10-09 DIAGNOSIS — M21.061 ACQUIRED GENU VALGUM OF RIGHT KNEE: ICD-10-CM

## 2019-10-09 PROCEDURE — 73700 CT LOWER EXTREMITY W/O DYE: CPT | Mod: 26,50,, | Performed by: RADIOLOGY

## 2019-10-09 PROCEDURE — 77073 XR HIP TO ANKLE: ICD-10-PCS | Mod: 26,,, | Performed by: RADIOLOGY

## 2019-10-09 PROCEDURE — 73700 CT THIGH WITHOUT CONTRAST BILATERAL: ICD-10-PCS | Mod: 26,50,, | Performed by: RADIOLOGY

## 2019-10-09 PROCEDURE — 77073 BONE LENGTH STUDIES: CPT | Mod: TC

## 2019-10-09 PROCEDURE — 73700 CT LOWER EXTREMITY W/O DYE: CPT | Mod: 50,TC

## 2019-10-09 PROCEDURE — 77073 BONE LENGTH STUDIES: CPT | Mod: 26,,, | Performed by: RADIOLOGY

## 2019-10-11 ENCOUNTER — TELEPHONE (OUTPATIENT)
Dept: SPORTS MEDICINE | Facility: CLINIC | Age: 19
End: 2019-10-11

## 2019-10-11 NOTE — TELEPHONE ENCOUNTER
----- Message from Anny Baum sent at 10/10/2019  2:59 PM CDT -----  Contact: Pt. [phone]      ----- Message -----  From: Ashley Schulz  Sent: 10/10/2019   1:30 PM CDT  To: Rem[facility] oDrantes Staff    The patient would like to speak to someone regarding rescheduling her appointment. Please contact the patient to discuss further.

## 2019-10-18 ENCOUNTER — TELEPHONE (OUTPATIENT)
Dept: SPORTS MEDICINE | Facility: CLINIC | Age: 19
End: 2019-10-18

## 2019-10-18 NOTE — TELEPHONE ENCOUNTER
----- Message from Anny Baum sent at 10/16/2019 11:34 AM CDT -----      ----- Message -----  From: Milena Moran MA  Sent: 10/16/2019   9:23 AM CDT  To: [facility] Staff    Dr House noticed patient did not make her appointment to see you all. He is wanting her rescheduled with him if possible.

## 2019-10-22 ENCOUNTER — OFFICE VISIT (OUTPATIENT)
Dept: SPORTS MEDICINE | Facility: CLINIC | Age: 19
End: 2019-10-22
Payer: MEDICAID

## 2019-10-22 VITALS
WEIGHT: 218 LBS | HEART RATE: 86 BPM | BODY MASS INDEX: 42.8 KG/M2 | SYSTOLIC BLOOD PRESSURE: 111 MMHG | HEIGHT: 60 IN | DIASTOLIC BLOOD PRESSURE: 74 MMHG

## 2019-10-22 DIAGNOSIS — M25.561 CHRONIC PAIN OF BOTH KNEES: Primary | ICD-10-CM

## 2019-10-22 DIAGNOSIS — M25.562 CHRONIC PAIN OF BOTH KNEES: Primary | ICD-10-CM

## 2019-10-22 DIAGNOSIS — G89.29 CHRONIC PAIN OF BOTH KNEES: Primary | ICD-10-CM

## 2019-10-22 PROCEDURE — 99999 PR PBB SHADOW E&M-EST. PATIENT-LVL III: ICD-10-PCS | Mod: PBBFAC,,, | Performed by: ORTHOPAEDIC SURGERY

## 2019-10-22 PROCEDURE — 99999 PR PBB SHADOW E&M-EST. PATIENT-LVL III: CPT | Mod: PBBFAC,,, | Performed by: ORTHOPAEDIC SURGERY

## 2019-10-22 PROCEDURE — 99214 OFFICE O/P EST MOD 30 MIN: CPT | Mod: S$PBB,,, | Performed by: ORTHOPAEDIC SURGERY

## 2019-10-22 PROCEDURE — 99213 OFFICE O/P EST LOW 20 MIN: CPT | Mod: PBBFAC | Performed by: ORTHOPAEDIC SURGERY

## 2019-10-22 PROCEDURE — 99214 PR OFFICE/OUTPT VISIT, EST, LEVL IV, 30-39 MIN: ICD-10-PCS | Mod: S$PBB,,, | Performed by: ORTHOPAEDIC SURGERY

## 2019-10-22 NOTE — PROGRESS NOTES
CC: Bilateral knee pain, Right greater Left    19 y.o. Female who presents as a new patient to me. 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 Complaint is bilateral knee pain, right worse than left, of about 10 years in duration. She is status post rght knee arthroscopy, right medial patellofemoral ligament reconstruction with hamstring allograft, right distal femoral varus-producing osteotomy with plate fixatioion and patella realignment by Dr. Ranulfo House in 12/2018 with subsequent manipulation under anesthesia. Additionally, eft knee arthroscopy, tibial tubercle osteotomy with anteromedial transfer in 2015.  Her complaint is pain rather than instability.  In fact she denies any recent dislocation or subluxation events.  Her pain is daily activity limiting.  She localizes primarily over the anterior knee although she is fairly nonspecific today with regards to her history.  She describes some intermittent mechanical symptoms. Treatment to this point has been extensive.  I believe Dr. House has taken excellent care of this patient.    The patient forgot her last appointment and no showed    No back or radicular symptoms.    PMHx notable for as listed below.   Negative for tobacco.   Negative for diabetes.    BMI 42     Pain Score: 0-No pain    REVIEW OF SYSTEMS:   Constitution: Negative. Negative for chills, fever and night sweats.    Hematologic/Lymphatic: Negative for bleeding problem. Does not bruise/bleed easily.   Skin: Negative for dry skin, itching and rash.   Musculoskeletal: Negative for falls. Positive for right > left knee pain and  muscle weakness.     All other review of symptoms were reviewed and found to be noncontributory.     PAST MEDICAL HISTORY:   Past Medical History:   Diagnosis Date    Anxiety     reports panic attacks    Depression     Fibromyalgia     per pt    Insomnia     Precocious female puberty     Seizures     reports 2 incidents of possible seizures while giving blood    Syncope and  collapse     Wrist fracture, bilateral        PAST SURGICAL HISTORY:   Past Surgical History:   Procedure Laterality Date    chip      chip implant    FEMUR OSTEOTOMY Right 12/13/2018    Procedure: OSTEOTOMY, FEMUR - distal to correct valgus (Orthopediatrics plate, MTF bone wedge).  Right knee arthroscopy with MPFL reconstruction, gracilis allograft (Linvatec).  3 hrs.;  Surgeon: Ranulfo House MD;  Location: [address];  Service: Orthopedics;  Laterality: Right;    HEMANGIOMA EXCISION      scalp    INGUINAL HERNIA REPAIR Left     INJECTION OF STEROID Right 7/5/2019    Procedure: INJECTION, STEROID;  Surgeon: Ranulfo House MD;  Location: [facility] OR [address] [place] [address];  Service: Orthopedics;  Laterality: Right;    KNEE JOINT MANIPULATION Right 7/5/2019    Procedure: MANIPULATION, KNEE;  Surgeon: Ranulfo House MD;  Location: [facility] OR [address] [place] [address];  Service: Orthopedics;  Laterality: Right;    KNEE SURGERY Left 2/16/15    TOE SURGERY Right     5 th toe       FAMILY HISTORY:   Family History   Problem Relation Age of Onset    Seizures Father     Anxiety disorder Father     Depression Father     Mental illness Father     Schizophrenia Father     Deep vein thrombosis Father        SOCIAL HISTORY:   Social History     Socioeconomic History    Marital status: Single     Spouse name: Not on file    Number of children: Not on file    Years of education: Not on file    Highest education level: Not on file   Occupational History    Not on file   Social Needs    Financial resource strain: Not on file    Food insecurity:     Worry: Not on file     Inability: Not on file    Transportation needs:     Medical: Not on file     Non-medical: Not on file   Tobacco Use    Smoking status: Never Smoker    Smokeless tobacco: Never Used   Substance and Sexual Activity    Alcohol use: No    Drug use: No    Sexual activity: Never     Birth control/protection: Abstinence   Lifestyle    Physical activity:     Days per week:  Not on file     Minutes per session: Not on file    Stress: Not on file   Relationships    Social connections:     Talks on phone: Not on file     Gets together: Not on file     Attends Spiritism service: Not on file     Active member of club or organization: Not on file     Attends meetings of clubs or organizations: Not on file     Relationship status: Not on file   Other Topics Concern    Not on file   Social History Narrative    Lives at home with mother and father.       MEDICATIONS:     Current Outpatient Medications:     HYDROcodone-acetaminophen (NORCO) 5-325 mg per tablet, Take 1 tablet by mouth every 6 (six) hours as needed for Pain. (Patient not taking: Reported on 10/8/2019), Disp: 20 tablet, Rfl: 0    norethindrone (MICRONOR) 0.35 mg tablet, Take 1 tablet (0.35 mg total) by mouth once daily. (Patient not taking: Reported on 10/8/2019), Disp: 90 tablet, Rfl: 3  No current facility-administered medications for this visit.     Facility-Administered Medications Ordered in Other Visits:     fentaNYL injection 25 mcg, 25 mcg, Intravenous, Q5 Min PRN, Lorna Calhoun MD    midazolam (VERSED) 1 mg/mL injection 0.5 mg, 0.5 mg, Intravenous, PRN, Lorna Calhoun MD    ALLERGIES:   Review of patient's allergies indicates:  No Known Allergies     PHYSICAL EXAMINATION:  /74   Pulse 86   Ht 5' (1.524 m)   Wt 98.9 kg (218 lb)   BMI 42.58 kg/m²   General: Well-developed well-nourished 19 y.o. femalein no acute distress   Cardiovascular: Regular rhythm by palpation of distal pulse, normal color and temperature, no concerning varicosities on symptomatic side   Lungs: No labored breathing or wheezing appreciated   Neuro: Alert and oriented ×3   Psychiatric: well oriented to person, place and time, demonstrates normal mood and affect   Skin: No rashes, lesions or ulcers, normal temperature, turgor, and texture on involved extremity    Ortho/SPM Exam  Examination of the right knee demonstrates  no effusion or swelling. Healed surgical incisions.  The patient has significant generalized guarding on exam with pain out of proportion and quite nonfocal.  In fact, she has inconsistent findings with regards to her generalized pain to palpation.  This is demonstrated with distraction techniques.  She has significant pain over the lower leg extending all way up her thigh.  Positive patellar grind and crepitus.  No patellar instability appreciated.  Central patellar tracking noted the knee is ligamentously stable. Neutral standing alignment. Tight hamstrings.  Weak hip abductors and VMO.    Similar findings in the left knee with appropriate overall patellar stability and tracking. Pain is at a portion.  No effusions..      IMAGING:  X-rays including standing, weight bearing AP and flexion bilateral knees, RIGHT knee lateral and sunrise views ordered and images reviewed by me show:    Postoperative changes are again identified relating to a distal right femoral metaphyseal osteotomy, a plate/screw construct again seen bridging this osteotomy site.  There has been progressive healing at the osteotomy site since 06/07/2019.  No evidence of superimposed recent fracture, lytic destructive process, hardware failure, or other significant detrimental change since that time is observed.  No significant joint effusion.    MRI reviewed by me and discussed with patient. Study shows:   S/P MPFL repair, and distal femoral osteotomy as noted above with intact MPFL graft.    Small joint effusion as above.  Minimal cartilaginous abnormality at the level of the patellar ridge.  Additional findings as above    Weight-bearing full-length x-rays and CT imaging for rotational profile were reviewed demonstrating neutral standing alignment and no significant malrotation deformity      ASSESSMENT:      ICD-10-CM ICD-9-CM   1. Chronic pain of both knees M25.561 719.46    M25.562 338.29    G89.29        PLAN:     All imaging reviewed with  extensive history taken and exam findings as noted. Patient's complaint is primarily pain and I detect no symptomatic recurrent patellar instability at this time. I believe the patient's care to this point has been excellent. I would not recommend further surgery. I am a bit concerned regarding her presentation in her overall non-focal pain presentation.  I believe in this case that pain management could be of benefit.  There may be some degree of underlying low-grade pain dysfunction.  I do believe she has symptomatic patellar chondromalacia particularly on the right side and there are certain non operative modalities which may be of benefit to include viscosupplementation.  This was discussed.  The patient has no interest in that at this time. Reassurance provided.  Finally weight loss would be of benefit.  I will communicate my findings to Dr. House.      Procedures

## 2019-10-22 NOTE — LETTER
October 28, 2019      Ranulfo House MD  [address]  [facility] [address]           [facility]  [address]  [facility] [address]  Phone: [phone]          Patient: Denise Mosher   MR Number: [id number]   YOB: 2000   Date of Visit: 10/22/2019       Dear Dr. Ranulfo House:    Thank you for referring Denise Mosher to me for evaluation. Attached you will find relevant portions of my assessment and plan of care.    If you have questions, please do not hesitate to call me. I look forward to following Denise Mosher along with you.    Sincerely,    W Jh Macedo MD    Enclosure  CC:  No Recipients    If you would like to receive this communication electronically, please contact [email or web address] or [phone] to request more information on [employer] access.    For providers and/or their staff who would like to refer a patient to [facility], please contact us through our one-stop-shop provider referral line, [facility], at [phone].    If you feel you have received this communication in error or would no longer like to receive these types of communications, please e-mail [email or web address]

## 2019-10-28 ENCOUNTER — TELEPHONE (OUTPATIENT)
Dept: ORTHOPEDICS | Facility: CLINIC | Age: 19
End: 2019-10-28

## 2019-10-28 NOTE — TELEPHONE ENCOUNTER
----- Message from Rohit Leiva sent at 10/28/2019  1:03 PM CDT -----  Contact: patient   Attn Milena    Please call pt at [phone]    Patient has questions regarding future surgery    Thank you

## 2019-10-30 ENCOUNTER — OFFICE VISIT (OUTPATIENT)
Dept: ORTHOPEDICS | Facility: CLINIC | Age: 19
End: 2019-10-30
Payer: MEDICAID

## 2019-10-30 ENCOUNTER — TELEPHONE (OUTPATIENT)
Dept: ORTHOPEDICS | Facility: CLINIC | Age: 19
End: 2019-10-30

## 2019-10-30 VITALS — WEIGHT: 218.06 LBS | HEIGHT: 60 IN | BODY MASS INDEX: 42.81 KG/M2

## 2019-10-30 DIAGNOSIS — M22.2X1 PATELLOFEMORAL PAIN SYNDROME OF RIGHT KNEE: Primary | ICD-10-CM

## 2019-10-30 PROCEDURE — 99214 OFFICE O/P EST MOD 30 MIN: CPT | Mod: S$PBB,,, | Performed by: ORTHOPAEDIC SURGERY

## 2019-10-30 PROCEDURE — 99999 PR PBB SHADOW E&M-EST. PATIENT-LVL II: CPT | Mod: PBBFAC,,, | Performed by: ORTHOPAEDIC SURGERY

## 2019-10-30 PROCEDURE — 99214 PR OFFICE/OUTPT VISIT, EST, LEVL IV, 30-39 MIN: ICD-10-PCS | Mod: S$PBB,,, | Performed by: ORTHOPAEDIC SURGERY

## 2019-10-30 PROCEDURE — 99999 PR PBB SHADOW E&M-EST. PATIENT-LVL II: ICD-10-PCS | Mod: PBBFAC,,, | Performed by: ORTHOPAEDIC SURGERY

## 2019-10-30 PROCEDURE — 99212 OFFICE O/P EST SF 10 MIN: CPT | Mod: PBBFAC | Performed by: ORTHOPAEDIC SURGERY

## 2019-11-07 NOTE — PROGRESS NOTES
H&P  Orthopaedics    SUBJECTIVE:     History of Present Illness:  Patient is a 19 y.o. female with recurrent right knee pain. Pt is s/p MOID R knee with cortisone inject. Valgus correction with MPFL correction previously. Pt c/o increased R knee pain for past month. Pt states her knees pop, the left a leaves crunching sound, the right a loud pop. Pt c/o difficulty going up and down stairs and a sharp pain in the center of her knee and on the lateral side, overlying the plate. Pt works as a  and has to climb stairs regularly. Pt denies recent trauma.     Recently saw Dr. Macedo, who felt that her knee was stable and didn't recommend surgery.      Review of patient's allergies indicates:  No Known Allergies    Past Medical History:   Diagnosis Date    Anxiety     reports panic attacks    Depression     Fibromyalgia     per pt    Insomnia     Precocious female puberty     Seizures     reports 2 incidents of possible seizures while giving blood    Syncope and collapse     Wrist fracture, bilateral      Past Surgical History:   Procedure Laterality Date    chip      chip implant    FEMUR OSTEOTOMY Right 12/13/2018    Procedure: OSTEOTOMY, FEMUR - distal to correct valgus (Orthopediatrics plate, MTF bone wedge).  Right knee arthroscopy with MPFL reconstruction, gracilis allograft (Linvatec).  3 hrs.;  Surgeon: Ranulfo House MD;  Location: [facility] OR [address] [place] [address];  Service: Orthopedics;  Laterality: Right;    HEMANGIOMA EXCISION      scalp    INGUINAL HERNIA REPAIR Left     INJECTION OF STEROID Right 7/5/2019    Procedure: INJECTION, STEROID;  Surgeon: Ranulfo House MD;  Location: [facility] OR [address] [place] [address];  Service: Orthopedics;  Laterality: Right;    KNEE JOINT MANIPULATION Right 7/5/2019    Procedure: MANIPULATION, KNEE;  Surgeon: Ranulfo House MD;  Location: [facility] OR [address] [place] [address];  Service: Orthopedics;  Laterality: Right;    KNEE SURGERY Left 2/16/15    TOE SURGERY Right     5 th toe     Family  History   Problem Relation Age of Onset    Seizures Father     Anxiety disorder Father     Depression Father     Mental illness Father     Schizophrenia Father     Deep vein thrombosis Father      Social History     Tobacco Use    Smoking status: Never Smoker    Smokeless tobacco: Never Used   Substance Use Topics    Alcohol use: No    Drug use: No        Review of Systems:  Patient denies constitutional symptoms, cardiac symptoms, respiratory symptoms, GI symptoms.  The remainder of the musculoskeletal ROS is included in the HPI.      OBJECTIVE:     Physical Exam:  Gen:  No acute distress  CV:  Peripherally well-perfused.  Pulses 2+ bilaterally.  Lungs:  Normal respiratory effort.  Abdomen:  Soft, non-tender, non-distended  Head/Neck:  Normocephalic.  Atraumatic. No TTP, AROM and PROM intact without pain  Neuro:  CN intact without deficit, SILT throughout B/L Upper & Lower Extremities    MSK:  Right/Left Lower Extremity Exam    - Skin intact, no deformity, no ecchymoses  - Compartments soft and compressible  - ROM at knee 0 - 110  - TTP along lateral condyle, lateral patella, inferior patella.   - SILT throughout  - DP and PT palpated  2+  - Capillary Refill <3s    Review of hip to ankle XR and CT rotational profile reveals stable knee, good alignment.    ASSESSMENT/PLAN:     A/P: Denise Mosher is a 19 y.o. chronic right knee pain, possibly 2/2 hardware    Plan:  Discussed with patient possibility that she has an over-tightened MPFL graft.  One option would be to do a knee scope and possibly cut the graft.  She will consider this.  She would like another opinion as well, will see Dr. Nguyen.

## 2019-11-08 ENCOUNTER — OFFICE VISIT (OUTPATIENT)
Dept: SPORTS MEDICINE | Facility: CLINIC | Age: 19
End: 2019-11-08
Payer: MEDICAID

## 2019-11-08 VITALS
BODY MASS INDEX: 42.8 KG/M2 | DIASTOLIC BLOOD PRESSURE: 75 MMHG | HEART RATE: 73 BPM | WEIGHT: 218 LBS | HEIGHT: 60 IN | SYSTOLIC BLOOD PRESSURE: 118 MMHG

## 2019-11-08 DIAGNOSIS — M23.91 INTERNAL DERANGEMENT OF MULTIPLE SITES OF RIGHT KNEE: Primary | ICD-10-CM

## 2019-11-08 PROCEDURE — 99999 PR PBB SHADOW E&M-EST. PATIENT-LVL III: ICD-10-PCS | Mod: PBBFAC,,, | Performed by: ORTHOPAEDIC SURGERY

## 2019-11-08 PROCEDURE — 99214 OFFICE O/P EST MOD 30 MIN: CPT | Mod: S$PBB,,, | Performed by: ORTHOPAEDIC SURGERY

## 2019-11-08 PROCEDURE — 99214 PR OFFICE/OUTPT VISIT, EST, LEVL IV, 30-39 MIN: ICD-10-PCS | Mod: S$PBB,,, | Performed by: ORTHOPAEDIC SURGERY

## 2019-11-08 PROCEDURE — 99213 OFFICE O/P EST LOW 20 MIN: CPT | Mod: PBBFAC | Performed by: ORTHOPAEDIC SURGERY

## 2019-11-08 PROCEDURE — 99999 PR PBB SHADOW E&M-EST. PATIENT-LVL III: CPT | Mod: PBBFAC,,, | Performed by: ORTHOPAEDIC SURGERY

## 2019-11-08 NOTE — PROGRESS NOTES
CC: Right knee pain    19 y.o. Female with a history of right knee pain who presents as a 3rd opinion evaluation.  She states that the pain is persistent.  Patient was treated by Dr. House in the past, with the distal femoral osteotomy and MPFL reconstruction for genu valgum.  This was performed in 2018.  Patient presents with complaints of continued pain in the knee. Reports that the most significant pain is over the anterior knee over the mid patella. She also reports pain over her prior incisions, and pain over the lateral distal femur directly over the lateral plate that she had.  She has tried anti-inflammatory medications and physical therapy.  Biggest concern is that she has continued pain despite having all these procedures in the past.  She also reports pain in left knee, however her right knee pain is the worst.  She reports clicking sensations and pain in the right knee in mid flexion with active motion. She works doing .      She reports that the pain and weakness. It also bothers her at night.    Clicking mechanical symptoms, No instability    Is affecting ADLs.  Pain is 9/10 at it's worst.    REVIEW OF SYSTEMS:   Constitution: Negative. Negative for chills, fever and night sweats.   HENT: Negative for congestion and headaches.    Eyes: Negative for blurred vision, left vision loss and right vision loss.   Cardiovascular: Negative for chest pain and syncope.   Respiratory: Negative for cough and shortness of breath.    Endocrine: Negative for polydipsia, polyphagia and polyuria.   Hematologic/Lymphatic: Negative for bleeding problem. Does not bruise/bleed easily.   Skin: Negative for dry skin, itching and rash.   Musculoskeletal: Negative for falls. Positive for right knee pain and  muscle weakness.   Gastrointestinal: Negative for abdominal pain and bowel incontinence.   Genitourinary: Negative for bladder incontinence and nocturia.   Neurological: Negative for disturbances in  coordination, loss of balance and seizures.   Psychiatric/Behavioral: Negative for depression. The patient does not have insomnia.    Allergic/Immunologic: Negative for hives and persistent infections.     PAST MEDICAL HISTORY:   Past Medical History:   Diagnosis Date    Anxiety     reports panic attacks    Depression     Fibromyalgia     per pt    Insomnia     Precocious female puberty     Seizures     reports 2 incidents of possible seizures while giving blood    Syncope and collapse     Wrist fracture, bilateral        PAST SURGICAL HISTORY:   Past Surgical History:   Procedure Laterality Date    chip      chip implant    FEMUR OSTEOTOMY Right 12/13/2018    Procedure: OSTEOTOMY, FEMUR - distal to correct valgus (Orthopediatrics plate, MTF bone wedge).  Right knee arthroscopy with MPFL reconstruction, gracilis allograft (Linvate).  3 hrs.;  Surgeon: Ranulfo House MD;  Location: [facility] OR [address] [place] [address];  Service: Orthopedics;  Laterality: Right;    HEMANGIOMA EXCISION      scalp    INGUINAL HERNIA REPAIR Left     INJECTION OF STEROID Right 7/5/2019    Procedure: INJECTION, STEROID;  Surgeon: Ranulfo House MD;  Location: [facility] OR [address] [place] [address];  Service: Orthopedics;  Laterality: Right;    KNEE JOINT MANIPULATION Right 7/5/2019    Procedure: MANIPULATION, KNEE;  Surgeon: Ranulfo House MD;  Location: [address];  Service: Orthopedics;  Laterality: Right;    KNEE SURGERY Left 2/16/15    TOE SURGERY Right     5 th toe       FAMILY HISTORY:   Family History   Problem Relation Age of Onset    Seizures Father     Anxiety disorder Father     Depression Father     Mental illness Father     Schizophrenia Father     Deep vein thrombosis Father        SOCIAL HISTORY:   Social History     Socioeconomic History    Marital status: Single     Spouse name: Not on file    Number of children: Not on file    Years of education: Not on file    Highest education level: Not on file   Occupational History    Not  on file   Social Needs    Financial resource strain: Not on file    Food insecurity:     Worry: Not on file     Inability: Not on file    Transportation needs:     Medical: Not on file     Non-medical: Not on file   Tobacco Use    Smoking status: Never Smoker    Smokeless tobacco: Never Used   Substance and Sexual Activity    Alcohol use: No    Drug use: No    Sexual activity: Never     Birth control/protection: Abstinence   Lifestyle    Physical activity:     Days per week: Not on file     Minutes per session: Not on file    Stress: Not on file   Relationships    Social connections:     Talks on phone: Not on file     Gets together: Not on file     Attends Religion service: Not on file     Active member of club or organization: Not on file     Attends meetings of clubs or organizations: Not on file     Relationship status: Not on file   Other Topics Concern    Not on file   Social History Narrative    Lives at home with mother and father.       MEDICATIONS:     Current Outpatient Medications:     norethindrone (MICRONOR) 0.35 mg tablet, Take 1 tablet (0.35 mg total) by mouth once daily., Disp: 90 tablet, Rfl: 3    HYDROcodone-acetaminophen (NORCO) 5-325 mg per tablet, Take 1 tablet by mouth every 6 (six) hours as needed for Pain. (Patient not taking: Reported on 10/30/2019), Disp: 20 tablet, Rfl: 0  No current facility-administered medications for this visit.     Facility-Administered Medications Ordered in Other Visits:     fentaNYL injection 25 mcg, 25 mcg, Intravenous, Q5 Min PRN, Lorna Calhoun MD    midazolam (VERSED) 1 mg/mL injection 0.5 mg, 0.5 mg, Intravenous, PRN, Lorna Calhoun MD    ALLERGIES:   Review of patient's allergies indicates:  No Known Allergies    VITAL SIGNS:   /75   Pulse 73   Ht 5' (1.524 m)   Wt 98.9 kg (218 lb)   BMI 42.58 kg/m²      PHYSICAL EXAMINATION:  General:  The patient is alert and oriented x 3.  Mood is pleasant.  Observation of ears,  eyes and nose reveal no gross abnormalities.  No labored breathing observed.    RIGHT KNEE EXAMINATION     OBSERVATION / INSPECTION   Gait:   Antalgic   Alignment:  Neutral - Right, Valgus - Left  Scars:   Present bilateral, healed   Muscle atrophy: Mild  Effusion:  None   Warmth:  None   Discoloration:   none     TENDERNESS / CREPITUS (T / C):          T / C      T / C   Patella   + / +   Lateral joint line   + / -   Peripatellar medial  +  Medial joint line    + / -   Peripatellar lateral +  Medial plica   - / -   Patellar tendon +   Popliteal fossa   - / -   Quad tendon   -   Gastrocnemius   -   Prepatellar Bursa - / -   Quadricep   -   Tibial tubercle  -  Thigh/hamstring  -   Pes anserine/HS -  Fibula    -   ITB   - / -  Tibia     -   Tib/fib joint  - / -  LCL    -     MFC   - / -   MCL: Proximal  -    LFC   + / +    Distal   -          Tender to palpation directly over the anterior patella, and tender to palpation over the lateral plate. There is IT band crepitations over the lateral plate with range of motion of the knee.  However the pain over the lateral plate is not as painful as the anterior patella.    ROM: (* = pain)  PASSIVE   ACTIVE    Left :   5 / 0 / 145   5 / 0 / 145     Right :    5 / 0 / 145   5 / 0 / 145    Patellofemoral examination:  See above noted areas of tenderness.   Patella position    Subluxation / dislocation: Centered           Sup. / Inf;   Normal   Crepitus (PF):    Moderate with painful catch in mid flexion  Patellar Mobility:       Medial-lateral:   Normal , painful   Superior-inferior:  Normal    Inferior tilt   Normal    Patellar tendon:  Normal   Lateral tilt:    Normal   J-sign:     None   Patellofemoral grind:   Pain       MENISCAL SIGNS:     Pain on terminal extension:  -  Pain on terminal flexion:  -  Bhupinders maneuver:  - (for Pain)  Squat     - (for Pain)    LIGAMENT EXAMINATION:  ACL / Lachman:  normal (-1 to 2mm)    PCL-Post.  drawer: normal 0 to 2mm  MCL-  Valgus:  normal 0 to 2mm  LCL- Varus:  normal 0 to 2mm  Pivot shift: normal (Equal)   Dial Test: difference c/w other side   At 30° flexion: normal (< 5°)    At 90° flexion: normal (< 5°)   Reverse Pivot Shift:   normal (Equal)     STRENGTH: (* = with pain) PAINFUL SIDE   Quadricep   5/5   Hamstrin/5    EXTREMITY NEURO-VASCULAR EXAMINATION:   Sensation:  Grossly intact to light touch all dermatomal regions.   Motor Function:  Fully intact motor function at hip, knee, foot and ankle    DTRs;  quadriceps and  achilles 2+.  No clonus and downgoing Babinski.    Vascular status:  DP and PT pulses 2+, brisk capillary refill, symmetric.     OTHER FINDINGS:  Diffusely tender throughout bilateral knees.  Difficult to perform an adequate ligamentous exam due to the diffuse tenderness.    IMAGING:    MRI of the right knee, CT of the right knee, and x-rays of the right knee was personally reviewed and reveals a healed distal femoral osteotomy, with an intact lateral plate and screws.  There is mild chondromalacia of the patella. The joint space of the patellofemoral joint and tibiofemoral joints are well preserved.  There is 10° of anteversion on the right.        ASSESSMENT:    Right Knee Pain  1.  Persistent knee pain following DFO and MPFL  2.  Painful hardware    PLAN:     I discussed the patient's knee issues with her.  I discussed that she seemed most uncomfortable when the patella engaged the trochlea in mid flexion over the lateral side near the lateral aspect of trochlea and near the plate.  She has had some stiffness in her joint following her last surgery and could still have some scar tissue in the joint which would benefit from being cleaned out.  Also, he lateral plate could be irritating her PF joint in certain flexion angles and images appear to me that she might have healed the femur enough to consider removing the lateral distal femoral plate.    We agreed that Dr. House's plan was very reasonable  and I agreed that the next most appropriate step would be a repeat arthroscopy of the knee for lysis of adhesions / scar tissue, to evaluate the patellar engagement with the trochlea and to see if the plate is possible irritating the PF joint with range of motion, and to evaluate the MPFL graft to see if a debridement or partial release of this would help tracking or PF centering or loading with trochlear engagement.    She seemed to understand and stated that she would contact Dr. House's office early next week      Vern Nguyen MD

## 2019-11-08 NOTE — LETTER
November 8, 2019      Ranulfo House MD  [address]  [facility] [address]           [facility]  [address]  [facility] [address]  Phone: [phone]          Patient: Denise Mosher   MR Number: [id number]   YOB: 2000   Date of Visit: 11/8/2019       Dear Dr. Ranulfo House:    Thank you for referring Denise Mosher to me for evaluation. Attached you will find relevant portions of my assessment and plan of care.    If you have questions, please do not hesitate to call me. I look forward to following Denise Mosher along with you.    Sincerely,    Boston Boudreaux MD    Enclosure  CC:  No Recipients    If you would like to receive this communication electronically, please contact [email or web address] or [phone] to request more information on [employer] access.    For providers and/or their staff who would like to refer a patient to [facility], please contact us through our one-stop-shop provider referral line, [facility], at [phone].    If you feel you have received this communication in error or would no longer like to receive these types of communications, please e-mail [email or web address]

## 2019-11-19 ENCOUNTER — TELEPHONE (OUTPATIENT)
Dept: ORTHOPEDICS | Facility: CLINIC | Age: 19
End: 2019-11-19

## 2019-11-19 NOTE — TELEPHONE ENCOUNTER
----- Message from Ranulfo House MD sent at 11/18/2019  2:33 PM CST -----  Sure.  I spoke with him.  He essentially agreed with my plan, which is to do a scope, evaluate her patellar tracking and scar tissue.  He thinks she may need removal of scar tissue, release/loosening of her MPFL graft, removal of the plate, or all 3.  I think this is a reasonable plan.  ----- Message -----  From: Milena Moran MA  Sent: 11/18/2019  12:53 PM CST  To: Ranulfo House MD    She is calling to discuss her appointment with Dr Nguyen with you. Can you look at the note and I can call her to discuss if you're not able to.

## 2019-11-20 DIAGNOSIS — M22.2X1 PATELLOFEMORAL PAIN SYNDROME OF RIGHT KNEE: Primary | ICD-10-CM

## 2019-11-20 DIAGNOSIS — T85.848D PAIN FROM IMPLANTED HARDWARE, SUBSEQUENT ENCOUNTER: ICD-10-CM

## 2019-11-27 ENCOUNTER — OFFICE VISIT (OUTPATIENT)
Dept: ORTHOPEDICS | Facility: CLINIC | Age: 19
End: 2019-11-27
Payer: MEDICAID

## 2019-11-27 VITALS
HEIGHT: 61 IN | SYSTOLIC BLOOD PRESSURE: 103 MMHG | WEIGHT: 220.81 LBS | DIASTOLIC BLOOD PRESSURE: 66 MMHG | HEART RATE: 70 BPM | BODY MASS INDEX: 41.69 KG/M2 | TEMPERATURE: 98 F

## 2019-11-27 DIAGNOSIS — M22.2X1 PATELLOFEMORAL PAIN SYNDROME OF RIGHT KNEE: Primary | ICD-10-CM

## 2019-11-27 DIAGNOSIS — T85.848D PAIN FROM IMPLANTED HARDWARE, SUBSEQUENT ENCOUNTER: ICD-10-CM

## 2019-11-27 PROCEDURE — 99499 NO LOS: ICD-10-PCS | Mod: S$PBB,,, | Performed by: ORTHOPAEDIC SURGERY

## 2019-11-27 PROCEDURE — 99999 PR PBB SHADOW E&M-EST. PATIENT-LVL III: CPT | Mod: PBBFAC,,, | Performed by: ORTHOPAEDIC SURGERY

## 2019-11-27 PROCEDURE — 99499 UNLISTED E&M SERVICE: CPT | Mod: S$PBB,,, | Performed by: ORTHOPAEDIC SURGERY

## 2019-11-27 PROCEDURE — 99999 PR PBB SHADOW E&M-EST. PATIENT-LVL III: ICD-10-PCS | Mod: PBBFAC,,, | Performed by: ORTHOPAEDIC SURGERY

## 2019-11-27 PROCEDURE — 99213 OFFICE O/P EST LOW 20 MIN: CPT | Mod: PBBFAC | Performed by: ORTHOPAEDIC SURGERY

## 2019-12-03 NOTE — PROGRESS NOTES
Subjective:    Denise Mosher is here for pre-op.    Past Medical History:   Diagnosis Date    Anxiety     reports panic attacks    Depression     Fibromyalgia     per pt    Insomnia     Precocious female puberty     Seizures     reports 2 incidents of possible seizures while giving blood    Syncope and collapse     Wrist fracture, bilateral        Past Surgical History:   Procedure Laterality Date    chip      chip implant    FEMUR OSTEOTOMY Right 12/13/2018    Procedure: OSTEOTOMY, FEMUR - distal to correct valgus (Orthopediatrics plate, MTF bone wedge).  Right knee arthroscopy with MPFL reconstruction, gracilis allograft (Linvatec).  3 hrs.;  Surgeon: Ranulfo House MD;  Location: [facility] OR [address] [place] [address];  Service: Orthopedics;  Laterality: Right;    HEMANGIOMA EXCISION      scalp    INGUINAL HERNIA REPAIR Left     INJECTION OF STEROID Right 7/5/2019    Procedure: INJECTION, STEROID;  Surgeon: Ranulfo House MD;  Location: [facility] OR [address] [place] [address];  Service: Orthopedics;  Laterality: Right;    KNEE JOINT MANIPULATION Right 7/5/2019    Procedure: MANIPULATION, KNEE;  Surgeon: Ranulfo House MD;  Location: [facility] OR [address] [place] [address];  Service: Orthopedics;  Laterality: Right;    KNEE SURGERY Left 2/16/15    TOE SURGERY Right     5 th toe       Current Outpatient Medications on File Prior to Visit   Medication Sig Dispense Refill    norethindrone (MICRONOR) 0.35 mg tablet Take 1 tablet (0.35 mg total) by mouth once daily. 90 tablet 3    HYDROcodone-acetaminophen (NORCO) 5-325 mg per tablet Take 1 tablet by mouth every 6 (six) hours as needed for Pain. (Patient not taking: Reported on 11/27/2019) 20 tablet 0     Current Facility-Administered Medications on File Prior to Visit   Medication Dose Route Frequency Provider Last Rate Last Dose    fentaNYL injection 25 mcg  25 mcg Intravenous Q5 Min PRN Lorna Calhoun MD        midazolam (VERSED) 1 mg/mL injection 0.5 mg  0.5 mg Intravenous PRN Lorna MOSER  MD Lj           Review of patient's allergies indicates:  No Known Allergies    Social History     Socioeconomic History    Marital status: Single     Spouse name: Not on file    Number of children: Not on file    Years of education: Not on file    Highest education level: Not on file   Occupational History    Not on file   Social Needs    Financial resource strain: Not on file    Food insecurity:     Worry: Not on file     Inability: Not on file    Transportation needs:     Medical: Not on file     Non-medical: Not on file   Tobacco Use    Smoking status: Never Smoker    Smokeless tobacco: Never Used   Substance and Sexual Activity    Alcohol use: No    Drug use: No    Sexual activity: Never     Birth control/protection: Abstinence   Lifestyle    Physical activity:     Days per week: Not on file     Minutes per session: Not on file    Stress: Not on file   Relationships    Social connections:     Talks on phone: Not on file     Gets together: Not on file     Attends [employer] service: Not on file     Active member of club or organization: Not on file     Attends meetings of clubs or organizations: Not on file     Relationship status: Not on file   Other Topics Concern    Not on file   Social History Narrative    Lives at home with mother and father.         Objective:    Vitals:    11/27/19 1041   BP: 103/66   Pulse: 70   Temp: 97.7 °F (36.5 °C)       Afebrile, Vital signs stable   Gen - well-developed, well-nourished, no acute distress  HEENT - Pupils equal/round/reactive to light, normocephalic, atraumatic   Neuro - Normal reflexes, normal sensation, normal motor exam  CV - Regular rate and rhythm, palpable distal pulses   Pulm - Good inspiratory effort with unlaboured breathing  Abd - +Bowel sounds, non-tender, non-distended      Plan: Right knee scope, possible hardware removal, possible MPFL graft release    I have discussed the risks, benefits, and alternatives of surgery with the  patient's guardian and obtained informed consent.

## 2019-12-03 NOTE — H&P (VIEW-ONLY)
Subjective:    Denise Mosher is here for pre-op.    Past Medical History:   Diagnosis Date    Anxiety     reports panic attacks    Depression     Fibromyalgia     per pt    Insomnia     Precocious female puberty     Seizures     reports 2 incidents of possible seizures while giving blood    Syncope and collapse     Wrist fracture, bilateral        Past Surgical History:   Procedure Laterality Date    chip      chip implant    FEMUR OSTEOTOMY Right 12/13/2018    Procedure: OSTEOTOMY, FEMUR - distal to correct valgus (Orthopediatrics plate, MTF bone wedge).  Right knee arthroscopy with MPFL reconstruction, gracilis allograft (Linvatec).  3 hrs.;  Surgeon: Ranulfo House MD;  Location: [facility] OR [address] [place] [address];  Service: Orthopedics;  Laterality: Right;    HEMANGIOMA EXCISION      scalp    INGUINAL HERNIA REPAIR Left     INJECTION OF STEROID Right 7/5/2019    Procedure: INJECTION, STEROID;  Surgeon: Ranulfo House MD;  Location: [facility] OR [address] [place] [address];  Service: Orthopedics;  Laterality: Right;    KNEE JOINT MANIPULATION Right 7/5/2019    Procedure: MANIPULATION, KNEE;  Surgeon: Ranulfo House MD;  Location: [facility] OR [address] [place] [address];  Service: Orthopedics;  Laterality: Right;    KNEE SURGERY Left 2/16/15    TOE SURGERY Right     5 th toe       Current Outpatient Medications on File Prior to Visit   Medication Sig Dispense Refill    norethindrone (MICRONOR) 0.35 mg tablet Take 1 tablet (0.35 mg total) by mouth once daily. 90 tablet 3    HYDROcodone-acetaminophen (NORCO) 5-325 mg per tablet Take 1 tablet by mouth every 6 (six) hours as needed for Pain. (Patient not taking: Reported on 11/27/2019) 20 tablet 0     Current Facility-Administered Medications on File Prior to Visit   Medication Dose Route Frequency Provider Last Rate Last Dose    fentaNYL injection 25 mcg  25 mcg Intravenous Q5 Min PRN Lorna Calhoun MD        midazolam (VERSED) 1 mg/mL injection 0.5 mg  0.5 mg Intravenous PRN Lorna MOSER  MD Lj           Review of patient's allergies indicates:  No Known Allergies    Social History     Socioeconomic History    Marital status: Single     Spouse name: Not on file    Number of children: Not on file    Years of education: Not on file    Highest education level: Not on file   Occupational History    Not on file   Social Needs    Financial resource strain: Not on file    Food insecurity:     Worry: Not on file     Inability: Not on file    Transportation needs:     Medical: Not on file     Non-medical: Not on file   Tobacco Use    Smoking status: Never Smoker    Smokeless tobacco: Never Used   Substance and Sexual Activity    Alcohol use: No    Drug use: No    Sexual activity: Never     Birth control/protection: Abstinence   Lifestyle    Physical activity:     Days per week: Not on file     Minutes per session: Not on file    Stress: Not on file   Relationships    Social connections:     Talks on phone: Not on file     Gets together: Not on file     Attends Anglican service: Not on file     Active member of club or organization: Not on file     Attends meetings of clubs or organizations: Not on file     Relationship status: Not on file   Other Topics Concern    Not on file   Social History Narrative    Lives at home with mother and father.         Objective:    Vitals:    11/27/19 1041   BP: 103/66   Pulse: 70   Temp: 97.7 °F (36.5 °C)       Afebrile, Vital signs stable   Gen - well-developed, well-nourished, no acute distress  HEENT - Pupils equal/round/reactive to light, normocephalic, atraumatic   Neuro - Normal reflexes, normal sensation, normal motor exam  CV - Regular rate and rhythm, palpable distal pulses   Pulm - Good inspiratory effort with unlaboured breathing  Abd - +Bowel sounds, non-tender, non-distended      Plan: Right knee scope, possible hardware removal, possible MPFL graft release    I have discussed the risks, benefits, and alternatives of surgery with the  patient's guardian and obtained informed consent.

## 2019-12-18 ENCOUNTER — TELEPHONE (OUTPATIENT)
Dept: ORTHOPEDICS | Facility: CLINIC | Age: 19
End: 2019-12-18

## 2019-12-18 ENCOUNTER — ANESTHESIA EVENT (OUTPATIENT)
Dept: SURGERY | Facility: HOSPITAL | Age: 19
End: 2019-12-18
Payer: MEDICAID

## 2019-12-19 ENCOUNTER — HOSPITAL ENCOUNTER (OUTPATIENT)
Facility: HOSPITAL | Age: 19
Discharge: HOME | End: 2019-12-19
Attending: ORTHOPAEDIC SURGERY | Admitting: ORTHOPAEDIC SURGERY
Payer: MEDICAID

## 2019-12-19 ENCOUNTER — TELEPHONE (OUTPATIENT)
Dept: ORTHOPEDICS | Facility: CLINIC | Age: 19
End: 2019-12-19

## 2019-12-19 ENCOUNTER — ANESTHESIA (OUTPATIENT)
Dept: SURGERY | Facility: HOSPITAL | Age: 19
End: 2019-12-19
Payer: MEDICAID

## 2019-12-19 VITALS
DIASTOLIC BLOOD PRESSURE: 58 MMHG | OXYGEN SATURATION: 100 % | WEIGHT: 225 LBS | HEIGHT: 61 IN | TEMPERATURE: 98 F | SYSTOLIC BLOOD PRESSURE: 102 MMHG | HEART RATE: 78 BPM | BODY MASS INDEX: 42.48 KG/M2 | RESPIRATION RATE: 15 BRPM

## 2019-12-19 DIAGNOSIS — T84.84XA PAINFUL ORTHOPAEDIC HARDWARE: ICD-10-CM

## 2019-12-19 DIAGNOSIS — T85.848D PAIN FROM IMPLANTED HARDWARE, SUBSEQUENT ENCOUNTER: Primary | ICD-10-CM

## 2019-12-19 LAB
B-HCG UR QL: NEGATIVE
CTP QC/QA: YES

## 2019-12-19 PROCEDURE — D9220A PRA ANESTHESIA: ICD-10-PCS | Mod: CRNA,,, | Performed by: NURSE ANESTHETIST, CERTIFIED REGISTERED

## 2019-12-19 PROCEDURE — 71000016 HC POSTOP RECOV ADDL HR: Performed by: ORTHOPAEDIC SURGERY

## 2019-12-19 PROCEDURE — 37000009 HC ANESTHESIA EA ADD 15 MINS: Performed by: ORTHOPAEDIC SURGERY

## 2019-12-19 PROCEDURE — D9220A PRA ANESTHESIA: Mod: CRNA,,, | Performed by: NURSE ANESTHETIST, CERTIFIED REGISTERED

## 2019-12-19 PROCEDURE — 01400 ANES OPN/ARTHRS KNEE JT NOS: CPT | Performed by: ORTHOPAEDIC SURGERY

## 2019-12-19 PROCEDURE — 27425 PR LATERAL RETINACULAR RELEASE OPEN: ICD-10-PCS | Mod: 51,RT,, | Performed by: ORTHOPAEDIC SURGERY

## 2019-12-19 PROCEDURE — 29877 ARTHRS KNEE SURG DBRDMT/SHVG: CPT | Mod: RT,,, | Performed by: ORTHOPAEDIC SURGERY

## 2019-12-19 PROCEDURE — 36000711: Performed by: ORTHOPAEDIC SURGERY

## 2019-12-19 PROCEDURE — 29877 PR KNEE SCOPE,SHAVE ARTICULAR CART: ICD-10-PCS | Mod: RT,,, | Performed by: ORTHOPAEDIC SURGERY

## 2019-12-19 PROCEDURE — 20680 REMOVAL OF IMPLANT DEEP: CPT | Mod: 51,RT,, | Performed by: ORTHOPAEDIC SURGERY

## 2019-12-19 PROCEDURE — D9220A PRA ANESTHESIA: Mod: ANES,,, | Performed by: ANESTHESIOLOGY

## 2019-12-19 PROCEDURE — 25000003 PHARM REV CODE 250: Performed by: NURSE ANESTHETIST, CERTIFIED REGISTERED

## 2019-12-19 PROCEDURE — 27201423 OPTIME MED/SURG SUP & DEVICES STERILE SUPPLY: Performed by: ORTHOPAEDIC SURGERY

## 2019-12-19 PROCEDURE — 36000710: Performed by: ORTHOPAEDIC SURGERY

## 2019-12-19 PROCEDURE — 71000015 HC POSTOP RECOV 1ST HR: Performed by: ORTHOPAEDIC SURGERY

## 2019-12-19 PROCEDURE — 27425 LAT RETINACULAR RELEASE OPEN: CPT | Mod: 51,RT,, | Performed by: ORTHOPAEDIC SURGERY

## 2019-12-19 PROCEDURE — 63600175 PHARM REV CODE 636 W HCPCS: Performed by: NURSE ANESTHETIST, CERTIFIED REGISTERED

## 2019-12-19 PROCEDURE — 37000008 HC ANESTHESIA 1ST 15 MINUTES: Performed by: ORTHOPAEDIC SURGERY

## 2019-12-19 PROCEDURE — 25000003 PHARM REV CODE 250: Performed by: ORTHOPAEDIC SURGERY

## 2019-12-19 PROCEDURE — 81025 URINE PREGNANCY TEST: CPT | Performed by: ORTHOPAEDIC SURGERY

## 2019-12-19 PROCEDURE — 20680 PR REMOVAL DEEP IMPLANT: ICD-10-PCS | Mod: 51,RT,, | Performed by: ORTHOPAEDIC SURGERY

## 2019-12-19 PROCEDURE — 25000003 PHARM REV CODE 250: Performed by: STUDENT IN AN ORGANIZED HEALTH CARE EDUCATION/TRAINING PROGRAM

## 2019-12-19 PROCEDURE — 71000044 HC DOSC ROUTINE RECOVERY FIRST HOUR: Performed by: ORTHOPAEDIC SURGERY

## 2019-12-19 PROCEDURE — D9220A PRA ANESTHESIA: ICD-10-PCS | Mod: ANES,,, | Performed by: ANESTHESIOLOGY

## 2019-12-19 PROCEDURE — 63600175 PHARM REV CODE 636 W HCPCS: Performed by: ORTHOPAEDIC SURGERY

## 2019-12-19 RX ORDER — MIDAZOLAM HYDROCHLORIDE 1 MG/ML
INJECTION, SOLUTION INTRAMUSCULAR; INTRAVENOUS
Status: DISCONTINUED | OUTPATIENT
Start: 2019-12-19 | End: 2019-12-19

## 2019-12-19 RX ORDER — KETAMINE HCL IN 0.9 % NACL 50 MG/5 ML
SYRINGE (ML) INTRAVENOUS
Status: DISCONTINUED | OUTPATIENT
Start: 2019-12-19 | End: 2019-12-19

## 2019-12-19 RX ORDER — FENTANYL CITRATE 50 UG/ML
25 INJECTION, SOLUTION INTRAMUSCULAR; INTRAVENOUS EVERY 5 MIN PRN
Status: DISCONTINUED | OUTPATIENT
Start: 2019-12-19 | End: 2019-12-19 | Stop reason: HOSPADM

## 2019-12-19 RX ORDER — ONDANSETRON HYDROCHLORIDE 8 MG/1
8 TABLET, FILM COATED ORAL EVERY 8 HOURS PRN
Qty: 30 TABLET | Refills: 0 | Status: ON HOLD | OUTPATIENT
Start: 2019-12-19 | End: 2020-07-10 | Stop reason: SDUPTHER

## 2019-12-19 RX ORDER — PROPOFOL 10 MG/ML
VIAL (ML) INTRAVENOUS
Status: DISCONTINUED | OUTPATIENT
Start: 2019-12-19 | End: 2019-12-19

## 2019-12-19 RX ORDER — SODIUM CHLORIDE 9 MG/ML
INJECTION, SOLUTION INTRAVENOUS CONTINUOUS
Status: DISCONTINUED | OUTPATIENT
Start: 2019-12-19 | End: 2019-12-19 | Stop reason: HOSPADM

## 2019-12-19 RX ORDER — ASPIRIN 81 MG/1
81 TABLET ORAL DAILY
Qty: 30 TABLET | Refills: 0 | Status: ON HOLD | OUTPATIENT
Start: 2019-12-19 | End: 2020-07-10 | Stop reason: SDUPTHER

## 2019-12-19 RX ORDER — LIDOCAINE HCL/PF 100 MG/5ML
SYRINGE (ML) INTRAVENOUS
Status: DISCONTINUED | OUTPATIENT
Start: 2019-12-19 | End: 2019-12-19

## 2019-12-19 RX ORDER — CEFAZOLIN SODIUM 1 G/3ML
INJECTION, POWDER, FOR SOLUTION INTRAMUSCULAR; INTRAVENOUS
Status: DISCONTINUED | OUTPATIENT
Start: 2019-12-19 | End: 2019-12-19

## 2019-12-19 RX ORDER — SODIUM CHLORIDE 0.9 % (FLUSH) 0.9 %
10 SYRINGE (ML) INJECTION
Status: DISCONTINUED | OUTPATIENT
Start: 2019-12-19 | End: 2019-12-19 | Stop reason: HOSPADM

## 2019-12-19 RX ORDER — HYDROCODONE BITARTRATE AND ACETAMINOPHEN 5; 325 MG/1; MG/1
1 TABLET ORAL EVERY 4 HOURS PRN
Status: DISCONTINUED | OUTPATIENT
Start: 2019-12-19 | End: 2019-12-19 | Stop reason: HOSPADM

## 2019-12-19 RX ORDER — GLYCOPYRROLATE 0.2 MG/ML
INJECTION INTRAMUSCULAR; INTRAVENOUS
Status: DISCONTINUED | OUTPATIENT
Start: 2019-12-19 | End: 2019-12-19

## 2019-12-19 RX ORDER — LIDOCAINE HYDROCHLORIDE 10 MG/ML
1 INJECTION, SOLUTION EPIDURAL; INFILTRATION; INTRACAUDAL; PERINEURAL ONCE
Status: COMPLETED | OUTPATIENT
Start: 2019-12-19 | End: 2019-12-19

## 2019-12-19 RX ORDER — EPINEPHRINE 1 MG/ML
INJECTION INTRAMUSCULAR; INTRAVENOUS; SUBCUTANEOUS
Status: DISCONTINUED
Start: 2019-12-19 | End: 2019-12-19 | Stop reason: HOSPADM

## 2019-12-19 RX ORDER — HYDROCODONE BITARTRATE AND ACETAMINOPHEN 5; 325 MG/1; MG/1
1 TABLET ORAL EVERY 6 HOURS PRN
Qty: 28 TABLET | Refills: 0 | Status: ON HOLD | OUTPATIENT
Start: 2019-12-19 | End: 2020-07-10 | Stop reason: SDUPTHER

## 2019-12-19 RX ORDER — FENTANYL CITRATE 50 UG/ML
INJECTION, SOLUTION INTRAMUSCULAR; INTRAVENOUS
Status: DISCONTINUED | OUTPATIENT
Start: 2019-12-19 | End: 2019-12-19

## 2019-12-19 RX ORDER — EPINEPHRINE 1 MG/ML
INJECTION INTRAMUSCULAR; INTRAVENOUS; SUBCUTANEOUS
Status: DISCONTINUED | OUTPATIENT
Start: 2019-12-19 | End: 2019-12-19 | Stop reason: HOSPADM

## 2019-12-19 RX ORDER — MIDAZOLAM HYDROCHLORIDE 1 MG/ML
0.5 INJECTION INTRAMUSCULAR; INTRAVENOUS
Status: DISCONTINUED | OUTPATIENT
Start: 2019-12-19 | End: 2019-12-19 | Stop reason: HOSPADM

## 2019-12-19 RX ORDER — IBUPROFEN 400 MG/1
400 TABLET ORAL EVERY 4 HOURS
Qty: 42 TABLET | Refills: 0 | Status: ON HOLD | OUTPATIENT
Start: 2019-12-19 | End: 2020-07-10 | Stop reason: SDUPTHER

## 2019-12-19 RX ORDER — KETOROLAC TROMETHAMINE 30 MG/ML
INJECTION, SOLUTION INTRAMUSCULAR; INTRAVENOUS
Status: DISCONTINUED | OUTPATIENT
Start: 2019-12-19 | End: 2019-12-19

## 2019-12-19 RX ORDER — HYDROMORPHONE HYDROCHLORIDE 1 MG/ML
0.2 INJECTION, SOLUTION INTRAMUSCULAR; INTRAVENOUS; SUBCUTANEOUS EVERY 5 MIN PRN
Status: DISCONTINUED | OUTPATIENT
Start: 2019-12-19 | End: 2019-12-19 | Stop reason: HOSPADM

## 2019-12-19 RX ORDER — DEXAMETHASONE SODIUM PHOSPHATE 4 MG/ML
INJECTION, SOLUTION INTRA-ARTICULAR; INTRALESIONAL; INTRAMUSCULAR; INTRAVENOUS; SOFT TISSUE
Status: DISCONTINUED | OUTPATIENT
Start: 2019-12-19 | End: 2019-12-19

## 2019-12-19 RX ORDER — ONDANSETRON 2 MG/ML
INJECTION INTRAMUSCULAR; INTRAVENOUS
Status: DISCONTINUED | OUTPATIENT
Start: 2019-12-19 | End: 2019-12-19

## 2019-12-19 RX ORDER — MUPIROCIN 20 MG/G
1 OINTMENT TOPICAL 2 TIMES DAILY
Status: DISCONTINUED | OUTPATIENT
Start: 2019-12-19 | End: 2019-12-19 | Stop reason: HOSPADM

## 2019-12-19 RX ADMIN — CEFAZOLIN 2 G: 330 INJECTION, POWDER, FOR SOLUTION INTRAMUSCULAR; INTRAVENOUS at 12:12

## 2019-12-19 RX ADMIN — HYDROCODONE BITARTRATE AND ACETAMINOPHEN 1 TABLET: 5; 325 TABLET ORAL at 04:12

## 2019-12-19 RX ADMIN — SODIUM CHLORIDE 1000 ML: 0.9 INJECTION, SOLUTION INTRAVENOUS at 11:12

## 2019-12-19 RX ADMIN — Medication 20 MG: at 12:12

## 2019-12-19 RX ADMIN — PROPOFOL 200 MG: 10 INJECTION, EMULSION INTRAVENOUS at 12:12

## 2019-12-19 RX ADMIN — MIDAZOLAM HYDROCHLORIDE 2 MG: 1 INJECTION, SOLUTION INTRAMUSCULAR; INTRAVENOUS at 12:12

## 2019-12-19 RX ADMIN — ONDANSETRON 4 MG: 2 INJECTION INTRAMUSCULAR; INTRAVENOUS at 01:12

## 2019-12-19 RX ADMIN — KETOROLAC TROMETHAMINE 30 MG: 30 INJECTION, SOLUTION INTRAMUSCULAR; INTRAVENOUS at 01:12

## 2019-12-19 RX ADMIN — LIDOCAINE HYDROCHLORIDE 100 MG: 20 INJECTION, SOLUTION INTRAVENOUS at 12:12

## 2019-12-19 RX ADMIN — Medication 10 MG: at 12:12

## 2019-12-19 RX ADMIN — GLYCOPYRROLATE 0.2 MG: 0.2 INJECTION, SOLUTION INTRAMUSCULAR; INTRAVENOUS at 12:12

## 2019-12-19 RX ADMIN — FENTANYL CITRATE 50 MCG: 50 INJECTION, SOLUTION INTRAMUSCULAR; INTRAVENOUS at 12:12

## 2019-12-19 RX ADMIN — FENTANYL CITRATE 25 MCG: 50 INJECTION, SOLUTION INTRAMUSCULAR; INTRAVENOUS at 12:12

## 2019-12-19 RX ADMIN — LIDOCAINE HYDROCHLORIDE 2 MG: 10 INJECTION, SOLUTION EPIDURAL; INFILTRATION; INTRACAUDAL; PERINEURAL at 11:12

## 2019-12-19 RX ADMIN — DEXAMETHASONE SODIUM PHOSPHATE 4 MG: 4 INJECTION, SOLUTION INTRAMUSCULAR; INTRAVENOUS at 12:12

## 2019-12-19 NOTE — DISCHARGE INSTRUCTIONS
Incision Care  Remember: Follow-up visits allow your healthcare provider to make sure your incision is healing well. Be sure to keep your appointments.     Stitches (sutures), surgical staples, special strips of surgical tape, or surgical skin glue may be used to close incisions. They also help stop bleeding and speed healing. To help your incision heal, follow the tips on this handout.  Home care  Tips for home care include the following:  · Always wash your hands before touching your incision.  · Keep your incision clean and dry.  · Avoid doing things that could cause dirt or sweat to get on your incision.  · Dont pick at scabs. They help protect the wound.  · Keep your incision out of water.  · Take a sponge bath to avoid getting your incision wet, unless your healthcare provider tells you otherwise.  · Ask your provider when can you take a shower or bathe.  · Ask your provider about the best way to keep your incision dry when bathing or showering.  · Pat stitches dry if they get wet. Dont rub.  · Leave the bandage (dressing) in place until you are told to remove it or change it. Change it only as directed, using clean hands.  · After the first 12 hours, change your dressing every 24 hours, or as directed by your healthcare provider.  · Change your dressing if it gets wet or soiled.  Care for specific closures  Follow these guidelines unless your healthcare provider tells you otherwise:  · Stitches or staples. Once you no longer need to keep these dry, clean the wound daily. First remove the bandage using clean hands. Then wash the area gently with soap and warm water. Use a wet cotton swab to loosen and remove any blood or crust that forms. After cleaning, put a thin layer of antibiotic ointment on. Then put on a new bandage.  · Skin glue. Dont put liquid, ointment, or cream on your wound while the glue is in place. Avoid activities that cause heavy sweating. Protect the wound from sunlight. Do not scratch,  rub, or pick at the glue. Do not put tape directly over the glue. The glue should peel off within 5 to 10 days.  · Surgical tape. Keep the area dry. If it gets wet, blot the area dry with a clean towel. Surgical tape usually falls off within 7 to 10 days. If it has not fallen off after 10 days, contact your healthcare provider before taking it off yourself. If you are told to remove the tape, put mineral oil or petroleum jelly on a cotton ball. Gently rub the tape until it is removed.  Changing your dressing  Leave the dressing (bandage) in place until you are told to remove it or change it. Follow the instructions below unless told otherwise by your healthcare provider:  · Always wash your hands before changing your dressing.  · After the first 48 hours, the incision wound usually will have closed. At this point, leave the incision uncovered and open to the air. If the incision has not closed keep it covered.  · Cover your incision only if your clothing is rubbing it or causing irritation.  · Change your dressing if it gets wet or soiled.  Follow-up care  Follow up with your healthcare provider to ask how long sutures or staples should be left in place. Be sure to return for stitch or staple removal as directed. If dissolving stitches were used in your mouth, these will not need to be removed. They should fall out or dissolve on their own.  If tape closures were used, remove them yourself when your provider recommends if they have not fallen off on their own. If skin glue was used, the glue will wear off by itself.  When to seek medical care  Call your healthcare provider if you have any of the following:  · More pain, redness, swelling, bleeding, or foul-smelling discharge around the incision area  · Fever of 100.4°F (38ºC) or higher, or as directed by your healthcare provider  · Shaking chills  · Vomiting or nausea that doesn't go away  · Numbness, coldness, or tingling around the incision area, or changes in  skin color  · Opening of the sutures or wound  · Stitches or staples come apart or fall out or surgical tape falls off before 7 days or as directed by your healthcare provider   Date Last Reviewed: 12/1/2016  © 4501-6625 [employer]. [address] [place] [address]. All rights reserved. This information is not intended as a substitute for professional medical care. Always follow your healthcare professional's instructions.        After Knee Arthroscopy  After surgery, your joint may be swollen, painful, and stiff. Your recovery time will depend on what was done. Your surgeon will tell you when to resume activity and weight bearing. If you had meniscal cartilage or loose bodies removed, you may be told to bear weight early on. After ACL repair, do not pivot or make sudden moves.     You may be told to ride an exercise bike daily. This will help restore your knee's motion and strength.   At home  Follow your surgeons guidelines for healing:  · Elevate your knee as much as possible and ice your knee for 20 minutes. then allow at least 20 minutes before the next icing session.  · Limit weight-bearing, if instructed to do so.  · Keep your knee bandaged according to your surgeon's instructions.  · When you shower, wrap your knee with plastic to keep it dry.  · Take pain medicine as directed.  Your checklist  The checklist below helps remind you what to do after arthroscopy.  ? Schedule your first follow-up visit for 5 days after surgery or as directed by your surgeon.  ? Take care of your incision and bathe as directed.  ? Complete your physical therapy program.  ? Talk with your surgeon about activities you can do immediately and those that need to wait.  Contact your surgeon right away if you have any of the following:  · Fever  · Chills  · Persistent warmth or redness around the knee  · Persistent or increased pain  · Significant swelling in your knee  · Increasing pain in your calf  muscle  Date Last Reviewed: 9/22/2015  © 4412-8252 [employer], [employer]. [address] [place] [address], [place] [address]. All rights reserved. This information is not intended as a substitute for professional medical care. Always follow your healthcare professional's instructions.

## 2019-12-19 NOTE — ANESTHESIA PREPROCEDURE EVALUATION
[facility]  Anesthesia Pre-Operative Evaluation         Patient Name: Denise Mosher  YOB: 2000  MRN: [id number]    SUBJECTIVE:     Pre-operative evaluation for Procedure(s) (LRB):  ARTHROSCOPY, KNEE (Right) - lysis of adhesions and manipulation under anesthesia.  Possible removal of distal femoral plate and screws (Orthopediatrics). (Right)     12/19/2019    Denise Mosher is a 19 y.o. female w/ a significant PMHx of anxiety, depression, fibromyalgia, and knee pain who now presents for the above procedures.  Patient underwent osteotomy right femur to correct valgus deformity with MPFL reconstruction.  APS was consulted for postoperative pain management at that time.       LDA: None documented.    Prev airway:   Direct laryngoscopy; Inserted by: CRNA; Airway Device: Endotracheal Tube; Mask Ventilation: Easy; Intubated: Postinduction; Blade: Esteban #2; Airway Device Size: 7.0; Style: Cuffed; Cuff Inflation: Minimal occlusive pressure; Inflation Amount: 7; Placement Verified By: Auscultation, Capnometry, ETT Condensation; Grade: Grade I; Complicating Factors: Obesity (Patient ramped)    Drips: None documented.    Patient Active Problem List   Diagnosis    Bilateral hand pain    Wound infection after surgery    Arthrofibrosis of knee joint    Altered mental status    Cervicalgia    Neck pain    Abnormal EEG    Family history of epilepsy    Anxiety    Syncope    Morbid obesity with BMI of 40.0-44.9, adult    Psychogenic nonepileptic seizure    Trochanteric bursitis of both hips    Chronic pain of both knees    Patellofemoral pain syndrome of right knee    Pain from implanted hardware    Right calf pain    Patellar instability of right knee    Acquired genu valgum of right knee    Incontinence in female    Arthrofibrosis of knee joint, right    Range of motion deficit    Weakness    Painful orthopaedic hardware       Review of patient's  allergies indicates:  No Known Allergies    Current Outpatient Medications:  No current facility-administered medications for this visit.   No current outpatient medications on file.    Facility-Administered Medications Ordered in Other Visits:     0.9%  NaCl infusion, , Intravenous, Continuous, Ranulfo House MD    fentaNYL injection 25 mcg, 25 mcg, Intravenous, Q5 Min PRN, Lorna Calhoun MD    fentaNYL injection 25 mcg, 25 mcg, Intravenous, Q5 Min PRN, Pramod Batista MD    lidocaine (PF) 10 mg/ml (1%) injection 10 mg, 1 mL, Other, Once, Ranulfo House MD    midazolam (VERSED) 1 mg/mL injection 0.5 mg, 0.5 mg, Intravenous, PRN, Lorna Calhoun MD    midazolam (VERSED) 1 mg/mL injection 0.5 mg, 0.5 mg, Intravenous, PRN, Pramod Batista MD    Past Surgical History:   Procedure Laterality Date    chip      chip implant    FEMUR OSTEOTOMY Right 12/13/2018    Procedure: OSTEOTOMY, FEMUR - distal to correct valgus (Orthopediatrics plate, MTF bone wedge).  Right knee arthroscopy with MPFL reconstruction, gracilis allograft (Linvate).  3 hrs.;  Surgeon: Ranulfo House MD;  Location: [facility] OR [address] [place] [address];  Service: Orthopedics;  Laterality: Right;    HEMANGIOMA EXCISION      scalp    INGUINAL HERNIA REPAIR Left     INJECTION OF STEROID Right 7/5/2019    Procedure: INJECTION, STEROID;  Surgeon: Ranulfo House MD;  Location: [facility] OR [address] [place] [address];  Service: Orthopedics;  Laterality: Right;    KNEE JOINT MANIPULATION Right 7/5/2019    Procedure: MANIPULATION, KNEE;  Surgeon: Ranulfo House MD;  Location: [facility] OR [address] [place] [address];  Service: Orthopedics;  Laterality: Right;    KNEE SURGERY Left 2/16/15    TOE SURGERY Right     5 th toe       Social History     Socioeconomic History    Marital status: Single     Spouse name: Not on file    Number of children: Not on file    Years of education: Not on file    Highest education level: Not on file   Occupational History    Not on file   Social Needs     Financial resource strain: Not on file    Food insecurity:     Worry: Not on file     Inability: Not on file    Transportation needs:     Medical: Not on file     Non-medical: Not on file   Tobacco Use    Smoking status: Never Smoker    Smokeless tobacco: Never Used   Substance and Sexual Activity    Alcohol use: No    Drug use: No    Sexual activity: Never     Birth control/protection: Abstinence   Lifestyle    Physical activity:     Days per week: Not on file     Minutes per session: Not on file    Stress: Not on file   Relationships    Social connections:     Talks on phone: Not on file     Gets together: Not on file     Attends Adventism service: Not on file     Active member of club or organization: Not on file     Attends meetings of clubs or organizations: Not on file     Relationship status: Not on file   Other Topics Concern    Not on file   Social History Narrative    Lives at home with mother and father.       OBJECTIVE:     Vital Signs Range (Last 24H):  Temp:  [37 °C (98.6 °F)]   Pulse:  [78]   Resp:  [20]   BP: (112)/(73)   SpO2:  [99 %]       Significant Labs:  Lab Results   Component Value Date    WBC 10.55 04/15/2019    HGB 13.4 04/15/2019    HCT 43.2 04/15/2019     04/15/2019    ALT 39 11/03/2017    AST 19 11/03/2017     04/15/2019    K 3.5 04/15/2019     04/15/2019    CREATININE 0.8 04/15/2019    BUN 12 04/15/2019    CO2 28 04/15/2019    TSH 1.13 05/12/2010       Diagnostic Studies:    EKG:  Vent. Rate : 085 BPM     Atrial Rate : 085 BPM     P-R Int : 150 ms          QRS Dur : 092 ms      QT Int : 344 ms       P-R-T Axes : 044 044 016 degrees     QTc Int : 409 ms    Normal sinus rhythm  When compared with ECG of 07-OCT-2016 13:12,  No significant change was found  Confirmed by Cee Anderson MD ([id number]) on 4/16/2019 8:13:57 AM        ASSESSMENT/PLAN:       Pre-op Assessment    I have reviewed the Patient Summary Reports.      I have reviewed the Medications.      Review of Systems  Anesthesia Hx:  No problems with previous Anesthesia  History of prior surgery of interest to airway management or planning: Denies Family Hx of Anesthesia complications.   Denies Personal Hx of Anesthesia complications.   Social:  Non-Smoker, No Alcohol Use    Hematology/Oncology:     Oncology Normal     EENT/Dental:EENT/Dental Normal   Cardiovascular:  Cardiovascular Normal Exercise tolerance: good     Pulmonary:  Pulmonary Normal  Denies Recent URI.    Renal/:  Renal/ Normal     Hepatic/GI:   Denies GERD.    Neurological:   Seizures, well controlled Patient had syncopal episode with associated loss of tone. EEG in 2016 with spikes concerning for epileptic activity.  Multiple EEG since that time have been normal.  Patient has not been on anti-epileptic medications.    Patient with vasovagal reaction during IV placement.    Patient diagnosed with fibromyalgia   Endocrine:  Endocrine Normal    Psych:   Psychiatric History anxiety depression          Physical Exam  General:  Obesity    Airway/Jaw/Neck:  Airway Findings: Mouth Opening: Normal Tongue: Normal  General Airway Assessment: Adult  Mallampati: I  TM Distance: Normal, at least 6 cm        Eyes/Ears/Nose:  EYES/EARS/NOSE FINDINGS: Normal   Dental:  DENTAL FINDINGS: Normal   Chest/Lungs:  Chest/Lungs Findings: Clear to auscultation, Normal Respiratory Rate     Heart/Vascular:  Heart Findings: Rate: Normal  Rhythm: Regular Rhythm        Mental Status:  Mental Status Findings:  Cooperative, Alert and Oriented         Anesthesia Plan  Type of Anesthesia, risks & benefits discussed:  Anesthesia Type:  general  Patient's Preference:   Intra-op Monitoring Plan: standard ASA monitors  Intra-op Monitoring Plan Comments:   Post Op Pain Control Plan: per primary service following discharge from PACU, IV/PO Opioids PRN and multimodal analgesia  Post Op Pain Control Plan Comments:   Induction:   IV  Beta Blocker:  Patient is not currently on a  Beta-Blocker (No further documentation required).       Informed Consent: Patient understands risks and agrees with Anesthesia plan.  Questions answered. Anesthesia consent signed with patient.  ASA Score: 2     Day of Surgery Review of History & Physical:    H&P update referred to the surgeon.         Ready For Surgery From Anesthesia Perspective.

## 2019-12-19 NOTE — PROGRESS NOTES
Called pharmacy bedside delivery regarding pts prescriptions, stated pt does not have a co-pay and they will be brining them up shortly

## 2019-12-19 NOTE — TELEPHONE ENCOUNTER
Left message regarding scheduling 2 week post op appointment with Dr. House.       ----- Message from Timothy Villalta MD sent at 12/19/2019  2:11 PM CST -----  F/u with Dr. house in clinic in 2 weeks.

## 2019-12-19 NOTE — TRANSFER OF CARE
"Anesthesia Transfer of Care Note    Patient: Denise Mosher    Procedure(s) Performed: Procedure(s) (LRB):  ARTHROSCOPY, KNEE (Right) (Right)  REVISION of MPFL graft (Right)  REMOVAL, HARDWARE righ distal femoral. (Right)  CHONDROPLASTY, KNEE patella (Right)    Patient location: PACU    Anesthesia Type: general    Transport from OR: Transported from OR on 6-10 L/min O2 by face mask with adequate spontaneous ventilation    Post pain: adequate analgesia    Post assessment: no apparent anesthetic complications    Post vital signs: stable    Level of consciousness: sedated    Nausea/Vomiting: no nausea/vomiting    Complications: none    Transfer of care protocol was followed      Last vitals:   Visit Vitals  /73 (BP Location: Right arm, Patient Position: Lying)   Pulse 78   Temp 37 °C (98.6 °F) (Oral)   Resp 20   Ht 5' 0.5" (1.537 m)   Wt 102.1 kg (225 lb)   LMP 10/08/2019 (Within Weeks)   SpO2 99%   Breastfeeding? No   BMI 43.22 kg/m²     "

## 2019-12-19 NOTE — PLAN OF CARE
Patient and patient's parents state they are ready to be discharged. Instructions and prescription given to patient and family. Both verbalize understanding. Patient tolerating po liquids with no difficulty. Patient states pain is at a tolerable level for them. Anesthesia consent and surgical consent in chart upon patient's discharge from [facility].

## 2019-12-19 NOTE — BRIEF OP NOTE
[facility]  Brief Operative Note    Surgery Date: 12/19/2019     Surgeon(s) and Role:     * Ranulfo House MD - Primary     * Timothy Villalta MD - Resident - Assisting        Pre-op Diagnosis:  Patellofemoral pain syndrome of right knee [M22.2X1]  Pain from implanted hardware, subsequent encounter [T85.848D]    Post-op Diagnosis:  Post-Op Diagnosis Codes:     * Patellofemoral pain syndrome of right knee [M22.2X1]     * Pain from implanted hardware, subsequent encounter [T85.848D]    Procedure(s) (LRB):  ARTHROSCOPY, KNEE (Right) (Right)  REVISION of MPFL graft (Right)  REMOVAL, HARDWARE righ distal femoral. (Right)  CHONDROPLASTY, KNEE patella (Right)    Anesthesia: General    Description of the findings of the procedure(s): same    Estimated Blood Loss: * No values recorded between 12/19/2019 12:29 PM and 12/19/2019  2:29 PM *         Specimens:   Specimen (12h ago, onward)    None            Discharge Note    OUTCOME: Patient tolerated treatment/procedure well without complication and is now ready for discharge.    DISPOSITION: Home or Self Care    FINAL DIAGNOSIS:  Painful orthopaedic hardware    FOLLOWUP: In clinic in 2 weeks. Clinic will call to make appt. Patient may shower if she keeps dressing dry. Patient can curry dressing in 1 week.

## 2019-12-20 NOTE — ANESTHESIA POSTPROCEDURE EVALUATION
Anesthesia Post Evaluation    Patient: Denise Mosher    Procedure(s) Performed: Procedure(s) (LRB):  ARTHROSCOPY, KNEE (Right) (Right)  REVISION of MPFL graft (Right)  REMOVAL, HARDWARE righ distal femoral. (Right)  CHONDROPLASTY, KNEE patella (Right)    Final Anesthesia Type: general    Patient location during evaluation: PACU  Patient participation: Yes- Able to Participate  Level of consciousness: awake and alert and oriented  Post-procedure vital signs: reviewed and stable  Pain management: adequate  Airway patency: patent    PONV status at discharge: No PONV  Anesthetic complications: no      Cardiovascular status: blood pressure returned to baseline  Respiratory status: unassisted  Hydration status: euvolemic  Follow-up not needed.          Vitals Value Taken Time   /59 12/19/2019  4:37 PM   Temp 36.6 °C (97.9 °F) 12/19/2019  4:30 PM   Pulse 84 12/19/2019  4:40 PM   Resp 14 12/19/2019  4:34 PM   SpO2 100 % 12/19/2019  4:40 PM   Vitals shown include unvalidated device data.      No case tracking events are documented in the log.      Pain/Fabiola Score: Pain Rating Prior to Med Admin: 5 (12/19/2019  4:37 PM)  Fabiola Score: 9 (12/19/2019  4:15 PM)

## 2019-12-23 ENCOUNTER — TELEPHONE (OUTPATIENT)
Dept: ORTHOPEDICS | Facility: CLINIC | Age: 19
End: 2019-12-23

## 2019-12-23 NOTE — TELEPHONE ENCOUNTER
Vicki Jerry NP spoke with patient regarding post op questions.     ----- Message from Minda Yates sent at 12/23/2019  1:44 PM CST -----  Contact: Self  Pt is needing a call back with post op questions @ [phone]

## 2019-12-25 DIAGNOSIS — M22.41 PATELLA, CHONDROMALACIA, RIGHT: Primary | ICD-10-CM

## 2019-12-26 DIAGNOSIS — M22.41 PATELLA, CHONDROMALACIA, RIGHT: Primary | ICD-10-CM

## 2019-12-26 DIAGNOSIS — G89.18 ACUTE POST-OPERATIVE PAIN: ICD-10-CM

## 2019-12-26 NOTE — OP NOTE
Surgery Date: 12/19/2019      Surgeon(s) and Role:     * Ranulfo House MD - Primary     * Timothy Villalta MD - Resident - Assisting           Pre-op Diagnosis:    1. Patellofemoral pain syndrome of right knee [M22.2X1]  2. Pain from implanted hardware, subsequent encounter [T85.848D]  3. Patellar instability, right knee     Post-op Diagnosis:    1. Patellofemoral pain syndrome of right knee [M22.2X1]  2. Pain from implanted hardware, subsequent encounter [T85.848D]  3. Patellar instability, right knee     Procedure(s) (LRB):  ARTHROSCOPY, KNEE (Right) (Right)  REVISION of medial patellofemoral ligament graft (Right)  REMOVAL, HARDWARE right distal femoral. (Right)  CHONDROPLASTY, KNEE patella (Right)     Anesthesia: General     Description of the findings of the procedure(s): same     Estimated Blood Loss: 10 mL         Specimens:       Specimen (12h ago, onward)     None     Patient is a 19-year-old female who previously had undergone a right distal femoral osteotomy with plate fixation as well as medial patellofemoral ligament reconstruction.  She subsequently has had significant pain in her right knee that was felt to be due to both the implanted hardware as well as a tight graft.  Therefore recommendation was made for the above-mentioned surgeries.  Risks benefits alternatives of the surgery were explained to the patient and informed consent was obtained. The date of surgery she presented to the preop holding area and the right lower extremity was marked.  She was brought to the operating room positioned supine on the operating room table. General anesthesia was administered and IV antibiotics were given. A formal time-out was performed ensuring the correct patient correct procedure and the correct operative site. The right lower extremity was prepped and draped in the usual sterile manner. We then started with the arthroscopy.  Incision was made in the lateral parapatellar arthroscopy site and the  arthroscope was inserted into the joint.  Diagnostic arthroscopy was performed. The patella was noted to have grade 2 chondromalacia.  The medial patellofemoral ligament graft was noted to be tight.  The remainder of the joint was clear of any damage.  This included the ACL, the medial joint cartilage, the medial meniscus, the lateral cartilage, and the lateral meniscus.  The patella was noted to be stable. The patella was debrided to a stable base.  Then the arthroscope was removed from the joint and incision was made over the anterior aspect of the patella.  Dissection was carried down to the MPFL graft.  It was released. This allowed for more natural motion of the patella. However the patella was not unstable after the graft was released.  Therefore removed ahead with the hardware removal.  Lateral approach to the distal femur was utilized.  Incision was made and dissection was carried down to the ITB band which was incised.  The vastus lateralis was dissected off of the plate and then the plate and screws were removed without difficulty.  The wound was well irrigated and then the fascia was closed with 0 Vicryl followed by 3 O Vicryl in the subcutaneous tissue and for Monocryl in the skin. Dermabond was placed followed by sterile dressings. Patient was then awakened from anesthesia and transferred off the operating room table. She was transferred to the recovery room in stable condition. She will weightbear as tolerated and work with physical therapy.  She will follow up in clinic in 2 weeks.

## 2019-12-30 ENCOUNTER — TELEPHONE (OUTPATIENT)
Dept: ORTHOPEDICS | Facility: CLINIC | Age: 19
End: 2019-12-30

## 2019-12-30 ENCOUNTER — NURSE TRIAGE (OUTPATIENT)
Dept: ADMINISTRATIVE | Facility: CLINIC | Age: 19
End: 2019-12-30

## 2019-12-30 NOTE — TELEPHONE ENCOUNTER
Patient declined first available appointment. scheduled patient on 1/10/2020 @ 11:15AM. Patient verbalized understanding.     ----- Message from Rohit Leiva sent at 12/30/2019  3:30 PM CST -----  Contact: pt   Please call pt at [phone]    Patient is requesting a post op appt (no problems)    Thank you

## 2019-12-31 ENCOUNTER — TELEPHONE (OUTPATIENT)
Dept: ORTHOPEDICS | Facility: CLINIC | Age: 19
End: 2019-12-31

## 2019-12-31 NOTE — TELEPHONE ENCOUNTER
Pt states she had surgery on RLE on 12/19/19 and pt has questions regarding showering. Pt advised per protocol to call office tomorrow and pt verbalizes understanding.     Reason for Disposition   [1] Caller requesting NON-URGENT health information AND [2] PCP's office is the best resource    Additional Information   Negative: RN needs further essential information from caller in order to complete triage   Negative: Requesting regular office appointment    Protocols used: INFORMATION ONLY CALL-A-AH

## 2020-01-08 ENCOUNTER — CLINICAL SUPPORT (OUTPATIENT)
Dept: REHABILITATION | Facility: HOSPITAL | Age: 20
End: 2020-01-08
Attending: ORTHOPAEDIC SURGERY
Payer: MEDICAID

## 2020-01-08 DIAGNOSIS — R29.898 WEAKNESS OF RIGHT LOWER EXTREMITY: ICD-10-CM

## 2020-01-08 DIAGNOSIS — M25.661 DECREASED RANGE OF MOTION OF RIGHT KNEE: Primary | ICD-10-CM

## 2020-01-08 PROCEDURE — 97110 THERAPEUTIC EXERCISES: CPT

## 2020-01-08 PROCEDURE — 97162 PT EVAL MOD COMPLEX 30 MIN: CPT

## 2020-01-08 NOTE — PROGRESS NOTES
Please see the below listed initial evaluation and POC. Thank you for your consult.    Jose Enrique Shepard, PT, DPT

## 2020-01-09 NOTE — PLAN OF CARE
"[facility]  Physical Therapy Initial Evaluation    Name: Denise Mosher  Clinic Number: [id number]    Therapy Diagnosis:   Encounter Diagnoses   Name Primary?    Decreased range of motion of right knee Yes    Weakness of right lower extremity      Physician: Ranulfo House MD    Physician Orders: PT Eval and Treat  Medical Diagnosis from Referral: M22.41 (ICD-10-CM) - Patella, chondromalacia, right   Evaluation Date: 1/8/2020  Authorization Period Expiration: pending  Plan of Care Expiration: 4/3/2020  Visit # / Visits authorized: 1/1    Time In: 1700  Time Out: 1810  Total Billable Time: 60 minutes    Precautions: Standard   S/p R knee arthroscopy with MPFL revision, right distal femoral hardward removal, and patellar chondroplasty on 12/19/2019    Subjective   Date of onset: surgery on 12/19/2019  History of current condition - Denise reports that she has had several surgeries for her R knee over the past few years with continued recurrent symptoms at varying intensities. Pt states that mostly recently she had a plate and MPFL reconstruction at the end of 2018 w/ good improvement but gradual recurrence of symptoms. Pt states she underwent knee scope on 12/19/2019 to "loosen" MPFL and remove plate. Pt states since surgery she has not been doing any rehabilitation. Pt states she will return to Dr. House for 3 week post-op appt on Friday.     Medical History:   Past Medical History:   Diagnosis Date    Anxiety     reports panic attacks    Depression     Fibromyalgia     per pt    Insomnia     Precocious female puberty     Seizures     reports 2 incidents of possible seizures while giving blood    Syncope and collapse     Wrist fracture, bilateral        Surgical History:   Denise Mosher  has a past surgical history that includes Knee surgery (Left, 2/16/15); Toe Surgery (Right); Inguinal hernia repair (Left); Hemangioma excision; chip; Femur Osteotomy (Right, " 12/13/2018); Knee joint manipulation (Right, 7/5/2019); Injection of steroid (Right, 7/5/2019); Arthroscopy of knee (Right, 12/19/2019); Hardware Removal (Right, 12/19/2019); and Chondroplasty of knee (Right, 12/19/2019).    Medications:   Denise OSBORN has a current medication list which includes the following prescription(s): aspirin, hydrocodone-acetaminophen, ibuprofen, norethindrone, and ondansetron, and the following Facility-Administered Medications: fentanyl and midazolam.    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[employer]  Prior Level of Function: Previously indep w/ community ambulation, ADLs, work, and recreation  Current Level of Function: Currently limited in community ambulation and ADLs    Pain:  Current 0/10, worst 8/10, best 0/10   Location: right knee   Description: Aching, Dull and Throbbing  Aggravating Factors: Standing and Walking  Easing Factors: ice, rest, elevation and knee flexion    Pts goals: To return to unrestricted community ambulation and ADLs at prior level of function    Objective     Observation: no acute distress; ambulating WBAT w/o AD w/ some noted antalgia w/ gait pattern    Posture: WNL    Joint Mobility: decreased at tibiofemoral and proximal tibiofibular joints; difficult to fully assess 2/2 to symptoms    Palpation: significant tenderness to palpation throughout R knee joint, even w/ light touch; likely some degree of hypersensitivity from repeated surgeries/trauma    Sensation: diminished around surgical incision sites and portals    Flexibility: decreased in R quadriceps    Edema: mod joint effusion present in R knee    Range of Motion:   Knee Left active Left Passive Right Active R passive   Flexion 135 135 50 50   Extension 5 55 5 5     Lower Extremity Strength: not assessed 2/2 post-op  "status    Special Tests: not indicated 2/2 post-op status    Step down test: NT    Function:  - SL squat L: NT  - SL squat R: NT  - BW Squat: NT  - SL balance (L): >30"  - SL balance (R): >30"    CMS Impairment/Limitation/Restriction for FOTO Knee Survey    Therapist reviewed FOTO scores for Denise Mosher on 1/8/2020.   FOTO documents entered into "Mobile Location, IP" - see Media section.    Limitation Score: 43%  Category: Mobility       TREATMENT   Treatment Time In: 1728  Treatment Time Out: 1804  Total Treatment time separate from Evaluation: 36 minutes    Denise received therapeutic exercises to develop strength, endurance, ROM and flexibility for 10 minutes including:  - Heel slides w/ strap x20  - Knee flexion PROM w/ PT    Denise participated in neuromuscular re-education activities to improve: NM activation for 11 minutes. The following activities were included:  - Quad set w/ towel roll under heel 20x5"  - Short arc quad 15x5"    Denise participated in gait training to improve functional mobility and safety for 5 minutes, including:  - Linear gait w/o AD x8 trips    Denise received cold pack for 10 minutes to R knee.    All units billed as TE as per Medicaid guidelines    Home Exercises and Patient Education Provided    Education provided:   - Role of PT, PT POC, importance of restoring quad activation and knee ROM, normalization of gait mechanics, post-op timeline    Written Home Exercises Provided: yes.  Exercises were reviewed and Denise was able to demonstrate them prior to the end of the session.  Denise demonstrated good  understanding of the education provided.     See EMR under Patient Instructions for exercises provided 1/8/2020.    Assessment   Denise OSBORN is a 19 y.o. female referred to outpatient physical therapy with a medical diagnosis of R patella chondromalacia and is s/p R knee arthroscopy on 12/19/2019. Physical exam is consistent with med dx and post-op timeline. This patient's primary " impairments include decreased RLE quad activation/strength, decreased R knee flexion ROM, altered gait mechanics, hypersensitivity of R knee w/ palpation and light touch, and decreased activity tolerance. Pt presentation fairly complex d/t repeated surgeries over past several years and has seemingly developed some hypersensitivity throughout the R knee joint over time. Pt w/ poor tolerance to even light touch at this time. Poor tolerance to passive knee flexion, but improved w/ repetition. Good integration of gait cueing from previous therapy episodes and pt has been attempting to normalize mechanics since surgery. Almost able to achieve good SLR on this date but still w/ slight extensor lag.     Pt prognosis is Fair.   Pt will benefit from skilled outpatient physical therapy to address the deficits listed above and in the chart below, provide pt/family education, and to maximize pt's level of independence.     Plan of care discussed with patient: Yes  Pt's spiritual, cultural and educational needs considered and patient is agreeable to the plan of care and goals as stated below:     Anticipated Barriers for therapy: exercise tolerance    Medical Necessity is demonstrated by the following  History  Co-morbidities and personal factors that may impact the plan of care Co-morbidities:   altered mental status, anxiety, psychogenic nonepileptic seizure, incontinence, morbid obesity    Personal Factors:   social background  lifestyle     moderate   Examination  Body Structures and Functions, activity limitations and participation restrictions that may impact the plan of care Body Regions:   lower extremities    Body Systems:    gross symmetry  ROM  strength  balance  gait  transfers  motor control  edema  scar formation    Participation Restrictions:   Driving, community amb, work, exercise    Activity limitations:   Learning and applying knowledge  no deficits    General Tasks and Commands  no  deficits    Communication  no deficits    Mobility  lifting and carrying objects  walking  driving (bike, car, motorcycle)    Self care  washing oneself (bathing, drying, washing hands)    Domestic Life  shopping  doing house work (cleaning house, washing dishes, laundry)    Interactions/Relationships  no deficits    Life Areas  higher education    Community and Social Life  no deficits         moderate   Clinical Presentation evolving clinical presentation with changing clinical characteristics moderate   Decision Making/ Complexity Score: moderate     Goals:  Short Term Goals (2-4 Weeks):   1) Pt will demonstrate compliance with initial home exercise program as prescribed by physical therapist to improve independence with management of condition.  2) Pt to improve active range of motion in R knee flexion to at least 105 degrees to allow for improved functional mobility.  3) Pt to report R knee pain of <5/10 at worst to improve tolerance to ADLs and therapy.    Long Term Goals (12 Weeks):   1( Pt to achieve <40% limitation as measured by the FOTO to demonstrate decreased disability.  2) Pt to improve active range of motion in R knee flexion to at least 120 degrees to allow for improved functional mobility.  3) Pt to report R knee pain of <2/10 at worst to improve tolerance to ADLs and therapy.    Plan   Plan of care Certification: 1/8/2020 to 4/3/2020    Outpatient Physical Therapy 1-2 times weekly for 10-12 weeks to include the following interventions: Electrical Stimulation NMES, Gait Training, Manual Therapy, Moist Heat/ Ice, Neuromuscular Re-ed, Patient Education, Self Care, Therapeutic Activites and Therapeutic Exercise.     Jose Enrique Shepard, PT

## 2020-01-10 ENCOUNTER — OFFICE VISIT (OUTPATIENT)
Dept: ORTHOPEDICS | Facility: CLINIC | Age: 20
End: 2020-01-10
Payer: MEDICAID

## 2020-01-10 VITALS — BODY MASS INDEX: 42.49 KG/M2 | HEIGHT: 61 IN | WEIGHT: 225.06 LBS

## 2020-01-10 DIAGNOSIS — T85.848D PAIN FROM IMPLANTED HARDWARE, SUBSEQUENT ENCOUNTER: Primary | ICD-10-CM

## 2020-01-10 DIAGNOSIS — M22.2X1 PATELLOFEMORAL PAIN SYNDROME OF RIGHT KNEE: ICD-10-CM

## 2020-01-10 PROCEDURE — 99024 POSTOP FOLLOW-UP VISIT: CPT | Mod: ,,, | Performed by: ORTHOPAEDIC SURGERY

## 2020-01-10 PROCEDURE — 99212 OFFICE O/P EST SF 10 MIN: CPT | Mod: PBBFAC | Performed by: ORTHOPAEDIC SURGERY

## 2020-01-10 PROCEDURE — 99999 PR PBB SHADOW E&M-EST. PATIENT-LVL II: ICD-10-PCS | Mod: PBBFAC,,, | Performed by: ORTHOPAEDIC SURGERY

## 2020-01-10 PROCEDURE — 99999 PR PBB SHADOW E&M-EST. PATIENT-LVL II: CPT | Mod: PBBFAC,,, | Performed by: ORTHOPAEDIC SURGERY

## 2020-01-10 PROCEDURE — 99024 PR POST-OP FOLLOW-UP VISIT: ICD-10-PCS | Mod: ,,, | Performed by: ORTHOPAEDIC SURGERY

## 2020-01-12 NOTE — PROGRESS NOTES
CC: Post-op    HPI: Denise Mosher is now 3 weeks post-op following   Surgery Date: 12/19/2019      Surgeon(s) and Role:     * Ranulfo House MD - Primary     * Timothy Villalta MD - Resident - Assisting           Pre-op Diagnosis:    1. Patellofemoral pain syndrome of right knee [M22.2X1]  2. Pain from implanted hardware, subsequent encounter [T85.848D]  3. Patellar instability, right knee     Post-op Diagnosis:    1. Patellofemoral pain syndrome of right knee [M22.2X1]  2. Pain from implanted hardware, subsequent encounter [T85.848D]  3. Patellar instability, right knee     Procedure(s) (LRB):  ARTHROSCOPY, KNEE (Right) (Right)  REVISION of medial patellofemoral ligament graft (Right)  REMOVAL, HARDWARE right distal femoral. (Right)  CHONDROPLASTY, KNEE patella (Right).  Doing well, no complaints.    PE: Incisions well-healed with no sign of infection.  Well-perfused, neurovascularly intact distally.  ROM 0-90 deg.  +quad.    Clinical decision-making: Doing well.  Work with PT, weight-bearing as tolerated, follow up in 3 weeks, AP and lateral x-ray of right knee.

## 2020-01-15 ENCOUNTER — CLINICAL SUPPORT (OUTPATIENT)
Dept: REHABILITATION | Facility: HOSPITAL | Age: 20
End: 2020-01-15
Attending: ORTHOPAEDIC SURGERY
Payer: MEDICAID

## 2020-01-15 DIAGNOSIS — M25.661 DECREASED RANGE OF MOTION OF RIGHT KNEE: ICD-10-CM

## 2020-01-15 DIAGNOSIS — R29.898 WEAKNESS OF RIGHT LOWER EXTREMITY: ICD-10-CM

## 2020-01-15 PROCEDURE — 97110 THERAPEUTIC EXERCISES: CPT

## 2020-01-15 NOTE — PROGRESS NOTES
"Physical Therapy Daily Treatment Note     Name: Denise Mosher  Clinic Number: [id number]    Therapy Diagnosis:   Encounter Diagnoses   Name Primary?    Weakness of right lower extremity     Decreased range of motion of right knee      Physician: Ranulfo House MD    Visit Date: 1/15/2020  Physician Orders: PT Eval and Treat  Medical Diagnosis from Referral: M22.41 (ICD-10-CM) - Patella, chondromalacia, right   Evaluation Date: 1/8/2020  Authorization Period Expiration: pending  Plan of Care Expiration: 4/3/2020  Visit # / Visits authorized: 2    Time In: 1652  Time Out: 1807  Total Billable Time: 30 minutes    Precautions: Standard   S/p R knee arthroscopy with MPFL revision, right distal femoral hardward removal, and patellar chondroplasty on 12/19/2019    Subjective     Pt reports: that she was doing ok but then had a very active day on Sunday and couldn't do anything for 2 days. States she's been doing quad sets but hasn't been bending. Saw Dr. House last week w/ no issues reported.  She was not compliant with home exercise program.  Response to previous treatment: no adverse effects  Functional change: none noted    Pain: 2/10  Location: right knee      Objective     Knee ROM: 5-0-95    Denise received therapeutic exercises to develop strength, endurance, ROM and flexibility for 25 minutes including:  - Heel slides w/ strap x30  - Knee flexion PROM w/ PT  - Seated knee flexion edge of table     Denise participated in neuromuscular re-education activities to improve: NM activation for 30 minutes. The following activities were included:  - Quad set x30  - Supine SLR flexion 20x5"  - Seated LAQ 20x5"  - Shuttle SL press 2x20 (.5 straps)    Denise participated in gait training to improve functional mobility and safety for 5 minutes, including:  - Linear gait w/o AD x8 trips     Denise received cold pack for 10 minutes to R knee.     All units billed as TE as per Medicaid guidelines    Home Exercises " Provided and Patient Education Provided     Education provided:   - Role of PT, PT POC, importance of restoring quad activation and knee ROM, normalization of gait mechanics, post-op timeline    Written Home Exercises Provided: Patient instructed to cont prior HEP.  Exercises were reviewed and Denise was able to demonstrate them prior to the end of the session.  Denise demonstrated good  understanding of the education provided.     See EMR under Patient Instructions for exercises provided prior visit.    Assessment     Pt w/ good progress on this date w/ regards to quad activation and knee flexion ROM. Able to achieve ~95 deg flexion today w/ good intra-session improvements. Improved tolerance to loading of R knee w/ ability to complete SLR flexion today for multiple sets/reps. Still ambulating w/ antalgic gait pattern upon arrival, but normalized after some activation during session. Able to ambulate w/ mostly normal mechanics during gait training.  Denise is progressing well towards her goals.   Pt prognosis is Fair.     Pt will continue to benefit from skilled outpatient physical therapy to address the deficits listed in the problem list box on initial evaluation, provide pt/family education and to maximize pt's level of independence in the home and community environment.     Pt's spiritual, cultural and educational needs considered and pt agreeable to plan of care and goals.    Anticipated barriers to physical therapy: none    Goals:  Short Term Goals (2-4 Weeks):   1) Pt will demonstrate compliance with initial home exercise program as prescribed by physical therapist to improve independence with management of condition. Progressing, not met  2) Pt to improve active range of motion in R knee flexion to at least 105 degrees to allow for improved functional mobility. Progressing, not met  3) Pt to report R knee pain of <5/10 at worst to improve tolerance to ADLs and therapy. Progressing, not met     Long  Term Goals (12 Weeks):   1( Pt to achieve <40% limitation as measured by the FOTO to demonstrate decreased disability. Progressing, not met  2) Pt to improve active range of motion in R knee flexion to at least 120 degrees to allow for improved functional mobility. Progressing, not met  3) Pt to report R knee pain of <2/10 at worst to improve tolerance to ADLs and therapy. Progressing, not met    Plan     Continue w/ current POC emphasizing restoration of R knee ROM, quad strength, gait mechanics, and activity tolerance    Plan of care Certification: 1/8/2020 to 4/3/2020  Outpatient Physical Therapy 1-2 times weekly for 10-12 weeks to include the following interventions: Electrical Stimulation NMES, Gait Training, Manual Therapy, Moist Heat/ Ice, Neuromuscular Re-ed, Patient Education, Self Care, Therapeutic Activites and Therapeutic Exercise.     Jose Enrique Shepard, PT

## 2020-01-23 ENCOUNTER — CLINICAL SUPPORT (OUTPATIENT)
Dept: REHABILITATION | Facility: HOSPITAL | Age: 20
End: 2020-01-23
Attending: ORTHOPAEDIC SURGERY
Payer: MEDICAID

## 2020-01-23 DIAGNOSIS — M25.661 DECREASED RANGE OF MOTION OF RIGHT KNEE: ICD-10-CM

## 2020-01-23 DIAGNOSIS — R29.898 WEAKNESS OF RIGHT LOWER EXTREMITY: ICD-10-CM

## 2020-01-23 PROCEDURE — 97110 THERAPEUTIC EXERCISES: CPT

## 2020-01-23 NOTE — PROGRESS NOTES
"Physical Therapy Daily Treatment Note     Name: Denise Mosher  Clinic Number: [id number]    Therapy Diagnosis:   Encounter Diagnoses   Name Primary?    Weakness of right lower extremity     Decreased range of motion of right knee      Physician: Ranulfo House MD    Visit Date: 1/23/2020  Physician Orders: PT Eval and Treat  Medical Diagnosis from Referral: M22.41 (ICD-10-CM) - Patella, chondromalacia, right   Evaluation Date: 1/8/2020  Authorization Period Expiration: 4/15/2020  Plan of Care Expiration: 4/3/2020  Visit # / Visits authorized: 2/24  Total visits: 3    Time In: 1700  Time Out: 1810  Total Billable Time: 30 minutes    Precautions: Standard   S/p R knee arthroscopy with MPFL revision, right distal femoral hardward removal, and patellar chondroplasty on 12/19/2019    Subjective     Pt reports: that she's doing ok and has been working on bending at school. States walking feels pretty normal.  She was not compliant with home exercise program.  Response to previous treatment: no adverse effects  Functional change: none noted    Pain: 1/10  Location: right knee      Objective     Knee ROM: 5-0-105    Denise received therapeutic exercises to develop strength, endurance, ROM and flexibility for 30 minutes including:  - Recumbent bike x10' for knee ROM  - Heel slides w/ strap x30  - Knee flexion PROM w/ PT  - Seated knee flexion edge of table     Denise participated in neuromuscular re-education activities to improve: NM activation for 30 minutes. The following activities were included:  - Quad set x30  - Supine SLR flexion 20x5"  - Seated LAQ 20x5"  - Shuttle SL press 2x20 (.5 straps) np  - TRX sit to stand 2x10    Denise participated in gait training to improve functional mobility and safety for 0 minutes, including:     Denise received cold pack for 10 minutes to R knee. (not charged)     All units billed as TE as per Medicaid guidelines    Home Exercises Provided and Patient Education " Provided     Education provided:   - Role of PT, PT POC, importance of restoring quad activation and knee ROM, normalization of gait mechanics, post-op timeline    Written Home Exercises Provided: Patient instructed to cont prior HEP.  Exercises were reviewed and Denise was able to demonstrate them prior to the end of the session.  Denise demonstrated good  understanding of the education provided.     See EMR under Patient Instructions for exercises provided prior visit.    Assessment     Despite not presented to clinic in over a week, pt demonstrates good improvements in knee flexion ROM today to ~105 deg seated at edge of table. Still w/ reduced knee ext fatigue resistance, but w/ excellent terminal knee extension activation w/ SLR and LAQ. Still w/ apprehension loading in CKC< but improving w/ repetition. 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    Denise is progressing well towards her goals.   Pt prognosis is Fair.     Pt will continue to benefit from skilled outpatient physical therapy to address the deficits listed in the problem list box on initial evaluation, provide pt/family education and to maximize pt's level of independence in the home and community environment.     Pt's spiritual, cultural and educational needs considered and pt agreeable to plan of care and goals.    Anticipated barriers to physical therapy: none    Goals:  Short Term Goals (2-4 Weeks):   1) Pt will demonstrate compliance with initial home exercise program as prescribed by physical therapist to improve independence with management of condition. Progressing, not met  2) Pt to improve active range of motion in R knee flexion to at least 105 degrees to allow for improved functional mobility. Progressing, not met  3) Pt to report R knee pain of <5/10 at worst to improve tolerance to ADLs and therapy. Progressing, not met     Long Term Goals (12 Weeks):   1( Pt to achieve <40% limitation as  measured by the FOTO to demonstrate decreased disability. Progressing, not met  2) Pt to improve active range of motion in R knee flexion to at least 120 degrees to allow for improved functional mobility. Progressing, not met  3) Pt to report R knee pain of <2/10 at worst to improve tolerance to ADLs and therapy. Progressing, not met    Plan     Continue w/ current POC emphasizing restoration of R knee ROM, quad strength, gait mechanics, and activity tolerance    Plan of care Certification: 1/8/2020 to 4/3/2020  Outpatient Physical Therapy 1-2 times weekly for 10-12 weeks to include the following interventions: Electrical Stimulation NMES, Gait Training, Manual Therapy, Moist Heat/ Ice, Neuromuscular Re-ed, Patient Education, Self Care, Therapeutic Activites and Therapeutic Exercise.     Jose Enrique Shepard, PT

## 2020-01-30 ENCOUNTER — CLINICAL SUPPORT (OUTPATIENT)
Dept: REHABILITATION | Facility: HOSPITAL | Age: 20
End: 2020-01-30
Attending: ORTHOPAEDIC SURGERY
Payer: MEDICAID

## 2020-01-30 DIAGNOSIS — R29.898 WEAKNESS OF RIGHT LOWER EXTREMITY: ICD-10-CM

## 2020-01-30 DIAGNOSIS — M25.661 DECREASED RANGE OF MOTION OF RIGHT KNEE: ICD-10-CM

## 2020-01-30 PROCEDURE — 97110 THERAPEUTIC EXERCISES: CPT

## 2020-01-30 NOTE — PROGRESS NOTES
"Physical Therapy Daily Treatment Note     Name: Denise Mosher  Clinic Number: [id number]    Therapy Diagnosis:   No diagnosis found.  Physician: Ranulfo House MD    Visit Date: 1/30/2020  Physician Orders: PT Eval and Treat  Medical Diagnosis from Referral: M22.41 (ICD-10-CM) - Patella, chondromalacia, right   Evaluation Date: 1/8/2020  Authorization Period Expiration: 4/15/2020  Plan of Care Expiration: 4/3/2020  Visit # / Visits authorized: 3/24  Total visits: 4    Time In: ***  Time Out: ***  Total Billable Time: *** minutes    Precautions: Standard   S/p R knee arthroscopy with MPFL revision, right distal femoral hardward removal, and patellar chondroplasty on 12/19/2019    Subjective     Pt reports: ***  She was not compliant with home exercise program.  Response to previous treatment: no adverse effects  Functional change: none noted    Pain: 1/10  Location: right knee      Objective     Knee ROM: 5-0-105 ***    Denise received therapeutic exercises to develop strength, endurance, ROM and flexibility for *** minutes including:  -  Recumbent bike x10' for knee ROM  -  Heel slides w/ strap x30  -  Knee flexion PROM w/ PT  -  Seated knee flexion edge of table     Denise participated in neuromuscular re-education activities to improve: NM activation for *** minutes. The following activities were included:  -  Quad set x30  -  Supine SLR flexion 20x5"  -  Seated LAQ 20x5"  -  Shuttle SL press 2x20 (.5 straps) np  -  TRX sit to stand 2x10    Denise participated in gait training to improve functional mobility and safety for 0 minutes, including:     Denise received cold pack for *** minutes to R knee. (not charged)     All units billed as TE as per Medicaid guidelines    Home Exercises Provided and Patient Education Provided     Education provided:   - Role of PT, PT POC, importance of restoring quad activation and knee ROM, normalization of gait mechanics, post-op timeline    Written Home Exercises " Provided: Patient instructed to cont prior HEP.  Exercises were reviewed and Denise was able to demonstrate them prior to the end of the session.  Denise demonstrated good  understanding of the education provided.     See EMR under Patient Instructions for exercises provided prior visit.    Assessment     ***    Denise is progressing well towards her goals.   Pt prognosis is Fair.     Pt will continue to benefit from skilled outpatient physical therapy to address the deficits listed in the problem list box on initial evaluation, provide pt/family education and to maximize pt's level of independence in the home and community environment.     Pt's spiritual, cultural and educational needs considered and pt agreeable to plan of care and goals.    Anticipated barriers to physical therapy: none    Goals:  Short Term Goals (2-4 Weeks):   1) Pt will demonstrate compliance with initial home exercise program as prescribed by physical therapist to improve independence with management of condition. Progressing, not met  2) Pt to improve active range of motion in R knee flexion to at least 105 degrees to allow for improved functional mobility. met  3) Pt to report R knee pain of <5/10 at worst to improve tolerance to ADLs and therapy. Progressing, not met     Long Term Goals (12 Weeks):   1( Pt to achieve <40% limitation as measured by the FOTO to demonstrate decreased disability. Progressing, not met  2) Pt to improve active range of motion in R knee flexion to at least 120 degrees to allow for improved functional mobility. Progressing, not met  3) Pt to report R knee pain of <2/10 at worst to improve tolerance to ADLs and therapy. Progressing, not met    Plan     Continue w/ current POC emphasizing restoration of R knee ROM, quad strength, gait mechanics, and activity tolerance    Plan of care Certification: 1/8/2020 to 4/3/2020  Outpatient Physical Therapy 1-2 times weekly for 10-12 weeks to include the following  interventions: Electrical Stimulation NMES, Gait Training, Manual Therapy, Moist Heat/ Ice, Neuromuscular Re-ed, Patient Education, Self Care, Therapeutic Activites and Therapeutic Exercise.     Jose Enrique Shepard PT

## 2020-01-30 NOTE — PROGRESS NOTES
"Physical Therapy Daily Treatment Note     Name: Denise Mosher  Clinic Number: [id number]    Therapy Diagnosis:   Encounter Diagnoses   Name Primary?    Weakness of right lower extremity     Decreased range of motion of right knee      Physician: Ranulfo House MD    Visit Date: 1/30/2020  Physician Orders: PT Eval and Treat  Medical Diagnosis from Referral: M22.41 (ICD-10-CM) - Patella, chondromalacia, right   Evaluation Date: 1/8/2020  Authorization Period Expiration: 4/15/2020  Plan of Care Expiration: 4/3/2020  Visit # / Visits authorized: 3/24  Total visits: 4    Time In: 1715  Time Out: 1800  Total Billable Time: 45 minutes    Precautions: Standard   S/p R knee arthroscopy with MPFL revision, right distal femoral hardward removal, and patellar chondroplasty on 12/19/2019    Subjective     Pt reports: pt arrives late due to thinking her appt was at a different time. She is frustrated that she did not get a notification about the change of provider.   She was not compliant with home exercise program.  Response to previous treatment: no adverse effects  Functional change: none noted    Pain: 1/10  Location: right knee      Objective     Knee ROM: 5-0-110    Denise received therapeutic exercises to develop strength, endurance, ROM and flexibility for 15 minutes including:  - Recumbent bike x5' for knee ROM  - Heel slides w/ strap x30- NP  - Knee flexion PROM w/ PT  - Seated knee flexion edge of table     Denise participated in neuromuscular re-education activities to improve: NM activation for 30 minutes. The following activities were included:  - Quad set x30  - Supine SLR flexion 20x5"  - Seated LAQ 20x5"  - Step up 6" step x10   - Shuttle SL press 2x20 (1.5 straps)   - Lateral band walking red theraband around knees x2 laps  - TRX sit to stand 2x10    Denise participated in gait training to improve functional mobility and safety for 0 minutes, including:     Denise received cold pack for 10 " minutes to R knee. (not charged)     All units billed as TE as per Medicaid guidelines    Home Exercises Provided and Patient Education Provided     Education provided:   - Role of PT, PT POC, importance of restoring quad activation and knee ROM, normalization of gait mechanics, post-op timeline    Written Home Exercises Provided: Patient instructed to cont prior HEP.  Exercises were reviewed and Denise was able to demonstrate them prior to the end of the session.  Denise demonstrated good  understanding of the education provided.     See EMR under Patient Instructions for exercises provided prior visit.    Assessment     Pt is happy that she is able to sit cross-legged during session. Training effect easily achieved with hip and core musculature. Initial hesitancy with step ups, but quad control and form improved with repetitions.     Denise is progressing well towards her goals.   Pt prognosis is Fair.     Pt will continue to benefit from skilled outpatient physical therapy to address the deficits listed in the problem list box on initial evaluation, provide pt/family education and to maximize pt's level of independence in the home and community environment.     Pt's spiritual, cultural and educational needs considered and pt agreeable to plan of care and goals.    Anticipated barriers to physical therapy: none    Goals:  Short Term Goals (2-4 Weeks):   1) Pt will demonstrate compliance with initial home exercise program as prescribed by physical therapist to improve independence with management of condition. Progressing, not met  2) Pt to improve active range of motion in R knee flexion to at least 105 degrees to allow for improved functional mobility. Progressing, not met  3) Pt to report R knee pain of <5/10 at worst to improve tolerance to ADLs and therapy. Progressing, not met     Long Term Goals (12 Weeks):   1( Pt to achieve <40% limitation as measured by the FOTO to demonstrate decreased disability.  Progressing, not met  2) Pt to improve active range of motion in R knee flexion to at least 120 degrees to allow for improved functional mobility. Progressing, not met  3) Pt to report R knee pain of <2/10 at worst to improve tolerance to ADLs and therapy. Progressing, not met    Plan     Continue w/ current POC emphasizing restoration of R knee ROM, quad strength, gait mechanics, and activity tolerance    Plan of care Certification: 1/8/2020 to 4/3/2020  Outpatient Physical Therapy 1-2 times weekly for 10-12 weeks to include the following interventions: Electrical Stimulation NMES, Gait Training, Manual Therapy, Moist Heat/ Ice, Neuromuscular Re-ed, Patient Education, Self Care, Therapeutic Activites and Therapeutic Exercise.     Anny Maradiaga, PT

## 2020-01-31 ENCOUNTER — HOSPITAL ENCOUNTER (OUTPATIENT)
Dept: RADIOLOGY | Facility: HOSPITAL | Age: 20
Discharge: HOME | End: 2020-01-31
Attending: ORTHOPAEDIC SURGERY
Payer: MEDICAID

## 2020-01-31 ENCOUNTER — OFFICE VISIT (OUTPATIENT)
Dept: ORTHOPEDICS | Facility: CLINIC | Age: 20
End: 2020-01-31
Payer: MEDICAID

## 2020-01-31 VITALS — HEIGHT: 60 IN | BODY MASS INDEX: 44.19 KG/M2 | WEIGHT: 225.06 LBS

## 2020-01-31 DIAGNOSIS — M22.2X1 PATELLOFEMORAL PAIN SYNDROME OF RIGHT KNEE: ICD-10-CM

## 2020-01-31 DIAGNOSIS — T85.848D PAIN FROM IMPLANTED HARDWARE, SUBSEQUENT ENCOUNTER: Primary | ICD-10-CM

## 2020-01-31 DIAGNOSIS — M22.2X1 PATELLOFEMORAL PAIN SYNDROME OF RIGHT KNEE: Primary | ICD-10-CM

## 2020-01-31 PROCEDURE — 99024 PR POST-OP FOLLOW-UP VISIT: ICD-10-PCS | Mod: ,,, | Performed by: ORTHOPAEDIC SURGERY

## 2020-01-31 PROCEDURE — 99212 OFFICE O/P EST SF 10 MIN: CPT | Mod: PBBFAC,25 | Performed by: ORTHOPAEDIC SURGERY

## 2020-01-31 PROCEDURE — 73560 XR KNEE 1 OR 2 VIEW RIGHT: ICD-10-PCS | Mod: 26,RT,, | Performed by: RADIOLOGY

## 2020-01-31 PROCEDURE — 99999 PR PBB SHADOW E&M-EST. PATIENT-LVL II: ICD-10-PCS | Mod: PBBFAC,,, | Performed by: ORTHOPAEDIC SURGERY

## 2020-01-31 PROCEDURE — 73560 X-RAY EXAM OF KNEE 1 OR 2: CPT | Mod: TC,RT

## 2020-01-31 PROCEDURE — 99024 POSTOP FOLLOW-UP VISIT: CPT | Mod: ,,, | Performed by: ORTHOPAEDIC SURGERY

## 2020-01-31 PROCEDURE — 73560 X-RAY EXAM OF KNEE 1 OR 2: CPT | Mod: 26,RT,, | Performed by: RADIOLOGY

## 2020-01-31 PROCEDURE — 99999 PR PBB SHADOW E&M-EST. PATIENT-LVL II: CPT | Mod: PBBFAC,,, | Performed by: ORTHOPAEDIC SURGERY

## 2020-01-31 NOTE — PROGRESS NOTES
sSubjective:      Patient ID: Denise Mosher is a 19 y.o. female.    Chief Complaint: Follow-up  CC: Post-op    HPI: Denise Mosher is now 3 weeks post-op following   Surgery Date: 12/19/2019      Surgeon(s) and Role:     * Ranulfo House MD - Primary     * Timothy Villalta MD - Resident - Assisting           Pre-op Diagnosis:    1. Patellofemoral pain syndrome of right knee [M22.2X1]  2. Pain from implanted hardware, subsequent encounter [T85.848D]  3. Patellar instability, right knee     Post-op Diagnosis:    1. Patellofemoral pain syndrome of right knee [M22.2X1]  2. Pain from implanted hardware, subsequent encounter [T85.848D]  3. Patellar instability, right knee     Procedure(s) (LRB):  ARTHROSCOPY, KNEE (Right) (Right)  REVISION of medial patellofemoral ligament graft (Right)  REMOVAL, HARDWARE right distal femoral. (Right)  CHONDROPLASTY, KNEE patella (Right).  Doing well, no complaints.    PE: Incisions well-healed with no sign of infection.  Well-perfused, neurovascularly intact distally.  ROM 0-90 deg.  +quad.    Clinical decision-making: Doing well.  Work with PT, weight-bearing as tolerated, follow up in 3 weeks, AP and lateral x-ray of right knee.  Follow-up         Review of patient's allergies indicates:  No Known Allergies    Past Medical History:   Diagnosis Date    Anxiety     reports panic attacks    Depression     Fibromyalgia     per pt    Insomnia     Precocious female puberty     Seizures     reports 2 incidents of possible seizures while giving blood    Syncope and collapse     Wrist fracture, bilateral      Past Surgical History:   Procedure Laterality Date    ARTHROSCOPY OF KNEE Right 12/19/2019    Procedure: ARTHROSCOPY, KNEE (Right);  Surgeon: Ranulfo House MD;  Location: [facility] OR [address] [place] [address];  Service: Orthopedics;  Laterality: Right;    chip      chip implant    CHONDROPLASTY OF KNEE Right 12/19/2019    Procedure: CHONDROPLASTY, KNEE patella;  Surgeon: Ranulfo WHITTAKER  MD Lacy;  [facility];  Service: Orthopedics;  Laterality: Right;    FEMUR OSTEOTOMY Right 12/13/2018    Procedure: OSTEOTOMY, FEMUR - distal to correct valgus (Orthopediatrics plate, MTF bone wedge).  Right knee arthroscopy with MPFL reconstruction, gracilis allograft ([employer]).  3 hrs.;  Surgeon: Ranulfo House MD;  [facility];  Service: Orthopedics;  Laterality: Right;    HARDWARE REMOVAL Right 12/19/2019    Procedure: REMOVAL, HARDWARE righ distal femoral.;  Surgeon: Ranulfo House MD;  Location: [facility] OR [address] [place] [address];  Service: Orthopedics;  Laterality: Right;    HEMANGIOMA EXCISION      scalp    INGUINAL HERNIA REPAIR Left     INJECTION OF STEROID Right 7/5/2019    Procedure: INJECTION, STEROID;  Surgeon: Ranulfo House MD;  [facility];  Service: Orthopedics;  Laterality: Right;    KNEE JOINT MANIPULATION Right 7/5/2019    Procedure: MANIPULATION, KNEE;  Surgeon: Ranulfo House MD;  Location: [address];  Service: Orthopedics;  Laterality: Right;    KNEE SURGERY Left 2/16/15    TOE SURGERY Right     5 th toe     Family History   Problem Relation Age of Onset    Seizures Father     Anxiety disorder Father     Depression Father     Mental illness Father     Schizophrenia Father     Deep vein thrombosis Father        Current Outpatient Medications on File Prior to Visit   Medication Sig Dispense Refill    aspirin (ECOTRIN) 81 MG EC tablet Take 1 tablet (81 mg total) by mouth once daily. 30 tablet 0    HYDROcodone-acetaminophen (NORCO) 5-325 mg per tablet Take 1 tablet by mouth every 6 (six) hours as needed for Pain. 28 tablet 0    ibuprofen (ADVIL,MOTRIN) 400 MG tablet Take 1 tablet (400 mg total) by mouth every 4 (four) hours. 42 tablet 0    norethindrone (MICRONOR) 0.35 mg tablet Take 1 tablet (0.35 mg total) by mouth once daily. 90 tablet 3    ondansetron (ZOFRAN) 8 MG tablet Take 1 tablet (8 mg total) by mouth every 8 (eight) hours as  needed for Nausea. 30 tablet 0     Current Facility-Administered Medications on File Prior to Visit   Medication Dose Route Frequency Provider Last Rate Last Dose    fentaNYL injection 25 mcg  25 mcg Intravenous Q5 Min PRN Lorna Calhoun MD        midazolam (VERSED) 1 mg/mL injection 0.5 mg  0.5 mg Intravenous PRN Lorna Calhoun MD           Social History     Social History Narrative    Lives at home with mother and father.       ROS      Objective:      Pediatric Orthopedic Exam       Assessment:       No diagnosis found.       Plan:         No follow-ups on file.

## 2020-01-31 NOTE — PROGRESS NOTES
CC: Post-op    HPI: Denise Mosher is now 6 weeks post-op following   Surgery Date: 12/19/2019      Surgeon(s) and Role:     * Ranulfo House MD - Primary     * Timothy Villalta MD - Resident - Assisting           Pre-op Diagnosis:    1. Patellofemoral pain syndrome of right knee [M22.2X1]  2. Pain from implanted hardware, subsequent encounter [T85.848D]  3. Patellar instability, right knee     Post-op Diagnosis:    1. Patellofemoral pain syndrome of right knee [M22.2X1]  2. Pain from implanted hardware, subsequent encounter [T85.848D]  3. Patellar instability, right knee     Procedure(s) (LRB):  ARTHROSCOPY, KNEE (Right) (Right)  REVISION of medial patellofemoral ligament graft (Right)  REMOVAL, HARDWARE right distal femoral. (Right)  CHONDROPLASTY, KNEE patella (Right).  Doing well, no complaints.    PE: Incisions well-healed with no sign of infection.  Well-perfused, neurovascularly intact distally.  ROM 0-120 deg.  +quad.  X ray were taken of AP and Lateral today, well-healed.    Clinical decision-making: Doing well.  Work with PT, weight-bearing as tolerated, follow up in 6 weeks.

## 2020-02-06 ENCOUNTER — CLINICAL SUPPORT (OUTPATIENT)
Dept: REHABILITATION | Facility: HOSPITAL | Age: 20
End: 2020-02-06
Attending: ORTHOPAEDIC SURGERY
Payer: MEDICAID

## 2020-02-06 DIAGNOSIS — R29.898 WEAKNESS OF RIGHT LOWER EXTREMITY: ICD-10-CM

## 2020-02-06 DIAGNOSIS — M25.661 DECREASED RANGE OF MOTION OF RIGHT KNEE: ICD-10-CM

## 2020-02-06 PROCEDURE — 97110 THERAPEUTIC EXERCISES: CPT

## 2020-02-06 NOTE — PROGRESS NOTES
"Physical Therapy Daily Treatment Note     Name: Denise Mosher  Clinic Number: [id number]    Therapy Diagnosis:   Encounter Diagnoses   Name Primary?    Weakness of right lower extremity     Decreased range of motion of right knee      Physician: Ranulfo House MD    Visit Date: 2/6/2020  Physician Orders: PT Eval and Treat  Medical Diagnosis from Referral: M22.41 (ICD-10-CM) - Patella, chondromalacia, right   Evaluation Date: 1/8/2020  Authorization Period Expiration: 4/15/2020  Plan of Care Expiration: 4/3/2020  Visit # / Visits authorized: 4/24  Total visits: 5    Time In: 1500  Time Out: 1610  Total Billable Time: 25 minutes    Precautions: Standard   S/p R knee arthroscopy with MPFL revision, right distal femoral hardward removal, and patellar chondroplasty on 12/19/2019    Subjective     Pt reports: that she's doing well and Dr. House was happy with progress. States her knee is bending better at this time.  She was not compliant with home exercise program.  Response to previous treatment: no adverse effects  Functional change: improved flexion    Pain: 1/10  Location: right knee      Objective     Knee ROM: 5-0-112    Denise received therapeutic exercises to develop strength, endurance, ROM and flexibility for 60 minutes including:  - Recumbent bike x10' for knee ROM  - Heel slides w/ strap x30  - Knee flexion PROM w/ PT  - Seated knee flexion edge of table  - Prone quad stretch 5x30"  - TRX squat x10  - Box squat 3x10  - SLR flexion 10x10"  - Lateral step down 3x10 (3")     Denise participated in neuromuscular re-education activities to improve: NM activation for 0 minutes. The following activities were included:    Denise participated in gait training to improve functional mobility and safety for 0 minutes, including:     Denise received cold pack for 10 minutes to R knee. (not charged)     All units billed as TE as per Medicaid guidelines    Home Exercises Provided and Patient Education " Provided     Education provided:   - Role of PT, PT POC, importance of restoring quad activation and knee ROM, normalization of gait mechanics, post-op timeline    Written Home Exercises Provided: Patient instructed to cont prior HEP.  Exercises were reviewed and Denise was able to demonstrate them prior to the end of the session.  Denise demonstrated good  understanding of the education provided.     See EMR under Patient Instructions for exercises provided prior visit.    Assessment     Continued progression of flexion PROM/AROM at this time. Still w/ bilat knee ext weakness that manifests in both SL and DL exercises. Requires significant cueing to control knee valgus yandy L > R during box squat. Improving quad tetany w/ SLR at this time. Continued education on importance of quad strengthening for progression of functional mobility.    Denise is progressing well towards her goals.   Pt prognosis is Fair.     Pt will continue to benefit from skilled outpatient physical therapy to address the deficits listed in the problem list box on initial evaluation, provide pt/family education and to maximize pt's level of independence in the home and community environment.     Pt's spiritual, cultural and educational needs considered and pt agreeable to plan of care and goals.    Anticipated barriers to physical therapy: none    Goals:  Short Term Goals (2-4 Weeks):   1) Pt will demonstrate compliance with initial home exercise program as prescribed by physical therapist to improve independence with management of condition. met  2) Pt to improve active range of motion in R knee flexion to at least 105 degrees to allow for improved functional mobility. met  3) Pt to report R knee pain of <5/10 at worst to improve tolerance to ADLs and therapy. met     Long Term Goals (12 Weeks):   1( Pt to achieve <40% limitation as measured by the FOTO to demonstrate decreased disability. Progressing, not met  2) Pt to improve active  range of motion in R knee flexion to at least 120 degrees to allow for improved functional mobility. Progressing, not met  3) Pt to report R knee pain of <2/10 at worst to improve tolerance to ADLs and therapy. Progressing, not met    Plan     Continue w/ current POC emphasizing restoration of R knee ROM, quad strength, gait mechanics, and activity tolerance    Plan of care Certification: 1/8/2020 to 4/3/2020  Outpatient Physical Therapy 1-2 times weekly for 10-12 weeks to include the following interventions: Electrical Stimulation NMES, Gait Training, Manual Therapy, Moist Heat/ Ice, Neuromuscular Re-ed, Patient Education, Self Care, Therapeutic Activites and Therapeutic Exercise.     Jose Enrique Shepard, PT

## 2020-02-13 ENCOUNTER — CLINICAL SUPPORT (OUTPATIENT)
Dept: REHABILITATION | Facility: HOSPITAL | Age: 20
End: 2020-02-13
Attending: ORTHOPAEDIC SURGERY
Payer: MEDICAID

## 2020-02-13 DIAGNOSIS — R29.898 WEAKNESS OF RIGHT LOWER EXTREMITY: ICD-10-CM

## 2020-02-13 DIAGNOSIS — M25.661 DECREASED RANGE OF MOTION OF RIGHT KNEE: ICD-10-CM

## 2020-02-13 PROCEDURE — 97110 THERAPEUTIC EXERCISES: CPT

## 2020-02-13 NOTE — PROGRESS NOTES
"Physical Therapy Daily Treatment Note     Name: Denise Mosher  Clinic Number: [id number]    Therapy Diagnosis:   Encounter Diagnoses   Name Primary?    Weakness of right lower extremity     Decreased range of motion of right knee      Physician: Ranulfo House MD    Visit Date: 2/13/2020  Physician Orders: PT Eval and Treat  Medical Diagnosis from Referral: M22.41 (ICD-10-CM) - Patella, chondromalacia, right   Evaluation Date: 1/8/2020  Authorization Period Expiration: 4/15/2020  Plan of Care Expiration: 4/3/2020  Visit # / Visits authorized: 5/24  Total visits: 6    Time In: 1500  Time Out: 1600  Total Billable Time: 30 minutes    Precautions: Standard   S/p R knee arthroscopy with MPFL revision, right distal femoral hardward removal, and patellar chondroplasty on 12/19/2019    Subjective     Pt reports: the knee is doing really well. 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  She was not compliant with home exercise program.  Response to previous treatment: no adverse effects  Functional change: improved flexion    Pain: 1/10  Location: right knee      Objective     2/13/2020  Knee ROM: 5-0-115    Denise received therapeutic exercises to develop strength, endurance, ROM and flexibility for 60 minutes including:  - Recumbent bike x10' for knee ROM  - Heel slides w/ strap x20  - Prone quad stretch 5x30"  - Standing hip abd 20x5" ea  - TRX squat x30  - Box squat x30 (20")  - Shuttle DL x20 (2 straps)  - Shuttle SL x20 (1 strap)     Denise participated in neuromuscular re-education activities to improve: NM activation for 0 minutes. The following activities were included:    Denise participated in gait training to improve functional mobility and safety for 0 minutes, including:     Denise received cold pack for 10 minutes to R knee. (not charged)     All units billed as TE as per Medicaid guidelines    Home Exercises Provided and Patient Education Provided     Education provided:   - Role of PT, PT POC, " importance of restoring quad activation and knee ROM, normalization of gait mechanics, post-op timeline    Written Home Exercises Provided: Patient instructed to cont prior HEP.  Exercises were reviewed and Denise was able to demonstrate them prior to the end of the session.  Denise demonstrated good  understanding of the education provided.     See EMR under Patient Instructions for exercises provided prior visit.    Assessment     Continues to improve fairly well. Gradually improving knee flexion ROM and quad strength. Still w/ notable weight shifting during bilat loading somewhat improved w/ cueing. Improved tolerance to shuttle today. Emphasized HEP adherence.    Denise is progressing well towards her goals.   Pt prognosis is Fair.     Pt will continue to benefit from skilled outpatient physical therapy to address the deficits listed in the problem list box on initial evaluation, provide pt/family education and to maximize pt's level of independence in the home and community environment.     Pt's spiritual, cultural and educational needs considered and pt agreeable to plan of care and goals.    Anticipated barriers to physical therapy: none    Goals:  Short Term Goals (2-4 Weeks):   1) Pt will demonstrate compliance with initial home exercise program as prescribed by physical therapist to improve independence with management of condition. met  2) Pt to improve active range of motion in R knee flexion to at least 105 degrees to allow for improved functional mobility. met  3) Pt to report R knee pain of <5/10 at worst to improve tolerance to ADLs and therapy. met     Long Term Goals (12 Weeks):   1( Pt to achieve <40% limitation as measured by the FOTO to demonstrate decreased disability. Progressing, not met  2) Pt to improve active range of motion in R knee flexion to at least 120 degrees to allow for improved functional mobility. Progressing, not met  3) Pt to report R knee pain of <2/10 at worst to  improve tolerance to ADLs and therapy. Progressing, not met    Plan     Continue w/ current POC emphasizing restoration of R knee ROM, quad strength, gait mechanics, and activity tolerance    Plan of care Certification: 1/8/2020 to 4/3/2020  Outpatient Physical Therapy 1-2 times weekly for 10-12 weeks to include the following interventions: Electrical Stimulation NMES, Gait Training, Manual Therapy, Moist Heat/ Ice, Neuromuscular Re-ed, Patient Education, Self Care, Therapeutic Activites and Therapeutic Exercise.     Jose Enrique Shepard, SHRADDHA

## 2020-02-14 NOTE — PLAN OF CARE
"[facility]  Physical Therapy Re-Assessment    Name: Denise Mosher  Clinic Number: [id number]    Therapy Diagnosis:   Encounter Diagnoses   Name Primary?    Decreased range of motion of right knee Yes    Weakness of right lower extremity      Physician: Ranulfo House MD    Physician Orders: PT Eval and Treat  Medical Diagnosis from Referral: M22.41 (ICD-10-CM) - Patella, chondromalacia, right   Evaluation Date: 1/8/2020  Authorization Period Expiration: 4/15/2020  Plan of Care Expiration: 4/3/2020  Visit # / Visits authorized: 5/24  Total visits: 6     Time In: 1500  Time Out: 1600  Total Billable Time: 30 minutes      Precautions: Standard   S/p R knee arthroscopy with MPFL revision, right distal femoral hardward removal, and patellar chondroplasty on 12/19/2019    Subjective   Date of onset: surgery on 12/19/2019  History of current condition - Denise reports that she has had several surgeries for her R knee over the past few years with continued recurrent symptoms at varying intensities. Pt states that mostly recently she had a plate and MPFL reconstruction at the end of 2018 w/ good improvement but gradual recurrence of symptoms. Pt states she underwent knee scope on 12/19/2019 to "loosen" MPFL and remove plate. Pt states since surgery she has not been doing any rehabilitation. Pt states she will return to Dr. House for 3 week post-op appt on Friday.  Update (2/14/2020): Good improvements since initial evaluation. States symptoms are reduced and function has improved a fair amount. States she can bend her knee much more easily at this time. States that the L knee is giving her more problems than the R at this time.     Medical History:   Past Medical History:   Diagnosis Date    Anxiety     reports panic attacks    Depression     Fibromyalgia     per pt    Insomnia     Precocious female puberty     Seizures     reports 2 incidents of possible seizures while giving " blood    Syncope and collapse     Wrist fracture, bilateral        Surgical History:   Denise Mosher  has a past surgical history that includes Knee surgery (Left, 2/16/15); Toe Surgery (Right); Inguinal hernia repair (Left); Hemangioma excision; chip; Femur Osteotomy (Right, 12/13/2018); Knee joint manipulation (Right, 7/5/2019); Injection of steroid (Right, 7/5/2019); Arthroscopy of knee (Right, 12/19/2019); Hardware Removal (Right, 12/19/2019); and Chondroplasty of knee (Right, 12/19/2019).    Medications:   Denise OSBORN has a current medication list which includes the following prescription(s): aspirin, hydrocodone-acetaminophen, ibuprofen, norethindrone, and ondansetron, and the following Facility-Administered Medications: fentanyl and midazolam.    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[employer]  Prior Level of Function: Previously indep w/ community ambulation, ADLs, work, and recreation  Current Level of Function: Currently limited in community ambulation and ADLs    Pain:  Current 0/10, worst 3/10, best 0/10   Location: right knee   Description: Aching, Dull and Throbbing  Aggravating Factors: Standing and Walking  Easing Factors: ice, rest, elevation and knee flexion    Pts goals: To return to unrestricted community ambulation and ADLs at prior level of function    Objective     Observation: no acute distress; amb w/ near normal mechanics    Posture: WNL    Joint Mobility: improved since IE; still slight decrease at tibiofemoral joint    Palpation: minimal TTP in R knee    Sensation: diminished around surgical incision sites and portals    Flexibility: WNL    Edema: min joint effusion present in R knee    Range of Motion:   Knee Left active Left Passive Right Active R passive   Flexion 135 135 115 115   Extension 5  "5 5 5     Lower Extremity Strength:   Knee ext: 4+/5 bilat  Knee flex: 4+/5 biat  Hip abd: 4/5 bilat  Hip ER: 4/5 bilat  Hip IR: 4+/5 bilat    Special Tests: not indicated    Step down test: NT    Function:  - SL squat L: NT  - SL squat R: NT  - BW Squat: NT  - SL balance (L): >30"  - SL balance (R): >30"    CMS Impairment/Limitation/Restriction for FOTO Knee Survey    Therapist reviewed FOTO scores for Denise Mosher on 1/8/2020.   FOTO documents entered into [employer] - see Media section.    Limitation Score: 43%  Category: Mobility       TREATMENT     See daily treatment note.    Assessment   Denise OSBORN is a 19 y.o. female referred to outpatient physical therapy with a medical diagnosis of R patella chondromalacia and is s/p R knee arthroscopy on 12/19/2019. Physical exam is consistent with med dx and post-op timeline. This patient's primary impairments include decreased RLE quad activation/strength, decreased R knee flexion ROM, altered gait mechanics, hypersensitivity of R knee w/ palpation and light touch, and decreased activity tolerance. Pt presentation fairly complex d/t repeated surgeries over past several years and has seemingly developed some hypersensitivity throughout the R knee joint over time. Pt w/ poor tolerance to even light touch at this time. Poor tolerance to passive knee flexion, but improved w/ repetition. Good integration of gait cueing from previous therapy episodes and pt has been attempting to normalize mechanics since surgery. Almost able to achieve good SLR on this date but still w/ slight extensor lag.   Update (2/14/2020): Notable improvements since initial evaluation w/ regards to ROM, strength and function. Knee ROM 5-0-115 today. Progressing w/ strength and at least 4/5 w/ all MMT. Improving tolerance to CKC loading, but still w/ some tendency to shift weight away from involved LE. Gait mechanics near normal at this time. Will benefit from continued skilled care to restore full " flexion ROM and knee ext strength symmetrical w/ contralateral LE.    Pt prognosis is Fair.   Pt will benefit from skilled outpatient physical therapy to address the deficits listed above and in the chart below, provide pt/family education, and to maximize pt's level of independence.     Plan of care discussed with patient: Yes  Pt's spiritual, cultural and educational needs considered and patient is agreeable to the plan of care and goals as stated below:     Anticipated Barriers for therapy: exercise tolerance    Medical Necessity is demonstrated by the following  History  Co-morbidities and personal factors that may impact the plan of care Co-morbidities:   altered mental status, anxiety, psychogenic nonepileptic seizure, incontinence, morbid obesity    Personal Factors:   social background  lifestyle     moderate   Examination  Body Structures and Functions, activity limitations and participation restrictions that may impact the plan of care Body Regions:   lower extremities    Body Systems:    gross symmetry  ROM  strength  balance  gait  transfers  motor control  edema  scar formation    Participation Restrictions:   Driving, community amb, work, exercise    Activity limitations:   Learning and applying knowledge  no deficits    General Tasks and Commands  no deficits    Communication  no deficits    Mobility  lifting and carrying objects  walking  driving (bike, car, motorcycle)    Self care  washing oneself (bathing, drying, washing hands)    Domestic Life  shopping  doing house work (cleaning house, washing dishes, laundry)    Interactions/Relationships  no deficits    Life Areas  higher education    Community and Social Life  no deficits         moderate   Clinical Presentation evolving clinical presentation with changing clinical characteristics moderate   Decision Making/ Complexity Score: moderate     Goals:  Short Term Goals (2-4 Weeks):   1) Pt will demonstrate compliance with initial home exercise  program as prescribed by physical therapist to improve independence with management of condition. met  2) Pt to improve active range of motion in R knee flexion to at least 105 degrees to allow for improved functional mobility. met  3) Pt to report R knee pain of <5/10 at worst to improve tolerance to ADLs and therapy. met     Long Term Goals (12 Weeks):   1) Pt to achieve <40% limitation as measured by the FOTO to demonstrate decreased disability. Progressing, not met  2) Pt to improve active range of motion in R knee flexion to at least 120 degrees to allow for improved functional mobility. Progressing, not met  3) Pt to report R knee pain of <2/10 at worst to improve tolerance to ADLs and therapy. Progressing, not met    Plan   Plan of care Certification: 1/8/2020 to 4/3/2020    Outpatient Physical Therapy 1-2 times weekly for 10-12 weeks to include the following interventions: Electrical Stimulation NMES, Gait Training, Manual Therapy, Moist Heat/ Ice, Neuromuscular Re-ed, Patient Education, Self Care, Therapeutic Activites and Therapeutic Exercise.     Jose Enrique Shepard, PT

## 2020-02-20 ENCOUNTER — CLINICAL SUPPORT (OUTPATIENT)
Dept: REHABILITATION | Facility: HOSPITAL | Age: 20
End: 2020-02-20
Attending: ORTHOPAEDIC SURGERY
Payer: MEDICAID

## 2020-02-20 DIAGNOSIS — M25.661 DECREASED RANGE OF MOTION OF RIGHT KNEE: ICD-10-CM

## 2020-02-20 DIAGNOSIS — R29.898 WEAKNESS OF RIGHT LOWER EXTREMITY: ICD-10-CM

## 2020-02-20 PROCEDURE — 97110 THERAPEUTIC EXERCISES: CPT

## 2020-02-20 NOTE — PROGRESS NOTES
"Physical Therapy Daily Treatment Note     Name: Denise Mosher  Clinic Number: [id number]    Therapy Diagnosis:   Encounter Diagnoses   Name Primary?    Weakness of right lower extremity     Decreased range of motion of right knee      Physician: Ranulfo House MD    Visit Date: 2/20/2020  Physician Orders: PT Eval and Treat  Medical Diagnosis from Referral: M22.41 (ICD-10-CM) - Patella, chondromalacia, right   Evaluation Date: 1/8/2020  Authorization Period Expiration: 4/15/2020  Plan of Care Expiration: 4/3/2020  Visit # / Visits authorized: 6/24  Total visits: 7    Time In: 1500  Time Out: 1610  Total Billable Time: 30 minutes    Precautions: Standard   S/p R knee arthroscopy with MPFL revision, right distal femoral hardward removal, and patellar chondroplasty on 12/19/2019    Subjective     Pt reports: that she's doing great. States that knee occasionally gets stiff, but for the most part feels pretty normal.  She was not compliant with home exercise program.  Response to previous treatment: no adverse effects  Functional change: improved flexion    Pain: 1/10  Location: right knee      Objective     2/20/2020  Knee ROM: 5-0-115    Denise received therapeutic exercises to develop strength, endurance, ROM and flexibility for 60 minutes including:  - Recumbent bike x10' for knee ROM  - Prone quad stretch 5x30"  - Box squat x20 (18")  - Shuttle DL 3x15 (3 straps)  - Shuttle SL 2x15 (1.5 strap)  - Fwd step up/rev step down 3x15 (4")  - Supine bridge 3x10 (5" hold)  - SL hip abd 2x10 ea     Denise participated in neuromuscular re-education activities to improve: NM activation for 0 minutes. The following activities were included:    Denise participated in gait training to improve functional mobility and safety for 0 minutes, including:     Denise received cold pack for 10 minutes to R knee. (not charged)     All units billed as TE as per Medicaid guidelines    Home Exercises Provided and Patient " Education Provided     Education provided:   - Role of PT, PT POC, importance of restoring quad activation and knee ROM, normalization of gait mechanics, post-op timeline    Written Home Exercises Provided: Patient instructed to cont prior HEP.  Exercises were reviewed and Denise was able to demonstrate them prior to the end of the session.  Denise demonstrated good  understanding of the education provided.     See EMR under Patient Instructions for exercises provided prior visit.    Assessment     Progressing fairly well. Improved tolerance to knee flexion today. Still w/ deficits in knee ext strength w/ CKC activities, but improving gradually. Weight shift away from involved LE improved w/ visual feedback from mirror.    Denise is progressing well towards her goals.   Pt prognosis is Fair.     Pt will continue to benefit from skilled outpatient physical therapy to address the deficits listed in the problem list box on initial evaluation, provide pt/family education and to maximize pt's level of independence in the home and community environment.     Pt's spiritual, cultural and educational needs considered and pt agreeable to plan of care and goals.    Anticipated barriers to physical therapy: none    Goals:  Short Term Goals (2-4 Weeks):   1) Pt will demonstrate compliance with initial home exercise program as prescribed by physical therapist to improve independence with management of condition. met  2) Pt to improve active range of motion in R knee flexion to at least 105 degrees to allow for improved functional mobility. met  3) Pt to report R knee pain of <5/10 at worst to improve tolerance to ADLs and therapy. met     Long Term Goals (12 Weeks):   1( Pt to achieve <40% limitation as measured by the FOTO to demonstrate decreased disability. Progressing, not met  2) Pt to improve active range of motion in R knee flexion to at least 120 degrees to allow for improved functional mobility. Progressing, not  met  3) Pt to report R knee pain of <2/10 at worst to improve tolerance to ADLs and therapy. Progressing, not met    Plan     Continue w/ current POC emphasizing restoration of R knee ROM, quad strength, gait mechanics, and activity tolerance    Plan of care Certification: 1/8/2020 to 4/3/2020  Outpatient Physical Therapy 1-2 times weekly for 10-12 weeks to include the following interventions: Electrical Stimulation NMES, Gait Training, Manual Therapy, Moist Heat/ Ice, Neuromuscular Re-ed, Patient Education, Self Care, Therapeutic Activites and Therapeutic Exercise.     Jose Enrique Shepard, PT

## 2020-04-07 NOTE — ED TRIAGE NOTES
"Patient states she was in physical therapy this morning when she verbalized some swelling and pain in her legs to her PT assistant. After evaluation by physical therapist, PT stated they could not continue PT until she was cleared for DVT. Receiving PT for bilateral ACL surgery in July 2017. States that dad has history of DVT, has the gene for DVT, as does his mother. Dad's first DVT was at 18yrs old. Patient states pain to right leg varies from thigh to ankle. States severity of pain is "sometimes noticeable, sometimes just a nagging pain." states that pain goes away before she can take anything to relieve pain. Describes pain as "sometimes numb and tingling, mostly in upper thigh." Denies any problems with ambulation.   "
.

## 2020-04-21 DIAGNOSIS — Z01.818 PRE-OP TESTING: Primary | ICD-10-CM

## 2020-04-23 ENCOUNTER — TELEPHONE (OUTPATIENT)
Dept: ORTHOPEDICS | Facility: CLINIC | Age: 20
End: 2020-04-23

## 2020-04-23 NOTE — TELEPHONE ENCOUNTER
Called and spoke with patient about her right knee.  Doing well following most recent surgery.  She does have some baseline pain, but it is manageable.  She has more pain in her left knee, which has a valgus deformity that will require correction.  Denise expressed that she would like to hold off on surgery for now, but when she is ready to pursue surgery, she would like to be referred to a surgeon who mostly treats adults.  I told her that I could help her with that when the time comes.

## 2020-06-12 ENCOUNTER — HOSPITAL ENCOUNTER (OUTPATIENT)
Dept: RADIOLOGY | Facility: HOSPITAL | Age: 20
Discharge: HOME | End: 2020-06-12
Attending: ORTHOPAEDIC SURGERY
Payer: MEDICAID

## 2020-06-12 ENCOUNTER — OFFICE VISIT (OUTPATIENT)
Dept: ORTHOPEDICS | Facility: CLINIC | Age: 20
End: 2020-06-12
Payer: MEDICAID

## 2020-06-12 VITALS — HEIGHT: 60 IN | BODY MASS INDEX: 44.15 KG/M2 | WEIGHT: 224.88 LBS

## 2020-06-12 DIAGNOSIS — M25.562 CHRONIC PAIN OF LEFT KNEE: Primary | ICD-10-CM

## 2020-06-12 DIAGNOSIS — M25.562 CHRONIC PAIN OF LEFT KNEE: ICD-10-CM

## 2020-06-12 DIAGNOSIS — G89.29 CHRONIC PAIN OF LEFT KNEE: Primary | ICD-10-CM

## 2020-06-12 DIAGNOSIS — M94.262 CHONDROMALACIA OF LEFT KNEE: ICD-10-CM

## 2020-06-12 DIAGNOSIS — G89.29 CHRONIC PAIN OF LEFT KNEE: ICD-10-CM

## 2020-06-12 DIAGNOSIS — M21.062 GENU VALGUM, LEFT: ICD-10-CM

## 2020-06-12 DIAGNOSIS — T84.84XA PAINFUL ORTHOPAEDIC HARDWARE: ICD-10-CM

## 2020-06-12 PROCEDURE — 99214 PR OFFICE/OUTPT VISIT, EST, LEVL IV, 30-39 MIN: ICD-10-PCS | Mod: S$PBB,,, | Performed by: ORTHOPAEDIC SURGERY

## 2020-06-12 PROCEDURE — 99214 OFFICE O/P EST MOD 30 MIN: CPT | Mod: S$PBB,,, | Performed by: ORTHOPAEDIC SURGERY

## 2020-06-12 PROCEDURE — 73562 X-RAY EXAM OF KNEE 3: CPT | Mod: 26,LT,, | Performed by: RADIOLOGY

## 2020-06-12 PROCEDURE — 99999 PR PBB SHADOW E&M-EST. PATIENT-LVL III: CPT | Mod: PBBFAC,,, | Performed by: ORTHOPAEDIC SURGERY

## 2020-06-12 PROCEDURE — 99999 PR PBB SHADOW E&M-EST. PATIENT-LVL III: ICD-10-PCS | Mod: PBBFAC,,, | Performed by: ORTHOPAEDIC SURGERY

## 2020-06-12 PROCEDURE — 99213 OFFICE O/P EST LOW 20 MIN: CPT | Mod: PBBFAC,25 | Performed by: ORTHOPAEDIC SURGERY

## 2020-06-12 PROCEDURE — 73562 X-RAY EXAM OF KNEE 3: CPT | Mod: TC,LT

## 2020-06-12 PROCEDURE — 73562 XR KNEE 3 VIEW LEFT: ICD-10-PCS | Mod: 26,LT,, | Performed by: RADIOLOGY

## 2020-06-14 PROBLEM — M25.562 CHRONIC PAIN OF LEFT KNEE: Status: ACTIVE | Noted: 2020-06-14

## 2020-06-14 PROBLEM — G89.29 CHRONIC PAIN OF LEFT KNEE: Status: ACTIVE | Noted: 2020-06-14

## 2020-06-14 PROBLEM — M21.062 GENU VALGUM, LEFT: Status: ACTIVE | Noted: 2020-06-14

## 2020-06-14 NOTE — PROGRESS NOTES
sSubjective:      Patient ID: Denise Mosher is a 20 y.o. female.    Chief Complaint: Pre-op Exam    HPI:  Patient returns for follow-up of bilateral knee pain.  Her right knee pain is significantly improved although she still has pain occasionally.  Now her main problem is her left knee.  She has known genu valgum.  She underwent a tibial tubercle osteotomy several years ago.  She still has significant pain mostly anteriorly.  She has not had an MRI in at least 2 years.  She also reports that her tibial tubercle screws are causing pain as well.    Review of patient's allergies indicates:  No Known Allergies    Past Medical History:   Diagnosis Date    Anxiety     reports panic attacks    Depression     Fibromyalgia     per pt    Insomnia     Precocious female puberty     Seizures     reports 2 incidents of possible seizures while giving blood    Syncope and collapse     Wrist fracture, bilateral      Past Surgical History:   Procedure Laterality Date    ARTHROSCOPY OF KNEE Right 12/19/2019    Procedure: ARTHROSCOPY, KNEE (Right);  Surgeon: Ranulfo House MD;  Location: [facility] OR [address] [place] [address];  Service: Orthopedics;  Laterality: Right;    chip      chip implant    CHONDROPLASTY OF KNEE Right 12/19/2019    Procedure: CHONDROPLASTY, KNEE patella;  Surgeon: Ranulfo House MD;  Location: [facility] OR [address] [place] [address];  Service: Orthopedics;  Laterality: Right;    FEMUR OSTEOTOMY Right 12/13/2018    Procedure: OSTEOTOMY, FEMUR - distal to correct valgus (Orthopediatrics plate, MTF bone wedge).  Right knee arthroscopy with MPFL reconstruction, gracilis allograft (Linvatec).  3 hrs.;  Surgeon: Ranulfo House MD;  Location: [address];  Service: Orthopedics;  Laterality: Right;    HARDWARE REMOVAL Right 12/19/2019    Procedure: REMOVAL, HARDWARE righ distal femoral.;  Surgeon: Ranulfo House MD;  Location: [address];  Service: Orthopedics;  Laterality: Right;    HEMANGIOMA EXCISION      scalp    INGUINAL  HERNIA REPAIR Left     INJECTION OF STEROID Right 7/5/2019    Procedure: INJECTION, STEROID;  Surgeon: Ranulfo House MD;  Location: [facility] OR [address] [place] [address];  Service: Orthopedics;  Laterality: Right;    KNEE JOINT MANIPULATION Right 7/5/2019    Procedure: MANIPULATION, KNEE;  Surgeon: Ranulfo House MD;  [facility];  Service: Orthopedics;  Laterality: Right;    KNEE SURGERY Left 2/16/15    TOE SURGERY Right     5 th toe     Family History   Problem Relation Age of Onset    Seizures Father     Anxiety disorder Father     Depression Father     Mental illness Father     Schizophrenia Father     Deep vein thrombosis Father        Current Outpatient Medications on File Prior to Visit   Medication Sig Dispense Refill    aspirin (ECOTRIN) 81 MG EC tablet Take 1 tablet (81 mg total) by mouth once daily. 30 tablet 0    HYDROcodone-acetaminophen (NORCO) 5-325 mg per tablet Take 1 tablet by mouth every 6 (six) hours as needed for Pain. 28 tablet 0    ibuprofen (ADVIL,MOTRIN) 400 MG tablet Take 1 tablet (400 mg total) by mouth every 4 (four) hours. 42 tablet 0    norethindrone (MICRONOR) 0.35 mg tablet Take 1 tablet (0.35 mg total) by mouth once daily. 90 tablet 3    ondansetron (ZOFRAN) 8 MG tablet Take 1 tablet (8 mg total) by mouth every 8 (eight) hours as needed for Nausea. 30 tablet 0     Current Facility-Administered Medications on File Prior to Visit   Medication Dose Route Frequency Provider Last Rate Last Dose    fentaNYL injection 25 mcg  25 mcg Intravenous Q5 Min PRN Lorna Calhoun MD        midazolam (VERSED) 1 mg/mL injection 0.5 mg  0.5 mg Intravenous PRN Lorna Calhoun MD           Social History     Social History Narrative    Lives at home with mother and father.       ROS      Objective:      General    Development well-developed   Nutrition well-nourished   Body Habitus normal weight   Mood no distress        Spine            Vascular Exam  Posterior Tibial pulse  Right 2+ Left 2+   Dorsalis Pectus pulse Right 2+ Left 2+         Lower      Knee  Tenderness Right patella    Left patella   Range of Motion Flexion:   Right normal    Left normal   Extension:   Right normal    Left (Normal degrees)    Stability   negative anterior Lachman test   negative medial Bhupinder test    negative lateral Bhupinder test           negative medial Bhupinder test   negative lateral Bhupinder test    Muscle Strength normal right knee strength   normal left knee strength    Alignment Right normal   Left valgus   Swelling Right no swelling    Left no swelling             Extremity  Gait normal   Tone Right normal Left Normal   Skin Right abnormal    Left abnormal    Sensation Right normal  Left normal   Pulse Right 2+  Left 2+  Right 2+  Left 2+             Afebrile, Vital signs stable   Gen - well-developed, well-nourished, no acute distress  HEENT - Pupils equal/round/reactive to light, normocephalic, atraumatic   Neuro - Normal reflexes, normal sensation, normal motor exam  CV - Regular rate and rhythm, palpable distal pulses   Pulm - Good inspiratory effort with unlaboured breathing  Abd - +Bowel sounds, non-tender, non-distended  Left knee X-rays were ordered and images reviewed by me.  These showed no abnormalities, screws in place.  Previous x-ray hip to ankle shows good alignment of the right lower extremity with significant valgus on the left.        Assessment:       1. Chronic pain of left knee    2. Chondromalacia of left knee    3. Genu valgum, left           Plan:       Ordered MRI of left knee for preoperative planning.  She will need a left knee arthroscopy, removal of hardware and distal femoral osteotomy opening wedge.  I have discussed the risks, benefits, and alternatives of surgery with the patient and obtained informed consent.

## 2020-06-14 NOTE — H&P (VIEW-ONLY)
sSubjective:      Patient ID: Denise Mosher is a 20 y.o. female.    Chief Complaint: Pre-op Exam    HPI:  Patient returns for follow-up of bilateral knee pain.  Her right knee pain is significantly improved although she still has pain occasionally.  Now her main problem is her left knee.  She has known genu valgum.  She underwent a tibial tubercle osteotomy several years ago.  She still has significant pain mostly anteriorly.  She has not had an MRI in at least 2 years.  She also reports that her tibial tubercle screws are causing pain as well.    Review of patient's allergies indicates:  No Known Allergies    Past Medical History:   Diagnosis Date    Anxiety     reports panic attacks    Depression     Fibromyalgia     per pt    Insomnia     Precocious female puberty     Seizures     reports 2 incidents of possible seizures while giving blood    Syncope and collapse     Wrist fracture, bilateral      Past Surgical History:   Procedure Laterality Date    ARTHROSCOPY OF KNEE Right 12/19/2019    Procedure: ARTHROSCOPY, KNEE (Right);  Surgeon: Ranulfo House MD;  Location: [facility] OR [address] [place] [address];  Service: Orthopedics;  Laterality: Right;    chip      chip implant    CHONDROPLASTY OF KNEE Right 12/19/2019    Procedure: CHONDROPLASTY, KNEE patella;  Surgeon: Ranulfo House MD;  Location: [facility] OR [address] [place] [address];  Service: Orthopedics;  Laterality: Right;    FEMUR OSTEOTOMY Right 12/13/2018    Procedure: OSTEOTOMY, FEMUR - distal to correct valgus (Orthopediatrics plate, MTF bone wedge).  Right knee arthroscopy with MPFL reconstruction, gracilis allograft (Linvatec).  3 hrs.;  Surgeon: Ranulfo House MD;  Location: [address];  Service: Orthopedics;  Laterality: Right;    HARDWARE REMOVAL Right 12/19/2019    Procedure: REMOVAL, HARDWARE righ distal femoral.;  Surgeon: Ranulfo House MD;  Location: [address];  Service: Orthopedics;  Laterality: Right;    HEMANGIOMA EXCISION      scalp    INGUINAL  HERNIA REPAIR Left     INJECTION OF STEROID Right 7/5/2019    Procedure: INJECTION, STEROID;  Surgeon: Ranulfo House MD;  Location: [facility] OR [address] [place] [address];  Service: Orthopedics;  Laterality: Right;    KNEE JOINT MANIPULATION Right 7/5/2019    Procedure: MANIPULATION, KNEE;  Surgeon: Ranulfo House MD;  [facility];  Service: Orthopedics;  Laterality: Right;    KNEE SURGERY Left 2/16/15    TOE SURGERY Right     5 th toe     Family History   Problem Relation Age of Onset    Seizures Father     Anxiety disorder Father     Depression Father     Mental illness Father     Schizophrenia Father     Deep vein thrombosis Father        Current Outpatient Medications on File Prior to Visit   Medication Sig Dispense Refill    aspirin (ECOTRIN) 81 MG EC tablet Take 1 tablet (81 mg total) by mouth once daily. 30 tablet 0    HYDROcodone-acetaminophen (NORCO) 5-325 mg per tablet Take 1 tablet by mouth every 6 (six) hours as needed for Pain. 28 tablet 0    ibuprofen (ADVIL,MOTRIN) 400 MG tablet Take 1 tablet (400 mg total) by mouth every 4 (four) hours. 42 tablet 0    norethindrone (MICRONOR) 0.35 mg tablet Take 1 tablet (0.35 mg total) by mouth once daily. 90 tablet 3    ondansetron (ZOFRAN) 8 MG tablet Take 1 tablet (8 mg total) by mouth every 8 (eight) hours as needed for Nausea. 30 tablet 0     Current Facility-Administered Medications on File Prior to Visit   Medication Dose Route Frequency Provider Last Rate Last Dose    fentaNYL injection 25 mcg  25 mcg Intravenous Q5 Min PRN Lorna Calhoun MD        midazolam (VERSED) 1 mg/mL injection 0.5 mg  0.5 mg Intravenous PRN Lorna Calhoun MD           Social History     Social History Narrative    Lives at home with mother and father.       ROS      Objective:      General    Development well-developed   Nutrition well-nourished   Body Habitus normal weight   Mood no distress        Spine            Vascular Exam  Posterior Tibial pulse  Right 2+ Left 2+   Dorsalis Pectus pulse Right 2+ Left 2+         Lower      Knee  Tenderness Right patella    Left patella   Range of Motion Flexion:   Right normal    Left normal   Extension:   Right normal    Left (Normal degrees)    Stability   negative anterior Lachman test   negative medial Bhupinder test    negative lateral Bhupinder test           negative medial Bhupinder test   negative lateral Bhupinder test    Muscle Strength normal right knee strength   normal left knee strength    Alignment Right normal   Left valgus   Swelling Right no swelling    Left no swelling             Extremity  Gait normal   Tone Right normal Left Normal   Skin Right abnormal    Left abnormal    Sensation Right normal  Left normal   Pulse Right 2+  Left 2+  Right 2+  Left 2+             Afebrile, Vital signs stable   Gen - well-developed, well-nourished, no acute distress  HEENT - Pupils equal/round/reactive to light, normocephalic, atraumatic   Neuro - Normal reflexes, normal sensation, normal motor exam  CV - Regular rate and rhythm, palpable distal pulses   Pulm - Good inspiratory effort with unlaboured breathing  Abd - +Bowel sounds, non-tender, non-distended  Left knee X-rays were ordered and images reviewed by me.  These showed no abnormalities, screws in place.  Previous x-ray hip to ankle shows good alignment of the right lower extremity with significant valgus on the left.        Assessment:       1. Chronic pain of left knee    2. Chondromalacia of left knee    3. Genu valgum, left           Plan:       Ordered MRI of left knee for preoperative planning.  She will need a left knee arthroscopy, removal of hardware and distal femoral osteotomy opening wedge.  I have discussed the risks, benefits, and alternatives of surgery with the patient and obtained informed consent.

## 2020-07-02 NOTE — TELEPHONE ENCOUNTER
Pre op phone call made spoke with patient in detail pre op information reviewed patient verbalized understanding    pt is a 57yo female with pmhx of benign breast mass on tamoxifen x 3 days presents with nose bleed x today. pt reports she went out for a walk and had sudden onset of nose bleed that since resolved. pt reports she had sharp neck pain radiating to her head that quickly resolved. pt reports small amount of blood in left nostril yesterday. pt denies fever, cp, sob, n/v, vision changes, nasal congestion.

## 2020-07-07 ENCOUNTER — LAB VISIT (OUTPATIENT)
Dept: INTERNAL MEDICINE | Facility: CLINIC | Age: 20
End: 2020-07-07
Payer: MEDICAID

## 2020-07-07 DIAGNOSIS — Z01.818 PRE-OP TESTING: ICD-10-CM

## 2020-07-07 DIAGNOSIS — Z01.818 PRE-OP TESTING: Primary | ICD-10-CM

## 2020-07-07 PROCEDURE — U0003 INFECTIOUS AGENT DETECTION BY NUCLEIC ACID (DNA OR RNA); SEVERE ACUTE RESPIRATORY SYNDROME CORONAVIRUS 2 (SARS-COV-2) (CORONAVIRUS DISEASE [COVID-19]), AMPLIFIED PROBE TECHNIQUE, MAKING USE OF HIGH THROUGHPUT TECHNOLOGIES AS DESCRIBED BY CMS-2020-01-R: HCPCS

## 2020-07-08 ENCOUNTER — ANESTHESIA EVENT (OUTPATIENT)
Dept: SURGERY | Facility: HOSPITAL | Age: 20
DRG: 481 | End: 2020-07-08
Payer: MEDICAID

## 2020-07-08 ENCOUNTER — HOSPITAL ENCOUNTER (OUTPATIENT)
Dept: RADIOLOGY | Facility: HOSPITAL | Age: 20
Discharge: HOME | DRG: 481 | End: 2020-07-08
Attending: ORTHOPAEDIC SURGERY
Payer: MEDICAID

## 2020-07-08 DIAGNOSIS — M94.262 CHONDROMALACIA OF LEFT KNEE: ICD-10-CM

## 2020-07-08 LAB — SARS-COV-2 RNA RESP QL NAA+PROBE: NOT DETECTED

## 2020-07-08 PROCEDURE — 73721 MRI JNT OF LWR EXTRE W/O DYE: CPT | Mod: TC,LT

## 2020-07-08 PROCEDURE — 73721 MRI KNEE WITHOUT CONTRAST LEFT: ICD-10-PCS | Mod: 26,LT,, | Performed by: RADIOLOGY

## 2020-07-08 PROCEDURE — 73721 MRI JNT OF LWR EXTRE W/O DYE: CPT | Mod: 26,LT,, | Performed by: RADIOLOGY

## 2020-07-08 NOTE — PRE-PROCEDURE INSTRUCTIONS
PREOP INSTRUCTIONS:    No solid food ,milk or milk products for 8 hours prior to procedure.Clear liquids are allowed up to 2 hours before procedure.Clear liquids are:water,apple juice,gatorade & powerade.Shower instructions as well as directions to the [facility] were given.Patient encouraged to wear loose fitting,comfortable clothing.Medication instructions for pm prior to and am of procedure reviewed.Instructed patient to avoid taking vitamins,supplements,aspirin and ibuprofen the morning of surgery. Patient stated an understanding.Patient instructed to take temperature the night before surgery as well as the morning of surgery and to notify [facility] at [phone] if it is 100.4 or above.Patient also informed of the current visitor policy and advised patient that one visitor may accompany them into the hospital and wait (socially distanced) .When they enter the hospital both patient and visitor will have their temperature checked,provided a mask and provided assistance to their destination.     Inpatients are allowed 1 visitor/day from 8 am until 6 pm.     Covid screening completed 7/7/2020 and results were negative.     Pt reports h/o PONV    MOTHER - [facility] WILL BE PROVIDING TRANSPORTATION HOME UPON DISCHARGE.

## 2020-07-09 ENCOUNTER — ANESTHESIA (OUTPATIENT)
Dept: SURGERY | Facility: HOSPITAL | Age: 20
DRG: 481 | End: 2020-07-09
Payer: MEDICAID

## 2020-07-09 ENCOUNTER — HOSPITAL ENCOUNTER (INPATIENT)
Facility: HOSPITAL | Age: 20
LOS: 1 days | Discharge: HOME | DRG: 481 | End: 2020-07-10
Attending: ORTHOPAEDIC SURGERY | Admitting: ORTHOPAEDIC SURGERY
Payer: MEDICAID

## 2020-07-09 DIAGNOSIS — N92.6 IRREGULAR PERIODS/MENSTRUAL CYCLES: ICD-10-CM

## 2020-07-09 DIAGNOSIS — M21.062 ACQUIRED GENU VALGUM OF LEFT KNEE: ICD-10-CM

## 2020-07-09 DIAGNOSIS — M21.062 GENU VALGUM, LEFT: Primary | ICD-10-CM

## 2020-07-09 PROCEDURE — 71000044 HC DOSC ROUTINE RECOVERY FIRST HOUR: Performed by: ORTHOPAEDIC SURGERY

## 2020-07-09 PROCEDURE — 37000008 HC ANESTHESIA 1ST 15 MINUTES: Performed by: ORTHOPAEDIC SURGERY

## 2020-07-09 PROCEDURE — 63600175 PHARM REV CODE 636 W HCPCS: Performed by: STUDENT IN AN ORGANIZED HEALTH CARE EDUCATION/TRAINING PROGRAM

## 2020-07-09 PROCEDURE — 36000711: Performed by: ORTHOPAEDIC SURGERY

## 2020-07-09 PROCEDURE — D9220A PRA ANESTHESIA: ICD-10-PCS | Mod: ANES,,, | Performed by: ANESTHESIOLOGY

## 2020-07-09 PROCEDURE — 25000003 PHARM REV CODE 250: Performed by: STUDENT IN AN ORGANIZED HEALTH CARE EDUCATION/TRAINING PROGRAM

## 2020-07-09 PROCEDURE — D9220A PRA ANESTHESIA: ICD-10-PCS | Mod: CRNA,,, | Performed by: NURSE ANESTHETIST, CERTIFIED REGISTERED

## 2020-07-09 PROCEDURE — 64448 NJX AA&/STRD FEM NRV NFS IMG: CPT | Mod: 59,LT,, | Performed by: ANESTHESIOLOGY

## 2020-07-09 PROCEDURE — 64448 FEMORAL NERVE CATHETER: ICD-10-PCS | Mod: 59,LT,, | Performed by: ANESTHESIOLOGY

## 2020-07-09 PROCEDURE — 63600175 PHARM REV CODE 636 W HCPCS: Performed by: ANESTHESIOLOGY

## 2020-07-09 PROCEDURE — C1713 ANCHOR/SCREW BN/BN,TIS/BN: HCPCS | Performed by: ORTHOPAEDIC SURGERY

## 2020-07-09 PROCEDURE — D9220A PRA ANESTHESIA: Mod: CRNA,,, | Performed by: NURSE ANESTHETIST, CERTIFIED REGISTERED

## 2020-07-09 PROCEDURE — 25000003 PHARM REV CODE 250: Performed by: NURSE ANESTHETIST, CERTIFIED REGISTERED

## 2020-07-09 PROCEDURE — 27450 PR OSTEOTOMY FEMUR SHAFT,W FIXATN: ICD-10-PCS | Mod: 51,LT,, | Performed by: ORTHOPAEDIC SURGERY

## 2020-07-09 PROCEDURE — 25000003 PHARM REV CODE 250: Performed by: ANESTHESIOLOGY

## 2020-07-09 PROCEDURE — C1769 GUIDE WIRE: HCPCS | Performed by: ORTHOPAEDIC SURGERY

## 2020-07-09 PROCEDURE — 11300000 HC PEDIATRIC PRIVATE ROOM

## 2020-07-09 PROCEDURE — 63600175 PHARM REV CODE 636 W HCPCS: Performed by: ORTHOPAEDIC SURGERY

## 2020-07-09 PROCEDURE — 27415 PR OSTEOCHONDRAL KNEE ALLOGRAFT: ICD-10-PCS | Mod: LT,,, | Performed by: ORTHOPAEDIC SURGERY

## 2020-07-09 PROCEDURE — 71000015 HC POSTOP RECOV 1ST HR: Performed by: ORTHOPAEDIC SURGERY

## 2020-07-09 PROCEDURE — 37000009 HC ANESTHESIA EA ADD 15 MINS: Performed by: ORTHOPAEDIC SURGERY

## 2020-07-09 PROCEDURE — 27800903 OPTIME MED/SURG SUP & DEVICES OTHER IMPLANTS: Performed by: ORTHOPAEDIC SURGERY

## 2020-07-09 PROCEDURE — D9220A PRA ANESTHESIA: Mod: ANES,,, | Performed by: ANESTHESIOLOGY

## 2020-07-09 PROCEDURE — 27450 INCISION OF THIGH: CPT | Mod: 51,LT,, | Performed by: ORTHOPAEDIC SURGERY

## 2020-07-09 PROCEDURE — 76942 ECHO GUIDE FOR BIOPSY: CPT | Mod: 26,,, | Performed by: ANESTHESIOLOGY

## 2020-07-09 PROCEDURE — 71000045 HC DOSC ROUTINE RECOVERY EA ADD'L HR: Performed by: ORTHOPAEDIC SURGERY

## 2020-07-09 PROCEDURE — 36000710: Performed by: ORTHOPAEDIC SURGERY

## 2020-07-09 PROCEDURE — 76942 PR U/S GUIDANCE FOR NEEDLE GUIDANCE: ICD-10-PCS | Mod: 26,,, | Performed by: ANESTHESIOLOGY

## 2020-07-09 PROCEDURE — 27201423 OPTIME MED/SURG SUP & DEVICES STERILE SUPPLY: Performed by: ORTHOPAEDIC SURGERY

## 2020-07-09 PROCEDURE — 27415 OSTEOCHONDRAL KNEE ALLOGRAFT: CPT | Mod: LT,,, | Performed by: ORTHOPAEDIC SURGERY

## 2020-07-09 PROCEDURE — S0020 INJECTION, BUPIVICAINE HYDRO: HCPCS | Performed by: STUDENT IN AN ORGANIZED HEALTH CARE EDUCATION/TRAINING PROGRAM

## 2020-07-09 DEVICE — IMPLANTABLE DEVICE: Type: IMPLANTABLE DEVICE | Site: LEG | Status: FUNCTIONAL

## 2020-07-09 DEVICE — ALLOGRAFT CARTIMAX VIABLE CART: Type: IMPLANTABLE DEVICE | Site: LEG | Status: FUNCTIONAL

## 2020-07-09 RX ORDER — DEXAMETHASONE SODIUM PHOSPHATE 4 MG/ML
INJECTION, SOLUTION INTRA-ARTICULAR; INTRALESIONAL; INTRAMUSCULAR; INTRAVENOUS; SOFT TISSUE
Status: DISCONTINUED | OUTPATIENT
Start: 2020-07-09 | End: 2020-07-09

## 2020-07-09 RX ORDER — ONDANSETRON 2 MG/ML
4 INJECTION INTRAMUSCULAR; INTRAVENOUS DAILY PRN
Status: COMPLETED | OUTPATIENT
Start: 2020-07-09 | End: 2020-07-09

## 2020-07-09 RX ORDER — LIDOCAINE HYDROCHLORIDE 10 MG/ML
1 INJECTION, SOLUTION EPIDURAL; INFILTRATION; INTRACAUDAL; PERINEURAL ONCE
Status: DISCONTINUED | OUTPATIENT
Start: 2020-07-09 | End: 2020-07-10

## 2020-07-09 RX ORDER — METHOCARBAMOL 750 MG/1
750 TABLET, FILM COATED ORAL 3 TIMES DAILY
Status: DISCONTINUED | OUTPATIENT
Start: 2020-07-09 | End: 2020-07-10

## 2020-07-09 RX ORDER — ACETAMINOPHEN 10 MG/ML
1000 INJECTION, SOLUTION INTRAVENOUS ONCE
Status: DISPENSED | OUTPATIENT
Start: 2020-07-09 | End: 2020-07-10

## 2020-07-09 RX ORDER — FENTANYL CITRATE 50 UG/ML
INJECTION, SOLUTION INTRAMUSCULAR; INTRAVENOUS
Status: DISCONTINUED
Start: 2020-07-09 | End: 2020-07-09 | Stop reason: WASHOUT

## 2020-07-09 RX ORDER — TALC
6 POWDER (GRAM) TOPICAL NIGHTLY PRN
Status: DISCONTINUED | OUTPATIENT
Start: 2020-07-09 | End: 2020-07-10 | Stop reason: HOSPADM

## 2020-07-09 RX ORDER — BUPIVACAINE HYDROCHLORIDE 5 MG/ML
INJECTION, SOLUTION EPIDURAL; INTRACAUDAL
Status: DISCONTINUED | OUTPATIENT
Start: 2020-07-09 | End: 2020-07-09

## 2020-07-09 RX ORDER — PROPOFOL 10 MG/ML
VIAL (ML) INTRAVENOUS
Status: DISCONTINUED | OUTPATIENT
Start: 2020-07-09 | End: 2020-07-09

## 2020-07-09 RX ORDER — GABAPENTIN 300 MG/1
300 CAPSULE ORAL 3 TIMES DAILY
Status: DISCONTINUED | OUTPATIENT
Start: 2020-07-09 | End: 2020-07-10

## 2020-07-09 RX ORDER — CELECOXIB 200 MG/1
400 CAPSULE ORAL ONCE
Status: COMPLETED | OUTPATIENT
Start: 2020-07-09 | End: 2020-07-09

## 2020-07-09 RX ORDER — ACETAMINOPHEN 325 MG/1
650 TABLET ORAL
Status: DISCONTINUED | OUTPATIENT
Start: 2020-07-09 | End: 2020-07-09

## 2020-07-09 RX ORDER — PREGABALIN 75 MG/1
150 CAPSULE ORAL NIGHTLY
Status: DISCONTINUED | OUTPATIENT
Start: 2020-07-09 | End: 2020-07-10 | Stop reason: HOSPADM

## 2020-07-09 RX ORDER — EPINEPHRINE 1 MG/ML
INJECTION INTRAMUSCULAR; INTRAVENOUS; SUBCUTANEOUS
Status: DISPENSED
Start: 2020-07-09 | End: 2020-07-10

## 2020-07-09 RX ORDER — MORPHINE SULFATE 2 MG/ML
2 INJECTION, SOLUTION INTRAMUSCULAR; INTRAVENOUS
Status: DISCONTINUED | OUTPATIENT
Start: 2020-07-09 | End: 2020-07-09

## 2020-07-09 RX ORDER — MIDAZOLAM HYDROCHLORIDE 1 MG/ML
0.5 INJECTION INTRAMUSCULAR; INTRAVENOUS
Status: DISCONTINUED | OUTPATIENT
Start: 2020-07-09 | End: 2020-07-10

## 2020-07-09 RX ORDER — MIDAZOLAM HYDROCHLORIDE 1 MG/ML
INJECTION INTRAMUSCULAR; INTRAVENOUS
Status: DISCONTINUED
Start: 2020-07-09 | End: 2020-07-09 | Stop reason: WASHOUT

## 2020-07-09 RX ORDER — SODIUM CHLORIDE 0.9 % (FLUSH) 0.9 %
3 SYRINGE (ML) INJECTION EVERY 30 MIN PRN
Status: DISCONTINUED | OUTPATIENT
Start: 2020-07-09 | End: 2020-07-09 | Stop reason: HOSPADM

## 2020-07-09 RX ORDER — SODIUM CHLORIDE 9 MG/ML
INJECTION, SOLUTION INTRAVENOUS CONTINUOUS PRN
Status: DISCONTINUED | OUTPATIENT
Start: 2020-07-09 | End: 2020-07-09

## 2020-07-09 RX ORDER — OXYCODONE HYDROCHLORIDE 5 MG/1
10 TABLET ORAL
Status: DISCONTINUED | OUTPATIENT
Start: 2020-07-09 | End: 2020-07-09

## 2020-07-09 RX ORDER — METHOCARBAMOL 500 MG/1
500 TABLET, FILM COATED ORAL 4 TIMES DAILY PRN
Status: DISCONTINUED | OUTPATIENT
Start: 2020-07-09 | End: 2020-07-10

## 2020-07-09 RX ORDER — CEFAZOLIN SODIUM 1 G/3ML
2 INJECTION, POWDER, FOR SOLUTION INTRAMUSCULAR; INTRAVENOUS
Status: DISCONTINUED | OUTPATIENT
Start: 2020-07-09 | End: 2020-07-09

## 2020-07-09 RX ORDER — HYDROMORPHONE HYDROCHLORIDE 1 MG/ML
0.2 INJECTION, SOLUTION INTRAMUSCULAR; INTRAVENOUS; SUBCUTANEOUS EVERY 5 MIN PRN
Status: DISCONTINUED | OUTPATIENT
Start: 2020-07-09 | End: 2020-07-09 | Stop reason: HOSPADM

## 2020-07-09 RX ORDER — GLYCOPYRROLATE 0.2 MG/ML
INJECTION INTRAMUSCULAR; INTRAVENOUS
Status: DISCONTINUED | OUTPATIENT
Start: 2020-07-09 | End: 2020-07-09

## 2020-07-09 RX ORDER — ROPIVACAINE HYDROCHLORIDE 2 MG/ML
6 INJECTION, SOLUTION EPIDURAL; INFILTRATION; PERINEURAL CONTINUOUS
Status: DISCONTINUED | OUTPATIENT
Start: 2020-07-09 | End: 2020-07-10 | Stop reason: HOSPADM

## 2020-07-09 RX ORDER — ONDANSETRON 8 MG/1
8 TABLET, ORALLY DISINTEGRATING ORAL EVERY 8 HOURS PRN
Status: DISCONTINUED | OUTPATIENT
Start: 2020-07-09 | End: 2020-07-10 | Stop reason: HOSPADM

## 2020-07-09 RX ORDER — OXYCODONE HYDROCHLORIDE 5 MG/1
5 TABLET ORAL EVERY 4 HOURS PRN
Status: DISCONTINUED | OUTPATIENT
Start: 2020-07-09 | End: 2020-07-10

## 2020-07-09 RX ORDER — ROCURONIUM BROMIDE 10 MG/ML
INJECTION, SOLUTION INTRAVENOUS
Status: DISCONTINUED | OUTPATIENT
Start: 2020-07-09 | End: 2020-07-09

## 2020-07-09 RX ORDER — FENTANYL CITRATE 50 UG/ML
25 INJECTION, SOLUTION INTRAMUSCULAR; INTRAVENOUS EVERY 5 MIN PRN
Status: DISCONTINUED | OUTPATIENT
Start: 2020-07-09 | End: 2020-07-09 | Stop reason: HOSPADM

## 2020-07-09 RX ORDER — ONDANSETRON 2 MG/ML
4 INJECTION INTRAMUSCULAR; INTRAVENOUS EVERY 12 HOURS PRN
Status: DISCONTINUED | OUTPATIENT
Start: 2020-07-09 | End: 2020-07-09

## 2020-07-09 RX ORDER — CELECOXIB 100 MG/1
200 CAPSULE ORAL DAILY
Status: DISCONTINUED | OUTPATIENT
Start: 2020-07-10 | End: 2020-07-10 | Stop reason: HOSPADM

## 2020-07-09 RX ORDER — EPINEPHRINE CONVENIENCE KIT 1 MG/ML(1)
KIT INTRAMUSCULAR; SUBCUTANEOUS
Status: DISCONTINUED | OUTPATIENT
Start: 2020-07-09 | End: 2020-07-09 | Stop reason: HOSPADM

## 2020-07-09 RX ORDER — LIDOCAINE HYDROCHLORIDE 20 MG/ML
INJECTION, SOLUTION EPIDURAL; INFILTRATION; INTRACAUDAL; PERINEURAL
Status: DISCONTINUED | OUTPATIENT
Start: 2020-07-09 | End: 2020-07-09

## 2020-07-09 RX ORDER — OXYCODONE HYDROCHLORIDE 5 MG/1
5 TABLET ORAL
Status: DISCONTINUED | OUTPATIENT
Start: 2020-07-09 | End: 2020-07-09

## 2020-07-09 RX ORDER — HYDROMORPHONE HYDROCHLORIDE 1 MG/ML
0.5 INJECTION, SOLUTION INTRAMUSCULAR; INTRAVENOUS; SUBCUTANEOUS
Status: DISCONTINUED | OUTPATIENT
Start: 2020-07-09 | End: 2020-07-10 | Stop reason: HOSPADM

## 2020-07-09 RX ORDER — MIDAZOLAM HYDROCHLORIDE 1 MG/ML
INJECTION, SOLUTION INTRAMUSCULAR; INTRAVENOUS
Status: DISCONTINUED | OUTPATIENT
Start: 2020-07-09 | End: 2020-07-09

## 2020-07-09 RX ORDER — ACETAMINOPHEN 500 MG
1000 TABLET ORAL EVERY 6 HOURS
Status: DISCONTINUED | OUTPATIENT
Start: 2020-07-10 | End: 2020-07-10 | Stop reason: HOSPADM

## 2020-07-09 RX ORDER — SODIUM CHLORIDE 0.9 % (FLUSH) 0.9 %
10 SYRINGE (ML) INJECTION
Status: DISCONTINUED | OUTPATIENT
Start: 2020-07-09 | End: 2020-07-10 | Stop reason: HOSPADM

## 2020-07-09 RX ORDER — MIDAZOLAM HYDROCHLORIDE 2 MG/ML
20 SYRUP ORAL ONCE
Status: COMPLETED | OUTPATIENT
Start: 2020-07-09 | End: 2020-07-09

## 2020-07-09 RX ORDER — MIDAZOLAM HYDROCHLORIDE 2 MG/ML
SYRUP ORAL
Status: DISPENSED
Start: 2020-07-09 | End: 2020-07-09

## 2020-07-09 RX ORDER — CEFAZOLIN SODIUM 2 G/50ML
2 SOLUTION INTRAVENOUS
Status: DISCONTINUED | OUTPATIENT
Start: 2020-07-09 | End: 2020-07-10 | Stop reason: HOSPADM

## 2020-07-09 RX ORDER — OXYCODONE HYDROCHLORIDE 10 MG/1
10 TABLET ORAL EVERY 4 HOURS PRN
Status: DISCONTINUED | OUTPATIENT
Start: 2020-07-09 | End: 2020-07-10

## 2020-07-09 RX ORDER — FENTANYL CITRATE 50 UG/ML
INJECTION, SOLUTION INTRAMUSCULAR; INTRAVENOUS
Status: DISCONTINUED | OUTPATIENT
Start: 2020-07-09 | End: 2020-07-09

## 2020-07-09 RX ADMIN — ROCURONIUM BROMIDE 10 MG: 10 INJECTION, SOLUTION INTRAVENOUS at 03:07

## 2020-07-09 RX ADMIN — FENTANYL CITRATE 25 MCG: 50 INJECTION, SOLUTION INTRAMUSCULAR; INTRAVENOUS at 03:07

## 2020-07-09 RX ADMIN — LIDOCAINE HYDROCHLORIDE 100 MG: 20 INJECTION, SOLUTION EPIDURAL; INFILTRATION; INTRACAUDAL; PERINEURAL at 12:07

## 2020-07-09 RX ADMIN — ONDANSETRON 4 MG: 2 INJECTION, SOLUTION INTRAMUSCULAR; INTRAVENOUS at 03:07

## 2020-07-09 RX ADMIN — FENTANYL CITRATE 50 MCG: 50 INJECTION, SOLUTION INTRAMUSCULAR; INTRAVENOUS at 12:07

## 2020-07-09 RX ADMIN — PROMETHAZINE HYDROCHLORIDE 6.25 MG: 25 INJECTION INTRAMUSCULAR; INTRAVENOUS at 11:07

## 2020-07-09 RX ADMIN — DEXTROSE 2 G: 50 INJECTION, SOLUTION INTRAVENOUS at 01:07

## 2020-07-09 RX ADMIN — BUPIVACAINE HYDROCHLORIDE 20 ML: 5 INJECTION, SOLUTION EPIDURAL; INTRACAUDAL; PERINEURAL at 12:07

## 2020-07-09 RX ADMIN — DEXAMETHASONE SODIUM PHOSPHATE 8 MG: 4 INJECTION, SOLUTION INTRAMUSCULAR; INTRAVENOUS at 01:07

## 2020-07-09 RX ADMIN — ROPIVACAINE HYDROCHLORIDE 6 ML/HR: 2 INJECTION, SOLUTION EPIDURAL; INFILTRATION at 07:07

## 2020-07-09 RX ADMIN — MIDAZOLAM HYDROCHLORIDE 20 MG: 2 SYRUP ORAL at 11:07

## 2020-07-09 RX ADMIN — ROCURONIUM BROMIDE 10 MG: 10 INJECTION, SOLUTION INTRAVENOUS at 02:07

## 2020-07-09 RX ADMIN — CEFAZOLIN 2 G: 1 INJECTION, POWDER, FOR SOLUTION INTRAVENOUS at 10:07

## 2020-07-09 RX ADMIN — PREGABALIN 150 MG: 75 CAPSULE ORAL at 10:07

## 2020-07-09 RX ADMIN — METHOCARBAMOL TABLETS 750 MG: 750 TABLET, COATED ORAL at 10:07

## 2020-07-09 RX ADMIN — HYDROMORPHONE HYDROCHLORIDE 0.5 MG: 1 INJECTION, SOLUTION INTRAMUSCULAR; INTRAVENOUS; SUBCUTANEOUS at 07:07

## 2020-07-09 RX ADMIN — ROCURONIUM BROMIDE 20 MG: 10 INJECTION, SOLUTION INTRAVENOUS at 02:07

## 2020-07-09 RX ADMIN — SODIUM CHLORIDE, SODIUM GLUCONATE, SODIUM ACETATE, POTASSIUM CHLORIDE, MAGNESIUM CHLORIDE, SODIUM PHOSPHATE, DIBASIC, AND POTASSIUM PHOSPHATE: .53; .5; .37; .037; .03; .012; .00082 INJECTION, SOLUTION INTRAVENOUS at 02:07

## 2020-07-09 RX ADMIN — OXYCODONE HYDROCHLORIDE 10 MG: 5 TABLET ORAL at 06:07

## 2020-07-09 RX ADMIN — SODIUM CHLORIDE: 0.9 INJECTION, SOLUTION INTRAVENOUS at 12:07

## 2020-07-09 RX ADMIN — ONDANSETRON 8 MG: 8 TABLET, ORALLY DISINTEGRATING ORAL at 10:07

## 2020-07-09 RX ADMIN — CELECOXIB 400 MG: 200 CAPSULE ORAL at 06:07

## 2020-07-09 RX ADMIN — GLYCOPYRROLATE 0.1 MG: 0.2 INJECTION, SOLUTION INTRAMUSCULAR; INTRAVENOUS at 02:07

## 2020-07-09 RX ADMIN — ROCURONIUM BROMIDE 50 MG: 10 INJECTION, SOLUTION INTRAVENOUS at 12:07

## 2020-07-09 RX ADMIN — PROPOFOL 250 MG: 10 INJECTION, EMULSION INTRAVENOUS at 12:07

## 2020-07-09 RX ADMIN — FENTANYL CITRATE 50 MCG: 50 INJECTION, SOLUTION INTRAMUSCULAR; INTRAVENOUS at 01:07

## 2020-07-09 RX ADMIN — MIDAZOLAM HYDROCHLORIDE 2 MG: 1 INJECTION, SOLUTION INTRAMUSCULAR; INTRAVENOUS at 12:07

## 2020-07-09 RX ADMIN — DEXMEDETOMIDINE HYDROCHLORIDE 52 MCG: 100 INJECTION, SOLUTION, CONCENTRATE INTRAVENOUS at 04:07

## 2020-07-09 RX ADMIN — GABAPENTIN 300 MG: 300 CAPSULE ORAL at 10:07

## 2020-07-09 RX ADMIN — SUGAMMADEX 200 MG: 100 INJECTION, SOLUTION INTRAVENOUS at 04:07

## 2020-07-09 NOTE — PROGRESS NOTES
Patient very upset and reluctant to IV insertion. Patient has made verbal threats if IV is not inserted in the desired area. Dr. Wallace notified; PO versed ordered (see MAR). Will attempt IV insertion after medication administration. Bed in lowest locked position with side rail up x1. Mom at the bedside; area from clutter.

## 2020-07-09 NOTE — TRANSFER OF CARE
"Anesthesia Transfer of Care Note    Patient: Denise Mosher    Procedure(s) Performed: Procedure(s) (LRB):  OSTEOTOMY, FEMUR (Left) - distal femoral opening wedge (Orthopediatrics, MTF), knee scope, removal of tibial tubercle screws (Orthopediatrics 4.0 cannulated screws) (Left)  ARTHROSCOPY, KNEE (Left)  REMOVAL, HARDWARE (Left)  ARTHROTOMY, KNEE (Left)    Patient location: PACU    Anesthesia Type: general    Transport from OR: Transported from OR on 6-10 L/min O2 by face mask with adequate spontaneous ventilation    Post pain: adequate analgesia    Post assessment: tolerated procedure well and no apparent anesthetic complications    Post vital signs: stable    Level of consciousness: sedated    Nausea/Vomiting: no nausea/vomiting    Complications: none    Transfer of care protocol was followed      Last vitals:   Visit Vitals  /72 (BP Location: Left arm, Patient Position: Lying)   Pulse 85   Temp 36.8 °C (98.3 °F) (Oral)   Resp 20   Ht 5' 1" (1.549 m)   Wt 104.3 kg (230 lb)   LMP 07/01/2019 (Approximate)   SpO2 100%   Breastfeeding No   BMI 43.46 kg/m²     "

## 2020-07-09 NOTE — ANESTHESIA PROCEDURE NOTES
Femoral Nerve Catheter    Patient location during procedure: OR   Block not for primary anesthetic.  Reason for block: at surgeon's request and post-op pain management   Post-op Pain Location: Left Leg Pain  Start time: 7/9/2020 12:32 PM  Timeout: 7/9/2020 12:30 PM   End time: 7/9/2020 12:50 PM    Staffing  Authorizing Provider: Kasandra Krueger MD  Performing Provider: Efe Pretty MD    Preanesthetic Checklist  Completed: patient identified, site marked, surgical consent, pre-op evaluation, timeout performed, IV checked, risks and benefits discussed and monitors and equipment checked  Peripheral Block  Patient position: supine  Prep: ChloraPrep and site prepped and draped  Patient monitoring: heart rate, cardiac monitor, continuous pulse ox, continuous capnometry and frequent blood pressure checks  Block type: femoral  Laterality: left  Injection technique: continuous  Needle  Needle type: Tuohy   Needle gauge: 17 G  Needle length: 3.5 in  Needle localization: anatomical landmarks and ultrasound guidance  Catheter type: spring wound  Catheter size: 19 G  Test dose: lidocaine 1.5% with Epi 1-to-200,000 and negative   -ultrasound image captured on disc.  Assessment  Injection assessment: negative aspiration, negative parasthesia and local visualized surrounding nerve  Paresthesia pain: none  Heart rate change: no  Slow fractionated injection: yes  Additional Notes  VSS.  [facility] RN monitoring vitals throughout procedure.  Patient tolerated procedure well.

## 2020-07-09 NOTE — ANESTHESIA PREPROCEDURE EVALUATION
07/09/2020  Denise Mosher is a 20 y.o., female.    Past Medical History:   Diagnosis Date    Anxiety     reports panic attacks    Depression     Fibromyalgia     per pt    Insomnia     Precocious female puberty     Seizures     reports 2 incidents of possible seizures while giving blood    Syncope and collapse     Wrist fracture, bilateral        Past Surgical History:   Procedure Laterality Date    ARTHROSCOPY OF KNEE Right 12/19/2019    Procedure: ARTHROSCOPY, KNEE (Right);  Surgeon: Ranulfo House MD;  Location: [facility] OR [address] [place] [address];  Service: Orthopedics;  Laterality: Right;    chip      chip implant    CHONDROPLASTY OF KNEE Right 12/19/2019    Procedure: CHONDROPLASTY, KNEE patella;  Surgeon: Ranulfo House MD;  Location: [facility] OR [address] [place] [address];  Service: Orthopedics;  Laterality: Right;    FEMUR OSTEOTOMY Right 12/13/2018    Procedure: OSTEOTOMY, FEMUR - distal to correct valgus (Orthopediatrics plate, MTF bone wedge).  Right knee arthroscopy with MPFL reconstruction, gracilis allograft (Linvatec).  3 hrs.;  Surgeon: Ranulfo House MD;  Location: [facility] OR [address] [place] [address];  Service: Orthopedics;  Laterality: Right;    HARDWARE REMOVAL Right 12/19/2019    Procedure: REMOVAL, HARDWARE righ distal femoral.;  Surgeon: Ranulfo House MD;  Location: [facility] OR [address] [place] [address];  Service: Orthopedics;  Laterality: Right;    HEMANGIOMA EXCISION      scalp    INGUINAL HERNIA REPAIR Left     INJECTION OF STEROID Right 7/5/2019    Procedure: INJECTION, STEROID;  Surgeon: Ranulfo House MD;  Location: [address];  Service: Orthopedics;  Laterality: Right;    KNEE JOINT MANIPULATION Right 7/5/2019    Procedure: MANIPULATION, KNEE;  Surgeon: Ranulfo House MD;  Location: [address];  Service: Orthopedics;  Laterality: Right;    KNEE SURGERY Left 2/16/15    TOE SURGERY Right     5 th toe         Anesthesia  Evaluation    I have reviewed the Patient Summary Reports.    I have reviewed the Nursing Notes. I have reviewed the NPO Status.   I have reviewed the Medications.     Review of Systems  Anesthesia Hx:  No problems with previous Anesthesia  History of prior surgery of interest to airway management or planning: Denies Family Hx of Anesthesia complications.   Denies Personal Hx of Anesthesia complications.   Cardiovascular:  Cardiovascular Normal     Pulmonary:  Pulmonary Normal    Hepatic/GI:  Hepatic/GI Normal    Neurological:   fibromyalgia   Psych:   anxiety depression          Physical Exam  General:  Well nourished, Obesity    Airway/Jaw/Neck:  Airway Findings: Mouth Opening: Normal Tongue: Normal  General Airway Assessment: Adult  Mallampati: II  TM Distance: Normal, at least 6 cm      Dental:  Dental Findings: In tact   Chest/Lungs:  Chest/Lungs Findings: Normal Respiratory Rate     Heart/Vascular:  Heart Findings: Rate: Normal        Mental Status:  Mental Status Findings:  Alert and Oriented         Anesthesia Plan  Type of Anesthesia, risks & benefits discussed:  Anesthesia Type:  general  Patient's Preference:   Intra-op Monitoring Plan: standard ASA monitors  Intra-op Monitoring Plan Comments:   Post Op Pain Control Plan: multimodal analgesia, IV/PO Opioids PRN, per primary service following discharge from PACU and peripheral nerve block  Post Op Pain Control Plan Comments:   Induction:   IV and Inhalation  Beta Blocker:  Patient is not currently on a Beta-Blocker (No further documentation required).       Informed Consent: Patient understands risks and agrees with Anesthesia plan.  Questions answered. Anesthesia consent signed with patient.  ASA Score: 3     Day of Surgery Review of History & Physical:    H&P update referred to the surgeon.         Ready For Surgery From Anesthesia Perspective.

## 2020-07-10 VITALS
OXYGEN SATURATION: 99 % | HEIGHT: 61 IN | BODY MASS INDEX: 43.43 KG/M2 | HEART RATE: 74 BPM | RESPIRATION RATE: 18 BRPM | TEMPERATURE: 98 F | WEIGHT: 230 LBS | DIASTOLIC BLOOD PRESSURE: 55 MMHG | SYSTOLIC BLOOD PRESSURE: 99 MMHG

## 2020-07-10 PROCEDURE — 63600175 PHARM REV CODE 636 W HCPCS: Performed by: STUDENT IN AN ORGANIZED HEALTH CARE EDUCATION/TRAINING PROGRAM

## 2020-07-10 PROCEDURE — 97165 OT EVAL LOW COMPLEX 30 MIN: CPT

## 2020-07-10 PROCEDURE — 97161 PT EVAL LOW COMPLEX 20 MIN: CPT

## 2020-07-10 PROCEDURE — 63600175 PHARM REV CODE 636 W HCPCS: Performed by: ORTHOPAEDIC SURGERY

## 2020-07-10 PROCEDURE — 25000003 PHARM REV CODE 250: Performed by: STUDENT IN AN ORGANIZED HEALTH CARE EDUCATION/TRAINING PROGRAM

## 2020-07-10 PROCEDURE — 97530 THERAPEUTIC ACTIVITIES: CPT

## 2020-07-10 PROCEDURE — 99231 PR SUBSEQUENT HOSPITAL CARE,LEVL I: ICD-10-PCS | Mod: ,,, | Performed by: ANESTHESIOLOGY

## 2020-07-10 PROCEDURE — 99231 SBSQ HOSP IP/OBS SF/LOW 25: CPT | Mod: ,,, | Performed by: ANESTHESIOLOGY

## 2020-07-10 RX ORDER — IBUPROFEN 400 MG/1
400 TABLET ORAL EVERY 4 HOURS
Qty: 42 TABLET | Refills: 0 | Status: SHIPPED | OUTPATIENT
Start: 2020-07-10 | End: 2024-03-28

## 2020-07-10 RX ORDER — OXYCODONE HYDROCHLORIDE 10 MG/1
10 TABLET ORAL EVERY 4 HOURS PRN
Status: DISCONTINUED | OUTPATIENT
Start: 2020-07-10 | End: 2020-07-10 | Stop reason: HOSPADM

## 2020-07-10 RX ORDER — OXYCODONE HYDROCHLORIDE 5 MG/1
5 TABLET ORAL EVERY 4 HOURS PRN
Status: DISCONTINUED | OUTPATIENT
Start: 2020-07-10 | End: 2020-07-10 | Stop reason: HOSPADM

## 2020-07-10 RX ORDER — ONDANSETRON HYDROCHLORIDE 8 MG/1
8 TABLET, FILM COATED ORAL EVERY 8 HOURS PRN
Qty: 30 TABLET | Refills: 0 | Status: SHIPPED | OUTPATIENT
Start: 2020-07-10 | End: 2022-12-21

## 2020-07-10 RX ORDER — ASPIRIN 81 MG/1
81 TABLET ORAL DAILY
Qty: 30 TABLET | Refills: 0 | Status: SHIPPED | OUTPATIENT
Start: 2020-07-10 | End: 2022-12-21

## 2020-07-10 RX ORDER — HYDROCODONE BITARTRATE AND ACETAMINOPHEN 5; 325 MG/1; MG/1
1 TABLET ORAL EVERY 4 HOURS PRN
Qty: 42 TABLET | Refills: 0 | Status: SHIPPED | OUTPATIENT
Start: 2020-07-10 | End: 2022-12-21

## 2020-07-10 RX ORDER — METHOCARBAMOL 750 MG/1
750 TABLET, FILM COATED ORAL 4 TIMES DAILY
Status: DISCONTINUED | OUTPATIENT
Start: 2020-07-10 | End: 2020-07-10 | Stop reason: HOSPADM

## 2020-07-10 RX ORDER — ONDANSETRON 2 MG/ML
8 INJECTION INTRAMUSCULAR; INTRAVENOUS EVERY 8 HOURS PRN
Status: DISCONTINUED | OUTPATIENT
Start: 2020-07-10 | End: 2020-07-10 | Stop reason: HOSPADM

## 2020-07-10 RX ADMIN — METHOCARBAMOL TABLETS 750 MG: 750 TABLET, COATED ORAL at 12:07

## 2020-07-10 RX ADMIN — CELECOXIB 200 MG: 100 CAPSULE ORAL at 08:07

## 2020-07-10 RX ADMIN — ROPIVACAINE HYDROCHLORIDE: 2 INJECTION, SOLUTION EPIDURAL; INFILTRATION at 03:07

## 2020-07-10 RX ADMIN — CEFAZOLIN 2 G: 1 INJECTION, POWDER, FOR SOLUTION INTRAVENOUS at 06:07

## 2020-07-10 RX ADMIN — ROPIVACAINE HYDROCHLORIDE 6 ML/HR: 2 INJECTION, SOLUTION EPIDURAL; INFILTRATION at 08:07

## 2020-07-10 RX ADMIN — ACETAMINOPHEN 1000 MG: 500 TABLET ORAL at 12:07

## 2020-07-10 RX ADMIN — OXYCODONE HYDROCHLORIDE 10 MG: 10 TABLET ORAL at 08:07

## 2020-07-10 RX ADMIN — METHOCARBAMOL TABLETS 750 MG: 750 TABLET, COATED ORAL at 08:07

## 2020-07-10 RX ADMIN — ACETAMINOPHEN 1000 MG: 500 TABLET ORAL at 06:07

## 2020-07-10 NOTE — NURSING TRANSFER
Nursing Transfer Note      7/9/2020     Transfer To: PEDS [facility]    Transfer via stretcher    Transfer with IV pole    Transported by RN    Medicines sent: no    Chart send with patient: Yes    Notified: mother    Report called to Monika CAICEDO on PEDS.

## 2020-07-10 NOTE — PT/OT/SLP EVAL
Occupational Therapy   Evaluation & Treatment     Name: Denise Mosher  MRN: [id number]  Admitting Diagnosis:  <principal problem not specified> 1 Day Post-Op    Recommendations:     Discharge Recommendations: home  Discharge Equipment Recommendations:     Barriers to discharge:  None    Assessment:     Denise Mosher is a 20 y.o. female with a medical diagnosis of <principal problem not specified>.  She presents with impairments listed below. Pt did well to tolerate and particiapte in the session. Pt is functioning below baseline at this time and is unable to assume prior life roles. Pt displayed global deconditioning requiring increased assist for ADLs and mobility at this time. Pt would benefit from skilled OT services to improve independence and overall occupational functioning.     Performance deficits affecting function: impaired endurance, impaired self care skills, impaired functional mobilty, gait instability, impaired balance, decreased lower extremity function, pain, decreased ROM, orthopedic precautions.      Rehab Prognosis: Good; patient would benefit from acute skilled OT services to address these deficits and reach maximum level of function.       Plan:     Patient to be seen daily to address the above listed problems via self-care/home management, therapeutic activities, therapeutic exercises  · Plan of Care Expires: 08/10/20  · Plan of Care Reviewed with: patient, mother    Subjective     Chief Complaint: back pain  Patient/Family Comments/goals: return home    Occupational Profile:  Living Environment: Lives in a [facility] w/ family.   Previous level of function: Indep  Roles and Routines: N/A  Equipment Used at Home:  crutches, axillary  Assistance upon Discharge: Has assistance upon D/C.     Pain/Comfort:  · Pain Rating 1: 8/10  · Location - Side 1: Bilateral  · Location - Orientation 1: generalized  · Location 1: back  · Pain Addressed 1: Pre-medicate for activity, Reposition,  Distraction  · Pain Rating Post-Intervention 1: (did not rate)    Patients cultural, spiritual, Cheondoism conflicts given the current situation:      Objective:     Communicated with: RN prior to session.  Patient found HOB elevated with SCD, cryotherapy, perineural catheter upon OT entry to room.    General Precautions: Standard, fall   Orthopedic Precautions:LLE toe touch weight bearing   Braces: Hinged knee brace     Occupational Performance:    Bed Mobility:    · Patient completed Scooting/Bridging with minimum assistance  · Patient completed Supine to Sit with minimum assistance    Functional Mobility/Transfers:  · Patient completed Sit <> Stand Transfer with minimum assistance  with  no assistive device   · Patient completed Bed <> Chair Transfer using Stand Pivot technique with minimum assistance with no assistive device  · Functional Mobility: Pt did not take steps at this time.    Activities of Daily Living:  · Upper Body Dressing: minimum assistance donned gown as robe    Cognitive/Visual Perceptual:  Cognitive/Psychosocial Skills:     -       Oriented to: Person, Place, Time and Situation   -       Follows Commands/attention:Follows multistep  commands  -       Communication: clear/fluent  -       Memory: No Deficits noted  -       Safety awareness/insight to disability: intact   -       Mood/Affect/Coping skills/emotional control: Appropriate to situation  Visual/Perceptual:      -Intact       Physical Exam:  Balance:    -       min a when performing a stand pivot t/f  Postural examination/scapula alignment:    -       Rounded shoulders  Skin integrity: Visible skin intact  Upper Extremity Range of Motion:     -       Right Upper Extremity: WFL  -       Left Upper Extremity: WFL  Upper Extremity Strength:    -       Right Upper Extremity: WFL  -       Left Upper Extremity: WFL   Strength:    -       Right Upper Extremity: WFL  -       Left Upper Extremity: WFL  Fine Motor Coordination:    -        Intact  Gross motor coordination:   WFL    AMPAC 6 Click ADL:  AMPAC Total Score: 21    Treatment & Education:  Pt educated on POC, weight bearing precautions, LBD techniques, and overall coordination of care.  OT demo' d mobility w/ TTWB status.   Education:    Patient left up in chair with all lines intact, call button in reach and mother present    GOALS:   Multidisciplinary Problems     Occupational Therapy Goals        Problem: Occupational Therapy Goal    Goal Priority Disciplines Outcome Interventions   Occupational Therapy Goal     OT, PT/OT Ongoing, Progressing    Description: Goals to be met by: 7/24/2020     Patient will increase functional independence with ADLs by performing:    UE Dressing with [place].  LE Dressing with Minimal Assistance.  Grooming while seated at sink with Stand-by Assistance.  Toileting from toilet with Supervision for hygiene and clothing management.   Toilet transfer to toilet with Stand-by Assistance.                     History:     Past Medical History:   Diagnosis Date    Anxiety     reports panic attacks    Depression     Fibromyalgia     per pt    Insomnia     Precocious female puberty     Seizures     reports 2 incidents of possible seizures while giving blood    Syncope and collapse     Wrist fracture, bilateral        Past Surgical History:   Procedure Laterality Date    ARTHROSCOPY OF KNEE Right 12/19/2019    Procedure: ARTHROSCOPY, KNEE (Right);  Surgeon: Ranulfo House MD;  Location: [address];  Service: Orthopedics;  Laterality: Right;    chip      chip implant    CHONDROPLASTY OF KNEE Right 12/19/2019    Procedure: CHONDROPLASTY, KNEE patella;  Surgeon: Ranulfo House MD;  Location: [facility] OR [address] [place] [address];  Service: Orthopedics;  Laterality: Right;    FEMUR OSTEOTOMY Right 12/13/2018    Procedure: OSTEOTOMY, FEMUR - distal to correct valgus (Orthopediatrics plate, MTF bone wedge).  Right knee arthroscopy with MPFL reconstruction, gracilis  allograft (Linvatec).  3 hrs.;  Surgeon: Ranulfo House MD;  Location: [facility] OR [address] [place] [address];  Service: Orthopedics;  Laterality: Right;    HARDWARE REMOVAL Right 12/19/2019    Procedure: REMOVAL, HARDWARE righ distal femoral.;  Surgeon: Ranulfo House MD;  Location: [facility] OR [address] [place] [address];  Service: Orthopedics;  Laterality: Right;    HEMANGIOMA EXCISION      scalp    INGUINAL HERNIA REPAIR Left     INJECTION OF STEROID Right 7/5/2019    Procedure: INJECTION, STEROID;  Surgeon: Ranulfo House MD;  Location: [facility] OR [address] [place] [address];  Service: Orthopedics;  Laterality: Right;    KNEE JOINT MANIPULATION Right 7/5/2019    Procedure: MANIPULATION, KNEE;  Surgeon: Ranulfo House MD;  Location: [address];  Service: Orthopedics;  Laterality: Right;    KNEE SURGERY Left 2/16/15    TOE SURGERY Right     5 th toe       Time Tracking:     OT Date of Treatment: 07/10/20  OT Start Time: 0753  OT Stop Time: 0815  OT Total Time (min): 22 min    Billable Minutes:Evaluation 14 mintues  Therapeutic Activity 8 minutes    Armando Bernal, OT  7/10/2020

## 2020-07-10 NOTE — HOSPITAL COURSE
On 7/9/20, the patient arrived to the [facility] for proper pre-operative management.  Upon completion of pre-operative preparation, the patient was taken back to the operative theatre. A left distal femur osteotomy, L tibia hardware removal, and left knee scope was performed without complication and the patient was transported to the post anesthesia care unit in stable condition.  After appropriate recovery from the anaesthetic agents used during the surgery, the patient was then transported to the hospital inpatient floor.  The interim of the hospital stay from arrival on the floor up to discharge has been uncomplicated. The patient has tolerated regular diet.  The patient's pain has been controlled using a multimodal approach. Currently, the patient's pain is well controlled on an oral regimen.  The patient has been voiding without difficulty.  The patient began participation in physical therapy after surgery and has progressed throughout the entire hospital stay.  Currently, the patient's progress is sufficient to allow the them to be discharged to home safely.  The patient agrees with this assessment and desires a discharge today.

## 2020-07-10 NOTE — ANESTHESIA POSTPROCEDURE EVALUATION
Anesthesia Post Evaluation    Patient: Denise Mosher    Procedure(s) Performed: Procedure(s) (LRB):  OSTEOTOMY, FEMUR (Left) - distal femoral opening wedge (Orthopediatrics, MTF), knee scope, removal of tibial tubercle screws (Orthopediatrics 4.0 cannulated screws) (Left)  ARTHROSCOPY, KNEE (Left)  REMOVAL, HARDWARE (Left)  ARTHROTOMY, KNEE (Left)    Final Anesthesia Type: general    Patient location during evaluation: PACU  Patient participation: Yes- Able to Participate  Level of consciousness: awake and alert and oriented  Post-procedure vital signs: reviewed and stable  Pain management: adequate  Airway patency: patent    PONV status at discharge: No PONV  Anesthetic complications: no      Cardiovascular status: blood pressure returned to baseline and hemodynamically stable  Respiratory status: unassisted  Hydration status: euvolemic  Follow-up not needed.          Vitals Value Taken Time   /59 07/09/20 1915   Temp 36.5 °C (97.7 °F) 07/09/20 1720   Pulse 74 07/09/20 1915   Resp 20 07/09/20 1915   SpO2 98 % 07/09/20 1915         No case tracking events are documented in the log.      Pain/Fabiola Score: Presence of Pain: denies (7/9/2020  8:21 PM)  Pain Rating Prior to Med Admin: 5 (7/10/2020  8:38 AM)  Fabiola Score: 10 (7/9/2020  6:30 PM)

## 2020-07-10 NOTE — PLAN OF CARE
Problem: Occupational Therapy Goal  Goal: Occupational Therapy Goal  Description: Goals to be met by: 7/24/2020     Patient will increase functional independence with ADLs by performing:    UE Dressing with [place].  LE Dressing with Minimal Assistance.  Grooming while seated at sink with Stand-by Assistance.  Toileting from toilet with Supervision for hygiene and clothing management.   Toilet transfer to toilet with Stand-by Assistance.    Outcome: Ongoing, Progressing    Armando Bernal OTR/L  7/10/2020

## 2020-07-10 NOTE — PLAN OF CARE
VSS, pt in NAD, afebrile. Left femoral PNC CDI with ropivacaine @ 6 ml/hr. Left FA IV CDI, saline locked between use. Cast padding/brace to L leg CDI. Neurovascular checks to LE WDL. After administering 2100 meds, pt vomited about 700 cc. Mom stated she saw undigested sandwich and pills in emesis. Administered sublingual zofran. Shortly after, pt vomited about 100 cc more. PRN phenergan then administered; good relief noted. MD Timothy Villalta notified. Per MD instructions, did not redose pt for meds that were vomited. Scheduled tylenol admin per orders. Scheduled IV ancef administered. No PRN pain meds requested. Pt urinating well; using bed pan. Mom at bedside, attentive to pt. Questions encouraged and answered. Verbalized understanding. Safety maintained. Will continue to monitor.

## 2020-07-10 NOTE — DISCHARGE SUMMARY
[facility]  Orthopedics  Discharge Summary      Patient Name: Denise Mosher  MRN: [id number]  Admission Date: 7/9/2020  Hospital Length of Stay: 1 days  Discharge Date and Time: 07/10/2020   Attending Physician: Ranulfo House MD   Discharging Provider: Edward Alvarado MD  Primary Care Provider: Farzana Marcelo MD    HPI:   Patient returns for follow-up of bilateral knee pain.  Her right knee pain is significantly improved although she still has pain occasionally.  Now her main problem is her left knee.  She has known genu valgum.  She underwent a tibial tubercle osteotomy several years ago.  She still has significant pain mostly anteriorly.  She has not had an MRI in at least 2 years.  She also reports that her tibial tubercle screws are causing pain as well.    Procedure(s) (LRB):  OSTEOTOMY, FEMUR (Left) - distal femoral opening wedge (Orthopediatrics, MTF), knee scope, removal of tibial tubercle screws (Orthopediatrics 4.0 cannulated screws) (Left)  ARTHROSCOPY, KNEE (Left)  REMOVAL, HARDWARE (Left)  ARTHROTOMY, KNEE (Left)      Hospital Course:  On 7/9/20, the patient arrived to the [facility] for proper pre-operative management.  Upon completion of pre-operative preparation, the patient was taken back to the operative theatre. A left distal femur osteotomy, L tibia hardware removal, and left knee scope was performed without complication and the patient was transported to the post anesthesia care unit in stable condition.  After appropriate recovery from the anaesthetic agents used during the surgery, the patient was then transported to the hospital inpatient floor.  The interim of the hospital stay from arrival on the floor up to discharge has been uncomplicated. The patient has tolerated regular diet.  The patient's pain has been controlled using a multimodal approach. Currently, the patient's pain is well controlled on an oral regimen.  The patient has been  voiding without difficulty.  The patient began participation in physical therapy after surgery and has progressed throughout the entire hospital stay.  Currently, the patient's progress is sufficient to allow the them to be discharged to home safely.  The patient agrees with this assessment and desires a discharge today.          Significant Diagnostic Studies: No pertinent studies.    Pending Diagnostic Studies:     None        Final Active Diagnoses:    Diagnosis Date Noted POA    PRINCIPAL PROBLEM:  Acquired genu valgum of left knee [M21.062] 07/09/2020 Yes      Problems Resolved During this Admission:      Discharged Condition: good    Disposition: Home or Self Care    Follow Up:  Follow-up Information     [facility] .    Specialty: Orthotics  Contact information:  [address]  [place] [id number] [phone]                 Patient Instructions:      Ambulatory referral/consult to Orthotist   Standing Status: Future   Referral Priority: Routine Referral Type: Consultation   Referral Reason: Specialty Services Required   Referred to Provider: [facility] Requested Specialty: Orthotics   Number of Visits Requested: 1     Ambulatory referral/consult to Physical/Occupational Therapy   Standing Status: Future   Referral Priority: Routine Referral Type: Physical Medicine   Referral Reason: Specialty Services Required   Number of Visits Requested: 1     Medications:  Reconciled Home Medications:      Medication List      CHANGE how you take these medications    HYDROcodone-acetaminophen 5-325 mg per tablet  Commonly known as: NORCO  Take 1 tablet by mouth every 4 (four) hours as needed for Pain.  What changed: when to take this        CONTINUE taking these medications    aspirin 81 MG EC tablet  Commonly known as: ECOTRIN  Take 1 tablet (81 mg total) by mouth once daily.     ibuprofen 400 MG tablet  Commonly known as: ADVIL,MOTRIN  Take 1 tablet (400 mg total) by mouth every 4 (four) hours.      norethindrone 0.35 mg tablet  Commonly known as: MICRONOR  Take 1 tablet (0.35 mg total) by mouth once daily.     ondansetron 8 MG tablet  Commonly known as: ZOFRAN  Take 1 tablet (8 mg total) by mouth every 8 (eight) hours as needed for Nausea.            Edward Alvarado MD  Orthopedics  [facility]

## 2020-07-10 NOTE — PROGRESS NOTES
OnQ teaching performed with patient and mother at bedside. All questions and concerns addressed. Discussed plan to call patient each day with OnQ for follow up as well as plan to remove PNC at home on 7/12/2020.     Vern Ferrell  Anesthesiology, CA-3

## 2020-07-10 NOTE — ANESTHESIA POST-OP PAIN MANAGEMENT
Acute Pain Service Progress Note    Denise Mosher is a 20 y.o., female, [id number].    Surgery:  Left femur osteotomy    Post Op Day #: 1    Catheter type: perineural  femoral Left    Infusion type: Ropivacaine 0.2%  6 cc/hr basal    Problem List:    Active Hospital Problems    Diagnosis  POA    Acquired genu valgum of left knee [M21.062]  Yes      Resolved Hospital Problems   No resolved problems to display.       Subjective:     General appearance of alert, oriented, no complaints   Pain with rest: 3    Numbers   Pain with movement: 6    Numbers   Side Effects    1. Pruritis No    2. Nausea No    3. Motor Blockade No, 0=Ability to raise lower extremities off bed    4. Sedation No, 1=awake and alert    Objective:     Catheter site: clean, dry, and intact.      Meds   Current Facility-Administered Medications   Medication Dose Route Frequency Provider Last Rate Last Dose    acetaminophen 1,000 mg/100 mL (10 mg/mL) injection 1,000 mg  1,000 mg Intravenous Once Vern Ferrell MD        Followed by    acetaminophen tablet 1,000 mg  1,000 mg Oral Q6H Vern Ferrell MD   1,000 mg at 07/10/20 1229    cefazolin (ANCEF) 2 gram in dextrose 5% 50 mL IVPB (premix)  2 g Intravenous Q8H Ranulfo House  mL/hr at 07/10/20 0632 2 g at 07/10/20 0632    celecoxib capsule 200 mg  200 mg Oral Daily Vern Ferrell MD   200 mg at 07/10/20 0827    HYDROmorphone injection 0.5 mg  0.5 mg Intravenous Q3H PRN Edward Alvarado MD   0.5 mg at 07/09/20 1900    melatonin tablet 6 mg  6 mg Oral Nightly PRN Edward Alvarado MD        methocarbamoL tablet 750 mg  750 mg Oral QID Vern Ferrell MD   750 mg at 07/10/20 1228    ondansetron disintegrating tablet 8 mg  8 mg Oral Q8H PRN Edward Alvarado MD   8 mg at 07/09/20 2258    ondansetron injection 8 mg  8 mg Intravenous Q8H PRN Timothy Villalta MD        oxyCODONE immediate release tablet 5 mg  5 mg Oral Q4H PRN Vern Ferrell MD        oxyCODONE  immediate release tablet Tab 10 mg  10 mg Oral Q4H PRN Vern Ferrell MD   10 mg at 07/10/20 0838    pregabalin capsule 150 mg  150 mg Oral QHS Vern Ferrell MD   150 mg at 07/09/20 2206    promethazine (PHENERGAN) 6.25 mg in dextrose 5 % 50 mL IVPB  6.25 mg Intravenous Q6H PRN Vern Ferrell  mL/hr at 07/09/20 2348 6.25 mg at 07/09/20 2348    ropivacaine (PF) 2 mg/ml (0.2%) infusion  6 mL/hr Perineural Continuous Vern Ferrell MD 6 mL/hr at 07/10/20 0829 6 mL/hr at 07/10/20 0829    ropivacaine 0.2% ON-Q C-BLOC 400 ML (SELECT A FLOW)   Perineural Continuous Vern Ferrell MD        sodium chloride 0.9% flush 10 mL  10 mL Intravenous PRN Edward Alvarado MD              Assessment:     Pain control adequate    Plan:     Patient doing well, continue present treatment.    -Continue multimodal regimen with tylenol, celebrex, lyrica, and robaxin.   -can discharge with OnQ when medically cleared.     Evaluator Vern Ferrell

## 2020-07-10 NOTE — PLAN OF CARE
Plan of Care:  Discharge Recommendation: Home, no PT needs.  Please continue Progressive Mobility Protocol as appropriate.  Appropriate transfer level with nursing staff: Bed <> Chair:  Stand Pivot with minimum assistance with Rolling Walker.    Eileen Jarquin PT, DPT  7/10/2020  Pager: [phone]    Physical Therapy Treatment Goals:  Multidisciplinary Problems       Physical Therapy Goals          Problem: Physical Therapy Goal    Goal Priority Disciplines Outcome Goal Variances Interventions   Physical Therapy Goal     PT, PT/OT      Description: Goals to be met by: 2020    Patient will increase functional independence with mobility by performin. Supine <> sit with Modified [place].  2. Sit <> stand transfer with Stand-by Assistance using Rolling Walker, maintaining TTWB of LLE.  3. Bed <> chair transfer via Stand Pivot with Stand-by Assistance using Rolling Walker.  4. Gait  x 50 feet with Stand-by Assistance using Rolling Walker to prepare for household ambulation and endurance activities.

## 2020-07-10 NOTE — PLAN OF CARE
07/10/20 1045   Discharge Assessment   Assessment Type Discharge Planning Assessment   Confirmed/corrected address and phone number on facesheet? No   Assessment information obtained from? Medical Record   Expected Length of Stay (days) 1   Prior to hospitilization cognitive status: Alert/Oriented   Prior to hospitalization functional status: Independent   Current cognitive status: Alert/Oriented   Current Functional Status: Independent   Lives With parent(s)   Able to Return to Prior Arrangements yes   Is patient able to care for self after discharge? Patient is of pediatric age   Who are your caregiver(s) and their phone number(s)? mother: Kadi Mosher [phone]   Patient's perception of discharge disposition home or selfcare   Readmission Within the Last 30 Days no previous admission in last 30 days   Patient currently being followed by outpatient case management? No   Patient currently receives any other outside agency services? No   Equipment Currently Used at Home crutches, axillary   Do you have any problems affording any of your prescribed medications? No   Is the patient taking medications as prescribed? yes   Does the patient have transportation home? Yes   Transportation Anticipated family or friend will provide   Does the patient receive services at the [facility]? No   Discharge Plan A Home with family   Discharge Plan B Home with family   DME Needed Upon Discharge  none   Patient/Family in Agreement with Plan yes   Pt discharging home to day with OnQ Ball for pain control. No other dc needs.

## 2020-07-10 NOTE — PT/OT/SLP EVAL
Physical Therapy Evaluation    Patient Name:  Denise Mosher   MRN:  [id number]  Admit Date: 7/9/2020  Admitting Diagnosis:  Acquired genu valgum of left knee  Length of Stay: 1 days  Recent Surgery: Procedure(s) (LRB):  OSTEOTOMY, FEMUR (Left) - distal femoral opening wedge (Orthopediatrics, MTF), knee scope, removal of tibial tubercle screws (Orthopediatrics 4.0 cannulated screws) (Left)  ARTHROSCOPY, KNEE (Left)  REMOVAL, HARDWARE (Left)  ARTHROTOMY, KNEE (Left) 1 Day Post-Op    Recommendations:     Discharge Recommendations:  home   Discharge Equipment Recommendations: none   Barriers to discharge: Inaccessible home (3STE)    Assessment:     Denise Mosher is a 20 y.o. female admitted with a medical diagnosis of Acquired genu valgum of left knee.  She presents with the following impairments/functional limitations: decreased functional mobility due to pain and orthopedic restrictions.  Denise Mosher's deficits effect their ability to safely and independently participate in self-care tasks and functional mobility which increases their caregiver burden. Due to her physical therapy diagnosis of debility and deconditioning, they continue to benefit from acute PT services to address the following limitations: high fall risk, weakness, instability, and the need for supervised instruction of exercise. These deficits effect their roles and responsibilities in which they were able to complete prior to admit. Pt would benefit from an intensive and collaborative PT/OT/SLP therapy program to improve quality of life and focus on recovery of impairments.     Problem List: impaired endurance, impaired self care skills, gait instability, impaired functional mobilty, impaired balance, pain, orthopedic precautions  Rehab Prognosis: Good; patient would benefit from acute skilled PT services to address these deficits and reach maximum level of function.      Plan:     During this hospitalization, patient to be seen 3  "x/week to address the identified rehab impairments via gait training, therapeutic activities, therapeutic exercises, neuromuscular re-education and progress towards the established goals.    · Plan of Care Expires:  08/09/20    Subjective   Communicated with RN prior to session.  Patient found supine upon PT entry to room, agreeable to evaluation. Denise Mosher's mother present during session.    Chief Complaint: No chief complaint on file.    Patient/Family Comments/goals: "I'm so tired. I want to go home."  Pain/Comfort:  · Pain Rating 1: 8/10  · Location - Side 1: Left  · Location - Orientation 1: generalized  · Location 1: knee  · Pain Addressed 1: Pre-medicate for activity, Cessation of Activity, Nurse notified  · Pain Rating Post-Intervention 1: 8/10    Living Environment:  Patient lives with mother in a single family home with 3 NICKOLAS.   Prior Level of Function:   Patient reports being independent with mobility & with ADLs. Hand Dominance: right. Patient uses DME as follows: wheelchair, crutches, axillary(motorized bed). DME owned (not currently used): none.  Roles/Repsonsibilities:   Work: Employed and Student. Drive: no. Managing Medicines/Managing Home: yes.    Patient reports they will have assistance from family upon discharge.    Objective:   Patient found with: cryotherapy, perineural catheter     General Precautions: Standard, Cardiac fall   Orthopedic Precautions:LLE toe touch weight bearing   Braces: Hinged knee brace   Oxygen Device: Room Air  Vitals: BP (!) 99/55 (BP Location: Right arm, Patient Position: Lying)   Pulse 74   Temp 97.7 °F (36.5 °C) (Oral)   Resp 18   Ht 5' 1" (1.549 m)   Wt 104.3 kg (230 lb)   LMP 07/01/2019 (Approximate)   SpO2 99%   Breastfeeding No   BMI 43.46 kg/m²     Exams:  · Cognition:   · Alert and Cooperative  · AxOx4  · Command following: Follows multistep  commands  · Fluency: clear/fluent  · Hearing: Intact  · Vision:  Intact visual fields     Left LE Right " LE   Edema swelling noted in ankle absent   ROM Hip WFL, knee locked in extension brace AROM WFL   Modified Manny Scale 0: No increase in muscle tone 0: No increase in muscle tone   Strength within normal limits 5/5   Sensation intact to light touch intact to light touch   Coordination normal normal     Outcome Measures:  AM-PAC 6 CLICK MOBILITY  Turning over in bed (including adjusting bedclothes, sheets and blankets)?: 3  Sitting down on and standing up from a chair with arms (e.g., wheelchair, bedside commode, etc.): 3  Moving from lying on back to sitting on the side of the bed?: 3  Moving to and from a bed to a chair (including a wheelchair)?: 3  Need to walk in hospital room?: 2  Climbing 3-5 steps with a railing?: 2  Basic Mobility Total Score: 16     Functional Mobility:  Additional staff present: N/A  Bed Mobility:  · Supine to Sit: minimum assistance; HOB elevated and from left side of bed  · Scooting anteriorly to EOB to have both feet planted on floor: minimum assistance  · Sit to Supine: minimum assistance; HOB flat and to left side of bed  · Assist with LLE into bed    Transfers:   · Sit <> Stand Transfer: contact guard assistance and minimum assistance with rolling walker   · Stand <> Sit Transfer: minimum assistance with rolling walker   · [id number] from EOB and [id number] from toliet   · Min A from toilet due to height of toilet      Gait:   · Patient ambulated: 50ft   · Patient required: minimal assist  · Patient used: rolling walker  · Gait Pattern observed: 3pt with LLE TTWB  · Gait Deviation(s): antalgic gait, flexed posture and decreased stanley  · Impairments due to: impaired balance and pain  · Comments: pt attempted to use axillary crutches, stated she felt safer with RW. Pt's gait sequence smoother with RW.    Therapeutic Activities & Exercises:   Education:  Patient provided with daily orientation and goals of this PT session. Patient and family agreed to participate in session. They were  educated to transfer to bedside chair/bedside commode/bathroom with RN/PCT present. Patient and Family member educated on Fall risk, gait training, home safety, Home exercise program, transfer training and weight bearing restrictions by explanation and demonstration.  Patient was receptive to education and verbalizes understanding. Encouraged patient to perform daily exercises & mobility to increase endurance and decrease effects of bedrest. Time provided for therapeutic counseling and discussion of current health disposition. All questions answered to patient's and family's satisfaction, within scope of PT practice; voiced no other concerns. White board updated in patient's room, RN notified of session.    Patient left supine, with head in midline, neutral pelvis & heels floated for skin protection with all lines intact, call button in reach and RN notified.    GOALS:   Multidisciplinary Problems     Physical Therapy Goals        Problem: Physical Therapy Goal    Goal Priority Disciplines Outcome Goal Variances Interventions   Physical Therapy Goal     PT, PT/OT      Description: Goals to be met by: 2020    Patient will increase functional independence with mobility by performin. Supine <> sit with Modified [place].  2. Sit <> stand transfer with Stand-by Assistance using Rolling Walker, maintaining TTWB of LLE.  3. Bed <> chair transfer via Stand Pivot with Stand-by Assistance using Rolling Walker.  4. Gait  x 50 feet with Stand-by Assistance using Rolling Walker to prepare for household ambulation and endurance activities.                         History:     Past Medical History:   Diagnosis Date    Anxiety     reports panic attacks    Depression     Fibromyalgia     per pt    Insomnia     Precocious female puberty     Seizures     reports 2 incidents of possible seizures while giving blood    Syncope and collapse     Wrist fracture, bilateral        Past Surgical History:   Procedure  Laterality Date    ARTHROSCOPY OF KNEE Right 12/19/2019    Procedure: ARTHROSCOPY, KNEE (Right);  Surgeon: Ranulfo House MD;  Location: [facility] OR [address] [place] [address];  Service: Orthopedics;  Laterality: Right;    chip      chip implant    CHONDROPLASTY OF KNEE Right 12/19/2019    Procedure: CHONDROPLASTY, KNEE patella;  Surgeon: Ranulfo House MD;  Location: [facility] OR [address] [place] [address];  Service: Orthopedics;  Laterality: Right;    FEMUR OSTEOTOMY Right 12/13/2018    Procedure: OSTEOTOMY, FEMUR - distal to correct valgus (Orthopediatrics plate, MTF bone wedge).  Right knee arthroscopy with MPFL reconstruction, gracilis allograft (Linvatec).  3 hrs.;  Surgeon: Ranulfo House MD;  Location: [facility] OR [address] [place] [address];  Service: Orthopedics;  Laterality: Right;    HARDWARE REMOVAL Right 12/19/2019    Procedure: REMOVAL, HARDWARE righ distal femoral.;  Surgeon: Ranulfo House MD;  Location: [facility] OR [address] [place] [address];  Service: Orthopedics;  Laterality: Right;    HEMANGIOMA EXCISION      scalp    INGUINAL HERNIA REPAIR Left     INJECTION OF STEROID Right 7/5/2019    Procedure: INJECTION, STEROID;  Surgeon: Ranulfo House MD;  Location: [facility] OR [address] [place] [address];  Service: Orthopedics;  Laterality: Right;    KNEE JOINT MANIPULATION Right 7/5/2019    Procedure: MANIPULATION, KNEE;  Surgeon: Ranulfo House MD;  Location: [address];  Service: Orthopedics;  Laterality: Right;    KNEE SURGERY Left 2/16/15    TOE SURGERY Right     5 th toe       Time Tracking:     PT Received On: 07/10/20  PT Start Time: 1410     PT Stop Time: 1500  PT Total Time (min): 50 min     Billable Minutes: Evaluation 10 and Therapeutic Activity 38    Eileen Jarquin PT, DPT  7/10/2020  Pager: [phone]

## 2020-07-10 NOTE — HPI
Patient returns for follow-up of bilateral knee pain.  Her right knee pain is significantly improved although she still has pain occasionally.  Now her main problem is her left knee.  She has known genu valgum.  She underwent a tibial tubercle osteotomy several years ago.  She still has significant pain mostly anteriorly.  She has not had an MRI in at least 2 years.  She also reports that her tibial tubercle screws are causing pain as well.

## 2020-07-10 NOTE — PLAN OF CARE
Pt being transferred per MD orders. Report called to MARIFER Frank on PEDS. Mom at the bedside. Ropivacaine gtt stared. PRN pain medication given prior. Polar ice connected to LLE. Patient is resting comfortably in bed.

## 2020-07-10 NOTE — NURSING
VSS and afebrile.  Pt has exhibited minimal signs/symptoms of pain and/or discomfort.  Pain well controlled with PCEA w/ ropivicaine, ATC tylenol and methocarbimol and PRN oxy administered x1. Pt has been resting comfortably between care. L leg w/ cast, brace and polar ice, CDI.  Peripheral neurovascular check WDL.  Pt tolerating regular diet, adequate intake and output. Medications administered as ordered. Pt up in chair for majority of shift. Assistance needed with movement, tolerated well.  PT and OT evaluated.  CPN machine delivered delivered to bedside.  Q ball in place. Discharge instructions given to mom and Pt, verbalized understanding.  Discussed follow up apt, medication administration, prescriptions, patient portal, 24/7 nurse care line, and signs/symptoms to watch for.  Safety maintained.

## 2020-07-11 NOTE — ANESTHESIA POST-OP PAIN MANAGEMENT
Spoke with patient today. Reports adequate pain control with On-Q ball and PRN oxycodone. Reiterated the plan to remove PNC tomorrow. Questions answered and concerns addressed. Will follow up tomorrow.    Vern Ferrell, CA-3  [facility]

## 2020-07-12 NOTE — ANESTHESIA POST-OP PAIN MANAGEMENT
I received a call around 1 H earlier in regards that her OnQ ball hasn't been flowing smoothly, and it's still full. I adviced her to clamp it and try to prime it or she can just visit the hospital. She did not feel comfortable, and I met her by Thanh godfrey.   - looks like the OnQ ball is still running @6ml/hr, more than half is still in there.   - I personally had primed the OnQ ball with sterile gloves. Everything looks good.      Roxann Lipscomb MD  Anesthesiology resident   Pager: [phone]

## 2020-07-12 NOTE — ADDENDUM NOTE
Addendum  created 07/12/20 1921 by Vern Ferrell MD    Clinical Note Signed, Intraprocedure Event edited

## 2020-07-12 NOTE — ANESTHESIA POST-OP PAIN MANAGEMENT
Spoke with patient today. Reports adequate pain control. PNC removed with no complications; blue tip intact. Questions answered and concerns addressed.     Vern Ferrell, CA-3  [facility]

## 2020-07-12 NOTE — OP NOTE
DATE OF PROCEDURE:  7/9/2020     PREOPERATIVE DIAGNOSES:  1.  Left knee pain.  2.  Left genu valgum.     POSTOPERATIVE DIAGNOSES:  1.  Left knee pain.  2.  Left genu valgum.  3. Left patellar chondromalacia     PROCEDURES:  1. Left distal femoral varus-producing osteotomy with plate fixation.  2. Left knee arthroscopy with chondroplasty.  3. Left knee arthrotomy with chondral allograft implantation (Cartimax)     ATTENDING PHYSICIAN:  Ranulfo House M.D.     ASSISTANT: Edward Alvarado M.D. (RES).     ANESTHESIA:  General and a femoral nerve catheter.     ESTIMATED BLOOD LOSS:  20 mL.     COMPLICATIONS:  None.     IMPLANTS USED:  1.  Orthopediatrics 4.5 mm distal femoral osteotomy plate with 7 screws.  2.  MTF 10 mm Lopez wedge  3.  Synthetic bone graft  4. Cartimax cartilage allograft     COMPLICATIONS:  None.     INDICATIONS FOR PROCEDURE:  The patient is a 20 year old female with significant genu valgum and knee pain.  The above surgeries were recommended.  Risks, benefits, and alternatives were explained to the patient and informed consent was obtained.     DESCRIPTION OF PROCEDURE:  On the date of surgery, the patient presented to the preop holding area and the operative extremity was marked.  The patient was brought to the   Operating Room and positioned supine on the operating room table.  General   anesthesia was initiated.  The patient had already undergone a preoperative nerve block   in the holding area.  IV antibiotics were given.  The patient was positioned supine on   the operating room table and the left lower extremity was prepped and draped in   the usual sterile manner.  Formal timeout was performed, showing the correct   patient, correct procedure, and correct operative site. Hemaclear tourniquet was placed.     Lateral and medial parapatellar portals were made and the arthroscope was inserted into the joint. The patellar cartilage had full-thickness damage with extension to the bone centrally.  It  was debrided with a shaver. The trochlear cartilage was intact. There were no loose bodies. The lateral and medial compartment cartilage was intact. Menisci on the lateral and medial side were intact. The ACL was intact.  The patella was noted to track laterally.       We proceeded with the osteotomy.  Standard lateral approach to the distal femur was utilized and the   bone was exposed distally.  The oscillating saw was placed under fluoroscopic guidance angled from the distal femoral cortex toward the distal medial cortex, leaving the medial cortex intact.  The osteotomy was completed with an   osteotome.  The leigha from the Orthopediatrics set was placed into the osteotomy.  The osteotomy then propagated through the medial cortex, which made it unstable.  We were unable to utilize the leigha or trial wedges, so we decided to use a 10mm wedge based on pre-operative planning.   The appropriate allograft wedge was then opened up and placed into the defect.  The bridge was used to stabilize it.  This led to appropriate correction; and therefore, an Orthopediatrics distal femoral plate was placed with three locking screws distally and four cortical screws proximally.  Alignment guide was again placed and it was noted to have appropriate correction.  Final fluoroscopic images were taken, showing appropriate positioning of all hardware and the osteotomy. The wound was well irrigated and the synthetic bone graft was placed into the remaining defect medial to the graft.    We then proceeded with the arthrotomy.  Incision was made medial to the patella and extended down to the joint capsule.  The capsule was sharply incised.  The patella was everted and the damaged cartilage was removed with Rongeur and curette.  It was irrigated.  The Cartimax was then opened and mixed.  It was molded appropriately and placed into the defect, leading to good fill.  The patella was reduced and the capsule was closed with #1 Vicryl.     The  wounds were closed with 0 Vicryl in the fascia, 3-0   Vicryl in the subcutaneous tissue, and 4-0 Monocryl in the skin.  Sterile   dressings were placed and then the patient's operative extremity was placed in   a hinged knee brace locked in extension.  The patient was then transferred off the   operating table and awakened from anesthesia.  The patient was transferred to the PACU   in stable condition.  Plan will be for the patient to be admitted to the   hospital for monitoring.

## 2020-07-15 NOTE — PLAN OF CARE
07/15/20 1524   Final Note   Assessment Type Final Discharge Note   Anticipated Discharge Disposition Home   Hospital Follow Up  Appt(s) scheduled? Yes   Follow Up:      Follow-up Information      [facility] .    Specialty: Orthotics  Contact information:  [place] [address] [phone]

## 2020-07-22 ENCOUNTER — OFFICE VISIT (OUTPATIENT)
Dept: ORTHOPEDICS | Facility: CLINIC | Age: 20
End: 2020-07-22
Payer: MEDICAID

## 2020-07-22 VITALS — WEIGHT: 229.94 LBS | HEIGHT: 61 IN | BODY MASS INDEX: 43.41 KG/M2

## 2020-07-22 DIAGNOSIS — M94.262 CHONDROMALACIA OF LEFT KNEE: Primary | ICD-10-CM

## 2020-07-22 DIAGNOSIS — M21.062 GENU VALGUM, LEFT: ICD-10-CM

## 2020-07-22 PROCEDURE — 99999 PR PBB SHADOW E&M-EST. PATIENT-LVL II: CPT | Mod: PBBFAC,,, | Performed by: ORTHOPAEDIC SURGERY

## 2020-07-22 PROCEDURE — 99024 PR POST-OP FOLLOW-UP VISIT: ICD-10-PCS | Mod: ,,, | Performed by: ORTHOPAEDIC SURGERY

## 2020-07-22 PROCEDURE — 99999 PR PBB SHADOW E&M-EST. PATIENT-LVL II: ICD-10-PCS | Mod: PBBFAC,,, | Performed by: ORTHOPAEDIC SURGERY

## 2020-07-22 PROCEDURE — 99024 POSTOP FOLLOW-UP VISIT: CPT | Mod: ,,, | Performed by: ORTHOPAEDIC SURGERY

## 2020-07-22 PROCEDURE — 99212 OFFICE O/P EST SF 10 MIN: CPT | Mod: PBBFAC | Performed by: ORTHOPAEDIC SURGERY

## 2020-07-22 RX ORDER — HYDROXYZINE HYDROCHLORIDE 25 MG/1
25 TABLET, FILM COATED ORAL 3 TIMES DAILY PRN
Qty: 40 TABLET | Refills: 0 | Status: SHIPPED | OUTPATIENT
Start: 2020-07-22 | End: 2020-08-05

## 2020-07-22 RX ORDER — CYCLOBENZAPRINE HCL 10 MG
10 TABLET ORAL 3 TIMES DAILY PRN
Qty: 40 TABLET | Refills: 0 | Status: SHIPPED | OUTPATIENT
Start: 2020-07-22 | End: 2020-08-05

## 2020-07-22 NOTE — PROGRESS NOTES
POSTOP VISIT  OSTEOTOMY, FEMUR (Left) - distal femoral opening wedge (Orthopediatrics, MTF), knee scope, removal of tibial tubercle screws (Orthopediatrics 4.0 cannulated screws) - Left, Arthroscopy, Knee - Left, Removal, Hardware - Left, and Arthrotomy, Knee - Left  7/9/2020    Patient is 2 weeks PO following the above procedure. She is doing well postoperatively. Has not started PT yet.  Doesn't want to bend knee due to pain.    Exam:  Incisions healing well without signs of infection.  Unable to flex knee at all due to pain.    Plan:  Dressing changed.  Encouraged to start PT and use CPM to prevent stiffness.  Prescribed Atarax for itching and Flexeril for muscle spasms.  RTC 4 weeks for X-rays of left knee.

## 2020-07-23 ENCOUNTER — NURSE TRIAGE (OUTPATIENT)
Dept: ADMINISTRATIVE | Facility: CLINIC | Age: 20
End: 2020-07-23

## 2020-07-23 NOTE — TELEPHONE ENCOUNTER
Post procedure symptom tracker- pt seen be MD at office visit on 7/22/2020  No contact needed  and no additional follow-up required per post procedure protocol.    Reason for Disposition   Caller has cancelled the call before the first contact    Protocols used: NO CONTACT OR DUPLICATE CONTACT CALL-A-[place]

## 2020-07-30 ENCOUNTER — CLINICAL SUPPORT (OUTPATIENT)
Dept: REHABILITATION | Facility: OTHER | Age: 20
End: 2020-07-30
Attending: ORTHOPAEDIC SURGERY
Payer: MEDICAID

## 2020-07-30 DIAGNOSIS — Z98.890 S/P LEFT KNEE SURGERY: ICD-10-CM

## 2020-07-30 DIAGNOSIS — G89.29 CHRONIC PAIN OF BOTH KNEES: Primary | ICD-10-CM

## 2020-07-30 DIAGNOSIS — M25.561 CHRONIC PAIN OF BOTH KNEES: Primary | ICD-10-CM

## 2020-07-30 DIAGNOSIS — M21.062 ACQUIRED GENU VALGUM OF LEFT KNEE: ICD-10-CM

## 2020-07-30 DIAGNOSIS — M25.562 CHRONIC PAIN OF BOTH KNEES: Primary | ICD-10-CM

## 2020-07-30 PROCEDURE — 97162 PT EVAL MOD COMPLEX 30 MIN: CPT | Mod: PN | Performed by: INTERNAL MEDICINE

## 2020-07-30 NOTE — PROGRESS NOTES
[facility]  Physical Therapy Initial Evaluation     Name: Denise Mosher  [facility] Number: [id number]     Therapy Diagnosis:        Encounter Diagnoses   Name Primary?    Decreased range of motion of right knee Yes    Weakness of right lower extremity        Physician: Ranulfo House MD     Physician Orders: PT Eval and Treat  Medical Diagnosis from Referral:     PROCEDURES: 7/9/2020  1. Left distal femoral varus-producing osteotomy with plate fixation.  2. Left knee arthroscopy with chondroplasty.  3. Left knee arthrotomy with chondral allograft implantation (Cartimax)      M22.41 (ICD-10-CM) - Patella, chondromalacia, right   S/p R knee arthroscopy with MPFL revision, right distal femoral hardward removal, and patellar chondroplasty on 12/19/2019       Evaluation Date: 7/31/2020  Authorization Period Expiration:  DATE OF PROCEDURE:  7/9/2020     Plan of Care Expiration: 8/29/2020  Visit # / Visits authorized: 1/1     Time In:12: 35 pm   Time Out: 1:30 pm   Total Billable Time: 55 minutes     Precautions: Standard , NWB per pt, ext immobilizer        Subjective   Date of onset: surgery on  7/9/2020, 6th knee surgery ( R in past too)  Walk I prior to surgery   4/10 pain L knee and throughout L LE with sensitivity throughout  Wearing immob locked in extension.  NWB per MD ( per pt)   - unknown how long.   Admits she has been putting weight on it- TTWB 5% approx   Found Cartilage deficit during surgery patella  More R knee pain since surgery , knee crepitus 10/10   Tripped going up stairs 2-3 weeks before surgery which she think led to cartilage  injury. Hit R knee on stair but was able to catch her fall a  Bit.     Needs help with LE dressing    Doesn't sleep in bed right now due to knee pain. Usually  sleeps prone.  Currently sleeping in dad's  lift chair   T/f bathroom - can perform MI but prefers help   Mom and friend helped give her a bath last night which took 90 min  Whole leg  "hurts , foot very sensitive     Currently In school for becoming a teacher   Stopped [facility] due to pts not taking surgery patients.  Prefers male therapists.    History of current condition - Denise reports that she has had several surgeries for her B  knees over the past few years with continued recurrent symptoms at varying intensities. Pt states  she had a plate and MPFL reconstruction at the end of 2018 w/ good improvement but gradual recurrence of symptoms. Pt states she underwent knee scope on 12/19/2019 to "loosen" MPFL and remove plate.       Medical History:        Past Medical History:   Diagnosis Date    Anxiety       reports panic attacks    Depression      Fibromyalgia       per pt    Insomnia      Precocious female puberty      Seizures       reports 2 incidents of possible seizures while giving blood    Syncope and collapse      Wrist fracture, bilateral           Surgical History:   Denise Mosher  has a past surgical history that includes Knee surgery (Left, 2/16/15); Toe Surgery (Right); Inguinal hernia repair (Left); Hemangioma excision; chip; Femur Osteotomy (Right, 12/13/2018); Knee joint manipulation (Right, 7/5/2019); Injection of steroid (Right, 7/5/2019); Arthroscopy of knee (Right, 12/19/2019); Hardware Removal (Right, 12/19/2019); and Chondroplasty of knee (Right, 12/19/2019).     Medications:   Denise OSBORN has a current medication list which includes the following prescription(s): aspirin, hydrocodone-acetaminophen, ibuprofen, norethindrone, and ondansetron, and the following Facility-Administered Medications: fentanyl and midazolam.     Allergies:   Review of patient's allergies indicates:  No Known Allergies      Imaging, none     Prior Therapy: Hx of PT following previous knee surgeries  Social History: Pt lives in [place], stairs outside home - half steps with rails , [place] with stair navigation  Occupation: medical leave-  [employer]    Prior Level of " Function: Previously indep w/ community ambulation, ADLs, work, and recreation  Current Level of Function: Currently limited in community ambulation and ADLs. Bending it approx to 20 at home in her recliner. No other ex      Pain:  Current 4/10, worst 10 /10, best 0/10   Location: left LE  Description: Aching, Dull and Throbbing  Aggravating Factors: activity     Easing Factors: ice daily  rest, elevation, ice. Keeps brace on but slides down . Brace locked in extension.      Pts goals: To return to unrestricted community ambulation and ADLs at prior level of function     Objective      Observation: mild acute distress; in her own wc with parents present ( alternating presence due to visitor policy)     Posture: WNL     Joint Mobility: unable to assess- pt won't allow me to touch her knee     Palpation: "  Incisions covered with gauze and ace wrap but no signs of infection or drainage     Sensation: unable to assess           Edema: mod joint effusion present in L knee     7/30/2020   Range of Motion:   Knee Left active Left Passive Right Active R passive   Flexion    20 125     Extension -5     wfl         Lower Extremity Strength: not assessed due to  post-op status     Special Tests: homans neg L        Function: mom pushes her wc  SBA to CGA with wc to mat B crutches 5 ft TTWB ( pt self selects)    Min A with L LE  T/f    CMS Impairment/Limitation/Restriction for FOTO Knee Survey     Therapist reviewed FOTO scores for Denise Mosher on  7/30/2020  FOTO documents entered into [employer] - see Media section.        Category: Mobility  Current 95  Goal 57         TREATMENT   Treatment Time In: 12: 55  PM   Treatment Time Out: 1 25 pm   Total Treatment time separate from Evaluation: 30 minutes     Denise received therapeutic exercises to develop strength, endurance, ROM and flexibility for 15 minutes including:  - prom knee flex seated eot 20 sec x 5  - supine hip abd with A from PT 20x   -Calf stretch towel 20 sec  x 3    -Ankle pumps 15x  - quad sets 10 x 10 sec       Denise received cold pack for 10 minutes to R knee.      Rec'd new gauze for wounds.  Ed to cont NWB per MD     Home Exercises and Patient Education Provided     Education provided:   - Role of PT, PT POC, importance of restoring quad activation and knee ROM, normalization of gait mechanics, post-op timeline     Written Home Exercises Provided: yes- above   Exercises were reviewed and Denise was able to demonstrate them prior to the end of the session.  Denise demonstrated good  understanding of the education provided.        exercises provided 7/30     Assessment   Denise OSBORN is a 20 y.o. female referred to outpatient physical therapy with a medical diagnosis of L knee surgery, B knee pain . Physical exam is consistent with med dx and post-op timeline. This patient's primary impairments include decreased L LE quad activation/strength, decreased L  knee flexion ROM, altered gait mechanics, hypersensitivity of L  knee   , and decreased activity tolerance. Pt presentation fairly complex d/t repeated surgeries over past several years  .   Poor tolerance to passive knee flexion, but improved w/ repetition.        Pt prognosis is Fair.   Pt will benefit from skilled outpatient physical therapy to address the deficits listed above and in the chart below, provide pt/family education, and to maximize pt's level of independence.      Plan of care discussed with patient: Yes  Pt's spiritual, cultural and educational needs considered and patient is agreeable to the plan of care and goals as stated below:      Anticipated Barriers for therapy: exercise tolerance, multiple knee surgeries, anxiety, transportation issues      Medical Necessity is demonstrated by the following  History  Co-morbidities and personal factors that may impact the plan of care Co-morbidities:   altered mental status, anxiety, psychogenic nonepileptic seizure, incontinence, morbid obesity      Personal Factors:   social background  lifestyle       moderate   Examination  Body Structures and Functions, activity limitations and participation restrictions that may impact the plan of care Body Regions:   lower extremities     Body Systems:    gross symmetry  ROM  strength  balance  gait  transfers  motor control  edema  scar formation     Participation Restrictions:   Driving, community amb, work, exercise     Activity limitations:   Learning and applying knowledge  no deficits     General Tasks and Commands  no deficits     Communication  no deficits     Mobility  lifting and carrying objects  walking  driving (bike, car, motorcycle)     Self care  washing oneself (bathing, drying, washing hands)     Domestic Life  shopping  doing house work (cleaning house, washing dishes, laundry)     Interactions/Relationships  no deficits     Life Areas  higher education     Community and Social Life  no deficits             moderate   Clinical Presentation evolving clinical presentation with changing clinical characteristics moderate   Decision Making/ Complexity Score: moderate      Goals:  Short Term Goals (2-4 Weeks):   1) Pt will demonstrate compliance with initial home exercise program as prescribed by physical therapist to improve independence with management of condition.  2) Pt to improve active range of motion in L  knee flexion to at least  40  degrees to allow for improved functional mobility.  3) Pt to report L  knee pain of <7/10 at worst to improve tolerance to ADLs and therapy.     Long Term Goals (12 Weeks):   1( Pt to achieve < 57  limitation as measured by the FOTO to demonstrate decreased disability.  2) Pt to improve active range of motion in L  knee flexion to at least 120 degrees to allow for improved functional mobility.  3) Pt to report L  knee pain of <2/10 at worst to improve tolerance to ADLs and therapy.  4. Amb I in community and able to r/t work as   5. I with HEP    6. L LE MMT to 5/5      Plan   Plan of care Certification: 7/30- 8/29/2020     Outpatient Physical Therapy 1  Time weekly for 10-12 weeks to include the following interventions:    Gait Training, Manual Therapy, Moist Heat/ Ice, Neuromuscular Re-ed, Patient Education, Self Care, Therapeutic Activites and Therapeutic Exercise.

## 2020-07-31 PROBLEM — Z98.890 S/P LEFT KNEE SURGERY: Status: ACTIVE | Noted: 2020-07-31

## 2020-07-31 NOTE — PLAN OF CARE
Please sign and return plan of care. Thank you for this referral to the [facility].    [facility]  Physical Therapy Initial Evaluation     Name: Denise Mosher  Clinic Number: [id number]     Therapy Diagnosis:        Encounter Diagnoses   Name Primary?    Decreased range of motion of right knee Yes    Weakness of right lower extremity        Physician: Ranulfo House MD     Physician Orders: PT Eval and Treat  Medical Diagnosis from Referral:     PROCEDURES: 7/9/2020  1. Left distal femoral varus-producing osteotomy with plate fixation.  2. Left knee arthroscopy with chondroplasty.  3. Left knee arthrotomy with chondral allograft implantation (Cartimax)      M22.41 (ICD-10-CM) - Patella, chondromalacia, right   S/p R knee arthroscopy with MPFL revision, right distal femoral hardward removal, and patellar chondroplasty on 12/19/2019       Evaluation Date: 7/31/2020  Authorization Period Expiration:  DATE OF PROCEDURE:  7/9/2020     Plan of Care Expiration: 8/29/2020  Visit # / Visits authorized: 1/1     Time In:12: 35 pm   Time Out: 1:30 pm   Total Billable Time: 55 minutes     Precautions: Standard , NWB per pt, ext immobilizer        Subjective   Date of onset: surgery on  7/9/2020, 6th knee surgery ( R in past too)  Walk I prior to surgery   4/10 pain L knee and throughout L LE with sensitivity throughout  Wearing immob locked in extension.  NWB per MD ( per pt)   - unknown how long.   Admits she has been putting weight on it- TTWB 5% approx   Found Cartilage deficit during surgery patella  More R knee pain since surgery , knee crepitus 10/10   Tripped going up stairs 2-3 weeks before surgery which she think led to cartilage  injury. Hit R knee on stair but was able to catch her fall a  Bit.     Needs help with LE dressing    Doesn't sleep in bed right now due to knee pain. Usually  sleeps prone.  Currently sleeping in dad's  lift chair   T/f  "bathroom - can perform MI but prefers help   Mom and friend helped give her a bath last night which took 90 min  Whole leg hurts , foot very sensitive     Currently In school for becoming a teacher   Stopped [facility] due to pts not taking surgery patients.  Prefers male therapists.    History of current condition - Denise reports that she has had several surgeries for her B  knees over the past few years with continued recurrent symptoms at varying intensities. Pt states  she had a plate and MPFL reconstruction at the end of 2018 w/ good improvement but gradual recurrence of symptoms. Pt states she underwent knee scope on 12/19/2019 to "loosen" MPFL and remove plate.       Medical History:        Past Medical History:   Diagnosis Date    Anxiety       reports panic attacks    Depression      Fibromyalgia       per pt    Insomnia      Precocious female puberty      Seizures       reports 2 incidents of possible seizures while giving blood    Syncope and collapse      Wrist fracture, bilateral           Surgical History:   Denise Mosher  has a past surgical history that includes Knee surgery (Left, 2/16/15); Toe Surgery (Right); Inguinal hernia repair (Left); Hemangioma excision; chip; Femur Osteotomy (Right, 12/13/2018); Knee joint manipulation (Right, 7/5/2019); Injection of steroid (Right, 7/5/2019); Arthroscopy of knee (Right, 12/19/2019); Hardware Removal (Right, 12/19/2019); and Chondroplasty of knee (Right, 12/19/2019).     Medications:   Denise OSBORN has a current medication list which includes the following prescription(s): aspirin, hydrocodone-acetaminophen, ibuprofen, norethindrone, and ondansetron, and the following Facility-Administered Medications: fentanyl and midazolam.     Allergies:   Review of patient's allergies indicates:  No Known Allergies      Imaging, none     Prior Therapy: Hx of PT following previous knee surgeries  Social History: Pt lives in [place], [facility] outside home - half " steps with rails , [place] with stair navigation  Occupation: medical leave-  [employer]    Prior Level of Function: Previously indep w/ community ambulation, ADLs, work, and recreation  Current Level of Function: Currently limited in community ambulation and ADLs. Bending it approx to 20 at home in her recliner. No other ex      Pain:  Current 4/10, worst 10 /10, best 0/10   Location: left LE  Description: Aching, Dull and Throbbing  Aggravating Factors: activity     Easing Factors: ice daily  rest, elevation, ice. Keeps brace on but slides down . Brace locked in extension.      Pts goals: To return to unrestricted community ambulation and ADLs at prior level of function     Objective      Observation: mild acute distress; in her own wc with parents present ( alternating presence due to visitor policy)     Posture: WNL     Joint Mobility: unable to assess- pt won't allow me to touch her knee     Palpation: "  Incisions covered with gauze and ace wrap but no signs of infection or drainage     Sensation: unable to assess           Edema: mod joint effusion present in L knee     7/30/2020   Range of Motion:   Knee Left active Left Passive Right Active R passive   Flexion    20 125     Extension -5     wfl         Lower Extremity Strength: not assessed due to  post-op status     Special Tests: homans neg L        Function: mom pushes her wc  SBA to CGA with wc to mat B crutches 5 ft TTWB ( pt self selects)    Min A with L LE  T/f    CMS Impairment/Limitation/Restriction for FOTO Knee Survey     Therapist reviewed FOTO scores for Denise Mosher on  7/30/2020  FOTO documents entered into [employer] - see Media section.        Category: Mobility  Current 95  Goal 57         TREATMENT   Treatment Time In: 12: 55  PM   Treatment Time Out: 1 25 pm   Total Treatment time separate from Evaluation: 30 minutes     Denise received therapeutic exercises to develop strength, endurance, ROM and flexibility for 15 minutes  including:  - prom knee flex seated eot 20 sec x 5  - supine hip abd with A from PT 20x   -Calf stretch towel 20 sec x 3    -Ankle pumps 15x  - quad sets 10 x 10 sec       Denise received cold pack for 10 minutes to R knee.      Rec'd new gauze for wounds.  Ed to cont NWB per MD     Home Exercises and Patient Education Provided     Education provided:   - Role of PT, PT POC, importance of restoring quad activation and knee ROM, normalization of gait mechanics, post-op timeline     Written Home Exercises Provided: yes- above   Exercises were reviewed and Denise was able to demonstrate them prior to the end of the session.  Denise demonstrated good  understanding of the education provided.        exercises provided 7/30     Assessment   Denise OSBORN is a 20 y.o. female referred to outpatient physical therapy with a medical diagnosis of L knee surgery, B knee pain . Physical exam is consistent with med dx and post-op timeline. This patient's primary impairments include decreased L LE quad activation/strength, decreased L  knee flexion ROM, altered gait mechanics, hypersensitivity of L  knee   , and decreased activity tolerance. Pt presentation fairly complex d/t repeated surgeries over past several years  .   Poor tolerance to passive knee flexion, but improved w/ repetition.        Pt prognosis is Fair.   Pt will benefit from skilled outpatient physical therapy to address the deficits listed above and in the chart below, provide pt/family education, and to maximize pt's level of independence.      Plan of care discussed with patient: Yes  Pt's spiritual, cultural and educational needs considered and patient is agreeable to the plan of care and goals as stated below:      Anticipated Barriers for therapy: exercise tolerance, multiple knee surgeries, anxiety, transportation issues      Medical Necessity is demonstrated by the following  History  Co-morbidities and personal factors that may impact the plan of care  Co-morbidities:   altered mental status, anxiety, psychogenic nonepileptic seizure, incontinence, morbid obesity     Personal Factors:   social background  lifestyle       moderate   Examination  Body Structures and Functions, activity limitations and participation restrictions that may impact the plan of care Body Regions:   lower extremities     Body Systems:    gross symmetry  ROM  strength  balance  gait  transfers  motor control  edema  scar formation     Participation Restrictions:   Driving, community amb, work, exercise     Activity limitations:   Learning and applying knowledge  no deficits     General Tasks and Commands  no deficits     Communication  no deficits     Mobility  lifting and carrying objects  walking  driving (bike, car, motorcycle)     Self care  washing oneself (bathing, drying, washing hands)     Domestic Life  shopping  doing house work (cleaning house, washing dishes, laundry)     Interactions/Relationships  no deficits     Life Areas  higher education     Community and Social Life  no deficits             moderate   Clinical Presentation evolving clinical presentation with changing clinical characteristics moderate   Decision Making/ Complexity Score: moderate      Goals:  Short Term Goals (2-4 Weeks):   1) Pt will demonstrate compliance with initial home exercise program as prescribed by physical therapist to improve independence with management of condition.  2) Pt to improve active range of motion in L  knee flexion to at least  40  degrees to allow for improved functional mobility.  3) Pt to report L  knee pain of <7/10 at worst to improve tolerance to ADLs and therapy.     Long Term Goals (12 Weeks):   1( Pt to achieve < 57  limitation as measured by the FOTO to demonstrate decreased disability.  2) Pt to improve active range of motion in L  knee flexion to at least 120 degrees to allow for improved functional mobility.  3) Pt to report L  knee pain of <2/10 at worst to improve  tolerance to ADLs and therapy.  4. Amb I in community and able to r/t work as   5. I with HEP   6. L LE MMT to 5/5      Plan   Plan of care Certification: 7/30- 8/29/2020     Outpatient Physical Therapy 1  Time weekly for 10-12 weeks to include the following interventions:    Gait Training, Manual Therapy, Moist Heat/ Ice, Neuromuscular Re-ed, Patient Education, Self Care, Therapeutic Activites and Therapeutic Exercise.

## 2020-08-07 ENCOUNTER — CLINICAL SUPPORT (OUTPATIENT)
Dept: REHABILITATION | Facility: OTHER | Age: 20
End: 2020-08-07
Attending: ORTHOPAEDIC SURGERY
Payer: MEDICAID

## 2020-08-07 DIAGNOSIS — Z98.890 S/P LEFT KNEE SURGERY: ICD-10-CM

## 2020-08-07 PROCEDURE — 97110 THERAPEUTIC EXERCISES: CPT | Mod: PN | Performed by: INTERNAL MEDICINE

## 2020-08-07 NOTE — PROGRESS NOTES
[facility]  Physical Therapy Rx note     Name: Denise Mosher  Clinic Number: [id number]     Therapy Diagnosis:        Encounter Diagnoses   Name Primary?    Decreased range of motion of right knee Yes    Weakness of right lower extremity        Physician: Ranulfo House MD     Physician Orders: PT Eval and Treat  Medical Diagnosis from Referral:     PROCEDURES: 7/9/2020  1. Left distal femoral varus-producing osteotomy with plate fixation.  2. Left knee arthroscopy with chondroplasty.  3. 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  Notices less circumduction with gait L LE    4/10 pain L knee    Wearing immob locked in extension.   More R knee pain since surgery         Pts goals: To return to unrestricted community ambulation and ADLs at prior level of function     Objective    Amb B crutches knee immob locked in ext ttwb to pwb SBA to MI  , L ext rot.     Observation: mild acute distress; parents not with there today       Joint Mobility: unable to assess- pt won't allow me to touch her knee     Palpation: "  Incisions covered with gauze and ace wrap but no signs of infection or drainage      Edema: mod joint effusion present in L knee     8/7/2020  Range of Motion:   Knee Left active Left Passive Right Active R passive   Flexion   45 125     Extension -3     wfl         Lower Extremity  Strength: not assessed due to  post-op status     Special Tests: homans neg L        Function:    Sit to stand SBA to CGA  B crutches 5 ft TTWB ( pt self selects)    Min A with L LE  T/f    CMS Impairment/Limitation/Restriction for FOTO Knee Survey     Therapist reviewed FOTO scores for Denise Mosher on  7/30/2020  FOTO documents entered into [employer] - see Media section.        Category: Mobility  Current 95  Goal 57         TREATMENT         Denise received therapeutic exercises to develop strength, endurance, ROM and flexibility for 45 minutes including:  - prom knee flex seated eot 20 sec x 6  - supine hip abd with A from PT 20x   -Calf stretch strap 20 sec x 4   -Ankle pumps 15x  - quad sets 15 x 10 sec   +Glut sets 15x  10 sec   +Side hip abd 10x with A   PF GTB 30x     GT clinic to car B crutches TTWB to PWB as mayuri 200 ft with SBA , A with L LE with car t/f to backseat. 10 min     Denise received cold pack for 10 minutes to R knee.              Home Exercises and Patient Education Provided     Education provided:   - Role of PT, PT POC, importance of restoring quad activation and knee ROM, normalization of gait mechanics, post-op timeline     Written Home Exercises Provided: yes- above   Exercises were reviewed and Denise was able to demonstrate them prior to the end of the session.  Denise demonstrated good  understanding of the education provided.        exercises provided 7/30     Assessment   Improved prom knee flex this visit and  Gait ability.      Pt prognosis is Fair.   Pt will benefit from skilled outpatient physical therapy to address the deficits listed above and in the chart below, provide pt/family education, and to maximize pt's level of independence.         Pt's spiritual, cultural and educational needs considered and patient is agreeable to the plan of care and goals as stated below:      Anticipated Barriers for therapy: exercise tolerance, multiple knee surgeries, anxiety,  transportation issues        Goals:  Short Term Goals (2-4 Weeks):   1) Pt will demonstrate compliance with initial home exercise program as prescribed by physical therapist to improve independence with management of condition.  2) Pt to improve active range of motion in L  knee flexion to at least  40  degrees to allow for improved functional mobility.  3) Pt to report L  knee pain of <7/10 at worst to improve tolerance to ADLs and therapy.     Long Term Goals (12 Weeks):   1( Pt to achieve < 57  limitation as measured by the FOTO to demonstrate decreased disability.  2) Pt to improve active range of motion in L  knee flexion to at least 120 degrees to allow for improved functional mobility.  3) Pt to report L  knee pain of <2/10 at worst to improve tolerance to ADLs and therapy.  4. Amb I in community and able to r/t work as   5. I with HEP   6. L LE MMT to 5/5      Plan   Plan of care Certification: 7/30- 8/29/2020     Outpatient Physical Therapy 1  Time weekly for 10-12 weeks to include the following interventions:    Gait Training, Manual Therapy, Moist Heat/ Ice, Neuromuscular Re-ed, Patient Education, Self Care, Therapeutic Activites and Therapeutic Exercise.

## 2020-08-09 PROBLEM — R53.1 WEAKNESS: Status: RESOLVED | Noted: 2019-07-10 | Resolved: 2020-08-09

## 2020-08-09 PROBLEM — M25.60 RANGE OF MOTION DEFICIT: Status: RESOLVED | Noted: 2019-07-10 | Resolved: 2020-08-09

## 2020-08-09 PROBLEM — R29.898 WEAKNESS OF RIGHT LOWER EXTREMITY: Status: RESOLVED | Noted: 2020-01-08 | Resolved: 2020-08-09

## 2020-08-09 PROBLEM — M25.661 DECREASED RANGE OF MOTION OF RIGHT KNEE: Status: RESOLVED | Noted: 2020-01-08 | Resolved: 2020-08-09

## 2020-08-14 ENCOUNTER — CLINICAL SUPPORT (OUTPATIENT)
Dept: REHABILITATION | Facility: OTHER | Age: 20
End: 2020-08-14
Attending: ORTHOPAEDIC SURGERY
Payer: MEDICAID

## 2020-08-14 DIAGNOSIS — Z98.890 S/P LEFT KNEE SURGERY: ICD-10-CM

## 2020-08-14 PROCEDURE — 97110 THERAPEUTIC EXERCISES: CPT | Mod: PN | Performed by: INTERNAL MEDICINE

## 2020-08-19 DIAGNOSIS — M94.262 CHONDROMALACIA OF LEFT KNEE: Primary | ICD-10-CM

## 2020-08-20 ENCOUNTER — CLINICAL SUPPORT (OUTPATIENT)
Dept: REHABILITATION | Facility: OTHER | Age: 20
End: 2020-08-20
Attending: ORTHOPAEDIC SURGERY
Payer: MEDICAID

## 2020-08-20 DIAGNOSIS — Z98.890 S/P LEFT KNEE SURGERY: ICD-10-CM

## 2020-08-20 PROCEDURE — 97110 THERAPEUTIC EXERCISES: CPT | Mod: PN,CQ

## 2020-08-20 NOTE — PROGRESS NOTES
[facility]  Physical Therapy Rx note     Name: Denise Mosher  Clinic Number: [id number]     Therapy Diagnosis:        Encounter Diagnoses   Name Primary?    Decreased range of motion of right knee Yes    Weakness of right lower extremity        Physician: Ranulfo House MD     Physician Orders: PT Eval and Treat  Medical Diagnosis from Referral:     PROCEDURES: 7/9/2020 5 weeks s/o   1. Left distal femoral varus-producing osteotomy with plate fixation.  2. Left knee arthroscopy with chondroplasty.  3. 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 No pain today. Cont PWB with no discomfort. Hasn't worn immob in 3 days stating it doesn't fit correctly and is burdensome.    0/10 pain L knee            Pts goals: To return to unrestricted community ambulation and ADLs at prior level of function     Objective    Amb B crutches no knee immob pwb MI  , L ext rot.    AMb from parking lot to clinic today    Observation: mild to no acute distress; mom  Her  today          TREATMENT         Denise received therapeutic exercises to develop strength, endurance, ROM and flexibility for 60 minutes including:  - HR 3 x 10 B lean R   - prom knee flex seated eot 20 sec x 6  - supine hip abd with A from PT 20x   -Calf stretch strap 3 x 30 sec    -Ankle pumps 15x  - quad sets 30 x 5 sec hold   +Glut sets 30 x 5 sec hold   Side hip abd 2 x 10 with  A   Hip ext L only 2 x 10   PF GTB 30x   + SLR with A 3 x 10   GT clinic to car B crutches   PWB as mayuri 200 ft  X 2 with MI      Denise received cold pack for 0 minutes to R knee.     Home Exercises and Patient Education Provided     Education provided:   Pt edu on proper exercise technique.      Written Home Exercises Provided: add  slr with A; mom understands how to help   Exercises were reviewed and Denise was able to demonstrate them prior to the end of the session.  Denise demonstrated good  understanding of the education provided.        exercises provided 7/30, 8/14     Assessment   Pt mayuri to tx was fair.  Pt demonstrated increased muscular endurance during therex but cont to require mod-max assist with leg raises.  Pt cont to lack L knee ROM.        Pt prognosis is Fair.   Pt will benefit from skilled outpatient physical therapy to address the deficits listed above and in the chart below, provide pt/family education, and to maximize pt's level of independence.         Pt's spiritual, cultural and educational needs considered and patient is agreeable to the plan of care and goals as stated below:      Anticipated Barriers for therapy: exercise tolerance, multiple knee surgeries, anxiety, transportation issues        Goals:  Short Term Goals (2-4 Weeks):   1) Pt will demonstrate compliance with initial home exercise program as prescribed by physical therapist to improve independence with management of condition.  2) Pt to improve active range of motion in L  knee flexion to at least  40  degrees to allow for improved functional mobility.  3) Pt to report L  knee pain of <7/10 at worst to improve tolerance to ADLs and therapy.     Long Term Goals (12 Weeks):   1( Pt to achieve < 57  limitation as measured by the FOTO to demonstrate decreased disability.  2) Pt to improve active range of motion in L  knee flexion to at least 120 degrees to allow for improved functional mobility.  3) Pt to report L  knee  pain of <2/10 at worst to improve tolerance to ADLs and therapy.  4. Amb I in community and able to r/t work as   5. I with HEP   6. L LE MMT to 5/5      Plan   Cont to progress towards goals set by PT.  Work to increase knee flexion and LE strength next visit.        Ramiro Payne, PTA

## 2020-08-21 ENCOUNTER — HOSPITAL ENCOUNTER (OUTPATIENT)
Dept: RADIOLOGY | Facility: HOSPITAL | Age: 20
Discharge: HOME | End: 2020-08-21
Attending: ORTHOPAEDIC SURGERY
Payer: MEDICAID

## 2020-08-21 ENCOUNTER — OFFICE VISIT (OUTPATIENT)
Dept: ORTHOPEDICS | Facility: CLINIC | Age: 20
End: 2020-08-21
Payer: MEDICAID

## 2020-08-21 VITALS — HEIGHT: 61 IN | WEIGHT: 229.94 LBS | BODY MASS INDEX: 43.41 KG/M2

## 2020-08-21 DIAGNOSIS — M94.262 CHONDROMALACIA OF LEFT KNEE: ICD-10-CM

## 2020-08-21 DIAGNOSIS — M21.062 GENU VALGUM, LEFT: Primary | ICD-10-CM

## 2020-08-21 PROCEDURE — 99024 PR POST-OP FOLLOW-UP VISIT: ICD-10-PCS | Mod: ,,, | Performed by: ORTHOPAEDIC SURGERY

## 2020-08-21 PROCEDURE — 99213 OFFICE O/P EST LOW 20 MIN: CPT | Mod: PBBFAC,25 | Performed by: ORTHOPAEDIC SURGERY

## 2020-08-21 PROCEDURE — 73560 X-RAY EXAM OF KNEE 1 OR 2: CPT | Mod: TC,LT

## 2020-08-21 PROCEDURE — 99999 PR PBB SHADOW E&M-EST. PATIENT-LVL III: CPT | Mod: PBBFAC,,, | Performed by: ORTHOPAEDIC SURGERY

## 2020-08-21 PROCEDURE — 73560 XR KNEE 1 OR 2 VIEW LEFT: ICD-10-PCS | Mod: 26,LT,, | Performed by: RADIOLOGY

## 2020-08-21 PROCEDURE — 73560 X-RAY EXAM OF KNEE 1 OR 2: CPT | Mod: 26,LT,, | Performed by: RADIOLOGY

## 2020-08-21 PROCEDURE — 99999 PR PBB SHADOW E&M-EST. PATIENT-LVL III: ICD-10-PCS | Mod: PBBFAC,,, | Performed by: ORTHOPAEDIC SURGERY

## 2020-08-21 PROCEDURE — 99024 POSTOP FOLLOW-UP VISIT: CPT | Mod: ,,, | Performed by: ORTHOPAEDIC SURGERY

## 2020-08-21 NOTE — PROGRESS NOTES
[facility]  Orthopedics    SUBJECTIVE:     History of Present Illness:  Patient is a 20 y.o. female here for 6 week visit after left distal femoral osteotomy and Cartimax graft placement. She has been doing well keeping the LLE iced and elevated. She needs a new order for CPM machine. Incisions have been healing well. Currently using a wheelchair for getting around.         Review of patient's allergies indicates:  No Known Allergies    Past Medical History:   Diagnosis Date    Anxiety     reports panic attacks    Depression     Fibromyalgia     per pt    Insomnia     Precocious female puberty     Seizures     reports 2 incidents of possible seizures while giving blood    Syncope and collapse     Wrist fracture, bilateral      Past Surgical History:   Procedure Laterality Date    ARTHROSCOPY OF KNEE Right 12/19/2019    Procedure: ARTHROSCOPY, KNEE (Right);  Surgeon: Ranulfo House MD;  Location: [facility] OR [address] [place] [address];  Service: Orthopedics;  Laterality: Right;    ARTHROSCOPY OF KNEE Left 7/9/2020    Procedure: ARTHROSCOPY, KNEE;  Surgeon: Ranulfo House MD;  Location: [facility] OR [address] [place] [address];  Service: Orthopedics;  Laterality: Left;  WITH CHRONDROPLASTY     ARTHROTOMY OF KNEE Left 7/9/2020    Procedure: ARTHROTOMY, KNEE;  Surgeon: Ranulfo House MD;  Location: [facility] OR [address] [place] [address];  Service: Orthopedics;  Laterality: Left;  WITH CHRONDRAL ALLOGRAFT IMPLANTATION    chip      chip implant    CHONDROPLASTY OF KNEE Right 12/19/2019    Procedure: CHONDROPLASTY, KNEE patella;  Surgeon: Ranulfo House MD;  Location: [facility] OR [address] [place] [address];  Service: Orthopedics;  Laterality: Right;    FEMUR OSTEOTOMY Right 12/13/2018    Procedure: OSTEOTOMY, FEMUR - distal to correct valgus (Orthopediatrics plate, MTF bone wedge).  Right knee arthroscopy with MPFL reconstruction, gracilis allograft ([employer]).  3 hrs.;  Surgeon: Ranulfo House MD;  Location: [address];  Service: Orthopedics;  Laterality: Right;    FEMUR OSTEOTOMY Left  7/9/2020    Procedure: OSTEOTOMY, FEMUR (Left) - distal femoral opening wedge (Orthopediatrics, MTF), knee scope, removal of tibial tubercle screws (Orthopediatrics 4.0 cannulated screws);  Surgeon: Ranulfo House MD;  [facility];  Service: Orthopedics;  Laterality: Left;  RQ Gianna DAVIS notified 7/8/20 HDavis RN    HARDWARE REMOVAL Right 12/19/2019    Procedure: REMOVAL, HARDWARE righ distal femoral.;  Surgeon: Ranulfo House MD;  Location: [facility] OR [facility];  Service: Orthopedics;  Laterality: Right;    HARDWARE REMOVAL Left 7/9/2020    Procedure: REMOVAL, HARDWARE;  Surgeon: Ranulfo House MD;  [facility];  Service: Orthopedics;  Laterality: Left;  TIBIA    HEMANGIOMA EXCISION      scalp    INGUINAL HERNIA REPAIR Left     INJECTION OF STEROID Right 7/5/2019    Procedure: INJECTION, STEROID;  Surgeon: Ranulfo House MD;  [facility];  Service: Orthopedics;  Laterality: Right;    KNEE JOINT MANIPULATION Right 7/5/2019    Procedure: MANIPULATION, KNEE;  Surgeon: Ranulfo House MD;  [facility];  Service: Orthopedics;  Laterality: Right;    KNEE SURGERY Left 2/16/15    TOE SURGERY Right     5 th toe     Family History   Problem Relation Age of Onset    Seizures Father     Anxiety disorder Father     Depression Father     Mental illness Father     Schizophrenia Father     Deep vein thrombosis Father      Social History     Tobacco Use    Smoking status: Never Smoker    Smokeless tobacco: Never Used   Substance Use Topics    Alcohol use: No    Drug use: No        Review of Systems:  Patient denies constitutional symptoms, cardiac symptoms, respiratory symptoms, GI symptoms.  The remainder of the musculoskeletal ROS is included in the HPI.      OBJECTIVE:     Physical Exam:  Gen:  No acute distress  CV:  Peripherally well-perfused.  Pulses 2+ bilaterally.  Lungs:  Normal respiratory effort.  Abdomen:  Soft, non-tender,  non-distended  Head/Neck:  Normocephalic.  Atraumatic. No TTP, AROM and PROM intact without pain  Neuro:  CN intact without deficit, SILT throughout B/L Upper & Lower Extremities    MSK:  Incisions all healing well no signs of redness, drainage or wound dehiscence.   Knee ROM limited due to patient refusal   Limited TTP along distal lateral thigh    Diagnostic Results:  Left X-rays were ordered and images reviewed by me.  These showed well fixed hardware and post op changes    ASSESSMENT/PLAN:     A/P: Denise Mosher is a 20 y.o.  S/p left distal femur osteotomy and Cartimax graft placement. She is doing well, currently NWB to the LLE in a wheelchair.     Plan:  - New order placed for CPM   - Continue NWB to LLE   - RTC at 3 months post op, standing hip to ankle XR.

## 2020-08-27 ENCOUNTER — CLINICAL SUPPORT (OUTPATIENT)
Dept: REHABILITATION | Facility: OTHER | Age: 20
End: 2020-08-27
Attending: ORTHOPAEDIC SURGERY
Payer: MEDICAID

## 2020-08-27 DIAGNOSIS — Z98.890 S/P LEFT KNEE SURGERY: ICD-10-CM

## 2020-08-27 PROCEDURE — 97110 THERAPEUTIC EXERCISES: CPT | Mod: PN,CQ

## 2020-08-27 NOTE — PROGRESS NOTES
[facility]  Physical Therapy Rx note     Name: Denise Mosher  Clinic Number: [id number]     Therapy Diagnosis:        Encounter Diagnoses   Name Primary?    Decreased range of motion of right knee Yes    Weakness of right lower extremity        Physician: Ranulfo House MD     Physician Orders: PT Eval and Treat  Medical Diagnosis from Referral:     PROCEDURES: 7/9/2020 5 weeks s/o   1. Left distal femoral varus-producing osteotomy with plate fixation.  2. Left knee arthroscopy with chondroplasty.  3. 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  Pt mentioned having increased tenderness along incision.  Pt stated she has not had food prior to attending therapy but did take her pn med this morning.      0/10 pain L knee       Pts goals: To return to unrestricted community ambulation and ADLs at prior level of function     Objective    Amb B crutches no knee immob pwb MI  , L ext rot.    AMb from parking lot to clinic today    Observation: mild to no acute distress; mom  Her  today          TREATMENT         Denise received therapeutic exercises to develop strength, endurance, ROM and flexibility for 46 minutes including:  +Glut sets 30 x 5 sec hold    quad sets 30 x 5 sec hold w/ towel under knee  -Calf stretch strap 3 x 30 sec   - HR 3 x 10 B lean R   - prom knee flex seated eot 20 sec x 6  Side hip abd 2  x 15 with mod-max Assist   Hip ext L only 2 x 10  + SLR with mo-max assist 2 x 15      - supine hip abd with A from PT 20x   PF GTB 30x    -Ankle pumps 15x    GT clinic to car B crutches   PWB as mayuri 200 ft  X 2 with MI      Denise received cold pack for 0 minutes to R knee.     Home Exercises and Patient Education Provided     Education provided:   Pt edu on proper exercise technique.      Written Home Exercises Provided: add  slr with A; mom understands how to help   Exercises were reviewed and Denise was able to demonstrate them prior to the end of the session.  Denise demonstrated good  understanding of the education provided.        exercises provided 7/30, 8/14     Assessment      Pt cont to lack knee flexion and presents with severe muscle guarding.  Pt mayuri to tx was fair.  Pt showed increased muscular endurance despite cont to require mod-max assist with some therex.      Pt prognosis is Fair.   Pt will benefit from skilled outpatient physical therapy to address the deficits listed above and in the chart below, provide pt/family education, and to maximize pt's level of independence.         Pt's spiritual, cultural and educational needs considered and patient is agreeable to the plan of care and goals as stated below:      Anticipated Barriers for therapy: exercise tolerance, multiple knee surgeries, anxiety, transportation issues        Goals:  Short Term Goals (2-4 Weeks):   1) Pt will demonstrate compliance with initial home exercise program as prescribed by physical therapist to improve independence with management of condition.  2) Pt to improve active range of motion in L  knee flexion to at least  40  degrees to allow for improved functional mobility.  3) Pt to report L  knee pain of <7/10 at worst to improve tolerance to ADLs and therapy.     Long Term Goals (12 Weeks):   1( Pt to achieve < 57  limitation as measured by the FOTO to demonstrate decreased disability.  2) Pt to improve active  range of motion in L  knee flexion to at least 120 degrees to allow for improved functional mobility.  3) Pt to report L  knee pain of <2/10 at worst to improve tolerance to ADLs and therapy.  4. Amb I in community and able to r/t work as   5. I with HEP   6. L LE MMT to 5/5      Plan   Cont to progress towards goals set by PT.  Work to increase knee flexion and LE strength next visit.        Ramiro Payne, PTA

## 2020-09-03 ENCOUNTER — CLINICAL SUPPORT (OUTPATIENT)
Dept: REHABILITATION | Facility: OTHER | Age: 20
End: 2020-09-03
Attending: ORTHOPAEDIC SURGERY
Payer: MEDICAID

## 2020-09-03 DIAGNOSIS — Z98.890 S/P LEFT KNEE SURGERY: ICD-10-CM

## 2020-09-03 PROCEDURE — 97110 THERAPEUTIC EXERCISES: CPT | Mod: PN,CQ

## 2020-09-03 NOTE — PROGRESS NOTES
[facility]  Physical Therapy Rx note     Name: Denise Mosher  Clinic Number: [id number]     Therapy Diagnosis:        Encounter Diagnoses   Name Primary?    Decreased range of motion of right knee Yes    Weakness of right lower extremity        Physician: Ranulfo House MD     Physician Orders: PT Eval and Treat  Medical Diagnosis from Referral:     PROCEDURES: 7/9/2020 5 weeks s/o   1. Left distal femoral varus-producing osteotomy with plate fixation.  2. Left knee arthroscopy with chondroplasty.  3. 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  Pt mentioned having increased tenderness along incision.  Pt stated she has not had food prior to attending therapy but did take her pn med this morning.      0/10 pain L knee       Pts goals: To return to unrestricted community ambulation and ADLs at prior level of function     Objective    Amb B crutches no knee immob pwb MI  , L ext rot.    AMb from parking lot to clinic today    Observation: mild to no acute distress; mom  Her  today          TREATMENT       Joint Mobility: unable to assess- pt won't allow me to touch her knee     Palpation: "  Incisions covered with gauze and ace wrap but no signs of infection or drainage      Edema: mod joint effusion present in L knee     8/14/2020     Range of Motion:   Knee Left active Left Passive Right  Active R passive   Flexion   50 125     Extension -2    wfl         Lower Extremity Strength: not assessed due to  post-op status    Denise received therapeutic exercises to develop strength, endurance, ROM and flexibility for 46 minutes including:  +Glut sets 30 x 5 sec hold    quad sets 30 x 5 sec hold w/ towel under knee  -Calf stretch strap 3 x 30 sec   - HR 3 x 10 B lean R   - prom knee flex seated eot 20 sec x 6  Side hip abd 2 x 15 with mod-max Assist   Hip ext L only 2 x 10  + SLR with mo-max assist 2 x 15      - supine hip abd with A from PT 20x   PF GTB 30x    -Ankle pumps 15x    GT clinic to car B crutches   PWB as mayuri 200 ft  X 2 with MI      Denise received cold pack for 0 minutes to R knee.     Home Exercises and Patient Education Provided     Education provided:   Pt edu on proper exercise technique.      Written Home Exercises Provided: add  slr with A; mom understands how to help   Exercises were reviewed and Denise was able to demonstrate them prior to the end of the session.  Denise demonstrated good  understanding of the education provided.        exercises provided 7/30, 8/14     Assessment      Pt cont to lack knee flexion and presents with severe muscle guarding.  Pt mayuri to tx was fair.  Pt showed increased muscular endurance despite cont to require mod-max assist with some therex.      Pt prognosis is Fair.   Pt will benefit from skilled outpatient physical therapy to address the deficits listed above and in the chart below, provide pt/family education, and to maximize pt's level of independence.         Pt's spiritual, cultural and educational needs considered and patient is agreeable to the plan of care and goals as stated below:      Anticipated Barriers for therapy: exercise tolerance, multiple knee surgeries, anxiety, transportation issues        Goals:  Short Term Goals (2-4 Weeks):   1) Pt will demonstrate compliance with initial home exercise program as prescribed by physical  therapist to improve independence with management of condition.  2) Pt to improve active range of motion in L  knee flexion to at least  40  degrees to allow for improved functional mobility.  3) Pt to report L  knee pain of <7/10 at worst to improve tolerance to ADLs and therapy.     Long Term Goals (12 Weeks):   1( Pt to achieve < 57  limitation as measured by the FOTO to demonstrate decreased disability.  2) Pt to improve active range of motion in L  knee flexion to at least 120 degrees to allow for improved functional mobility.  3) Pt to report L  knee pain of <2/10 at worst to improve tolerance to ADLs and therapy.  4. Amb I in community and able to r/t work as   5. I with HEP   6. L LE MMT to 5/5      Plan   Cont to progress towards goals set by PT.  Work to increase knee flexion and LE strength next visit.        Ramiro Payne, PTA

## 2020-09-03 NOTE — PROGRESS NOTES
[facility]  Physical Therapy Rx note     Name: Denise Mosher  Clinic Number: [id number]     Therapy Diagnosis:        Encounter Diagnoses   Name Primary?    Decreased range of motion of right knee Yes    Weakness of right lower extremity        Physician: Ranulfo House MD     Physician Orders: PT Eval and Treat  Medical Diagnosis from Referral:     PROCEDURES: 7/9/2020 5 weeks s/o   1. Left distal femoral varus-producing osteotomy with plate fixation.  2. Left knee arthroscopy with chondroplasty.  3. 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  Pt mentioned she is able to perform slight SLR w/ no assist at home  Pt was compliant with HEP and CPM.    Pt had no adverse effects from tx.    0/10 pain L knee       Pts goals: To return to unrestricted community ambulation and ADLs at prior level of function     Objective    Amb B crutches no knee immob pwb MI  , L ext rot.    AMb from parking lot to clinic today    Observation: mild to no acute distress; mom  Her  today          TREATMENT    PROM 9/3/2020  Knee flexion 53 deg        Denise received therapeutic exercises to develop strength, endurance, ROM and flexibility for 36 minutes including:  +Glut sets 30 x 5 sec hold    quad sets 30 x 5 sec hold w/ towel under knee  -Calf stretch strap 3 x 30 sec   - HR 3 x 10 B lean R   - prom knee flex seated eot 20 sec x  6  Side hip abd 2 x 15   Hip ext L only 2 x 10  + SLR  3 x 5     - supine hip abd with A from PT 20x   PF GTB 30x    -Ankle pumps 15x    Manual treatment 5 min   Joint mobes L knee grade I    GT clinic to car B crutches   PWB as mayuri 200 ft  X 2 with MI      Denise received cold pack for 0 minutes to R knee.     Home Exercises and Patient Education Provided     Education provided:   Pt edu on proper exercise technique.      Written Home Exercises Provided: add  slr with A; mom understands how to help   Exercises were reviewed and Denise was able to demonstrate them prior to the end of the session.  Denise demonstrated good  understanding of the education provided.        exercises provided 7/30, 8/14     Assessment   Pt displayed increased quad strength and control during therex.  Pt cont to lack knee flexion but showed improvement today.  Pt mayuri to tx was fair.  Pn level remained same prior to and after tx session.      Pt prognosis is Fair.   Pt will benefit from skilled outpatient physical therapy to address the deficits listed above and in the chart below, provide pt/family education, and to maximize pt's level of independence.         Pt's spiritual, cultural and educational needs considered and patient is agreeable to the plan of care and goals as stated below:      Anticipated Barriers for therapy: exercise tolerance, multiple knee surgeries, anxiety, transportation issues        Goals:  Short Term Goals (2-4 Weeks):   1) Pt will demonstrate compliance with initial home exercise program as prescribed by physical therapist to improve independence with management of condition.  2) Pt to improve active range of motion in L  knee flexion to at least  40  degrees to allow for improved functional mobility.  3) Pt to report L  knee pain of <7/10 at worst to improve tolerance to ADLs and therapy.     Long Term Goals (12 Weeks):   1( Pt to achieve < 57  limitation as measured by the FOTO to demonstrate decreased  disability.  2) Pt to improve active range of motion in L  knee flexion to at least 120 degrees to allow for improved functional mobility.  3) Pt to report L  knee pain of <2/10 at worst to improve tolerance to ADLs and therapy.  4. Amb I in community and able to r/t work as   5. I with HEP   6. L LE MMT to 5/5      Plan   Cont to progress towards goals set by PT.  Cont to improve knee flexion and LE strength next visit.        Ramiro Payne, PTA

## 2020-09-11 ENCOUNTER — CLINICAL SUPPORT (OUTPATIENT)
Dept: REHABILITATION | Facility: OTHER | Age: 20
End: 2020-09-11
Attending: ORTHOPAEDIC SURGERY
Payer: MEDICAID

## 2020-09-11 DIAGNOSIS — Z98.890 S/P LEFT KNEE SURGERY: ICD-10-CM

## 2020-09-11 PROCEDURE — 97110 THERAPEUTIC EXERCISES: CPT | Mod: PN,CQ

## 2020-09-11 NOTE — PROGRESS NOTES
[facility]  Physical Therapy Rx note     Name: Denise Mosher  Clinic Number: [id number]     Therapy Diagnosis:        Encounter Diagnoses   Name Primary?    Decreased range of motion of right knee Yes    Weakness of right lower extremity        Physician: Ranulfo House MD     Physician Orders: PT Eval and Treat  Medical Diagnosis from Referral:     PROCEDURES: 7/9/2020 5 weeks s/o   1. Left distal femoral varus-producing osteotomy with plate fixation.  2. Left knee arthroscopy with chondroplasty.  3. 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      Pt was not compliant with HEP and CPM.    Pt had no adverse effects from tx.    0/10 pain L knee       Pts goals: To return to unrestricted community ambulation and ADLs at prior level of function     Objective    Amb B crutches no knee immob pwb MI  , L ext rot.    AMb from parking lot to clinic today    Observation: mild to no acute distress; mom  Her  today          TREATMENT    PROM 9/11/2020  Knee flexion 60 deg      Denise received therapeutic exercises to develop strength, endurance, ROM and flexibility for 47 minutes including:  +Glut sets 30 x 5 sec hold    quad sets 30 x 3 sec hold w/ towel under knee  QS no towel 30 x 3 sec hold   STM R quad 5 min w/ roller   -Calf stretch strap 3 x 30 sec   - prom knee flex seated  eot 20 sec x 6  Side hip abd 2 x 15   + SLR  X 5, x 10 , 10  - HR 3 x 10 B lean R  Hip ext L only 2 x 10  - supine hip abd with A from PT 20x   PF GTB 30x    -Ankle pumps 15x    Manual treatment 15 min   Joint mobes L knee grade I    GT clinic to car B crutches   PWB as mayuri 200 ft  X 2 with MI      Denise received cold pack for 0 minutes to R knee.     Home Exercises and Patient Education Provided     Education provided:   Pt edu on proper exercise technique.  PTA discussed importance of performing HEP and strategies to accomplish them with a busy schedule.      Written Home Exercises Provided: add  slr with A; mom understands how to help   Exercises were reviewed and Denise was able to demonstrate them prior to the end of the session.  Denise demonstrated good  understanding of the education provided.        exercises provided 7/30, 8/14     Assessment     Pt hd no adverse effects from tx.  Pt showed increased quad strength and endurance during therex.  Pt cont to lcak some knee flexion despite increase today.      Pt prognosis is Fair.   Pt will benefit from skilled outpatient physical therapy to address the deficits listed above and in the chart below, provide pt/family education, and to maximize pt's level of independence.         Pt's spiritual, cultural and educational needs considered and patient is agreeable to the plan of care and goals as stated below:      Anticipated Barriers for therapy: exercise tolerance, multiple knee surgeries, anxiety, transportation issues        Goals:  Short Term Goals (2-4 Weeks):   1) Pt will demonstrate compliance with initial home exercise program as prescribed by physical therapist to improve independence with management of condition.  2) Pt to improve active range of motion in L  knee flexion to at least  40  degrees to allow for improved functional mobility.  3) Pt to report L  knee pain of <7/10 at worst to improve tolerance to ADLs and therapy.     Long Term Goals  (12 Weeks):   1( Pt to achieve < 57  limitation as measured by the FOTO to demonstrate decreased disability.  2) Pt to improve active range of motion in L  knee flexion to at least 120 degrees to allow for improved functional mobility.  3) Pt to report L  knee pain of <2/10 at worst to improve tolerance to ADLs and therapy.  4. Amb I in community and able to r/t work as   5. I with HEP   6. L LE MMT to 5/5      Plan   Cont to progress towards goals set by PT.  Work to increase knee flexion and LE strength next visit.        Ramiro Payne, PTA

## 2020-09-11 NOTE — PLAN OF CARE
. Please sign and return plan of care. Thank you for this referral to the [facility] clinic.    [facility]  Physical Therapy Rx note     Name: Denise Mosher  Clinic Number: [id number]     Therapy Diagnosis:        Encounter Diagnoses   Name Primary?    Decreased range of motion of right knee Yes    Weakness of right lower extremity        Physician: Ranulfo House MD     Physician Orders: PT Eval and Treat  Medical Diagnosis from Referral:     PROCEDURES: 7/9/2020 5 weeks s/o   1. Left distal femoral varus-producing osteotomy with plate fixation.  2. Left knee arthroscopy with chondroplasty.  3. 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 No pain today. Cont PWB with no discomfort. Hasn't worn immob in 3 days stating it doesn't fit correctly and is burdensome.    0/10 pain L knee            Pts goals: To return to unrestricted community ambulation and ADLs at prior level of function     Objective    Amb B crutches no knee immob pwb MI  , L ext rot.    AMb from parking lot to clinic today    Observation: mild to no acute distress; mom  Her  today       Joint Mobility: unable to assess- pt won't allow me to touch her knee     Palpation: "  Incisions covered with gauze and ace wrap but no signs of infection or drainage      Edema: mod joint effusion present in L knee     8/14/2020    Range of Motion:    Knee Left active Left Passive Right Active R passive   Flexion   50 125     Extension -2    wfl         Lower Extremity Strength: not assessed due to  post-op status     Special Tests: homans neg L        Function:    Sit to stand SBA to CGA  B crutches    Min A with L LE  T/f    CMS Impairment/Limitation/Restriction for FOTO Knee Survey     Therapist reviewed FOTO scores for Denise Mosher on  7/30/2020  FOTO documents entered into [employer] - see Media section.        Category: Mobility  Current 95  Goal 57         TREATMENT         Denise received therapeutic exercises to develop strength, endurance, ROM and flexibility for 45 minutes including:  - HR 10x B lean R  - prom knee flex seated eot 20 sec x 6  - supine hip abd with A from PT 20x   -Calf stretch strap 20 sec x 4   -Ankle pumps 15x  - quad sets 15 x 10 sec   +Glut sets 15x  10 sec   Side hip abd 10x 2 with A   PF GTB 30x   + SLR with A 2 x 10   GT clinic to car B crutches   PWB as mayuri 200 ft  X 2 with MI      Denise received cold pack for 10 minutes to R knee.              Home Exercises and Patient Education Provided     Education provided:   - Role of PT, PT POC, importance of restoring quad activation and knee ROM, normalization of gait mechanics, post-op timeline     Written Home Exercises Provided: add  slr with A; mom understands how to help   Exercises were reviewed and Denise was able to demonstrate them prior to the end of the session.  Denise demonstrated good  understanding of the education provided.        exercises provided 7/30, 8/14     Assessment   Improved prom knee flex this visit and  Gait ability.      Pt prognosis is Fair.   Pt will benefit from skilled outpatient physical therapy to address the deficits listed above and in the chart below, provide pt/family education, and to maximize pt's level of independence.         Pt's spiritual, cultural and educational needs considered and patient is agreeable to the plan of care and  goals as stated below:      Anticipated Barriers for therapy: exercise tolerance, multiple knee surgeries, anxiety, transportation issues        Goals:  Short Term Goals (2-4 Weeks):   1) Pt will demonstrate compliance with initial home exercise program as prescribed by physical therapist to improve independence with management of condition.  2) Pt to improve active range of motion in L  knee flexion to at least  40  degrees to allow for improved functional mobility.  3) Pt to report L  knee pain of <7/10 at worst to improve tolerance to ADLs and therapy.     Long Term Goals (12 Weeks):   1( Pt to achieve < 57  limitation as measured by the FOTO to demonstrate decreased disability.  2) Pt to improve active range of motion in L  knee flexion to at least 120 degrees to allow for improved functional mobility.  3) Pt to report L  knee pain of <2/10 at worst to improve tolerance to ADLs and therapy.  4. Amb I in community and able to r/t work as   5. I with HEP   6. L LE MMT to 5/5      Plan   Plan of care Certification: 7/30- 8/29/2020     Outpatient Physical Therapy 1  Time weekly for 10-12 weeks to include the following interventions:    Gait Training, Manual Therapy, Moist Heat/ Ice, Neuromuscular Re-ed, Patient Education, Self Care, Therapeutic Activites and Therapeutic Exercise.     I certify the need for these services furnished under this plan of treatment and while under my care    ____________________________________  Physician/Referring Practitioner    _______________  Date of Signature

## 2020-09-18 ENCOUNTER — CLINICAL SUPPORT (OUTPATIENT)
Dept: REHABILITATION | Facility: OTHER | Age: 20
End: 2020-09-18
Attending: ORTHOPAEDIC SURGERY
Payer: MEDICAID

## 2020-09-18 DIAGNOSIS — Z98.890 S/P LEFT KNEE SURGERY: ICD-10-CM

## 2020-09-18 PROCEDURE — 97110 THERAPEUTIC EXERCISES: CPT | Mod: PN | Performed by: INTERNAL MEDICINE

## 2020-09-21 ENCOUNTER — DOCUMENTATION ONLY (OUTPATIENT)
Dept: REHABILITATION | Facility: OTHER | Age: 20
End: 2020-09-21

## 2020-09-21 NOTE — PLAN OF CARE
Please sign and return plan of care. Thank you for this referral to the [facility].    [facility]  Physical Therapy Rx note     Name: Denise Mosher  Clinic Number: [id number]     Therapy Diagnosis:        Encounter Diagnoses   Name Primary?    Decreased range of motion of right knee Yes    Weakness of right lower extremity        Physician: Ranulfo House MD     Physician Orders: PT Eval and Treat  Medical Diagnosis from Referral:     PROCEDURES: 7/9/2020 5 weeks s/o   1. Left distal femoral varus-producing osteotomy with plate fixation.  2. Left knee arthroscopy with chondroplasty.  3. 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 Cont using B crutches    Pt was not compliant with HEP and CPM.    Pt had no adverse effects from tx.          Pts goals: To return to unrestricted community ambulation and ADLs at prior level of function     Objective    Amb B crutches no knee immob pwb MI  , L ext rot.    AMb from parking lot to clinic today    Observation: mild to no acute distress; mom  Her  today          TREATMENT    PROM 9/18/2020    Knee flexion 58 deg      Denise received therapeutic exercises to develop strength, endurance, ROM and flexibility for 47 minutes including:  +Glut sets 30 x 5 sec hold    quad sets 30 x 3 sec hold w/ towel under knee  QS no towel 30 x 3 sec hold   STM R quad 5 min w/  roller   -Calf stretch strap 3 x 30 sec   - prom knee flex seated eot 20 sec x 6  Side hip abd 2 x 15     SLR  X 2 x 10 , multiple rests  Prone hip ext 10x 2   - HR 3 x 10 B lean R  Hip ext L only 2 x 10 np   - supine hip abd with A from PT 20x  np   PF GTB 30x    -Ankle pumps 15x    Manual treatment 5  min   Joint mobes L knee grade I    GT clinic to car B crutches   PWB as mayuri 200 ft  X 2 with MI      Denise received cold pack for 0 minutes to R knee.     Home Exercises and Patient Education Provided     Education provided:   Pt edu on proper exercise technique.Importance of flex rom , using cpm    Written Home Exercises Provided: none today  Exercises were reviewed and Denise was able to demonstrate them prior to the end of the session.  Denise demonstrated good  understanding of the education provided.        exercises provided 7/30, 8/14     Assessment     Cont with decreased knee flex with pt refusing manual therapy. Pt cont to need constant cueing and motivation to cont with ex. Mom helped this visit.    Pt prognosis is Fair.   Pt will benefit from skilled outpatient physical therapy to address the deficits listed above and in the chart below, provide pt/family education, and to maximize pt's level of independence.         Pt's spiritual, cultural and educational needs considered and patient is agreeable to the plan of care and goals as stated below:      Anticipated Barriers for therapy: exercise tolerance, multiple knee surgeries, anxiety, transportation issues        Goals:  Short Term Goals (2-4 Weeks):   1) Pt will demonstrate compliance with initial home exercise program as prescribed by physical therapist to improve independence with management of condition. (MET_  2) Pt to improve active range of motion in L  knee flexion to at least  40  degrees to allow for improved functional mobility. (MET)   3) Pt to report L  knee pain of <7/10 at worst to improve tolerance to ADLs and therapy. (MET)       Long Term Goals (12 Weeks):   1( Pt to achieve < 57  limitation as measured by the FOTO to demonstrate decreased disability.  2) Pt to improve active range of motion in L  knee flexion to at least 120 degrees to allow for improved functional mobility.  3) Pt to report L  knee pain of <2/10 at worst to improve tolerance to ADLs and therapy.  4. Amb I in community and able to r/t work as   5. I with HEP   6. L LE MMT to 5/5      Plan   Cont to progress towards goals set by PT.  Work to increase knee flexion and LE strength next visit.      Soumya Sifuentes, PT

## 2020-09-21 NOTE — PROGRESS NOTES
PT/PTA met face to face to discuss patient's treatment plan and progress towards established goals.  Treatment will be continued as described in initial report/eval and progress notes.  Patient will be seen by physical therapist every sixth visit and minimally once per month.

## 2020-09-25 ENCOUNTER — CLINICAL SUPPORT (OUTPATIENT)
Dept: REHABILITATION | Facility: OTHER | Age: 20
End: 2020-09-25
Attending: ORTHOPAEDIC SURGERY
Payer: MEDICAID

## 2020-09-25 DIAGNOSIS — Z98.890 S/P LEFT KNEE SURGERY: ICD-10-CM

## 2020-09-25 PROCEDURE — 97110 THERAPEUTIC EXERCISES: CPT | Mod: PN,CQ

## 2020-09-25 NOTE — PROGRESS NOTES
Physical Therapy Treatment Note     Name: Denise Mosher  Clinic Number: [id number]    Therapy Diagnosis:   Encounter Diagnosis   Name Primary?    S/P left knee surgery      Physician: Ranulfo House MD    Visit Date: 9/25/2020    Physician Orders: PT Eval and Treat  Medical Diagnosis from Referral:     PROCEDURES: 7/9/2020 5 weeks s/o   1. Left distal femoral varus-producing osteotomy with plate fixation.  2. Left knee arthroscopy with chondroplasty.  3. 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  Pt reported w/ no c/o pnin L knee currently.        Pt was not compliant with HEP and CPM.    Pt had no adverse effects from tx.       Pts goals: To return to unrestricted community ambulation and ADLs at prior level of function     Objective    Amb B crutches no knee immob pwb MI  , L ext rot.    AMb from parking lot to clinic today     Observation: mild to no acute distress; mom  Her  today          TREATMENT    PROM 9/25/2020     Knee flexion 70 deg      Denise received therapeutic exercises to develop strength, endurance, ROM and flexibility for 44  minutes including:  -Calf stretch strap 2 minn  - prom knee flex seated eot 20 sec x 4 ( 70 deg but unable to keep L glute on the table )   Heel slides 0 x 10 sec hold   Side hip abd 2 x 15     SLR  X 3 x 10   Prone hip ext 3 x 10    quad sets 30 x 3 sec hold w/ towel under  knee  QS no towel 30 x 3 sec hold     +Glut sets 30 x 5 sec hold   STM R quad 5 min w/ roller  - HR 3 x 10 B lean R  Hip ext L only 2 x 10 np   - supine hip abd with A from PT 20x  np   PF GTB 30x    -Ankle pumps 15x     Manual treatment 5  min   Joint mobes L knee grade I     GT clinic to car B crutches   PWB as mayuri 200 ft  X 2 with MI       Denise received cold pack for 0 minutes to R knee.     Home Exercises and Patient Education Provided     Education provided:   Pt edu on proper exercise technique.Importance of flex rom , using cpm     Written Home Exercises Provided: none today  Exercises were reviewed and Denise was able to demonstrate them prior to the end of the session.  Denise demonstrated good  understanding of the education provided.        exercises provided 7/30, 8/14     Assessment    Pt mayuri to tx was fair.  Pt showed increased knee flexion and quad muscular endurance during tx.  Pt cont to have limited ROM and LE weakness.      Pt prognosis is Fair.   Pt will benefit from skilled outpatient physical therapy to address the deficits listed above and in the chart below, provide pt/family education, and to maximize pt's level of independence.         Pt's spiritual, cultural and educational needs considered and patient is agreeable to the plan of care and goals as stated below:      Anticipated Barriers for therapy: exercise tolerance, multiple knee surgeries, anxiety, transportation issues        Goals:  Short Term Goals (2-4 Weeks):   1) Pt will demonstrate compliance with initial home exercise program as prescribed by physical therapist to improve independence with management of condition. (MET_  2) Pt to improve active range of motion in L  knee flexion to at least  40  degrees to allow for improved functional mobility. (MET)   3) Pt to report L  knee pain of <7/10 at worst to improve tolerance to ADLs and therapy. (MET)      Long Term Goals (12 Weeks):   1( Pt to achieve < 57  limitation as  measured by the FOTO to demonstrate decreased disability.  2) Pt to improve active range of motion in L  knee flexion to at least 120 degrees to allow for improved functional mobility.  3) Pt to report L  knee pain of <2/10 at worst to improve tolerance to ADLs and therapy.  4. Amb I in community and able to r/t work as   5. I with HEP   6. L LE MMT to 5/5      Plan   Cont to progress towards goals set by PT.  Cont to improve ROM and LE strength next visit.       Ramiro Payne, PTA

## 2020-09-30 NOTE — Clinical Note
Chief Complaint   Patient presents with   • HTN (Controlled)   • Chest Pain   • Hyperlipidemia       Subjective:   Ethan Silver is a 68y.o. male who presents today for follow-up regarding chest pain and hypertension.  He is a  with a history of 40-pack-year smoking but quit 6 months ago, and hypertension well controlled.  Ultimately he underwent coronary angiography showing very mild nonobstructive CAD.    He returns today after being released from cardiology clinic mentioning that his blood pressures have been high.  Indicates he does have a primary care physician whom he sees, and they recommended that he follow-up with cardiology for his hypertension.  He is currently not on any medications.    Past Medical History:   Diagnosis Date   • Dental disorder     partial lower and upper    • Essential hypertension, benign 2019   • Hypothyroidism 2019   • Mixed hyperlipidemia 2019     Past Surgical History:   Procedure Laterality Date   • CHOLECYSTECTOMY  2009     History reviewed. No pertinent family history.  Social History     Socioeconomic History   • Marital status:      Spouse name: Not on file   • Number of children: Not on file   • Years of education: Not on file   • Highest education level: Not on file   Occupational History   • Not on file   Social Needs   • Financial resource strain: Not on file   • Food insecurity     Worry: Not on file     Inability: Not on file   • Transportation needs     Medical: Not on file     Non-medical: Not on file   Tobacco Use   • Smoking status: Former Smoker     Packs/day: 1.00     Years: 40.00     Pack years: 40.00     Types: Cigarettes     Quit date: 2018     Years since quittin.2   • Smokeless tobacco: Never Used   Substance and Sexual Activity   • Alcohol use: No     Comment: stopped drinking in    • Drug use: No   • Sexual activity: Not on file   Lifestyle   • Physical activity     Days per week: Not on file     Minutes per  Please sign  session: Not on file   • Stress: Not on file   Relationships   • Social connections     Talks on phone: Not on file     Gets together: Not on file     Attends [employer] service: Not on file     Active member of club or organization: Not on file     Attends meetings of clubs or organizations: Not on file     Relationship status: Not on file   • Intimate partner violence     Fear of current or ex partner: Not on file     Emotionally abused: Not on file     Physically abused: Not on file     Forced sexual activity: Not on file   Other Topics Concern   • Not on file   Social History Narrative   • Not on file     No Known Allergies  Outpatient Encounter Medications as of 9/30/2020   Medication Sig Dispense Refill   • levothyroxine (SYNTHROID) 137 MCG Tab TAKE 1 TABLET BY MOUTH EVERY MORNING TAKE 30 MIN BEFORE ANY OTHER FOOD/DRINK/MED.     • vitamin D, cholecalciferol, 400 units Tab tablet Take 400 Units by mouth every day.     • losartan (COZAAR) 25 MG Tab Take 1 Tab by mouth every day. 90 Tab 3   • ASPIRIN LOW DOSE PO Take 100 mg by mouth.     • [DISCONTINUED] azithromycin (ZITHROMAX) 250 MG Tab TAKE 2 TABLETS BY MOUTH TODAY, THEN TAKE 1 TABLET DAILY FOR 4 DAYS     • [DISCONTINUED] metoprolol (LOPRESSOR) 25 MG Tab Take 1 Tab by mouth 2 times a day. Follow up with primary are for future refills. Thank you (Patient not taking: Reported on 9/30/2020) 60 Tab 0   • [DISCONTINUED] levothyroxine (SYNTHROID) 150 MCG Tab TAKE 1 TABLET BY MOUTH ONCE A DAY TAKE 30 MIN BEFORE ANY OTHER FOOD DRINK MED  6   • [DISCONTINUED] Non Formulary Request Trimetazidina 35mg 1 po every 12hrs     • [DISCONTINUED] candesartan (ATACAND) 16 MG Tab Take 16 mg by mouth every day.     • [DISCONTINUED] atorvastatin (LIPITOR) 20 MG Tab Take 20 mg by mouth every evening.       No facility-administered encounter medications on file as of 9/30/2020.      Review of Systems   All other systems reviewed and are negative.       Objective:   /76 (BP  "Location: Left arm, Patient Position: Sitting, BP Cuff Size: Adult)   Pulse 67   Ht 1.753 m (5' 9\")   Wt 94.3 kg (207 lb 12.8 oz)   SpO2 98%   BMI 30.69 kg/m²     Physical Exam   Constitutional: He is oriented to person, place, and time. He appears well-developed and well-nourished. No distress.   HENT:   Head: Normocephalic and atraumatic.   Right Ear: External ear normal.   Left Ear: External ear normal.   Eyes: Pupils are equal, round, and reactive to light. Conjunctivae and EOM are normal. Right eye exhibits no discharge. Left eye exhibits no discharge. No scleral icterus.   Neck: Normal range of motion. Neck supple. No JVD present. No tracheal deviation present. No thyromegaly present.   Cardiovascular: Normal rate, regular rhythm and intact distal pulses. PMI is not displaced. Exam reveals no gallop and no friction rub.   No murmur heard.  Pulses:       Carotid pulses are 2+ on the right side and 2+ on the left side.       Radial pulses are 2+ on the left side.        Popliteal pulses are 2+ on the right side and 2+ on the left side.        Dorsalis pedis pulses are 2+ on the right side and 2+ on the left side.        Posterior tibial pulses are 2+ on the right side and 2+ on the left side.   Pulmonary/Chest: Effort normal and breath sounds normal. No respiratory distress. He has no wheezes. He has no rales. He exhibits no tenderness.   Abdominal: Soft. Bowel sounds are normal. He exhibits no distension. There is no abdominal tenderness.   Musculoskeletal: Normal range of motion.         General: No tenderness, deformity or edema.   Neurological: He is alert and oriented to person, place, and time. No cranial nerve deficit (cranial nerves II through XII grossly intact). Coordination normal.   Skin: Skin is warm and dry. No rash noted. He is not diaphoretic. No erythema. No pallor.   Psychiatric: He has a normal mood and affect. His behavior is normal. Thought content normal.   Vitals reviewed.    CORONARY " ANGIOGRAM(1/28/2019):  1.  Mild nonobstructive CAD  2.  IFR LAD 0.96, IFR D1 0.98  3.  Moderate length segment of myocardial bridging mid LAD  4.  Normal left ventricular systolic function and LVEDP    ECHO CONCLUSIONS (1/30/2019):  No prior study is available for comparison.   Normal left ventricular size, thickness, systolic function, and   diastolic function.  Left ventricular ejection fraction is visually estimated to be 60%.  No significant valve abnormalities.   Normal inferior vena cava size and inspiratory collapse.      STRESS (12/2018):  Positive for flat and downsloping ST depression of 1-2 mm inferolaterally.      Assessment:     1. Essential hypertension, benign  losartan (COZAAR) 25 MG Tab   2. Mixed hyperlipidemia         Medical Decision Making:  Today's Assessment / Status / Plan:     Mild nonobstructive CAD therefore goal LDL less than 70 continue statin.  Asymptomatic essential hypertension not on therapy, initiate losartan 25 mg daily and follow-up with PCP for basic metabolic panel and medication titration.  He is welcome to follow-up with cardiology however his issues are all easily managed by his primary physician, and in the absence of any new changes were symptoms either is appropriate.    He already has labs pending in the next few weeks per his report for a yearly visit with his primary physician next month.

## 2020-10-01 ENCOUNTER — CLINICAL SUPPORT (OUTPATIENT)
Dept: REHABILITATION | Facility: OTHER | Age: 20
End: 2020-10-01
Attending: ORTHOPAEDIC SURGERY
Payer: MEDICAID

## 2020-10-01 DIAGNOSIS — Z98.890 S/P LEFT KNEE SURGERY: ICD-10-CM

## 2020-10-01 PROCEDURE — 97110 THERAPEUTIC EXERCISES: CPT | Mod: PN,CQ

## 2020-10-01 NOTE — PROGRESS NOTES
Physical Therapy Treatment Note     Name: Denise Mosher  Clinic Number: [id number]    Therapy Diagnosis:   Encounter Diagnosis   Name Primary?    S/P left knee surgery      Physician: Ranulfo House MD    Visit Date: 10/1/2020    Physician Orders: PT Eval and Treat  Medical Diagnosis from Referral:     PROCEDURES: 7/9/2020 5 weeks s/o   1. Left distal femoral varus-producing osteotomy with plate fixation.  2. Left knee arthroscopy with chondroplasty.  3. 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        Pt was not compliant with HEP.  Pt cont to miss 1-2 days a week 2* school work.    Pt had no adverse effects from tx.    Pt has improved SLR at home      Pts goals: To return to unrestricted community ambulation and ADLs at prior level of function     Objective    Amb B crutches no knee immob pwb MI  , L ext rot.    AMb from parking lot to clinic today     Observation: mild to no acute distress; mom  Her  today          TREATMENT    PROM 10/1/2020     Knee flexion 75 deg      Denise received therapeutic exercises to develop strength, endurance, ROM and flexibility for 50 minutes including:  -Calf stretch strap 2 minn  - prom knee flex seated eot 20 sec x 4 ( 75 deg but unable to keep L glute on the table )   Heel slides 10 x 10 sec hold   Side hip abd 30 x   SLR x 2 x 15  Prone hip ext 3 x 10   QS no towel 30 x 5 sec hold     +Glut sets 30  x 5 sec hold   STM R quad 5 min w/ roller  - HR 3 x 10 B lean R  Hip ext L only 2 x 10 np   - supine hip abd with A from PT 20x  np   PF GTB 30x    -Ankle pumps 15x     Manual treatment 5  min   Joint mobes L knee grade I     GT clinic to car B crutches   PWB as mayuri 200 ft  X 2 with MI       Denise received cold pack for 0 minutes to R knee.     Home Exercises and Patient Education Provided     Education provided:   Pt edu on proper exercise technique.     Written Home Exercises Provided: none today  Exercises were reviewed and Denise was able to demonstrate them prior to the end of the session.  Denise demonstrated good  understanding of the education provided.        exercises provided 7/30, 8/14     Assessment     Pt cont to lack knee flexion and quad strength despite having improved quad control with SLR.  Pt mayuri tx well.  Pn level remained same prior to and after tx session.      Pt prognosis is Fair.   Pt will benefit from skilled outpatient physical therapy to address the deficits listed above and in the chart below, provide pt/family education, and to maximize pt's level of independence.         Pt's spiritual, cultural and educational needs considered and patient is agreeable to the plan of care and goals as stated below:      Anticipated Barriers for therapy: exercise tolerance, multiple knee surgeries, anxiety, transportation issues        Goals:  Short Term Goals (2-4 Weeks):   1) Pt will demonstrate compliance with initial home exercise program as prescribed by physical therapist to improve independence with management of condition. (MET_  2) Pt to improve active range of motion in L  knee flexion to at least  40  degrees to allow for improved functional mobility. (MET)   3) Pt to report L  knee pain of <7/10 at worst to improve tolerance to ADLs and therapy. (MET)      Long Term Goals (12 Weeks):   1( Pt to achieve < 57  limitation as measured by the FOTO to demonstrate decreased disability.  2)  Pt to improve active range of motion in L  knee flexion to at least 120 degrees to allow for improved functional mobility.  3) Pt to report L  knee pain of <2/10 at worst to improve tolerance to ADLs and therapy.  4. Amb I in community and able to r/t work as   5. I with HEP   6. L LE MMT to 5/5      Plan   Cont to progress towards goals set by PT.  Cont to improve ROM and LE strength next visit.       Ramiro Payne, PTA

## 2020-10-02 ENCOUNTER — HOSPITAL ENCOUNTER (OUTPATIENT)
Dept: RADIOLOGY | Facility: HOSPITAL | Age: 20
Discharge: HOME | End: 2020-10-02
Attending: ORTHOPAEDIC SURGERY
Payer: MEDICAID

## 2020-10-02 ENCOUNTER — OFFICE VISIT (OUTPATIENT)
Dept: ORTHOPEDICS | Facility: CLINIC | Age: 20
End: 2020-10-02
Payer: MEDICAID

## 2020-10-02 VITALS — WEIGHT: 229.94 LBS | BODY MASS INDEX: 43.41 KG/M2 | HEIGHT: 61 IN

## 2020-10-02 DIAGNOSIS — M24.662 FIBROSIS OF LEFT KNEE JOINT: ICD-10-CM

## 2020-10-02 DIAGNOSIS — M21.062 GENU VALGUM, LEFT: ICD-10-CM

## 2020-10-02 DIAGNOSIS — M21.062 GENU VALGUM, LEFT: Primary | ICD-10-CM

## 2020-10-02 DIAGNOSIS — M94.262 CHONDROMALACIA OF LEFT KNEE: ICD-10-CM

## 2020-10-02 PROCEDURE — 99024 PR POST-OP FOLLOW-UP VISIT: ICD-10-PCS | Mod: ,,, | Performed by: ORTHOPAEDIC SURGERY

## 2020-10-02 PROCEDURE — 77073 BONE LENGTH STUDIES: CPT | Mod: 26,,, | Performed by: RADIOLOGY

## 2020-10-02 PROCEDURE — 77073 XR HIP TO ANKLE: ICD-10-PCS | Mod: 26,,, | Performed by: RADIOLOGY

## 2020-10-02 PROCEDURE — 99999 PR PBB SHADOW E&M-EST. PATIENT-LVL III: ICD-10-PCS | Mod: PBBFAC,,, | Performed by: ORTHOPAEDIC SURGERY

## 2020-10-02 PROCEDURE — 77073 BONE LENGTH STUDIES: CPT | Mod: TC

## 2020-10-02 PROCEDURE — 99024 POSTOP FOLLOW-UP VISIT: CPT | Mod: ,,, | Performed by: ORTHOPAEDIC SURGERY

## 2020-10-02 PROCEDURE — 99999 PR PBB SHADOW E&M-EST. PATIENT-LVL III: CPT | Mod: PBBFAC,,, | Performed by: ORTHOPAEDIC SURGERY

## 2020-10-02 PROCEDURE — 99213 OFFICE O/P EST LOW 20 MIN: CPT | Mod: PBBFAC,25 | Performed by: ORTHOPAEDIC SURGERY

## 2020-10-05 NOTE — PROGRESS NOTES
H&P  Orthopedics    SUBJECTIVE:     History of Present Illness:  Patient is a 20 y.o. female here for 12 week visit after left distal femoral osteotomy and Cartimax graft placement. Doing well. Still stiff. Able to ambulate with walker.        Review of patient's allergies indicates:  No Known Allergies    Past Medical History:   Diagnosis Date    Anxiety     reports panic attacks    Depression     Fibromyalgia     per pt    Insomnia     Precocious female puberty     Seizures     reports 2 incidents of possible seizures while giving blood    Syncope and collapse     Wrist fracture, bilateral      Past Surgical History:   Procedure Laterality Date    ARTHROSCOPY OF KNEE Right 12/19/2019    Procedure: ARTHROSCOPY, KNEE (Right);  Surgeon: Ranulfo House MD;  Location: [address];  Service: Orthopedics;  Laterality: Right;    ARTHROSCOPY OF KNEE Left 7/9/2020    Procedure: ARTHROSCOPY, KNEE;  Surgeon: Ranulfo House MD;  Location: [address];  Service: Orthopedics;  Laterality: Left;  WITH CHRONDROPLASTY     ARTHROTOMY OF KNEE Left 7/9/2020    Procedure: ARTHROTOMY, KNEE;  Surgeon: Ranulfo House MD;  Location: [address];  Service: Orthopedics;  Laterality: Left;  WITH CHRONDRAL ALLOGRAFT IMPLANTATION    chip      chip implant    CHONDROPLASTY OF KNEE Right 12/19/2019    Procedure: CHONDROPLASTY, KNEE patella;  Surgeon: Ranulfo House MD;  Location: [address];  Service: Orthopedics;  Laterality: Right;    FEMUR OSTEOTOMY Right 12/13/2018    Procedure: OSTEOTOMY, FEMUR - distal to correct valgus (Orthopediatrics plate, MTF bone wedge).  Right knee arthroscopy with MPFL reconstruction, gracilis allograft ([employer]).  3 hrs.;  Surgeon: Ranulfo House MD;  Location: [address];  Service: Orthopedics;  Laterality: Right;    FEMUR OSTEOTOMY Left 7/9/2020    Procedure: OSTEOTOMY, FEMUR (Left) - distal femoral opening wedge (Orthopediatrics, MTF), knee scope, removal of tibial  tubercle screws (Orthopediatrics 4.0 cannulated screws);  Surgeon: Ranulfo House MD;  [facility];  Service: Orthopedics;  Laterality: Left;  RQ Gianna DAVIS notified 7/8/20 HDavis RN    HARDWARE REMOVAL Right 12/19/2019    Procedure: REMOVAL, HARDWARE righ distal femoral.;  Surgeon: Ranulfo House MD;  [facility];  Service: Orthopedics;  Laterality: Right;    HARDWARE REMOVAL Left 7/9/2020    Procedure: REMOVAL, HARDWARE;  Surgeon: Ranulfo House MD;  [facility];  Service: Orthopedics;  Laterality: Left;  TIBIA    HEMANGIOMA EXCISION      scalp    INGUINAL HERNIA REPAIR Left     INJECTION OF STEROID Right 7/5/2019    Procedure: INJECTION, STEROID;  Surgeon: Ranulfo House MD;  [facility];  Service: Orthopedics;  Laterality: Right;    KNEE JOINT MANIPULATION Right 7/5/2019    Procedure: MANIPULATION, KNEE;  Surgeon: Ranulfo House MD;  [facility];  Service: Orthopedics;  Laterality: Right;    KNEE SURGERY Left 2/16/15    TOE SURGERY Right     5 th toe     Family History   Problem Relation Age of Onset    Seizures Father     Anxiety disorder Father     Depression Father     Mental illness Father     Schizophrenia Father     Deep vein thrombosis Father      Social History     Tobacco Use    Smoking status: Never Smoker    Smokeless tobacco: Never Used   Substance Use Topics    Alcohol use: No    Drug use: No        Review of Systems:  Patient denies constitutional symptoms, cardiac symptoms, respiratory symptoms, GI symptoms.  The remainder of the musculoskeletal ROS is included in the HPI.      OBJECTIVE:     Physical Exam:  Gen:  No acute distress  CV:  Peripherally well-perfused.  Pulses 2+ bilaterally.  Lungs:  Normal respiratory effort.  Abdomen:  Soft, non-tender, non-distended  Head/Neck:  Normocephalic.  Atraumatic. No TTP, AROM and PROM intact without pain  Neuro:  CN intact without deficit, SILT  throughout B/L Upper & Lower Extremities    MSK:  Incisions all healing well no signs of redness, drainage or wound dehiscence.   Knee ROM limited, 0-75 deg.  Weak quads.    Diagnostic Results:  Hip to ankle X-rays were ordered and images reviewed by me.  These showed well fixed hardware and post op changes, good alignment    ASSESSMENT/PLAN:     A/P: Denise Mosher is a 20 y.o.  S/p left distal femur osteotomy and Cartimax graft placement.     Plan:  Will likely need OMID.  Patient wants to hold off and see if she can regain ROM on her own.  Will schedule surgery for Dec, return for pre-op.  Patient also mentioned right knee pain.  Will need MRI, but hold off for now.

## 2020-10-08 ENCOUNTER — CLINICAL SUPPORT (OUTPATIENT)
Dept: REHABILITATION | Facility: OTHER | Age: 20
End: 2020-10-08
Attending: ORTHOPAEDIC SURGERY
Payer: MEDICAID

## 2020-10-08 DIAGNOSIS — Z98.890 S/P LEFT KNEE SURGERY: ICD-10-CM

## 2020-10-08 PROCEDURE — 97110 THERAPEUTIC EXERCISES: CPT | Mod: PN,CQ

## 2020-10-08 NOTE — PROGRESS NOTES
Physical Therapy Treatment Note     Name: Denise Mosher  Clinic Number: [id number]    Therapy Diagnosis:   Encounter Diagnosis   Name Primary?    S/P left knee surgery      Physician: Ranulfo House MD    Visit Date: 10/8/2020    Physician Orders: PT Eval and Treat  Medical Diagnosis from Referral:     PROCEDURES: 7/9/2020 5 weeks s/o   1. Left distal femoral varus-producing osteotomy with plate fixation.  2. Left knee arthroscopy with chondroplasty.  3. 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        Pt was not compliant with HEP.  Pt cont to miss 1-2 days a week 2* school work.    Pt had no adverse effects from tx.    Pt has improved SLR at home      Pts goals: To return to unrestricted community ambulation and ADLs at prior level of function     Objective    Amb B crutches no knee immob pwb MI  , L ext rot.    AMb from parking lot to clinic today     Observation: mild to no acute distress; mom  Her  today          TREATMENT    PROM 10/8/2020     Knee flexion 77 deg      Denise received therapeutic exercises to develop strength, endurance, ROM and flexibility for 45 minutes including:  -Calf stretch strap 2 minn  - prom knee flex seated eot 20 sec x 4 ( 77 deg but unable to keep L glute on the table )   Heel slides 10 x 10 sec hold   Side hip abd 30 x   SLR x 2 x 15  Wt shifting 3 x 10 5 sec hold (VCs needed to avoid hyper extension)    Prone hip ext 3 x 10   QS no towel 30 x 5 sec hold     +Glut sets 30 x 5 sec hold   STM R quad 5 min w/ roller  - HR 3 x 10 B lean R  Hip ext L only 2 x 10 np   - supine hip abd with A from PT 20x  np   PF GTB 30x    -Ankle pumps 15x     Manual treatment 5  min   Joint mobes L knee grade I     Gait training: 3 min   Heel toe walking WBAT w/ B crutches 30 ft      Denise received cold pack for 0 minutes to R knee.     Home Exercises and Patient Education Provided     Education provided:   Pt edu on proper exercise technique.     Written Home Exercises Provided: none today  Exercises were reviewed and Denise was able to demonstrate them prior to the end of the session.  Denise demonstrated good  understanding of the education provided.        exercises provided 7/30, 8/14     Assessment   Pt showed improved gait pattern and WB status during tx today.  Pt cont to lack knee flexion and quad strength.  Pt mayuri tx well.  Pn level remained same prior to and after tx session.      Pt prognosis is Fair.   Pt will benefit from skilled outpatient physical therapy to address the deficits listed above and in the chart below, provide pt/family education, and to maximize pt's level of independence.         Pt's spiritual, cultural and educational needs considered and patient is agreeable to the plan of care and goals as stated below:      Anticipated Barriers for therapy: exercise tolerance, multiple knee surgeries, anxiety, transportation issues        Goals:  Short Term Goals (2-4 Weeks):   1) Pt will demonstrate compliance with initial home exercise program as prescribed by physical therapist to improve independence with management of condition. (MET_  2) Pt to improve active range of motion in L  knee flexion to at least  40  degrees to allow for improved functional mobility. (MET)   3) Pt to report L  knee pain of <7/10 at worst to improve tolerance to ADLs and therapy. (MET)      Long Term Goals (12 Weeks):   1( Pt to  achieve < 57  limitation as measured by the FOTO to demonstrate decreased disability.  2) Pt to improve active range of motion in L  knee flexion to at least 120 degrees to allow for improved functional mobility.  3) Pt to report L  knee pain of <2/10 at worst to improve tolerance to ADLs and therapy.  4. Amb I in community and able to r/t work as   5. I with HEP   6. L LE MMT to 5/5      Plan   Cont to progress towards goals set by PT.  Cont to improve ROM and LE strength next visit.       Ramiro Payne, PTA

## 2020-10-15 ENCOUNTER — CLINICAL SUPPORT (OUTPATIENT)
Dept: REHABILITATION | Facility: OTHER | Age: 20
End: 2020-10-15
Attending: ORTHOPAEDIC SURGERY
Payer: MEDICAID

## 2020-10-15 DIAGNOSIS — Z98.890 S/P LEFT KNEE SURGERY: ICD-10-CM

## 2020-10-15 PROCEDURE — 97110 THERAPEUTIC EXERCISES: CPT | Mod: PN

## 2020-10-19 NOTE — PLAN OF CARE
Outpatient Therapy Updated Plan of Care     Visit Date: 10/15/2020  Name: Denise Mosher  Clinic Number: [id number]    Therapy Diagnosis:   Encounter Diagnosis   Name Primary?    S/P left knee surgery      Physician: Ranulfo House MD    Physician Orders: PT Eval and Treat, Left knee distal femoral osteotomy and arthrotomy with cartilage implantation.  [facility] protocol.  Medical Diagnosis from Referral:     PROCEDURES: 7/9/2020  1. Left distal femoral varus-producing osteotomy with plate fixation.  2. Left knee arthroscopy with chondroplasty.  3. 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 Bending it approx to 20 at home in her recliner. No other ex     Time In: 10:00 AM  Time Out: 10:52 AM  Total Billable Time: 52 minutes       Subjective     Update:   Pt denies pain in her L knee at the start of the session.      Pt was compliant with HEP.   Response to previous treatment: soreness the next day  Functional change: sustain more weight on L LE for longer periods of time    Pain: 0/10  Location: knee  left     Pts goals: To return to unrestricted community ambulation and ADLs at prior level of function      Objective     Update:    L knee AROM: 0 to 60 deg  L KNee AAROM: 0 to 80 deg    amb with axillary crutches WBAT    MMT R L   Hip flexion 5/5 5/5   Hip abduction 5/5 3/5   Hip extension 5/5 5/5   Knee extension 4/5 3/5   Knee flexion 5/5 2-/5   Ankle  dorsiflexion 5/5 5/5   Ankle plantar flexion 5/5 5/5   Ankle inversion 5/5 5/5   Ankle eversion 5/5 5/5         CMS Impairment/Limitation/Restriction for FOTO Knee Survey    Therapist reviewed FOTO scores for Denise Mosher on 10/1/2020  FOTO documents entered into [employer] - see Media section.    Limitation Score: 59%  Category: Mobility    Current : CK = at least 40% but < 60% impaired, limited or restricted  Goal: CK = at least 40% but < 60% impaired, limited or restricted           Denise received therapeutic exercises to develop strength, endurance, ROM and flexibility for 37 minutes including:  Reassessed strength, ROM to update plan of care    The stick stm L quad  Calf stretch strap 2 min  prom knee flex seated eot 20 sec x 4   Heel slides 10 x 10 sec hold   Prone hip ext 3 x 10   QS no towel 30 x 5 sec hold        Manual treatment 15  min   Patellar mobs Grade I  Grades I and II AP tibial glides to increase knee flexion and decrease pain       Assessment     Update:   Increased pain with patellar mobilizations today. still presenting with significant ROM restrictions with L knee flexion. Weakness noted in L LE as well with MMT. Patient with significant apprehension and increased pain with L knee mobilizations, ROM, and stm with roller stick. PT provided verbal and tactile cues to unlock L knee for toe off. Stressed to patient that she the need for bending her knee to avoid the scheduled closed manipulation for December, one of her goals.    Pt prognosis is Fair.   Pt will benefit from skilled outpatient physical therapy to address the deficits listed above and in the chart below, provide pt/family education, and to maximize pt's level of independence.         Pt's spiritual, cultural and educational needs considered and patient is agreeable to the plan of care and goals as stated below:      Anticipated Barriers for therapy: exercise tolerance, multiple knee surgeries, anxiety, transportation issues      Previous Short Term Goals Status:     Short Term Goals (2-4 Weeks):   1) Pt will demonstrate compliance with initial home exercise program as prescribed by physical therapist to improve independence with management of condition. (MET_  2) Pt to improve active range of motion in L  knee flexion to at least  40  degrees to allow for improved functional mobility. (MET)   3) Pt to report L  knee pain of <7/10 at worst to improve tolerance to ADLs and therapy. (MET)      Long Term Goals (12 Weeks):   1( Pt to achieve < 57  limitation as measured by the FOTO to demonstrate decreased disability.  2) Pt to improve active range of motion in L  knee flexion to at least 120 degrees to allow for improved functional mobility. (not met)  3) Pt to report L  knee pain of <2/10 at worst to improve tolerance to ADLs and therapy. (not met)  4. Amb I in community and able to r/t work as  (not met)  5. I with HEP (in progress)  6. L LE MMT to 5/5  (not met)    Long Term Goal Status:   continue per initial plan of care.  Reasons for Recertification of Therapy:   Patient continues to present with L knee ROM limitations. Experienced increased pain levels with therapy . Will benefit from continued OP PT to increase L knee ROM, promote I ambulation, and return pt to prior functional level.    Plan     Updated Certification Period: 10/15/2020 to 12/9/2020  Recommended Treatment Plan: 2 times per week for 8 weeks: Gait Training, Manual Therapy, Moist Heat/ Ice, Neuromuscular Re-ed, Patient Education and Therapeutic Exercise      Felipe Montelongo, PT  10/15/2020      I CERTIFY THE NEED FOR THESE SERVICES FURNISHED UNDER THIS PLAN OF TREATMENT AND WHILE UNDER MY CARE    Physician's comments:        Physician's Signature: Ranulfo House 10/20/2020 ___________________________________________________

## 2020-10-22 ENCOUNTER — CLINICAL SUPPORT (OUTPATIENT)
Dept: REHABILITATION | Facility: OTHER | Age: 20
End: 2020-10-22
Attending: ORTHOPAEDIC SURGERY
Payer: MEDICAID

## 2020-10-22 DIAGNOSIS — Z98.890 S/P LEFT KNEE SURGERY: ICD-10-CM

## 2020-10-22 PROCEDURE — 97110 THERAPEUTIC EXERCISES: CPT | Mod: PN,CQ

## 2020-10-22 NOTE — PROGRESS NOTES
Physical Therapy Treatment Note      Visit Date: 10/15/2020  Name: Denise Mosher  Clinic Number: [id number]     Therapy Diagnosis:        Encounter Diagnosis   Name Primary?    S/P left knee surgery        Physician: Ranulfo House MD     Physician Orders: PT Eval and Treat, Left knee distal femoral osteotomy and arthrotomy with cartilage implantation.  [facility] protocol.  Medical Diagnosis from Referral:     PROCEDURES: 7/9/2020  1. Left distal femoral varus-producing osteotomy with plate fixation.  2. Left knee arthroscopy with chondroplasty.  3. 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 Bending it approx to 20 at home in her recliner. No other ex      Time In: 0900  Time Out: 0945  Total Billable Time: 45 minutes        Subjective        Pt states feeling well w/ no co pn in L knee currently.      Pt was compliant with HEP.   Response to previous treatment: no adverse effects  Functional change: improved WB mayuri      Pain: 0/10  Location: knee  left     Pts goals: To return to unrestricted community ambulation and ADLs at prior level of function        Objective    Pt amb into clinic with B crutches      ROM: 10/22/2020  AROM knee flexion 70 deg   PROM knee flexion 80 deg     Denise received therapeutic exercises to develop strength, endurance, ROM and flexibility for 22 minutes including:     GSS strap 2 min   HSS plinthe 2 min   The stick stm L quad  SLR 2 x  15  LLR 2 x 15  prom knee flex seated eot 20 sec x 5   QS no towel 30 x 5 sec hold     Heel slides 10 x 10 sec hold   Prone hip ext 3 x 10        Manual treatment 15  min   Patellar mobs Grade I  Grades I and II AP tibial glides to increase knee flexion and decrease pain    Gait trainin min   Heel toe walking in // w/ VCs 6 laps 4 ft w/out UE assist         Assessment    Pt displayed improved gait pattern w/ VCs.  Pt cont to lack knee flexion and some quad/glute strength.  Pt mauyri tx well.  Pn level remained same prior to and after tx session.      Pt prognosis is Fair.   Pt will benefit from skilled outpatient physical therapy to address the deficits listed above and in the chart below, provide pt/family education, and to maximize pt's level of independence.         Pt's spiritual, cultural and educational needs considered and patient is agreeable to the plan of care and goals as stated below:      Anticipated Barriers for therapy: exercise tolerance, multiple knee surgeries, anxiety, transportation issues      Previous Short Term Goals Status:     Short Term Goals (2-4 Weeks):   1) Pt will demonstrate compliance with initial home exercise program as prescribed by physical therapist to improve independence with management of condition. (MET_  2) Pt to improve active range of motion in L  knee flexion to at least  40  degrees to allow for improved functional mobility. (MET)   3) Pt to report L  knee pain of <7/10 at worst to improve tolerance to ADLs and therapy. (MET)      Long Term Goals (12 Weeks):   1( Pt to achieve < 57  limitation as measured by the FOTO to demonstrate decreased disability.  2) Pt to improve active range of motion in L  knee flexion to at least 120 degrees to allow for improved functional mobility. (not met)  3) Pt to report L  knee pain of <2/10 at worst to improve tolerance to ADLs and therapy. (not met)  4. Amb I in community and able to r/t work as  (not met)  5. I  with HEP (in progress)  6. L LE MMT to 5/5  (not met)     Long Term Goal Status:   continue per initial plan of care.  Reasons for Recertification of Therapy:   Patient continues to present with L knee ROM limitations. Experienced increased pain levels with therapy . Will benefit from continued OP PT to increase L knee ROM, promote I ambulation, and return pt to prior functional level.     Plan      Cont to progress towards goals set by PT.  Work to improve gait pattern, knee flexion and LE strength next visit.        Ramiro Payne, PTA

## 2020-11-06 ENCOUNTER — CLINICAL SUPPORT (OUTPATIENT)
Dept: REHABILITATION | Facility: OTHER | Age: 20
End: 2020-11-06
Attending: ORTHOPAEDIC SURGERY
Payer: MEDICAID

## 2020-11-06 DIAGNOSIS — Z98.890 S/P LEFT KNEE SURGERY: ICD-10-CM

## 2020-11-06 PROCEDURE — 97110 THERAPEUTIC EXERCISES: CPT | Mod: PN | Performed by: INTERNAL MEDICINE

## 2020-11-06 NOTE — PROGRESS NOTES
Physical Therapy Treatment Note      Visit Date: 11/6/2020    Name: Denise Mosher  Clinic Number: [id number]     Therapy Diagnosis:        Encounter Diagnosis   Name Primary?    S/P left knee surgery        Physician: Ranulfo House MD     Physician Orders: PT Eval and Treat, Left knee distal femoral osteotomy and arthrotomy with cartilage implantation.  [facility] protocol.  Medical Diagnosis from Referral:     PROCEDURES: 7/9/2020  1. Left distal femoral varus-producing osteotomy with plate fixation.  2. Left knee arthroscopy with chondroplasty.  3. Left knee arthrotomy with chondral allograft implantation (Cartimax)        M22.41 (ICD-10-CM) - Patella, chondromalacia, right   S/p R knee arthroscopy with MPFL revision, right distal femoral hardward removal, and patellar chondroplasty on 12/19/2019     10/15/2020 - Post Op week 14   Evaluation Date: 7/31/2020     Total Visits Received: 14  Cancelled Visits: 1  No Show Visits: 0     Current Certification Period:  12/9/2020  Precautions: Standard , TTWB per pt, ext immobilizer     Functional Level Prior to Evaluation:   limited in community ambulation and ADLs. Bending it approx to 20 at home in her recliner. No other ex      Time In: 9:35 a  Time Out: 1015A  Total Billable Time: 45 minutes        Subjective        Pt states feeling well w/ no co pn in L knee currently. No longer using AD     Pt was compliant with HEP.   Response to previous treatment: no adverse effects  Functional change: improved WB mayuri      Pain: 0- 3/10 ant knee   Location: knee  left     Pts goals: To return to unrestricted community ambulation and ADLs at prior level of function        Objective    Pt amb into clinic without AD ,  decreased  L knee flex  Mom present    ROM: 11/6/2020       PROM knee flexion 83 deg , ext wfl     Denise received therapeutic exercises to develop strength, endurance, ROM and flexibility for 40  minutes including:     GSS strap 2 min  np   HSS plinthe 2 min  np  The stick stm L quad np   SLR 2 x 15  LLR 2 x 15  prom knee flex seated eot 20 sec x 5   QS no towel 30 x 5 sec hold   +HR 20x  + mini squats 15x  Heel slides 10 x 10 sec hold   Prone hip ext 3 x 10   +U shuttle 12 # 2 x 10        Manual treatment 0  min - pt refused  Patellar mobs Grade I  Grades I and II AP tibial glides to increase knee flexion and decrease pain        Assessment    Pt displayed improved gait and some improved knee flex rom. Cont weakness quads and decreased rom.    Pt prognosis is Fair.   Pt will benefit from skilled outpatient physical therapy to address the deficits listed above and in the chart below, provide pt/family education, and to maximize pt's level of independence.         Pt's spiritual, cultural and educational needs considered and patient is agreeable to the plan of care and goals as stated below:      Anticipated Barriers for therapy: exercise tolerance, multiple knee surgeries, anxiety, transportation issues      Previous Short Term Goals Status:     Short Term Goals (2-4 Weeks):   1) Pt will demonstrate compliance with initial home exercise program as prescribed by physical therapist to improve independence with management of condition. (MET_  2) Pt to improve active range of motion in L  knee flexion to at least  40  degrees to allow for improved functional mobility. (MET)   3) Pt to report L  knee pain of <7/10 at worst to improve tolerance to ADLs and therapy. (MET)      Long Term Goals (12 Weeks):   1( Pt to achieve < 57  limitation as measured by the FOTO to demonstrate decreased disability.  2) Pt to improve active range of motion in L  knee flexion to at least 120 degrees to allow for improved functional mobility. (not met)  3) Pt to report L  knee pain of <2/10 at worst to improve tolerance to ADLs and therapy. (not met)  4. Amb I in community and able to r/t work as  (not met)  5. I with HEP (in progress)  6. L LE MMT to 5/5  (not met)     Long  Term Goal Status:   continue per initial plan of care.  Reasons for Recertification of Therapy:   Patient continues to present with L knee ROM limitations. Experienced increased pain levels with therapy . Will benefit from continued OP PT to increase L knee ROM, promote I ambulation, and return pt to prior functional level.     Plan      Cont to progress towards goals set by PT.  Work to improve gait pattern, knee flexion and LE strength next visit.      Soumya Sifuentes, PT

## 2020-11-10 ENCOUNTER — DOCUMENTATION ONLY (OUTPATIENT)
Dept: REHABILITATION | Facility: OTHER | Age: 20
End: 2020-11-10

## 2020-11-10 NOTE — PROGRESS NOTES
PT/PTA met face to face to discuss patient's treatment plan and progress towards established goals.  Treatment will be continued as described in initial report/eval and progress notes.  Patient will be seen by physical therapist every sixth visit and minimally once per month. Cont with rom and quad strengthening closed chain

## 2020-11-11 ENCOUNTER — DOCUMENTATION ONLY (OUTPATIENT)
Dept: REHABILITATION | Facility: HOSPITAL | Age: 20
End: 2020-11-11

## 2020-11-11 NOTE — PROGRESS NOTES
Outpatient Therapy Discharge Summary     Name: Denise Mosher  [facility] Number: [id number]    Therapy Diagnosis:        Encounter Diagnoses   Name Primary?    Weakness of right lower extremity      Decreased range of motion of right knee        Physician: Ranulfo House MD     Visit Date: 2/20/2020  Physician Orders: PT Eval and Treat  Medical Diagnosis from Referral: M22.41 (ICD-10-CM) - Patella, chondromalacia, right   Evaluation Date: 1/8/2020    Date of Last visit: 2/20/2020  Total Visits Received: 6  Cancelled Visits: -  No Show Visits: -    Assessment    Goals: not met    Discharge reason: Patient has not attended therapy since 2/20/2020    Plan   This patient is discharged from Physical Therapy    Jose Enrique Shepard PT, DPT  11/11/2020

## 2020-11-12 ENCOUNTER — CLINICAL SUPPORT (OUTPATIENT)
Dept: REHABILITATION | Facility: OTHER | Age: 20
End: 2020-11-12
Attending: ORTHOPAEDIC SURGERY
Payer: MEDICAID

## 2020-11-12 DIAGNOSIS — Z98.890 S/P LEFT KNEE SURGERY: ICD-10-CM

## 2020-11-12 PROCEDURE — 97110 THERAPEUTIC EXERCISES: CPT | Mod: PN,CQ

## 2020-11-12 NOTE — PROGRESS NOTES
Physical Therapy Treatment Note      Visit Date: 11/12/2020    Name: Denise Mosher  Clinic Number: [id number]     Therapy Diagnosis:        Encounter Diagnosis   Name Primary?    S/P left knee surgery        Physician: Ranulfo House MD     Physician Orders: PT Eval and Treat, Left knee distal femoral osteotomy and arthrotomy with cartilage implantation.  [facility] protocol.  Medical Diagnosis from Referral:     PROCEDURES: 7/9/2020  1. Left distal femoral varus-producing osteotomy with plate fixation.  2. Left knee arthroscopy with chondroplasty.  3. 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 Bending it approx to 20 at home in her recliner. No other ex      Time In: 0900  Time Out: 0947   Total Billable Time: 45 minutes        Subjective      Pt reports w/ no c/o pn in L knee.  Pt mentioned cont to have some stiffness.       Pt was compliant with HEP.   Response to previous treatment: no adverse effects  Functional change: improved gait pattern     Pain: 0- 3/10 ant knee   Location: knee  left     Pts goals: To return to unrestricted community ambulation and ADLs at prior level of function        Objective    Pt amb into clinic without AD ,  decreased  L knee flex  Mom present    ROM: 11/12/2020       PROM knee flexion 85 deg , ext wfl     Denise received therapeutic exercises to develop strength, endurance, ROM and flexibility for 45 minutes including:  prom knee flex seated eot 20 sec x 5    Heel slides 10 x 10 sec hold   +HR 30 x 3 sec hold   + mini squats 2 x 10  +U shuttle 12 # 2 x 105  SLR 2 x 15  LLR 2 x 15  Prone hip ext 2 x 15     GSS strap 2 min  np   HSS plinthe 2 min np  The stick stm L quad np   QS no towel 30 x 5 sec hold     Manual treatment 0  min - pt refused  Patellar mobs Grade I  Grades I and II AP tibial glides to increase knee flexion and decrease pain        Assessment     Knee flexion PROM improved slightly.  Pt had no adverse effects.  Pt displayed increased quad strength and endurance.  Pt cont to lack some strength, core stability, and AROM to perform mini squats.      Pt prognosis is Fair.   Pt will benefit from skilled outpatient physical therapy to address the deficits listed above and in the chart below, provide pt/family education, and to maximize pt's level of independence.         Pt's spiritual, cultural and educational needs considered and patient is agreeable to the plan of care and goals as stated below:      Anticipated Barriers for therapy: exercise tolerance, multiple knee surgeries, anxiety, transportation issues      Previous Short Term Goals Status:     Short Term Goals (2-4 Weeks):   1) Pt will demonstrate compliance with initial home exercise program as prescribed by physical therapist to improve independence with management of condition. (MET_  2) Pt to improve active range of motion in L  knee flexion to at least  40  degrees to allow for improved functional mobility. (MET)   3) Pt to report L  knee pain of <7/10 at worst to improve tolerance to ADLs and therapy. (MET)      Long Term Goals (12 Weeks):   1( Pt to achieve < 57  limitation as measured by the FOTO to demonstrate decreased disability.  2) Pt to improve active range of motion in L  knee flexion to at least 120 degrees to allow for improved functional mobility. (not met)  3) Pt to report L  knee pain of <2/10 at worst to improve tolerance to ADLs and therapy. (not met)  4. Amb I in community and  able to r/t work as  (not met)  5. I with HEP (in progress)  6. L LE MMT to 5/5  (not met)     Long Term Goal Status:   continue per initial plan of care.  Reasons for Recertification of Therapy:   Patient continues to present with L knee ROM limitations. Experienced increased pain levels with therapy . Will benefit from continued OP PT to increase L knee ROM, promote I ambulation, and return pt to prior functional level.     Plan      Cont to progress towards goals set by PT.  Cont to increase gait pattern, knee flexion and LE strength next visit.      Ramiro Payne, PTA

## 2020-11-19 ENCOUNTER — CLINICAL SUPPORT (OUTPATIENT)
Dept: REHABILITATION | Facility: OTHER | Age: 20
End: 2020-11-19
Attending: ORTHOPAEDIC SURGERY
Payer: MEDICAID

## 2020-11-19 DIAGNOSIS — Z98.890 S/P LEFT KNEE SURGERY: ICD-10-CM

## 2020-11-19 PROCEDURE — 97110 THERAPEUTIC EXERCISES: CPT | Mod: PN,CQ

## 2020-11-19 NOTE — PROGRESS NOTES
"Physical Therapy Treatment Note      Visit Date: 11/19/2020    Name: Denise Mosher  Clinic Number: [id number]     Therapy Diagnosis:        Encounter Diagnosis   Name Primary?    S/P left knee surgery        Physician: Ranulfo House MD     Physician Orders: PT Eval and Treat, Left knee distal femoral osteotomy and arthrotomy with cartilage implantation.  MARYAM protocol.  Medical Diagnosis from Referral:     PROCEDURES: 7/9/2020  1. Left distal femoral varus-producing osteotomy with plate fixation.  2. Left knee arthroscopy with chondroplasty.  3. 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 Bending it approx to 20 at home in her recliner. No other ex      Time In: 0947  Time Out: 1034   Total Billable Time: 47 minutes        Subjective      Pt states having slight discomfort in L knee 2* "sleeping on it wrong".  Pt mentioned having increased back pn 2* irregular gait pattern.       Pt was compliant with HEP.   Response to previous treatment: no adverse effects  Functional change: no change      Pain: 0/10 ant knee   Location: knee  left     Pts goals: To return to unrestricted community ambulation and ADLs at prior level of function        Objective    Pt amb into clinic without AD ,  decreased  L knee flex  Mom present    ROM: 11/19/2020    PROM knee flexion 80 deg AROM EOT and 89 deg PROM EOT , ext wfl     Denise received therapeutic exercises to develop strength, endurance, ROM and " flexibility for 44 minutes including:  QS 30 x 3 sec hold   GSS w/ strap 2 min   Heel slides 10 x 10 sec hold   prom knee flex seated eot 20 sec x 5   SLR 2 x 15  LLR 2 x 15  +HR 30 x 3 sec hold   +U shuttle 12 # 2 x 105  + mini squats 2 x 10  Prone hip ext 2 x 15  HSS plinthe 2 min np  The stick stm L quad np   QS no towel 30 x 5 sec hold     Manual treatment 3  min -   PROM knee flexion manual stretch 3 min     Patellar mobs Grade I  Grades I and II AP tibial glides to increase knee flexion and decrease pain        Assessment   Therex limited 2* increased low back pn that radiates down L side.  Pt mayuri tx well.  Pt displayed increased PROM knee flexion and quad strength during therex.  Pt cont to lack some knee flexion and and LE strength.        Pt prognosis is Fair.   Pt will benefit from skilled outpatient physical therapy to address the deficits listed above and in the chart below, provide pt/family education, and to maximize pt's level of independence.         Pt's spiritual, cultural and educational needs considered and patient is agreeable to the plan of care and goals as stated below:      Anticipated Barriers for therapy: exercise tolerance, multiple knee surgeries, anxiety, transportation issues      Previous Short Term Goals Status:     Short Term Goals (2-4 Weeks):   1) Pt will demonstrate compliance with initial home exercise program as prescribed by physical therapist to improve independence with management of condition. (MET_  2) Pt to improve active range of motion in L  knee flexion to at least  40  degrees to allow for improved functional mobility. (MET)   3) Pt to report L  knee pain of <7/10 at worst to improve tolerance to ADLs and therapy. (MET)      Long Term Goals (12 Weeks):   1( Pt to achieve < 57  limitation as measured by the FOTO to demonstrate decreased disability.  2) Pt to improve active range of motion in L  knee flexion to at least 120 degrees to allow for improved functional  mobility. (not met)  3) Pt to report L  knee pain of <2/10 at worst to improve tolerance to ADLs and therapy. (not met)  4. Amb I in community and able to r/t work as  (not met)  5. I with HEP (in progress)  6. L LE MMT to 5/5  (not met)     Long Term Goal Status:   continue per initial plan of care.  Reasons for Recertification of Therapy:   Patient continues to present with L knee ROM limitations. Experienced increased pain levels with therapy . Will benefit from continued OP PT to increase L knee ROM, promote I ambulation, and return pt to prior functional level.     Plan      Cont to progress towards goals set by PT.  Cont to increase gait pattern, knee flexion and LE strength next visit.      Ramiro Payne, PTA

## 2020-11-24 ENCOUNTER — OFFICE VISIT (OUTPATIENT)
Dept: ORTHOPEDICS | Facility: CLINIC | Age: 20
End: 2020-11-24
Payer: MEDICAID

## 2020-11-24 VITALS — BODY MASS INDEX: 43.41 KG/M2 | WEIGHT: 229.94 LBS | HEIGHT: 61 IN

## 2020-11-24 DIAGNOSIS — M22.2X2 PATELLOFEMORAL PAIN SYNDROME OF BOTH KNEES: ICD-10-CM

## 2020-11-24 DIAGNOSIS — M21.062 GENU VALGUM, LEFT: ICD-10-CM

## 2020-11-24 DIAGNOSIS — M22.2X1 PATELLOFEMORAL PAIN SYNDROME OF BOTH KNEES: ICD-10-CM

## 2020-11-24 DIAGNOSIS — M24.662 FIBROSIS OF LEFT KNEE JOINT: Primary | ICD-10-CM

## 2020-11-24 PROCEDURE — 99999 PR PBB SHADOW E&M-EST. PATIENT-LVL III: CPT | Mod: PBBFAC,,, | Performed by: ORTHOPAEDIC SURGERY

## 2020-11-24 PROCEDURE — 99213 OFFICE O/P EST LOW 20 MIN: CPT | Mod: PBBFAC | Performed by: ORTHOPAEDIC SURGERY

## 2020-11-24 PROCEDURE — 99213 OFFICE O/P EST LOW 20 MIN: CPT | Mod: S$PBB,,, | Performed by: ORTHOPAEDIC SURGERY

## 2020-11-24 PROCEDURE — 99213 PR OFFICE/OUTPT VISIT, EST, LEVL III, 20-29 MIN: ICD-10-PCS | Mod: S$PBB,,, | Performed by: ORTHOPAEDIC SURGERY

## 2020-11-24 PROCEDURE — 99999 PR PBB SHADOW E&M-EST. PATIENT-LVL III: ICD-10-PCS | Mod: PBBFAC,,, | Performed by: ORTHOPAEDIC SURGERY

## 2020-11-30 NOTE — PROGRESS NOTES
H&P  Orthopedics    SUBJECTIVE:     History of Present Illness:  Patient is a 20 y.o. female here for 5 month visit after left distal femoral osteotomy and Cartimax graft placement. Doing well. Still stiff. Able to ambulate with no assistance.        Review of patient's allergies indicates:  No Known Allergies    Past Medical History:   Diagnosis Date    Anxiety     reports panic attacks    Depression     Fibromyalgia     per pt    Insomnia     Precocious female puberty     Seizures     reports 2 incidents of possible seizures while giving blood    Syncope and collapse     Wrist fracture, bilateral      Past Surgical History:   Procedure Laterality Date    ARTHROSCOPY OF KNEE Right 12/19/2019    Procedure: ARTHROSCOPY, KNEE (Right);  Surgeon: Ranulfo House MD;  Location: [address];  Service: Orthopedics;  Laterality: Right;    ARTHROSCOPY OF KNEE Left 7/9/2020    Procedure: ARTHROSCOPY, KNEE;  Surgeon: Ranulfo House MD;  Location: [facility] OR [address] [place] [address];  Service: Orthopedics;  Laterality: Left;  WITH CHRONDROPLASTY     ARTHROTOMY OF KNEE Left 7/9/2020    Procedure: ARTHROTOMY, KNEE;  Surgeon: Ranulfo House MD;  Location: [address];  Service: Orthopedics;  Laterality: Left;  WITH CHRONDRAL ALLOGRAFT IMPLANTATION    chip      chip implant    CHONDROPLASTY OF KNEE Right 12/19/2019    Procedure: CHONDROPLASTY, KNEE patella;  Surgeon: Ranulfo House MD;  Location: [facility] OR [address] [place] [address];  Service: Orthopedics;  Laterality: Right;    FEMUR OSTEOTOMY Right 12/13/2018    Procedure: OSTEOTOMY, FEMUR - distal to correct valgus (Orthopediatrics plate, MTF bone wedge).  Right knee arthroscopy with MPFL reconstruction, gracilis allograft ([employer]invatec).  3 hrs.;  Surgeon: Ranulfo House MD;  Location: [address];  Service: Orthopedics;  Laterality: Right;    FEMUR OSTEOTOMY Left 7/9/2020    Procedure: OSTEOTOMY, FEMUR (Left) - distal femoral opening wedge (Orthopediatrics, MTF), knee scope, removal of  tibial tubercle screws (Orthopediatrics 4.0 cannulated screws);  Surgeon: Ranulfo House MD;  [facility];  Service: Orthopedics;  Laterality: Left;  RQ Gianna DAVIS notified 7/8/20 HDavis RN    HARDWARE REMOVAL Right 12/19/2019    Procedure: REMOVAL, HARDWARE righ distal femoral.;  Surgeon: Ranulfo House MD;  [facility];  Service: Orthopedics;  Laterality: Right;    HARDWARE REMOVAL Left 7/9/2020    Procedure: REMOVAL, HARDWARE;  Surgeon: Ranulfo House MD;  [facility];  Service: Orthopedics;  Laterality: Left;  TIBIA    HEMANGIOMA EXCISION      scalp    INGUINAL HERNIA REPAIR Left     INJECTION OF STEROID Right 7/5/2019    Procedure: INJECTION, STEROID;  Surgeon: Ranulfo House MD;  [facility];  Service: Orthopedics;  Laterality: Right;    KNEE JOINT MANIPULATION Right 7/5/2019    Procedure: MANIPULATION, KNEE;  Surgeon: Ranulfo House MD;  [facility];  Service: Orthopedics;  Laterality: Right;    KNEE SURGERY Left 2/16/15    TOE SURGERY Right     5 th toe     Family History   Problem Relation Age of Onset    Seizures Father     Anxiety disorder Father     Depression Father     Mental illness Father     Schizophrenia Father     Deep vein thrombosis Father      Social History     Tobacco Use    Smoking status: Never Smoker    Smokeless tobacco: Never Used   Substance Use Topics    Alcohol use: No    Drug use: No        Review of Systems:  Patient denies constitutional symptoms, cardiac symptoms, respiratory symptoms, GI symptoms.  The remainder of the musculoskeletal ROS is included in the HPI.      OBJECTIVE:     Physical Exam:  Gen:  No acute distress  CV:  Peripherally well-perfused.  Pulses 2+ bilaterally.  Lungs:  Normal respiratory effort.  Abdomen:  Soft, non-tender, non-distended  Head/Neck:  Normocephalic.  Atraumatic. No TTP, AROM and PROM intact without pain  Neuro:  CN intact without deficit,  SILT throughout B/L Upper & Lower Extremities    MSK:  Incisions all healing well no signs of redness, drainage or wound dehiscence.   Knee ROM limited, 0-95 deg.  Weak quads.      ASSESSMENT/PLAN:     A/P: Denise Mosher is a 20 y.o.  S/p left distal femur osteotomy and Cartimax graft placement.     Plan:  ROM improving.  Continue PT.  Hold off on surgery.  RTC 2 months, standing hip to ankle X-rays and X-rays of both knees.

## 2020-12-09 ENCOUNTER — CLINICAL SUPPORT (OUTPATIENT)
Dept: REHABILITATION | Facility: OTHER | Age: 20
End: 2020-12-09
Attending: ORTHOPAEDIC SURGERY
Payer: MEDICAID

## 2020-12-09 DIAGNOSIS — Z98.890 S/P LEFT KNEE SURGERY: ICD-10-CM

## 2020-12-09 PROCEDURE — 97110 THERAPEUTIC EXERCISES: CPT | Mod: PN

## 2020-12-09 NOTE — PLAN OF CARE
"Physical Therapy Treatment Note/ Updated plan of care      Visit Date: 12/9/2020    Name: Denise Mosher  Clinic Number: [id number]     Therapy Diagnosis:        Encounter Diagnosis   Name Primary?    S/P left knee surgery        Physician: Ranulfo House MD     Physician Orders: PT Eval and Treat, Left knee distal femoral osteotomy and arthrotomy with cartilage implantation.  MARYAM protocol.  Medical Diagnosis from Referral:     PROCEDURES: 7/9/2020  1. Left distal femoral varus-producing osteotomy with plate fixation.  2. Left knee arthroscopy with chondroplasty.  3. Left knee arthrotomy with chondral allograft implantation (Cartimax)        M22.41 (ICD-10-CM) - Patella, chondromalacia, right   S/p R knee arthroscopy with MPFL revision, right distal femoral hardward removal, and patellar chondroplasty on 12/19/2019     10/15/2020 - Post Op week 14   Evaluation Date: 7/31/2020     Total Visits Received: 16 4/12 (on current referral)  Cancelled Visits: 1  No Show Visits: 0     Current Certification Period:  12/9/2020  Precautions: Standard , TTWB per pt, ext immobilizer     Functional Level Prior to Evaluation:   limited in community ambulation and ADLs. Bending it approx to 20 at home in her recliner. No other ex      Time In: 0815  Time Out: 0905   Total Billable Time: 50 minutes        Subjective      "right now it is sore". She notes that she did not have a OMID since last PT visit.      Pt was compliant with HEP.   Response to previous treatment: no adverse effects  Functional change: no change      Pain: 0/10 ant knee   Location: knee  left     Pts goals: To return to unrestricted community ambulation and ADLs at prior level of function        Objective      Update:     L knee AROM: 0 to 66 deg  L KNee AAROM: 0 to 92 deg     amb without axillary crutches      MMT R L   Hip flexion 5/5 5/5   Hip abduction 5/5 3/5   Hip extension 5/5 5/5   Knee extension 4/5 3/5   Knee flexion 5/5 3/5   Ankle dorsiflexion " 5/5 5/5   Ankle plantar flexion 5/5 5/5   Ankle inversion 5/5 5/5   Ankle eversion 5/5 5/5            CMS Impairment/Limitation/Restriction for FOTO Knee Survey     Therapist reviewed FOTO scores for Denise Mosher on 10/1/2020  FOTO documents entered into [employer] - see Media section.     Limitation Score: 59%  Category: Mobility     Current : CK = at least 40% but < 60% impaired, limited or restricted  Goal: CK = at least 40% but < 60% impaired, limited or restricted               Denise received therapeutic exercises to develop strength, endurance, ROM and flexibility for 37 minutes including:  Reassessed strength, ROM to update plan of care     The stick stm L quad  Calf stretch strap 2 min  prom knee flex seated eot 20 sec x 4   Heel slides 10 x 10 sec hold   Prone hip ext 3 x 10   QS no towel 30 x 5 sec hold         Manual treatment 15  min   Patellar mobs Grade I  Grades I and II AP tibial glides to increase knee flexion and decrease pain       TREATMENT    Denise received therapeutic exercises to develop strength, endurance, ROM and flexibility for 44 minutes including:  QS 30 x 3 sec hold   GSS w/ strap 2 min   Heel slides 10 x 10 sec hold   prom knee flex seated eot 20 sec x 5   SLR 2 x 15  LLR 2 x 15  +HR 30 x 3 sec hold   +U shuttle DL 3 bands x20, SL 1 band x20  + mini squats 2 x 10  Prone hip ext 2 x 15  HSS plinthe 2 min np  The stick stm L quad np   QS no towel 30 x 5 sec hold      Manual treatment 3  min -   PROM knee flexion manual stretch 3 min      Patellar mobs Grade I  Grades I and II AP tibial glides to increase knee flexion and decrease pain     Assessment      Update:   Increased pain is still present with patellar mobilizations today. still presenting with significant ROM restrictions with L knee flexion. Weakness noted in L LE as well with MMT. Patient continues with significant apprehension and increased pain with L knee mobilizations, ROM, and stm with roller stick. Good improvement  in ROM pre and post knee flexion measurements today. She also demonstrates improved knee flexion strength.      Pt prognosis is Fair.   Pt will benefit from skilled outpatient physical therapy to address the deficits listed above and in the chart below, provide pt/family education, and to maximize pt's level of independence. PT recommends a continuation of PT for 8 more weeks at 1-2 times per week.         Pt's spiritual, cultural and educational needs considered and patient is agreeable to the plan of care and goals as stated below:      Anticipated Barriers for therapy: exercise tolerance, multiple knee surgeries, anxiety, transportation issues      Previous Short Term Goals Status:     Short Term Goals (2-4 Weeks):   1) Pt will demonstrate compliance with initial home exercise program as prescribed by physical therapist to improve independence with management of condition. (MET_  2) Pt to improve active range of motion in L  knee flexion to at least  40  degrees to allow for improved functional mobility. (MET)   3) Pt to report L  knee pain of <7/10 at worst to improve tolerance to ADLs and therapy. (MET)      Long Term Goals (12 Weeks):   1( Pt to achieve < 57  limitation as measured by the FOTO to demonstrate decreased disability.  2) Pt to improve active range of motion in L  knee flexion to at least 120 degrees to allow for improved functional mobility. (not met)  3) Pt to report L  knee pain of <2/10 at worst to improve tolerance to ADLs and therapy. (not met)  4. Amb I in community and able to r/t work as  (not met)  5. I with HEP (in progress)  6. L LE MMT to 5/5  (not met)     Long Term Goal Status:   continue per initial plan of care.  Reasons for Recertification of Therapy:   Patient continues to present with L knee ROM limitations. Experienced increased pain levels with therapy . Will benefit from continued OP PT to increase L knee ROM, promote I ambulation, and return pt to prior  functional level.       Plan      Updated Certification Period: 12/9/2020  to 2/5/2021  Recommended Treatment Plan: 2 times per week for 8 weeks: Gait Training, Manual Therapy, Moist Heat/ Ice, Neuromuscular Re-ed, Patient Education and Therapeutic Exercise      Berry Yusuf PT

## 2020-12-14 ENCOUNTER — CLINICAL SUPPORT (OUTPATIENT)
Dept: REHABILITATION | Facility: OTHER | Age: 20
End: 2020-12-14
Attending: ORTHOPAEDIC SURGERY
Payer: MEDICAID

## 2020-12-14 DIAGNOSIS — Z98.890 S/P LEFT KNEE SURGERY: ICD-10-CM

## 2020-12-14 PROCEDURE — 97110 THERAPEUTIC EXERCISES: CPT | Mod: PN

## 2020-12-14 NOTE — PROGRESS NOTES
Physical Therapy Treatment Note      Visit Date: 12/14/2020    Name: Denise Mosher  Clinic Number: [id number]     Therapy Diagnosis:        Encounter Diagnosis   Name Primary?    S/P left knee surgery        Physician: Ranulfo House MD     Physician Orders: PT Eval and Treat, Left knee distal femoral osteotomy and arthrotomy with cartilage implantation.  MARYAM protocol.  Medical Diagnosis from Referral:     PROCEDURES: 7/9/2020  1. Left distal femoral varus-producing osteotomy with plate fixation.  2. Left knee arthroscopy with chondroplasty.  3. Left knee arthrotomy with chondral allograft implantation (Cartimax)        M22.41 (ICD-10-CM) - Patella, chondromalacia, right   S/p R knee arthroscopy with MPFL revision, right distal femoral hardward removal, and patellar chondroplasty on 12/19/2019     10/15/2020 - Post Op week 14   Evaluation Date: 7/31/2020     Total Visits Received: 16   5/12 (on current referral)  Cancelled Visits: 1  No Show Visits: 0     Current Certification Period:  12/9/2020  Precautions: Standard , TTWB per pt, ext immobilizer     Functional Level Prior to Evaluation:   limited in community ambulation and ADLs. Bending it approx to 20 at home in her recliner. No other ex      Time In: 740  Time Out: 825   Total Billable Time: 45 minutes        Subjective      She had a lot of pain this weekend which she states was due to working long shifts at work. This morning she is very tired and is having a hard time waking.      Pt was compliant with HEP.   Response to previous treatment: no adverse effects  Functional change: no change      Pain: 0/10 ant knee   Location: knee  left     Pts goals: To return to unrestricted community ambulation and ADLs at prior level of function        Objective   Pt amb into clinic without AD ,  decreased  L knee flex        ROM: 12/14/2020    PROM knee flexion 92 deg AROM EOT and 96 deg PROM EOT , ext wfl     Denise received therapeutic exercises to develop  strength, endurance, ROM and flexibility for 42 minutes including:  QS 30 x 3 sec hold   GSS w/ strap 2 min   Heel slides 10 x 10 sec hold with strap  prom knee flex seated eot 20 sec x 5   SLR 2 x 15  LLR 2 x 15  +HR 30 x 3 sec hold   +U shuttle DL 3 bands x20, SL 1 band x20  + mini squats 2 x 10  Prone hip ext 2 x 15  HSS plinthe 2 min np  The stick stm L quad np   QS no towel 30 x 5 sec hold   Seated hamstring curls - OTB 2x10   Knee flexion on step - 2x10 5'' hold   Heel raises double leg - 2x15      Manual treatment 3  min -   PROM knee flexion manual stretch 3 min      Patellar mobs Grade I   Grades I and II AP tibial glides to increase knee flexion and decrease pain NOT PERFORMED          Assessment   Pt arrived to clinic today with more ROM than previous visit and we were able to gain 4 more degrees after the treatment. She was slow to perform all exercises today but did not report increased pain or fatigue throughout the visit. Added knee flexion on step and seated hamstring curls today to work on gaining knee flexion without increasing pain levels. Good tolerance to the session today.       Pt prognosis is Fair.   Pt will benefit from skilled outpatient physical therapy to address the deficits listed above and in the chart below, provide pt/family education, and to maximize pt's level of independence.         Pt's spiritual, cultural and educational needs considered and patient is agreeable to the plan of care and goals as stated below:      Anticipated Barriers for therapy: exercise tolerance, multiple knee surgeries, anxiety, transportation issues      Previous Short Term Goals Status:     Short Term Goals (2-4 Weeks):   1) Pt will demonstrate compliance with initial home exercise program as prescribed by physical therapist to improve independence with management of condition. (MET_  2) Pt to improve active range of motion in L  knee flexion to at least  40  degrees to allow for improved functional  mobility. (MET)   3) Pt to report L  knee pain of <7/10 at worst to improve tolerance to ADLs and therapy. (MET)      Long Term Goals (12 Weeks):   1( Pt to achieve < 57  limitation as measured by the FOTO to demonstrate decreased disability.  2) Pt to improve active range of motion in L  knee flexion to at least 120 degrees to allow for improved functional mobility. (not met)  3) Pt to report L  knee pain of <2/10 at worst to improve tolerance to ADLs and therapy. (not met)  4. Amb I in community and able to r/t work as  (not met)  5. I with HEP (in progress)  6. L LE MMT to 5/5  (not met)     Long Term Goal Status:   continue per initial plan of care.  Reasons for Recertification of Therapy:   Patient continues to present with L knee ROM limitations. Experienced increased pain levels with therapy . Will benefit from continued OP PT to increase L knee ROM, promote I ambulation, and return pt to prior functional level.     Plan      Cont to progress towards goals set by PT.  Cont to increase gait pattern, knee flexion and LE strength next visit.      Berry Yusuf, PT

## 2020-12-16 ENCOUNTER — CLINICAL SUPPORT (OUTPATIENT)
Dept: REHABILITATION | Facility: OTHER | Age: 20
End: 2020-12-16
Attending: ORTHOPAEDIC SURGERY
Payer: MEDICAID

## 2020-12-16 DIAGNOSIS — Z98.890 S/P LEFT KNEE SURGERY: ICD-10-CM

## 2020-12-16 PROCEDURE — 97110 THERAPEUTIC EXERCISES: CPT | Mod: PN

## 2020-12-16 NOTE — PROGRESS NOTES
Physical Therapy Treatment Note      Visit Date: 12/16/2020    Name: Denise Mosher  Clinic Number: [id number]     Therapy Diagnosis:        Encounter Diagnosis   Name Primary?    S/P left knee surgery        Physician: Ranulfo House MD     Physician Orders: PT Eval and Treat, Left knee distal femoral osteotomy and arthrotomy with cartilage implantation.  MARYAM protocol.  Medical Diagnosis from Referral:     PROCEDURES: 7/9/2020  1. Left distal femoral varus-producing osteotomy with plate fixation.  2. Left knee arthroscopy with chondroplasty.  3. Left knee arthrotomy with chondral allograft implantation (Cartimax)        M22.41 (ICD-10-CM) - Patella, chondromalacia, right   S/p R knee arthroscopy with MPFL revision, right distal femoral hardward removal, and patellar chondroplasty on 12/19/2019     10/15/2020 - Post Op week 14   Evaluation Date: 7/31/2020     Total Visits Received: 16   6/12 (on current referral)  Cancelled Visits: 1  No Show Visits: 0     Current Certification Period:  12/9/2020  Precautions: Standard , TTWB per pt, ext immobilizer     Functional Level Prior to Evaluation:   limited in community ambulation and ADLs. Bending it approx to 20 at home in her recliner. No other ex      Time In: 745  Time Out: 835   Total Billable Time: 50 minutes        Subjective      She was able to drive at work for the first time in a while with no knee pain.      Pt was compliant with HEP.   Response to previous treatment: no adverse effects  Functional change: no change      Pain: 0/10 ant knee   Location: knee  left     Pts goals: To return to unrestricted community ambulation and ADLs at prior level of function        Objective   Decreased knee flexion and heel strike during gait        ROM: 12/16/2020    PROM knee flexion 92 deg AROM EOT and 100 deg PROM EOT , ext wfl     Denise received therapeutic exercises to develop strength, endurance, ROM and flexibility for 40 minutes including:  QS 30 x 3 sec  hold   GSS w/ strap 2 min   Heel slides 10 x 10 sec hold with strap  prom knee flex seated eot 20 sec x 5   SLR 2 x 15  LLR 2 x 15  +HR 30 x 3 sec hold   +U shuttle DL 3 bands x30, SL 1 blk 1 red x20  + mini squats 2 x 10  STS from gold chair - 2x5   Prone hip ext 2 x 15  HSS plinthe 2 min np  The stick stm L quad np   QS no towel 30 x 5 sec hold   Seated hamstring curls - OTB 2x10   Knee flexion on step - 2x10 5'' hold   Heel raises double leg - 2x15   Mini lunges: forward, lateral - x10 each      Manual treatment 4  min -   PROM knee flexion manual stretch 4 min with contract relax      Patellar mobs Grade I   Grades I and II AP tibial glides to increase knee flexion and decrease pain NOT PERFORMED          Assessment   Meghna continues to arrive to the early morning appointments very tired and with poor motivation. All exercises were performed, but at a very slow pace. Added mini lunges to strengthen quads during gait. Also returned to mini squats. Pt only allowed herself to squat to ~35 degrees of knee flexion. We then performed sit to stands and she was able to achieve more knee flexion in a squat pattern with this exercise. Left the session with improved ROM       Pt prognosis is Fair.   Pt will benefit from skilled outpatient physical therapy to address the deficits listed above and in the chart below, provide pt/family education, and to maximize pt's level of independence.         Pt's spiritual, cultural and educational needs considered and patient is agreeable to the plan of care and goals as stated below:      Anticipated Barriers for therapy: exercise tolerance, multiple knee surgeries, anxiety, transportation issues      Previous Short Term Goals Status:     Short Term Goals (2-4 Weeks):   1) Pt will demonstrate compliance with initial home exercise program as prescribed by physical therapist to improve independence with management of condition. (MET_  2) Pt to improve active range of motion in L   knee flexion to at least  40  degrees to allow for improved functional mobility. (MET)   3) Pt to report L  knee pain of <7/10 at worst to improve tolerance to ADLs and therapy. (MET)      Long Term Goals (12 Weeks):   1( Pt to achieve < 57  limitation as measured by the FOTO to demonstrate decreased disability.  2) Pt to improve active range of motion in L  knee flexion to at least 120 degrees to allow for improved functional mobility. (not met)  3) Pt to report L  knee pain of <2/10 at worst to improve tolerance to ADLs and therapy. (not met)  4. Amb I in community and able to r/t work as  (not met)  5. I with HEP (in progress)  6. L LE MMT to 5/5  (not met)     Long Term Goal Status:   continue per initial plan of care.  Reasons for Recertification of Therapy:   Patient continues to present with L knee ROM limitations. Experienced increased pain levels with therapy . Will benefit from continued OP PT to increase L knee ROM, promote I ambulation, and return pt to prior functional level.     Plan      Cont to progress towards goals set by PT.  Cont to increase gait pattern, knee flexion and LE strength next visit.      Berry Yusuf, PT

## 2020-12-23 ENCOUNTER — TELEPHONE (OUTPATIENT)
Dept: REHABILITATION | Facility: OTHER | Age: 20
End: 2020-12-23

## 2020-12-23 NOTE — TELEPHONE ENCOUNTER
Spoke with patient about missing the last 2 sessions and she stated that she overslept. She was reminded of of her next scheduled visit and informed to call if she needs to reschedule.

## 2020-12-28 ENCOUNTER — CLINICAL SUPPORT (OUTPATIENT)
Dept: REHABILITATION | Facility: OTHER | Age: 20
End: 2020-12-28
Attending: ORTHOPAEDIC SURGERY
Payer: MEDICAID

## 2020-12-28 DIAGNOSIS — Z98.890 S/P LEFT KNEE SURGERY: ICD-10-CM

## 2020-12-28 PROCEDURE — 97110 THERAPEUTIC EXERCISES: CPT | Mod: PN

## 2021-01-04 ENCOUNTER — CLINICAL SUPPORT (OUTPATIENT)
Dept: URGENT CARE | Facility: CLINIC | Age: 21
End: 2021-01-04
Payer: MEDICAID

## 2021-01-04 ENCOUNTER — CLINICAL SUPPORT (OUTPATIENT)
Dept: REHABILITATION | Facility: OTHER | Age: 21
End: 2021-01-04
Attending: ORTHOPAEDIC SURGERY
Payer: MEDICAID

## 2021-01-04 DIAGNOSIS — Z98.890 S/P LEFT KNEE SURGERY: ICD-10-CM

## 2021-01-04 PROCEDURE — 97110 THERAPEUTIC EXERCISES: CPT | Mod: PN

## 2021-01-04 PROCEDURE — 90471 TD VACCINE GREATER THAN OR EQUAL TO 7YO WITH PRESERVATIVE IM: ICD-10-PCS | Mod: S$GLB,,, | Performed by: PHYSICIAN ASSISTANT

## 2021-01-04 PROCEDURE — 90471 IMMUNIZATION ADMIN: CPT | Mod: S$GLB,,, | Performed by: PHYSICIAN ASSISTANT

## 2021-01-04 PROCEDURE — 90714 TD VACCINE GREATER THAN OR EQUAL TO 7YO WITH PRESERVATIVE IM: ICD-10-PCS | Mod: S$GLB,,, | Performed by: PHYSICIAN ASSISTANT

## 2021-01-04 PROCEDURE — 90714 TD VACC NO PRESV 7 YRS+ IM: CPT | Mod: S$GLB,,, | Performed by: PHYSICIAN ASSISTANT

## 2021-01-06 ENCOUNTER — CLINICAL SUPPORT (OUTPATIENT)
Dept: REHABILITATION | Facility: OTHER | Age: 21
End: 2021-01-06
Attending: ORTHOPAEDIC SURGERY
Payer: MEDICAID

## 2021-01-06 DIAGNOSIS — Z98.890 S/P LEFT KNEE SURGERY: ICD-10-CM

## 2021-01-06 PROCEDURE — 97110 THERAPEUTIC EXERCISES: CPT | Mod: PN

## 2021-01-14 ENCOUNTER — CLINICAL SUPPORT (OUTPATIENT)
Dept: REHABILITATION | Facility: OTHER | Age: 21
End: 2021-01-14
Attending: ORTHOPAEDIC SURGERY
Payer: MEDICAID

## 2021-01-14 DIAGNOSIS — Z98.890 S/P LEFT KNEE SURGERY: Primary | ICD-10-CM

## 2021-01-14 PROCEDURE — 97110 THERAPEUTIC EXERCISES: CPT | Mod: PN

## 2021-01-25 DIAGNOSIS — M21.062 GENU VALGUM, LEFT: ICD-10-CM

## 2021-01-25 DIAGNOSIS — M24.662 FIBROSIS OF LEFT KNEE JOINT: Primary | ICD-10-CM

## 2021-02-01 ENCOUNTER — CLINICAL SUPPORT (OUTPATIENT)
Dept: REHABILITATION | Facility: OTHER | Age: 21
End: 2021-02-01
Attending: ORTHOPAEDIC SURGERY
Payer: MEDICAID

## 2021-02-01 DIAGNOSIS — Z98.890 S/P LEFT KNEE SURGERY: ICD-10-CM

## 2021-02-01 PROCEDURE — 97110 THERAPEUTIC EXERCISES: CPT | Mod: PN,CQ

## 2021-02-05 ENCOUNTER — CLINICAL SUPPORT (OUTPATIENT)
Dept: REHABILITATION | Facility: OTHER | Age: 21
End: 2021-02-05
Attending: ORTHOPAEDIC SURGERY
Payer: MEDICAID

## 2021-02-05 DIAGNOSIS — Z98.890 S/P LEFT KNEE SURGERY: ICD-10-CM

## 2021-02-05 PROCEDURE — 97140 MANUAL THERAPY 1/> REGIONS: CPT | Mod: PN,CQ

## 2021-02-05 PROCEDURE — 97110 THERAPEUTIC EXERCISES: CPT | Mod: PN,CQ

## 2021-02-10 ENCOUNTER — HOSPITAL ENCOUNTER (OUTPATIENT)
Dept: RADIOLOGY | Facility: HOSPITAL | Age: 21
Discharge: HOME | End: 2021-02-10
Attending: ORTHOPAEDIC SURGERY
Payer: MEDICAID

## 2021-02-10 ENCOUNTER — OFFICE VISIT (OUTPATIENT)
Dept: ORTHOPEDICS | Facility: CLINIC | Age: 21
End: 2021-02-10
Payer: MEDICAID

## 2021-02-10 VITALS — HEIGHT: 61 IN | BODY MASS INDEX: 43.41 KG/M2 | WEIGHT: 229.94 LBS

## 2021-02-10 DIAGNOSIS — G89.29 CHRONIC PAIN OF LEFT KNEE: ICD-10-CM

## 2021-02-10 DIAGNOSIS — M25.562 CHRONIC PAIN OF LEFT KNEE: ICD-10-CM

## 2021-02-10 DIAGNOSIS — M22.42 CHONDROMALACIA OF LEFT PATELLA: Primary | ICD-10-CM

## 2021-02-10 DIAGNOSIS — M21.062 GENU VALGUM, LEFT: ICD-10-CM

## 2021-02-10 DIAGNOSIS — M24.662 FIBROSIS OF LEFT KNEE JOINT: ICD-10-CM

## 2021-02-10 PROCEDURE — 77073 BONE LENGTH STUDIES: CPT | Mod: 26,59,, | Performed by: RADIOLOGY

## 2021-02-10 PROCEDURE — 99999 PR PBB SHADOW E&M-EST. PATIENT-LVL II: CPT | Mod: PBBFAC,,, | Performed by: ORTHOPAEDIC SURGERY

## 2021-02-10 PROCEDURE — 99212 OFFICE O/P EST SF 10 MIN: CPT | Mod: PBBFAC,25 | Performed by: ORTHOPAEDIC SURGERY

## 2021-02-10 PROCEDURE — 73562 X-RAY EXAM OF KNEE 3: CPT | Mod: 26,59,50, | Performed by: RADIOLOGY

## 2021-02-10 PROCEDURE — 77073 XR HIP TO ANKLE: ICD-10-PCS | Mod: 26,59,, | Performed by: RADIOLOGY

## 2021-02-10 PROCEDURE — 73562 XR KNEE 3 VIEW BILATERAL: ICD-10-PCS | Mod: 26,59,50, | Performed by: RADIOLOGY

## 2021-02-10 PROCEDURE — 99213 PR OFFICE/OUTPT VISIT, EST, LEVL III, 20-29 MIN: ICD-10-PCS | Mod: S$PBB,,, | Performed by: ORTHOPAEDIC SURGERY

## 2021-02-10 PROCEDURE — 73562 X-RAY EXAM OF KNEE 3: CPT | Mod: TC,50

## 2021-02-10 PROCEDURE — 99213 OFFICE O/P EST LOW 20 MIN: CPT | Mod: S$PBB,,, | Performed by: ORTHOPAEDIC SURGERY

## 2021-02-10 PROCEDURE — 99999 PR PBB SHADOW E&M-EST. PATIENT-LVL II: ICD-10-PCS | Mod: PBBFAC,,, | Performed by: ORTHOPAEDIC SURGERY

## 2021-02-10 PROCEDURE — 77073 BONE LENGTH STUDIES: CPT | Mod: TC

## 2021-02-10 RX ORDER — GABAPENTIN 100 MG/1
300 CAPSULE ORAL NIGHTLY
Qty: 90 CAPSULE | Refills: 1 | Status: SHIPPED | OUTPATIENT
Start: 2021-02-10 | End: 2022-12-21

## 2021-02-17 ENCOUNTER — TELEPHONE (OUTPATIENT)
Dept: REHABILITATION | Facility: HOSPITAL | Age: 21
End: 2021-02-17

## 2021-02-19 ENCOUNTER — CLINICAL SUPPORT (OUTPATIENT)
Dept: REHABILITATION | Facility: OTHER | Age: 21
End: 2021-02-19
Attending: ORTHOPAEDIC SURGERY
Payer: MEDICAID

## 2021-02-19 DIAGNOSIS — Z98.890 S/P LEFT KNEE SURGERY: ICD-10-CM

## 2021-02-19 PROCEDURE — 97110 THERAPEUTIC EXERCISES: CPT | Mod: PN

## 2021-02-26 ENCOUNTER — CLINICAL SUPPORT (OUTPATIENT)
Dept: REHABILITATION | Facility: OTHER | Age: 21
End: 2021-02-26
Attending: ORTHOPAEDIC SURGERY
Payer: MEDICAID

## 2021-02-26 DIAGNOSIS — Z98.890 S/P LEFT KNEE SURGERY: ICD-10-CM

## 2021-02-26 PROCEDURE — 97110 THERAPEUTIC EXERCISES: CPT | Mod: PN,CQ

## 2021-03-17 ENCOUNTER — CLINICAL SUPPORT (OUTPATIENT)
Dept: REHABILITATION | Facility: OTHER | Age: 21
End: 2021-03-17
Attending: ORTHOPAEDIC SURGERY
Payer: MEDICAID

## 2021-03-17 ENCOUNTER — HOSPITAL ENCOUNTER (OUTPATIENT)
Dept: RADIOLOGY | Facility: HOSPITAL | Age: 21
Discharge: HOME | End: 2021-03-17
Attending: ORTHOPAEDIC SURGERY
Payer: MEDICAID

## 2021-03-17 DIAGNOSIS — M22.42 CHONDROMALACIA OF LEFT PATELLA: ICD-10-CM

## 2021-03-17 DIAGNOSIS — Z98.890 S/P LEFT KNEE SURGERY: ICD-10-CM

## 2021-03-17 PROCEDURE — 97110 THERAPEUTIC EXERCISES: CPT | Mod: PN

## 2021-03-17 PROCEDURE — 73721 MRI KNEE WITHOUT CONTRAST LEFT: ICD-10-PCS | Mod: 26,LT,, | Performed by: RADIOLOGY

## 2021-03-17 PROCEDURE — 73721 MRI JNT OF LWR EXTRE W/O DYE: CPT | Mod: TC,LT

## 2021-03-17 PROCEDURE — 73721 MRI JNT OF LWR EXTRE W/O DYE: CPT | Mod: 26,LT,, | Performed by: RADIOLOGY

## 2021-03-31 ENCOUNTER — CLINICAL SUPPORT (OUTPATIENT)
Dept: REHABILITATION | Facility: OTHER | Age: 21
End: 2021-03-31
Attending: ORTHOPAEDIC SURGERY
Payer: MEDICAID

## 2021-03-31 DIAGNOSIS — Z98.890 S/P LEFT KNEE SURGERY: ICD-10-CM

## 2021-03-31 PROCEDURE — 97110 THERAPEUTIC EXERCISES: CPT | Mod: PN

## 2021-04-19 ENCOUNTER — CLINICAL SUPPORT (OUTPATIENT)
Dept: REHABILITATION | Facility: OTHER | Age: 21
End: 2021-04-19
Attending: ORTHOPAEDIC SURGERY
Payer: MEDICAID

## 2021-04-19 DIAGNOSIS — Z98.890 S/P LEFT KNEE SURGERY: ICD-10-CM

## 2021-04-19 PROCEDURE — 97110 THERAPEUTIC EXERCISES: CPT | Mod: PN,CQ

## 2021-04-23 ENCOUNTER — CLINICAL SUPPORT (OUTPATIENT)
Dept: REHABILITATION | Facility: OTHER | Age: 21
End: 2021-04-23
Attending: ORTHOPAEDIC SURGERY
Payer: MEDICAID

## 2021-04-23 DIAGNOSIS — Z98.890 S/P LEFT KNEE SURGERY: ICD-10-CM

## 2021-04-23 PROCEDURE — 97110 THERAPEUTIC EXERCISES: CPT | Mod: PN,CQ

## 2021-04-26 ENCOUNTER — CLINICAL SUPPORT (OUTPATIENT)
Dept: REHABILITATION | Facility: OTHER | Age: 21
End: 2021-04-26
Attending: ORTHOPAEDIC SURGERY
Payer: MEDICAID

## 2021-04-26 DIAGNOSIS — Z98.890 S/P LEFT KNEE SURGERY: ICD-10-CM

## 2021-04-26 PROCEDURE — 97110 THERAPEUTIC EXERCISES: CPT | Mod: PN,CQ

## 2021-05-07 ENCOUNTER — DOCUMENTATION ONLY (OUTPATIENT)
Dept: REHABILITATION | Facility: OTHER | Age: 21
End: 2021-05-07

## 2021-05-14 ENCOUNTER — CLINICAL SUPPORT (OUTPATIENT)
Dept: REHABILITATION | Facility: OTHER | Age: 21
End: 2021-05-14
Payer: MEDICAID

## 2021-05-14 DIAGNOSIS — Z98.890 S/P LEFT KNEE SURGERY: ICD-10-CM

## 2021-05-14 PROCEDURE — 97110 THERAPEUTIC EXERCISES: CPT | Mod: PN

## 2021-05-17 ENCOUNTER — CLINICAL SUPPORT (OUTPATIENT)
Dept: REHABILITATION | Facility: OTHER | Age: 21
End: 2021-05-17
Payer: MEDICAID

## 2021-05-17 DIAGNOSIS — Z98.890 S/P LEFT KNEE SURGERY: Primary | ICD-10-CM

## 2021-05-17 PROCEDURE — 97110 THERAPEUTIC EXERCISES: CPT | Mod: PN

## 2021-06-07 NOTE — INTERVAL H&P NOTE
The patient has been examined and the H&P has been reviewed:    I concur with the findings and no changes have occurred since H&P was written.    Anesthesia/Surgery risks, benefits and alternative options discussed and understood by patient/family.          There are no hospital problems to display for this patient.     08-Jun-2021 06:51

## 2021-07-27 ENCOUNTER — OFFICE VISIT (OUTPATIENT)
Dept: ORTHOPEDICS | Facility: CLINIC | Age: 21
End: 2021-07-27
Payer: MEDICAID

## 2021-07-27 VITALS — HEIGHT: 61 IN | WEIGHT: 229 LBS | BODY MASS INDEX: 43.23 KG/M2

## 2021-07-27 DIAGNOSIS — M94.262 CHONDROMALACIA OF LEFT KNEE: Primary | ICD-10-CM

## 2021-07-27 PROCEDURE — 99999 PR PBB SHADOW E&M-EST. PATIENT-LVL III: CPT | Mod: PBBFAC,,, | Performed by: ORTHOPAEDIC SURGERY

## 2021-07-27 PROCEDURE — 99213 OFFICE O/P EST LOW 20 MIN: CPT | Mod: PBBFAC | Performed by: ORTHOPAEDIC SURGERY

## 2021-07-27 PROCEDURE — 99213 OFFICE O/P EST LOW 20 MIN: CPT | Mod: S$PBB,,, | Performed by: ORTHOPAEDIC SURGERY

## 2021-07-27 PROCEDURE — 99999 PR PBB SHADOW E&M-EST. PATIENT-LVL III: ICD-10-PCS | Mod: PBBFAC,,, | Performed by: ORTHOPAEDIC SURGERY

## 2021-07-27 PROCEDURE — 99213 PR OFFICE/OUTPT VISIT, EST, LEVL III, 20-29 MIN: ICD-10-PCS | Mod: S$PBB,,, | Performed by: ORTHOPAEDIC SURGERY

## 2021-12-20 NOTE — PROGRESS NOTES
"                                                                            Physical Therapy Progress Note        Name: Denise Mosher  [facility] Number: [id number]    Denise OSBORN is a 16 y.o. female evaluated on 1/6/17    Diagnosis:  B hip bursitis, B  Patellofemoral pain  DOS:2/16/2015  PROCEDURES:  1. Left knee arthroscopy.  2. Left tibial tubercle osteotomy with anteromedial tibial tubercle transfer     Physician: Ranulfo House MD  Treatment Orders: PT Eval and Treat    Past Medical History   Diagnosis Date    Precocious female puberty     Wrist fracture, bilateral    Fibromyalgia, depression, anxiety, seizure like episodes- mom states epilepsy has been r/o Pt denies diabetes.    2/2015 PROCEDURES:  1. Left knee arthroscopy.  2. Left tibial tubercle osteotomy with anteromedial tibial tubercle transfer   7/2015 PROCEDURES:  1. Right knee arthroscopy.  2. Right tibial tubercle osteotomy with anteromedial tibial tubercle transfer   11/20/15 PROCEDURES PERFORMED:  1. Closed manipulation of right knee.  2. Steroid injection, right knee.       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    Patient Goals: "live pain free"  to recreational activites      Objective     Observation: pt present with mom  for entire session        arom Range of Motion: Knee   Left Right   Flexion: 136 132   Extension wnl WNL's            Lower Extremity Strength   L hip abd 5, R 5  B HF 5  B HE 5  B kf 5  B KE 4+      Joint Mobility: normal patella  Palpation:  Decreased R quad cc vs L  Treatment:  Pt performed there ex's for L knee strengthening, ROM, flexibility and endurance including:  rec bike x 5 min   Single leg stretch ( core ) 2 x f np  SLR 3 x 10 B supine 2#  SL hip abd 2 x 15 , 2 L #, R 0 due to hip pain NP  Clams gtb 10 x 10 sec B   Bridges 3 x 10 tball np  Prone knee flex- nv  u shuttle 37.5 # 3 x 10  Therex Time: 40 min  CP x 10 min R " Glasses Rx given.   knee       Assessment  Pt mayuri tx well w/ no c/o pn.  Pt displayed increased strength and endurance during therex w/ VCs for technique.  Pt instructed to cont HEP.  PT barriers cont to be cooperation.  Cont to to progress as mayuri.      This is a 16 y.o. female referred to outpatient physical therapy and presents with a medical diagnosis of B knee pain   and demonstrates limitations as described in the problem list. Pt will benefit from physcial therapy services in order to maximize pain free and/or functional use of left knee. The following goals were discussed with the patient and patient is in agreement with them as to be addressed in the treatment plan.     Problem List: pain, decreased ROM, decreased flexibility, decreased strength, decreased balance and stability, pain/ limited adl.    GOALS:     Long Term Goals: 12-16 weeks  1.Report decreased    B knee  pain  <   / =  3  /10 with ADL including sitting  2.Increased MMT  for  L  Hip abd MMT   to increase tolerance for ADL. (MET)  3.I with hep  4. Increased B KE MMT.      Plan  Pt will be treated by physical therapy 1- 2 times a week for 6 weeks for Pt Education, HEP, therapeutic exercises, neuromuscular re-education/ balance exercises, joint mobilizations, modalities prn   to achieve established goals. Denise OSBORN may at times be seen by a PTA as part of the Rehab Team. .     PT/PTA met face to face to discuss patient's treatment plan and progress towards established goals.  Treatment will be continued as described in initial report/eval and progress notes.  Patient will be seen by physical therapist every sixth visit and minimally once per month.

## 2021-12-23 ENCOUNTER — OFFICE VISIT (OUTPATIENT)
Dept: URGENT CARE | Facility: CLINIC | Age: 21
End: 2021-12-23
Payer: MEDICAID

## 2021-12-23 VITALS
BODY MASS INDEX: 43.23 KG/M2 | DIASTOLIC BLOOD PRESSURE: 73 MMHG | TEMPERATURE: 98 F | RESPIRATION RATE: 16 BRPM | HEIGHT: 61 IN | WEIGHT: 229 LBS | HEART RATE: 84 BPM | OXYGEN SATURATION: 97 % | SYSTOLIC BLOOD PRESSURE: 115 MMHG

## 2021-12-23 DIAGNOSIS — J10.1 INFLUENZA A: ICD-10-CM

## 2021-12-23 DIAGNOSIS — R05.9 COUGH: Primary | ICD-10-CM

## 2021-12-23 LAB
CTP QC/QA: YES
CTP QC/QA: YES
POC MOLECULAR INFLUENZA A AGN: POSITIVE
POC MOLECULAR INFLUENZA B AGN: NEGATIVE
SARS-COV-2 RDRP RESP QL NAA+PROBE: NEGATIVE

## 2021-12-23 PROCEDURE — 99203 OFFICE O/P NEW LOW 30 MIN: CPT | Mod: S$GLB,,,

## 2021-12-23 PROCEDURE — 1159F PR MEDICATION LIST DOCUMENTED IN MEDICAL RECORD: ICD-10-PCS | Mod: CPTII,S$GLB,,

## 2021-12-23 PROCEDURE — 3078F DIAST BP <80 MM HG: CPT | Mod: CPTII,S$GLB,,

## 2021-12-23 PROCEDURE — 3008F BODY MASS INDEX DOCD: CPT | Mod: CPTII,S$GLB,,

## 2021-12-23 PROCEDURE — 3074F SYST BP LT 130 MM HG: CPT | Mod: CPTII,S$GLB,,

## 2021-12-23 PROCEDURE — 99203 PR OFFICE/OUTPT VISIT, NEW, LEVL III, 30-44 MIN: ICD-10-PCS | Mod: S$GLB,,,

## 2021-12-23 PROCEDURE — 87502 POCT INFLUENZA A/B MOLECULAR: ICD-10-PCS | Mod: QW,S$GLB,,

## 2021-12-23 PROCEDURE — 1160F RVW MEDS BY RX/DR IN RCRD: CPT | Mod: CPTII,S$GLB,,

## 2021-12-23 PROCEDURE — 87635: ICD-10-PCS | Mod: QW,S$GLB,,

## 2021-12-23 PROCEDURE — 3008F PR BODY MASS INDEX (BMI) DOCUMENTED: ICD-10-PCS | Mod: CPTII,S$GLB,,

## 2021-12-23 PROCEDURE — 87635 SARS-COV-2 COVID-19 AMP PRB: CPT | Mod: QW,S$GLB,,

## 2021-12-23 PROCEDURE — 3078F PR MOST RECENT DIASTOLIC BLOOD PRESSURE < 80 MM HG: ICD-10-PCS | Mod: CPTII,S$GLB,,

## 2021-12-23 PROCEDURE — 87502 INFLUENZA DNA AMP PROBE: CPT | Mod: QW,S$GLB,,

## 2021-12-23 PROCEDURE — 1160F PR REVIEW ALL MEDS BY PRESCRIBER/CLIN PHARMACIST DOCUMENTED: ICD-10-PCS | Mod: CPTII,S$GLB,,

## 2021-12-23 PROCEDURE — 1159F MED LIST DOCD IN RCRD: CPT | Mod: CPTII,S$GLB,,

## 2021-12-23 PROCEDURE — 3074F PR MOST RECENT SYSTOLIC BLOOD PRESSURE < 130 MM HG: ICD-10-PCS | Mod: CPTII,S$GLB,,

## 2021-12-23 RX ORDER — OSELTAMIVIR PHOSPHATE 75 MG/1
75 CAPSULE ORAL 2 TIMES DAILY
Qty: 10 CAPSULE | Refills: 0 | Status: SHIPPED | OUTPATIENT
Start: 2021-12-23 | End: 2021-12-28

## 2021-12-23 RX ORDER — BENZONATATE 100 MG/1
100 CAPSULE ORAL 3 TIMES DAILY PRN
Qty: 30 CAPSULE | Refills: 0 | Status: SHIPPED | OUTPATIENT
Start: 2021-12-23 | End: 2022-01-02

## 2022-01-07 ENCOUNTER — OFFICE VISIT (OUTPATIENT)
Dept: URGENT CARE | Facility: CLINIC | Age: 22
End: 2022-01-07
Payer: MEDICAID

## 2022-01-07 VITALS
DIASTOLIC BLOOD PRESSURE: 77 MMHG | HEIGHT: 61 IN | RESPIRATION RATE: 16 BRPM | BODY MASS INDEX: 42.86 KG/M2 | SYSTOLIC BLOOD PRESSURE: 117 MMHG | OXYGEN SATURATION: 100 % | HEART RATE: 90 BPM | TEMPERATURE: 99 F | WEIGHT: 227 LBS

## 2022-01-07 DIAGNOSIS — R51.9 SINUS HEADACHE: ICD-10-CM

## 2022-01-07 DIAGNOSIS — J20.8 ACUTE BACTERIAL BRONCHITIS: Primary | ICD-10-CM

## 2022-01-07 DIAGNOSIS — R05.9 COUGH: ICD-10-CM

## 2022-01-07 DIAGNOSIS — B96.89 ACUTE BACTERIAL BRONCHITIS: Primary | ICD-10-CM

## 2022-01-07 LAB
CTP QC/QA: YES
SARS-COV-2 RDRP RESP QL NAA+PROBE: NEGATIVE

## 2022-01-07 PROCEDURE — 3008F BODY MASS INDEX DOCD: CPT | Mod: CPTII,S$GLB,, | Performed by: EMERGENCY MEDICINE

## 2022-01-07 PROCEDURE — 3074F SYST BP LT 130 MM HG: CPT | Mod: CPTII,S$GLB,, | Performed by: EMERGENCY MEDICINE

## 2022-01-07 PROCEDURE — U0002 COVID-19 LAB TEST NON-CDC: HCPCS | Mod: QW,S$GLB,, | Performed by: EMERGENCY MEDICINE

## 2022-01-07 PROCEDURE — 3078F PR MOST RECENT DIASTOLIC BLOOD PRESSURE < 80 MM HG: ICD-10-PCS | Mod: CPTII,S$GLB,, | Performed by: EMERGENCY MEDICINE

## 2022-01-07 PROCEDURE — 99214 OFFICE O/P EST MOD 30 MIN: CPT | Mod: S$GLB,,, | Performed by: EMERGENCY MEDICINE

## 2022-01-07 PROCEDURE — 3008F PR BODY MASS INDEX (BMI) DOCUMENTED: ICD-10-PCS | Mod: CPTII,S$GLB,, | Performed by: EMERGENCY MEDICINE

## 2022-01-07 PROCEDURE — U0002: ICD-10-PCS | Mod: QW,S$GLB,, | Performed by: EMERGENCY MEDICINE

## 2022-01-07 PROCEDURE — 99214 PR OFFICE/OUTPT VISIT, EST, LEVL IV, 30-39 MIN: ICD-10-PCS | Mod: S$GLB,,, | Performed by: EMERGENCY MEDICINE

## 2022-01-07 PROCEDURE — 3078F DIAST BP <80 MM HG: CPT | Mod: CPTII,S$GLB,, | Performed by: EMERGENCY MEDICINE

## 2022-01-07 PROCEDURE — 3074F PR MOST RECENT SYSTOLIC BLOOD PRESSURE < 130 MM HG: ICD-10-PCS | Mod: CPTII,S$GLB,, | Performed by: EMERGENCY MEDICINE

## 2022-01-07 RX ORDER — BROMPHENIRAMINE MALEATE, PSEUDOEPHEDRINE HYDROCHLORIDE, AND DEXTROMETHORPHAN HYDROBROMIDE 2; 30; 10 MG/5ML; MG/5ML; MG/5ML
10 SYRUP ORAL EVERY 4 HOURS PRN
Qty: 200 ML | Refills: 0 | Status: SHIPPED | OUTPATIENT
Start: 2022-01-07 | End: 2022-01-17

## 2022-01-07 RX ORDER — PREDNISONE 20 MG/1
TABLET ORAL
Qty: 10 TABLET | Refills: 0 | Status: SHIPPED | OUTPATIENT
Start: 2022-01-07 | End: 2022-12-21

## 2022-01-07 RX ORDER — ERYTHROMYCIN 5 MG/G
OINTMENT OPHTHALMIC
COMMUNITY
Start: 2021-12-01 | End: 2022-12-21

## 2022-01-07 RX ORDER — DOXYCYCLINE 100 MG/1
100 CAPSULE ORAL 2 TIMES DAILY
Qty: 20 CAPSULE | Refills: 0 | Status: SHIPPED | OUTPATIENT
Start: 2022-01-07 | End: 2022-01-17

## 2022-01-07 NOTE — PROGRESS NOTES
"Subjective:       Patient ID: Denise Mosher is a 21 y.o. female.    Vitals:  height is 5' 1" (1.549 m) and weight is 103 kg (227 lb). Her temperature is 98.6 °F (37 °C). Her blood pressure is 117/77 and her pulse is 90. Her respiration is 16 and oxygen saturation is 100%.     Chief Complaint: Sinus Problem    Pt has cough, headaches, congestion. She has not had the Covid vaccine. She has been contact with someone with Covid on Sunday. Patient has taken tylenol and zyrtec, which has not helped.    Patient reports having influenza around [place] 2-3 weeks ago and took the Tamiflu however has had persistent cough as well as sinus congestion.  No fevers no chills no body aches.  She was coughing at work and was told to get ruled out for COVID by employer.  She denies any significant body aches or weakness or fatigue.  She has been trying over-the-counter medicine including Flonase and Zyrtec.    Sinus Problem  This is a new problem. The problem is unchanged. There has been no fever. The fever has been present for less than 1 day. Associated symptoms include congestion, coughing, headaches and sinus pressure. Past treatments include acetaminophen. The treatment provided mild relief.     ROS    HENT: Positive for congestion and sinus pressure.    Respiratory: Positive for cough.    Neurological: Positive for headaches.       Objective:      Physical Exam   Constitutional: She is oriented to person, place, and time. She appears well-developed and well-nourished. She is cooperative.  Non-toxic appearance. She does not have a sickly appearance. She does not appear ill. No distress.   HENT:   Head: Normocephalic and atraumatic.   Ears:   Right Ear: Hearing, tympanic membrane, external ear and ear canal normal.   Left Ear: Hearing, tympanic membrane, external ear and ear canal normal.   Nose: Congestion present. No mucosal edema, rhinorrhea or nasal deformity. No epistaxis. Right sinus exhibits no maxillary sinus " tenderness and no frontal sinus tenderness. Left sinus exhibits no maxillary sinus tenderness and no frontal sinus tenderness.   Mouth/Throat: Uvula is midline, oropharynx is clear and moist and mucous membranes are normal. No trismus in the jaw. Normal dentition. No uvula swelling. No oropharyngeal exudate, posterior oropharyngeal edema or posterior oropharyngeal erythema.   Eyes: Conjunctivae and lids are normal. No scleral icterus.   Neck: Trachea normal and phonation normal. Neck supple. No edema present. No erythema present. No neck rigidity present.   Cardiovascular: Normal rate, regular rhythm, normal heart sounds, intact distal pulses and normal pulses.   Pulmonary/Chest: Effort normal and breath sounds normal. No respiratory distress. She has no decreased breath sounds. She has no rhonchi.    Comments: MODERATE COUGH, NONPRODUCTIVE, LUNGS CLEAR    Abdominal: Normal appearance.   Musculoskeletal: Normal range of motion.         General: No deformity or edema. Normal range of motion.   Neurological: She is alert and oriented to person, place, and time. She exhibits normal muscle tone. Coordination normal.   Skin: Skin is warm, dry, intact, not diaphoretic and not pale.   Psychiatric: She has a normal mood and affect. Her speech is normal and behavior is normal. Judgment and thought content normal. Cognition and memory  Nursing note and vitals reviewed.        Results for orders placed or performed in visit on 01/07/22   POCT COVID-19 Rapid Screening   Result Value Ref Range    POC Rapid COVID Negative Negative     Acceptable Yes        Assessment:       1. Acute bacterial bronchitis    2. Sinus headache    3. Cough          Plan:         Acute bacterial bronchitis    Sinus headache    Cough  -     POCT COVID-19 Rapid Screening    Other orders  -     predniSONE (DELTASONE) 20 MG tablet; Take 2 tablets daily for 5 days  Dispense: 10 tablet; Refill: 0  -     doxycycline (VIBRAMYCIN) 100 MG Cap;  Take 1 capsule (100 mg total) by mouth 2 (two) times daily. for 10 days  Dispense: 20 capsule; Refill: 0  -     brompheniramine-pseudoeph-DM (BROMFED DM) 2-30-10 mg/5 mL Syrp; Take 10 mLs by mouth every 4 (four) hours as needed (COUGH/CONGESTION).  Dispense: 200 mL; Refill: 0         Patient Instructions   Patient Education    COVID SWAB NEGATIVE  REST AND HYDRATE WITH PLENTY OF FLUIDS  PREDNISONE PRESCRIPTION  DOXYCYCLINE ANTIBIOTIC PRESCRIPTION  BROMFED DM PRESCRIPTION FOR RUNNY NOSE, POSTNASAL DRIP, COUGH  FOLLOW-UP WITH PRIMARY CARE PHYSICIAN  REVIEW BRONCHITIS SHEET  RETURN FOR ANY CONCERNS OR PROBLEMS.        Bronchitis, Adult ED   General Information   You came to the Emergency Department (ED) for acute bronchitis. This is an infection of the bronchi which are tubes that carry air into your lungs. Most of the time, this infection is caused by a virus so an antibiotic wont help. Your cough should get better within 2 to 3 weeks, but may take a bit longer.  What care is needed at home?   · Call your regular doctor to let them know you were in the ED. Make a follow-up appointment if you were told to.  · Do not smoke or be in smoke-filled places. Avoid other things that may cause breathing problems like fumes, pollution, dust, and other common allergens.  · Drink lots of water, juice, or broth to replace fluids lost in runny nose and fever.  · If you have medicines to take when you are feeling short of breath, be sure to carry them with you. Then, you can take them when needed.  · If you smoke, stop.  · Take warm, steamy showers to help soothe the cough.  · Use a cool mist humidifier. This may make it easier to breathe.  · Use hard candy or cough drops to soothe sore throat and cough.  · Wash your hands often. This will help prevent you from spreading germs to others.  When do I need to get emergency help?   · Call for an ambulance right away if:   ? You are having so much trouble breathing that you can only say  one or two words at a time.  ? You need to sit upright at all times to be able to breathe and or cannot lie down.  · Return to the ED if:   ? You have trouble breathing when talking or sitting still.  When do I need to call the doctor?   · You have a fever of 100.4°F (38°C) or higher or chills.  · You have chest pain when you cough, have trouble breathing but can still talk in full sentences, or cough up blood.  · You develop a barking cough.  · You are still coughing in 3 weeks.  · You have new or worsening symptoms.  Last Reviewed Date   2020-07-22  Consumer Information Use and Disclaimer   This information is not specific medical advice and does not replace information you receive from your health care provider. This is only a brief summary of general information. It does NOT include all information about conditions, illnesses, injuries, tests, procedures, treatments, therapies, discharge instructions or life-style choices that may apply to you. You must talk with your health care provider for complete information about your health and treatment options. This information should not be used to decide whether or not to accept your health care providers advice, instructions or recommendations. Only your health care provider has the knowledge and training to provide advice that is right for you.  Copyright   Copyright © 2021 [employer] and its affiliates and/or licensors. All rights reserved.

## 2022-01-07 NOTE — PATIENT INSTRUCTIONS
Patient Education    COVID SWAB NEGATIVE  REST AND HYDRATE WITH PLENTY OF FLUIDS  PREDNISONE PRESCRIPTION  DOXYCYCLINE ANTIBIOTIC PRESCRIPTION  BROMFED DM PRESCRIPTION FOR RUNNY NOSE, POSTNASAL DRIP, COUGH  FOLLOW-UP WITH PRIMARY CARE PHYSICIAN  REVIEW BRONCHITIS SHEET  RETURN FOR ANY CONCERNS OR PROBLEMS.        Bronchitis, Adult ED   General Information   You came to the Emergency Department (ED) for acute bronchitis. This is an infection of the bronchi which are tubes that carry air into your lungs. Most of the time, this infection is caused by a virus so an antibiotic wont help. Your cough should get better within 2 to 3 weeks, but may take a bit longer.  What care is needed at home?   · Call your regular doctor to let them know you were in the ED. Make a follow-up appointment if you were told to.  · Do not smoke or be in smoke-filled places. Avoid other things that may cause breathing problems like fumes, pollution, dust, and other common allergens.  · Drink lots of water, juice, or broth to replace fluids lost in runny nose and fever.  · If you have medicines to take when you are feeling short of breath, be sure to carry them with you. Then, you can take them when needed.  · If you smoke, stop.  · Take warm, steamy showers to help soothe the cough.  · Use a cool mist humidifier. This may make it easier to breathe.  · Use hard candy or cough drops to soothe sore throat and cough.  · Wash your hands often. This will help prevent you from spreading germs to others.  When do I need to get emergency help?   · Call for an ambulance right away if:   ? You are having so much trouble breathing that you can only say one or two words at a time.  ? You need to sit upright at all times to be able to breathe and or cannot lie down.  · Return to the ED if:   ? You have trouble breathing when talking or sitting still.  When do I need to call the doctor?   · You have a fever of 100.4°F (38°C) or higher or chills.  · You have  chest pain when you cough, have trouble breathing but can still talk in full sentences, or cough up blood.  · You develop a barking cough.  · You are still coughing in 3 weeks.  · You have new or worsening symptoms.  Last Reviewed Date   2020-07-22  Consumer Information Use and Disclaimer   This information is not specific medical advice and does not replace information you receive from your health care provider. This is only a brief summary of general information. It does NOT include all information about conditions, illnesses, injuries, tests, procedures, treatments, therapies, discharge instructions or life-style choices that may apply to you. You must talk with your health care provider for complete information about your health and treatment options. This information should not be used to decide whether or not to accept your health care providers advice, instructions or recommendations. Only your health care provider has the knowledge and training to provide advice that is right for you.  Copyright   Copyright © 2021 [employer], [employer] and its affiliates and/or licensors. All rights reserved.

## 2022-01-07 NOTE — LETTER
[address], [address] ? [place], [address] ? Phone [phone] ? Fax [phone]           Return to Work/School    Patient: Denise Mosher  YOB: 2000   Date: 01/07/2022      To Whom It May Concern:     Denise Mosher was in contact with/seen in my office on 01/07/2022. COVID-19 is present in our communities across the [facility]. Not all patients are eligible or appropriate to be tested. In this situation, your employee meets the following criteria:     Denise Mosher has met the criteria for COVID-19 testing and has a NEGATIVE result. The employee can return to work once they are asymptomatic for 24 hours without the use of fever reducing medications (Tylenol, Motrin, etc).     If you have any questions or concerns, or if I can be of further assistance, please do not hesitate to contact me.     Sincerely,      Stanley Malcolm MD

## 2022-02-26 ENCOUNTER — PATIENT MESSAGE (OUTPATIENT)
Dept: ORTHOPEDICS | Facility: CLINIC | Age: 22
End: 2022-02-26
Payer: MEDICAID

## 2022-02-26 ENCOUNTER — OFFICE VISIT (OUTPATIENT)
Dept: URGENT CARE | Facility: CLINIC | Age: 22
End: 2022-02-26
Payer: MEDICAID

## 2022-02-26 VITALS
BODY MASS INDEX: 44.93 KG/M2 | HEIGHT: 61 IN | RESPIRATION RATE: 18 BRPM | OXYGEN SATURATION: 97 % | DIASTOLIC BLOOD PRESSURE: 70 MMHG | HEART RATE: 101 BPM | SYSTOLIC BLOOD PRESSURE: 111 MMHG | TEMPERATURE: 99 F | WEIGHT: 238 LBS

## 2022-02-26 DIAGNOSIS — R05.9 COUGH: Primary | ICD-10-CM

## 2022-02-26 DIAGNOSIS — Z20.822 EXPOSURE TO CONFIRMED CASE OF COVID-19: ICD-10-CM

## 2022-02-26 DIAGNOSIS — J06.9 VIRAL URI: ICD-10-CM

## 2022-02-26 LAB
CTP QC/QA: YES
SARS-COV-2 RDRP RESP QL NAA+PROBE: NEGATIVE

## 2022-02-26 PROCEDURE — 3074F PR MOST RECENT SYSTOLIC BLOOD PRESSURE < 130 MM HG: ICD-10-PCS | Mod: CPTII,S$GLB,, | Performed by: NURSE PRACTITIONER

## 2022-02-26 PROCEDURE — 1159F PR MEDICATION LIST DOCUMENTED IN MEDICAL RECORD: ICD-10-PCS | Mod: CPTII,S$GLB,, | Performed by: NURSE PRACTITIONER

## 2022-02-26 PROCEDURE — U0002 COVID-19 LAB TEST NON-CDC: HCPCS | Mod: QW,S$GLB,, | Performed by: NURSE PRACTITIONER

## 2022-02-26 PROCEDURE — 3008F BODY MASS INDEX DOCD: CPT | Mod: CPTII,S$GLB,, | Performed by: NURSE PRACTITIONER

## 2022-02-26 PROCEDURE — 3078F DIAST BP <80 MM HG: CPT | Mod: CPTII,S$GLB,, | Performed by: NURSE PRACTITIONER

## 2022-02-26 PROCEDURE — 1159F MED LIST DOCD IN RCRD: CPT | Mod: CPTII,S$GLB,, | Performed by: NURSE PRACTITIONER

## 2022-02-26 PROCEDURE — 3078F PR MOST RECENT DIASTOLIC BLOOD PRESSURE < 80 MM HG: ICD-10-PCS | Mod: CPTII,S$GLB,, | Performed by: NURSE PRACTITIONER

## 2022-02-26 PROCEDURE — 1160F PR REVIEW ALL MEDS BY PRESCRIBER/CLIN PHARMACIST DOCUMENTED: ICD-10-PCS | Mod: CPTII,S$GLB,, | Performed by: NURSE PRACTITIONER

## 2022-02-26 PROCEDURE — 3074F SYST BP LT 130 MM HG: CPT | Mod: CPTII,S$GLB,, | Performed by: NURSE PRACTITIONER

## 2022-02-26 PROCEDURE — 3008F PR BODY MASS INDEX (BMI) DOCUMENTED: ICD-10-PCS | Mod: CPTII,S$GLB,, | Performed by: NURSE PRACTITIONER

## 2022-02-26 PROCEDURE — 99213 PR OFFICE/OUTPT VISIT, EST, LEVL III, 20-29 MIN: ICD-10-PCS | Mod: S$GLB,,, | Performed by: NURSE PRACTITIONER

## 2022-02-26 PROCEDURE — 99213 OFFICE O/P EST LOW 20 MIN: CPT | Mod: S$GLB,,, | Performed by: NURSE PRACTITIONER

## 2022-02-26 PROCEDURE — 1160F RVW MEDS BY RX/DR IN RCRD: CPT | Mod: CPTII,S$GLB,, | Performed by: NURSE PRACTITIONER

## 2022-02-26 PROCEDURE — U0002: ICD-10-PCS | Mod: QW,S$GLB,, | Performed by: NURSE PRACTITIONER

## 2022-02-26 RX ORDER — PROGESTERONE 200 MG/1
CAPSULE ORAL
COMMUNITY
Start: 2022-02-24 | End: 2022-12-21

## 2022-02-26 NOTE — LETTER
[address] [place] [address]  Phone: [phone]  Fax: [phone]          Return to Work/School    Patient: Denise Mosher  YOB: 2000   Date: 02/26/2022     To Whom It May Concern:     Denise Mosher was in contact with/seen in my office on 02/26/2022. COVID-19 is present in our communities across the state. There is limited testing for COVID at this time, so not all patients can be tested. In this situation, your employee meets the following criteria:     Denise Mosher has met the criteria for COVID-19 testing and has a NEGATIVE result. The employee can return to work once they are asymptomatic for 24 hours without the use of fever reducing medications (Tylenol, Motrin, etc).     If you have any questions or concerns, or if I can be of further assistance, please do not hesitate to contact me.     Sincerely,      Darshana Mccoy NP

## 2022-02-26 NOTE — PATIENT INSTRUCTIONS
- You must understand that you have received an Urgent Care treatment only and that you may be released before all of your medical problems are known or treated.   - You, the patient, will arrange for follow up care as instructed.   - If your condition worsens or fails to improve we recommend that you receive another evaluation at the ER immediately or contact your PCP to discuss your concerns.   - You can call [phone] or [phone] to help schedule an appointment with the appropriate provider.    Drink plenty of fluids   Get lots of rest  Tylenol or ibuprofen for pain/fever  Mucinex DM for cough  Saline nasal rinses to irrigate sinus cavities  Warm salt water gargles for sore throat  Flonase nasal sprays for nasal congestion  Zyrtec for runny nose      IF YOU  Were exposed to COVID-19 and are NOT up-to-date on COVID-19 vaccinations    Quarantine for at least 5 days  Stay home  Stay home and quarantine for at least 5 full days.  Wear a well-fitted mask if you must be around others in your home.  Do not travel.  Get tested  Even if you dont develop symptoms, get tested at least 5 days after you last had close contact with someone with COVID-19.    After quarantine  Watch for symptoms  Watch for symptoms until 10 days after you last had close contact with someone with COVID-19.  Avoid travel  It is best to avoid travel until a full 10 days after you last had close contact with someone with COVID-19.  If you develop symptoms  Isolate immediately and get tested. Continue to stay home until you know the results. Wear a well-fitted mask around others.    Take precautions until day 10  Wear a mask  Wear a well-fitted mask for 10 full days any time you are around others inside your home or in public. Do not go to places where you are unable to wear a mask.  If you must travel during days 6-10, take precautions.  Avoid being around people who are at high risk

## 2022-02-26 NOTE — PROGRESS NOTES
"Subjective:       Patient ID: Denise Mosher is a 21 y.o. female.    Vitals:  height is 5' 0.5" (1.537 m) and weight is 108 kg (238 lb). Her oral temperature is 98.8 °F (37.1 °C). Her blood pressure is 111/70 and her pulse is 101. Her respiration is 18 and oxygen saturation is 97%.     Chief Complaint: Sinus Problem    Patient is a 22 yo female w/ c/o covid exposure. She started with sinus congestion, cough, PND, sore throat about 4 days ago. Denies fever. She is not covid vaccinated. She was exposed to a positive covid coworker.    Sinus Problem  This is a new problem. The current episode started in the past 7 days. The problem has been gradually worsening since onset. There has been no fever. Her pain is at a severity of 4/10. The pain is mild. Associated symptoms include congestion, coughing, headaches, sinus pressure, sneezing and a sore throat. Pertinent negatives include no chills, diaphoresis, ear pain or shortness of breath. Treatments tried: dayquil. The treatment provided no relief.       Constitution: Negative for chills, sweating, fatigue and fever.   HENT: Positive for congestion, postnasal drip, sinus pain, sinus pressure and sore throat. Negative for ear pain.    Respiratory: Positive for cough and sputum production. Negative for shortness of breath.    Allergic/Immunologic: Positive for sneezing.   Neurological: Positive for headaches.       Objective:      Physical Exam   Constitutional: She is oriented to person, place, and time. She appears well-developed. She is cooperative.  Non-toxic appearance. She does not appear ill. No distress.   HENT:   Head: Normocephalic and atraumatic.   Ears:   Right Ear: Hearing, tympanic membrane, external ear and ear canal normal.   Left Ear: Hearing, tympanic membrane, external ear and ear canal normal.   Nose: Mucosal edema and rhinorrhea present. No nasal deformity. No epistaxis. Right sinus exhibits frontal sinus tenderness. Right sinus exhibits no " maxillary sinus tenderness. Left sinus exhibits frontal sinus tenderness. Left sinus exhibits no maxillary sinus tenderness.   Mouth/Throat: Uvula is midline, oropharynx is clear and moist and mucous membranes are normal. No trismus in the jaw. Normal dentition. No uvula swelling. No oropharyngeal exudate, posterior oropharyngeal edema or posterior oropharyngeal erythema.   Post nasal drip      Comments: Post nasal drip  Eyes: Conjunctivae and lids are normal. No scleral icterus.   Neck: Trachea normal and phonation normal. Neck supple. No edema present. No erythema present. No neck rigidity present.   Cardiovascular: Normal rate, regular rhythm, normal heart sounds and normal pulses.   Pulmonary/Chest: Effort normal and breath sounds normal. No respiratory distress. She has no decreased breath sounds. She has no wheezes. She has no rhonchi.   Abdominal: Normal appearance.   Musculoskeletal: Normal range of motion.         General: No deformity. Normal range of motion.   Neurological: She is alert and oriented to person, place, and time. She exhibits normal muscle tone. Coordination normal.   Skin: Skin is warm, dry, intact, not diaphoretic and not pale.   Psychiatric: Her speech is normal and behavior is normal. Judgment and thought content normal.   Nursing note and vitals reviewed.     Results for orders placed or performed in visit on 02/26/22   POCT COVID-19 Rapid Screening   Result Value Ref Range    POC Rapid COVID Negative Negative     Acceptable Yes              Assessment:       1. Cough    2. Exposure to confirmed case of COVID-19    3. Viral URI          Plan:         Cough  -     POCT COVID-19 Rapid Screening    Exposure to confirmed case of COVID-19    Viral URI         Patient Instructions   - You must understand that you have received an Urgent Care treatment only and that you may be released before all of your medical problems are known or treated.   - You, the patient, will arrange  for follow up care as instructed.   - If your condition worsens or fails to improve we recommend that you receive another evaluation at the ER immediately or contact your PCP to discuss your concerns.   - You can call [phone] or [phone] to help schedule an appointment with the appropriate provider.    Drink plenty of fluids   Get lots of rest  Tylenol or ibuprofen for pain/fever  Mucinex DM for cough  Saline nasal rinses to irrigate sinus cavities  Warm salt water gargles for sore throat  Flonase nasal sprays for nasal congestion  Zyrtec for runny nose      IF YOU  Were exposed to COVID-19 and are NOT up-to-date on COVID-19 vaccinations    Quarantine for at least 5 days  Stay home  Stay home and quarantine for at least 5 full days.  Wear a well-fitted mask if you must be around others in your home.  Do not travel.  Get tested  Even if you dont develop symptoms, get tested at least 5 days after you last had close contact with someone with COVID-19.    After quarantine  Watch for symptoms  Watch for symptoms until 10 days after you last had close contact with someone with COVID-19.  Avoid travel  It is best to avoid travel until a full 10 days after you last had close contact with someone with COVID-19.  If you develop symptoms  Isolate immediately and get tested. Continue to stay home until you know the results. Wear a well-fitted mask around others.    Take precautions until day 10  Wear a mask  Wear a well-fitted mask for 10 full days any time you are around others inside your home or in public. Do not go to places where you are unable to wear a mask.  If you must travel during days 6-10, take precautions.  Avoid being around people who are at high risk

## 2022-03-23 ENCOUNTER — OFFICE VISIT (OUTPATIENT)
Dept: ORTHOPEDICS | Facility: CLINIC | Age: 22
End: 2022-03-23
Payer: MEDICAID

## 2022-03-23 ENCOUNTER — HOSPITAL ENCOUNTER (OUTPATIENT)
Dept: RADIOLOGY | Facility: HOSPITAL | Age: 22
Discharge: HOME | End: 2022-03-23
Attending: ORTHOPAEDIC SURGERY
Payer: MEDICAID

## 2022-03-23 VITALS — HEIGHT: 60 IN | WEIGHT: 238 LBS | BODY MASS INDEX: 46.72 KG/M2

## 2022-03-23 DIAGNOSIS — M94.262 CHONDROMALACIA OF LEFT KNEE: Primary | ICD-10-CM

## 2022-03-23 DIAGNOSIS — M94.262 CHONDROMALACIA OF LEFT KNEE: ICD-10-CM

## 2022-03-23 DIAGNOSIS — G89.29 CHRONIC PAIN OF LEFT KNEE: ICD-10-CM

## 2022-03-23 DIAGNOSIS — M25.562 CHRONIC PAIN OF LEFT KNEE: ICD-10-CM

## 2022-03-23 PROCEDURE — 99213 OFFICE O/P EST LOW 20 MIN: CPT | Mod: PBBFAC | Performed by: ORTHOPAEDIC SURGERY

## 2022-03-23 PROCEDURE — 1160F RVW MEDS BY RX/DR IN RCRD: CPT | Mod: CPTII,,, | Performed by: ORTHOPAEDIC SURGERY

## 2022-03-23 PROCEDURE — 99214 PR OFFICE/OUTPT VISIT, EST, LEVL IV, 30-39 MIN: ICD-10-PCS | Mod: S$PBB,,, | Performed by: ORTHOPAEDIC SURGERY

## 2022-03-23 PROCEDURE — 99999 PR PBB SHADOW E&M-EST. PATIENT-LVL III: CPT | Mod: PBBFAC,,, | Performed by: ORTHOPAEDIC SURGERY

## 2022-03-23 PROCEDURE — 99999 PR PBB SHADOW E&M-EST. PATIENT-LVL III: ICD-10-PCS | Mod: PBBFAC,,, | Performed by: ORTHOPAEDIC SURGERY

## 2022-03-23 PROCEDURE — 1160F PR REVIEW ALL MEDS BY PRESCRIBER/CLIN PHARMACIST DOCUMENTED: ICD-10-PCS | Mod: CPTII,,, | Performed by: ORTHOPAEDIC SURGERY

## 2022-03-23 PROCEDURE — 1159F MED LIST DOCD IN RCRD: CPT | Mod: CPTII,,, | Performed by: ORTHOPAEDIC SURGERY

## 2022-03-23 PROCEDURE — 99214 OFFICE O/P EST MOD 30 MIN: CPT | Mod: S$PBB,,, | Performed by: ORTHOPAEDIC SURGERY

## 2022-03-23 PROCEDURE — 1159F PR MEDICATION LIST DOCUMENTED IN MEDICAL RECORD: ICD-10-PCS | Mod: CPTII,,, | Performed by: ORTHOPAEDIC SURGERY

## 2022-03-23 PROCEDURE — 3008F PR BODY MASS INDEX (BMI) DOCUMENTED: ICD-10-PCS | Mod: CPTII,,, | Performed by: ORTHOPAEDIC SURGERY

## 2022-03-23 PROCEDURE — 3008F BODY MASS INDEX DOCD: CPT | Mod: CPTII,,, | Performed by: ORTHOPAEDIC SURGERY

## 2022-03-24 NOTE — PROGRESS NOTES
H&P  Orthopedics    SUBJECTIVE:     History of Present Illness:  Patient is a 21 y.o. female here for a year and a half post-op visit after left distal femoral osteotomy and Cartimax graft placement. Having significant knee pain with ROM and walking, with associated crepitus.        Review of patient's allergies indicates:  No Known Allergies    Past Medical History:   Diagnosis Date    Anxiety     reports panic attacks    Depression     Fibromyalgia     per pt    Insomnia     Precocious female puberty     Seizures     reports 2 incidents of possible seizures while giving blood    Syncope and collapse     Wrist fracture, bilateral      Past Surgical History:   Procedure Laterality Date    ARTHROSCOPY OF KNEE Right 12/19/2019    Procedure: ARTHROSCOPY, KNEE (Right);  Surgeon: Ranulfo House MD;  Location: [facility] OR [address] [place] [address];  Service: Orthopedics;  Laterality: Right;    ARTHROSCOPY OF KNEE Left 7/9/2020    Procedure: ARTHROSCOPY, KNEE;  Surgeon: Ranulfo House MD;  Location: [facility] OR [address] [place] [address];  Service: Orthopedics;  Laterality: Left;  WITH CHRONDROPLASTY     ARTHROTOMY OF KNEE Left 7/9/2020    Procedure: ARTHROTOMY, KNEE;  Surgeon: Ranulfo House MD;  Location: [facility] OR [address] [place] [address];  Service: Orthopedics;  Laterality: Left;  WITH CHRONDRAL ALLOGRAFT IMPLANTATION    chip      chip implant    CHONDROPLASTY OF KNEE Right 12/19/2019    Procedure: CHONDROPLASTY, KNEE patella;  Surgeon: Ranulfo House MD;  Location: [facility] OR [address] [place] [address];  Service: Orthopedics;  Laterality: Right;    FEMUR OSTEOTOMY Right 12/13/2018    Procedure: OSTEOTOMY, FEMUR - distal to correct valgus (Orthopediatrics plate, MTF bone wedge).  Right knee arthroscopy with MPFL reconstruction, gracilis allograft ([employer]atec).  3 hrs.;  Surgeon: Ranulfo House MD;  Location: [address];  Service: Orthopedics;  Laterality: Right;    FEMUR OSTEOTOMY Left 7/9/2020    Procedure: OSTEOTOMY, FEMUR (Left) - distal femoral opening wedge  (Orthopediatrics, MTF), knee scope, removal of tibial tubercle screws (Orthopediatrics 4.0 cannulated screws);  Surgeon: Ranulfo House MD;  [facility];  Service: Orthopedics;  Laterality: Left;  RQ Gianna DAVIS notified 7/8/20 Reiniers RN    HARDWARE REMOVAL Right 12/19/2019    Procedure: REMOVAL, HARDWARE righ distal femoral.;  Surgeon: Ranulfo House MD;  [facility];  Service: Orthopedics;  Laterality: Right;    HARDWARE REMOVAL Left 7/9/2020    Procedure: REMOVAL, HARDWARE;  Surgeon: Ranulfo House MD;  [facility];  Service: Orthopedics;  Laterality: Left;  TIBIA    HEMANGIOMA EXCISION      scalp    INGUINAL HERNIA REPAIR Left     INJECTION OF STEROID Right 7/5/2019    Procedure: INJECTION, STEROID;  Surgeon: Ranulfo House MD;  [facility];  Service: Orthopedics;  Laterality: Right;    KNEE JOINT MANIPULATION Right 7/5/2019    Procedure: MANIPULATION, KNEE;  Surgeon: Ranulfo House MD;  [facility];  Service: Orthopedics;  Laterality: Right;    KNEE SURGERY Left 2/16/15    TOE SURGERY Right     5 th toe     Family History   Problem Relation Age of Onset    Seizures Father     Anxiety disorder Father     Depression Father     Mental illness Father     Schizophrenia Father     Deep vein thrombosis Father      Social History     Tobacco Use    Smoking status: Never Smoker    Smokeless tobacco: Never Used   Substance Use Topics    Alcohol use: No    Drug use: No        Review of Systems:  Patient denies constitutional symptoms, cardiac symptoms, respiratory symptoms, GI symptoms.  The remainder of the musculoskeletal ROS is included in the HPI.      OBJECTIVE:     Physical Exam:  Gen:  No acute distress  CV:  Peripherally well-perfused.  Pulses 2+ bilaterally.  Lungs:  Normal respiratory effort.  Abdomen:  Soft, non-tender, non-distended  Head/Neck:  Normocephalic.  Atraumatic. No TTP, AROM and PROM intact  without pain  Neuro:  CN intact without deficit, SILT throughout B/L Upper & Lower Extremities    MSK:  Incisions all healing well no signs of redness, drainage or wound dehiscence.   Knee ROM limited, 0-120 deg.  Pain with touching skin around knee.  Audible crepitus with knee flexion.  + quads.    ASSESSMENT/PLAN:     A/P: Denise Mosher is a 21 y.o.  S/p left distal femur osteotomy and Cartimax graft placement.     Plan:  Will check repeat MRI to evaluate healing of Cartimax.  Also hip to ankle XR to assess alignment.  Will contact patients with results.

## 2022-04-04 ENCOUNTER — HOSPITAL ENCOUNTER (OUTPATIENT)
Dept: RADIOLOGY | Facility: HOSPITAL | Age: 22
Discharge: HOME | End: 2022-04-04
Attending: ORTHOPAEDIC SURGERY
Payer: MEDICAID

## 2022-04-04 DIAGNOSIS — G89.29 CHRONIC PAIN OF LEFT KNEE: ICD-10-CM

## 2022-04-04 DIAGNOSIS — M94.262 CHONDROMALACIA OF LEFT KNEE: ICD-10-CM

## 2022-04-04 DIAGNOSIS — M25.562 CHRONIC PAIN OF LEFT KNEE: ICD-10-CM

## 2022-04-04 PROCEDURE — 73721 MRI JNT OF LWR EXTRE W/O DYE: CPT | Mod: TC,LT

## 2022-04-04 PROCEDURE — 73562 X-RAY EXAM OF KNEE 3: CPT | Mod: TC,LT

## 2022-04-04 PROCEDURE — 73721 MRI KNEE WITHOUT CONTRAST LEFT: ICD-10-PCS | Mod: 26,LT,, | Performed by: RADIOLOGY

## 2022-04-04 PROCEDURE — 77073 BONE LENGTH STUDIES: CPT | Mod: TC

## 2022-04-04 PROCEDURE — 77073 BONE LENGTH STUDIES: CPT | Mod: 26,,, | Performed by: RADIOLOGY

## 2022-04-04 PROCEDURE — 73562 XR KNEE 3 VIEW LEFT: ICD-10-PCS | Mod: 26,LT,, | Performed by: RADIOLOGY

## 2022-04-04 PROCEDURE — 77073 XR HIP TO ANKLE: ICD-10-PCS | Mod: 26,,, | Performed by: RADIOLOGY

## 2022-04-04 PROCEDURE — 73562 X-RAY EXAM OF KNEE 3: CPT | Mod: 26,LT,, | Performed by: RADIOLOGY

## 2022-04-04 PROCEDURE — 73721 MRI JNT OF LWR EXTRE W/O DYE: CPT | Mod: 26,LT,, | Performed by: RADIOLOGY

## 2022-06-09 ENCOUNTER — OFFICE VISIT (OUTPATIENT)
Dept: URGENT CARE | Facility: CLINIC | Age: 22
End: 2022-06-09
Payer: MEDICAID

## 2022-06-09 VITALS
TEMPERATURE: 98 F | BODY MASS INDEX: 44.57 KG/M2 | WEIGHT: 227 LBS | SYSTOLIC BLOOD PRESSURE: 104 MMHG | HEIGHT: 60 IN | DIASTOLIC BLOOD PRESSURE: 73 MMHG | RESPIRATION RATE: 16 BRPM | HEART RATE: 73 BPM | OXYGEN SATURATION: 97 %

## 2022-06-09 DIAGNOSIS — U07.1 COVID-19 VIRUS INFECTION: Primary | ICD-10-CM

## 2022-06-09 LAB
CTP QC/QA: YES
SARS-COV-2 RDRP RESP QL NAA+PROBE: POSITIVE

## 2022-06-09 PROCEDURE — U0002 COVID-19 LAB TEST NON-CDC: HCPCS | Mod: QW,S$GLB,,

## 2022-06-09 PROCEDURE — 1159F PR MEDICATION LIST DOCUMENTED IN MEDICAL RECORD: ICD-10-PCS | Mod: CPTII,S$GLB,,

## 2022-06-09 PROCEDURE — 99213 OFFICE O/P EST LOW 20 MIN: CPT | Mod: S$GLB,,,

## 2022-06-09 PROCEDURE — 3008F PR BODY MASS INDEX (BMI) DOCUMENTED: ICD-10-PCS | Mod: CPTII,S$GLB,,

## 2022-06-09 PROCEDURE — 3078F DIAST BP <80 MM HG: CPT | Mod: CPTII,S$GLB,,

## 2022-06-09 PROCEDURE — 1160F PR REVIEW ALL MEDS BY PRESCRIBER/CLIN PHARMACIST DOCUMENTED: ICD-10-PCS | Mod: CPTII,S$GLB,,

## 2022-06-09 PROCEDURE — 3074F PR MOST RECENT SYSTOLIC BLOOD PRESSURE < 130 MM HG: ICD-10-PCS | Mod: CPTII,S$GLB,,

## 2022-06-09 PROCEDURE — 1160F RVW MEDS BY RX/DR IN RCRD: CPT | Mod: CPTII,S$GLB,,

## 2022-06-09 PROCEDURE — 3008F BODY MASS INDEX DOCD: CPT | Mod: CPTII,S$GLB,,

## 2022-06-09 PROCEDURE — 99213 PR OFFICE/OUTPT VISIT, EST, LEVL III, 20-29 MIN: ICD-10-PCS | Mod: S$GLB,,,

## 2022-06-09 PROCEDURE — U0002: ICD-10-PCS | Mod: QW,S$GLB,,

## 2022-06-09 PROCEDURE — 3074F SYST BP LT 130 MM HG: CPT | Mod: CPTII,S$GLB,,

## 2022-06-09 PROCEDURE — 1159F MED LIST DOCD IN RCRD: CPT | Mod: CPTII,S$GLB,,

## 2022-06-09 PROCEDURE — 3078F PR MOST RECENT DIASTOLIC BLOOD PRESSURE < 80 MM HG: ICD-10-PCS | Mod: CPTII,S$GLB,,

## 2022-06-09 RX ORDER — METHOCARBAMOL 750 MG/1
750 TABLET, FILM COATED ORAL NIGHTLY
COMMUNITY
Start: 2022-04-11 | End: 2022-12-21

## 2022-06-09 NOTE — PATIENT INSTRUCTIONS
You have tested positive for COVID-19 today.    ISOLATION  If you tested positive and do not have symptoms, you must isolate for 5 days starting on the day of the positive test.  If you tested positive and have symptoms, you must isolate for 5 days starting on the day of the first symptoms,  not the day of the positive test.   This is the most important part, both the CDC and the [facility] emphasize that you do not test out of isolation.   After 5 days, if your symptoms have improved and you have not had fever on day 5, you can return to the community on day 6- NO TESTING REQUIRED!    In fact, we do not retest if you were positive in the last 90 days.  After your 5 days of isolation are completed, the CDC recommends strict mask use for the first 5 days that you come out of isolation.    - Rest.    - Drink plenty of fluids.  - Viral upper respiratory infections typically run their course in 10-14 days.      - You can take over-the-counter claritin, zyrtec, allegra, or xyzal as directed. These are antihistamines that can help with runny nose, nasal congestion, sneezing, and helps to dry up post-nasal drip, which usually causes sore throat and cough.              - If you do NOT have high blood pressure, you may use a decongestant form (D)  of this medication (ie. Claritin- D, zyrtec-D, allegra-D) or if you do not take the D form, you can take sudafed (pseudoephedrine) over the counter, which is a decongestant. Do NOT take two decongestant (D) medications at the same time (such as mucinex-D and claritin-D or plain sudafed and claritin D)    - If you DO have Hypertension, anxiety, or palpitations, it is safe to take Coricidin HBP for relief of sinus symptoms.     - You can use Flonase (fluticasone) nasal spray as directed for sinus congestion and postnasal drip. This is a steroid nasal spray that works locally over time to decrease the inflammation in your nose/sinuses and help with allergic symptoms. This is not an quick-  relief spray like afrin, but it works well if used daily.  Discontinue if you develop nose bleed  - use nasal saline prior to Flonase.  - Use Ocean Spray Nasal Saline 1-3 puffs each nostril every 2-3 hours then blow out onto tissue. This is to irrigate the nasal passage way to clear the sinus openings. Use until sinus problem resolved.     - you can take plain Mucinex (guaifenesin) 1200 mg twice a day to help loosen mucous.      -warm salt water gargles can help with sore throat     - warm tea with honey can help with cough. Honey is a natural cough suppressant.     - Dextromethorphan (DM) is a cough suppressant over the counter (ie. mucinex DM, robitussin, delsym; dayquil/nyquil has DM as well.)        - Follow up with your PCP or specialty clinic as directed in the next 1-2 weeks if not improved or as needed.  You can call [phone] to schedule an appointment with the appropriate provider.       - Go to the ER if you develop new or worsening symptoms.      - You must understand that you have received an Urgent Care treatment only and that you may be released before all of your medical problems are known or treated.   - You, the patient, will arrange for follow up care as instructed.   - If your condition worsens or fails to improve we recommend that you receive another evaluation at the ER immediately or contact your PCP to discuss your concerns or return here.

## 2022-06-09 NOTE — PROGRESS NOTES
Subjective:       Patient ID: Denise Mosher is a 22 y.o. female.    Vitals:  height is 5' (1.524 m) and weight is 103 kg (227 lb). Her temperature is 98.1 °F (36.7 °C). Her blood pressure is 104/73 and her pulse is 73. Her respiration is 16 and oxygen saturation is 97%.     Chief Complaint: Cough    Pt is a 23 y/o female who presents with coughing, headache, body aches, fatigue, congestion, PND, sore throat, nausea x4 days. Her mother tested positive for COVID today. Not vaccinated for COVID. Denies CP, SOB, vomiting, diarrhea, abdominal pain.    Cough  This is a new problem. The current episode started in the past 7 days. The problem has been gradually worsening. The problem occurs constantly. The cough is productive of sputum. Associated symptoms include headaches, myalgias, nasal congestion, postnasal drip and a sore throat. Pertinent negatives include no chest pain, chills, ear congestion, ear pain, fever, heartburn, hemoptysis, rash, rhinorrhea, shortness of breath, sweats, weight loss or wheezing. Nothing aggravates the symptoms. She has tried OTC cough suppressant for the symptoms. The treatment provided no relief. There is no history of asthma, bronchiectasis, bronchitis, COPD, emphysema, environmental allergies or pneumonia.       Constitution: Negative for chills, sweating, fatigue and fever.   HENT: Positive for congestion, postnasal drip, sinus pressure and sore throat. Negative for ear pain.    Neck: Negative for neck pain.   Cardiovascular: Negative for chest pain.   Respiratory: Positive for cough and sputum production. Negative for chest tightness, bloody sputum, shortness of breath and wheezing.    Gastrointestinal: Positive for nausea. Negative for abdominal pain, vomiting, constipation, diarrhea and heartburn.   Musculoskeletal: Positive for muscle ache.   Skin: Negative for rash.   Allergic/Immunologic: Negative for environmental allergies and sneezing.   Neurological: Positive for  headaches. Negative for dizziness and light-headedness.       Objective:      Physical Exam   Constitutional: She is oriented to person, place, and time. She appears well-developed. She is cooperative.  Non-toxic appearance. She does not appear ill. No distress.   HENT:   Head: Normocephalic and atraumatic.   Ears:   Right Ear: Hearing, tympanic membrane, external ear and ear canal normal.   Left Ear: Hearing, tympanic membrane, external ear and ear canal normal.   Nose: Rhinorrhea and congestion present. No mucosal edema or nasal deformity. No epistaxis. Right sinus exhibits no maxillary sinus tenderness and no frontal sinus tenderness. Left sinus exhibits no maxillary sinus tenderness and no frontal sinus tenderness.   Mouth/Throat: Uvula is midline and mucous membranes are normal. No trismus in the jaw. Normal dentition. No uvula swelling. Posterior oropharyngeal erythema present. No oropharyngeal exudate or posterior oropharyngeal edema.   Eyes: Conjunctivae and lids are normal. No scleral icterus.   Neck: Trachea normal and phonation normal. Neck supple. No edema present. No erythema present. No neck rigidity present.   Cardiovascular: Normal rate, regular rhythm, normal heart sounds and normal pulses.   Pulmonary/Chest: Effort normal and breath sounds normal. No respiratory distress. She has no decreased breath sounds. She has no wheezes. She has no rhonchi. She has no rales.         Comments: Lungs CTAB.    Abdominal: Normal appearance. She exhibits no distension. Soft. There is no abdominal tenderness.   Musculoskeletal: Normal range of motion.         General: No deformity. Normal range of motion.   Neurological: no focal deficit. She is alert and oriented to person, place, and time. She exhibits normal muscle tone. Coordination normal.   Skin: Skin is warm, dry, intact, not diaphoretic and not pale. Capillary refill takes less than 2 seconds.   Psychiatric: Her speech is normal and behavior is normal.  Judgment and thought content normal.   Nursing note and vitals reviewed.              Assessment:       1. COVID-19 virus infection          Plan:         COVID-19 virus infection  -     POCT COVID-19 Rapid Screening  -     nirmatrelvir-ritonavir 150 mg x 2- 100 mg copackaged tablets (EUA); Take 3 tablets by mouth 2 (two) times daily for 5 days. Each dose contains 2 nirmatrelvir (pink tablets) and 1 ritonavir (white tablet). Take all 3 tablets together  Dispense: 30 tablet; Refill: 0           COVID risk score 1 (calculated by Epic).  Pt denies taking any medications.  Will send paxlovid. Paxlovid fact sheet given.  Discussed OTC medications and supportive care  Discussed isolation  Clinic return and ED precautions given.          Patient Instructions   You have tested positive for COVID-19 today.    ISOLATION  If you tested positive and do not have symptoms, you must isolate for 5 days starting on the day of the positive test.  If you tested positive and have symptoms, you must isolate for 5 days starting on the day of the first symptoms,  not the day of the positive test.   This is the most important part, both the CDC and the [facility] emphasize that you do not test out of isolation.   After 5 days, if your symptoms have improved and you have not had fever on day 5, you can return to the community on day 6- NO TESTING REQUIRED!    In fact, we do not retest if you were positive in the last 90 days.  After your 5 days of isolation are completed, the CDC recommends strict mask use for the first 5 days that you come out of isolation.    - Rest.    - Drink plenty of fluids.  - Viral upper respiratory infections typically run their course in 10-14 days.      - You can take over-the-counter claritin, zyrtec, allegra, or xyzal as directed. These are antihistamines that can help with runny nose, nasal congestion, sneezing, and helps to dry up post-nasal drip, which usually causes sore throat and cough.              - If you do  NOT have high blood pressure, you may use a decongestant form (D)  of this medication (ie. Claritin- D, zyrtec-D, allegra-D) or if you do not take the D form, you can take sudafed (pseudoephedrine) over the counter, which is a decongestant. Do NOT take two decongestant (D) medications at the same time (such as mucinex-D and claritin-D or plain sudafed and claritin D)    - If you DO have Hypertension, anxiety, or palpitations, it is safe to take Coricidin HBP for relief of sinus symptoms.     - You can use Flonase (fluticasone) nasal spray as directed for sinus congestion and postnasal drip. This is a steroid nasal spray that works locally over time to decrease the inflammation in your nose/sinuses and help with allergic symptoms. This is not an quick- relief spray like afrin, but it works well if used daily.  Discontinue if you develop nose bleed  - use nasal saline prior to Flonase.  - Use Ocean Spray Nasal Saline 1-3 puffs each nostril every 2-3 hours then blow out onto tissue. This is to irrigate the nasal passage way to clear the sinus openings. Use until sinus problem resolved.     - you can take plain Mucinex (guaifenesin) 1200 mg twice a day to help loosen mucous.      -warm salt water gargles can help with sore throat     - warm tea with honey can help with cough. Honey is a natural cough suppressant.     - Dextromethorphan (DM) is a cough suppressant over the counter (ie. mucinex DM, robitussin, delsym; dayquil/nyquil has DM as well.)        - Follow up with your PCP or specialty clinic as directed in the next 1-2 weeks if not improved or as needed.  You can call [phone] to schedule an appointment with the appropriate provider.       - Go to the ER if you develop new or worsening symptoms.      - You must understand that you have received an Urgent Care treatment only and that you may be released before all of your medical problems are known or treated.   - You, the patient, will arrange for follow  up care as instructed.   - If your condition worsens or fails to improve we recommend that you receive another evaluation at the ER immediately or contact your PCP to discuss your concerns or return here.

## 2022-06-28 ENCOUNTER — OFFICE VISIT (OUTPATIENT)
Dept: ORTHOPEDICS | Facility: CLINIC | Age: 22
End: 2022-06-28
Payer: MEDICAID

## 2022-06-28 DIAGNOSIS — M94.262 CHONDROMALACIA OF LEFT KNEE: Primary | ICD-10-CM

## 2022-06-28 PROCEDURE — 1159F PR MEDICATION LIST DOCUMENTED IN MEDICAL RECORD: ICD-10-PCS | Mod: CPTII,,, | Performed by: ORTHOPAEDIC SURGERY

## 2022-06-28 PROCEDURE — 99999 PR PBB SHADOW E&M-EST. PATIENT-LVL II: ICD-10-PCS | Mod: PBBFAC,,, | Performed by: ORTHOPAEDIC SURGERY

## 2022-06-28 PROCEDURE — 99212 OFFICE O/P EST SF 10 MIN: CPT | Mod: PBBFAC | Performed by: ORTHOPAEDIC SURGERY

## 2022-06-28 PROCEDURE — 99213 PR OFFICE/OUTPT VISIT, EST, LEVL III, 20-29 MIN: ICD-10-PCS | Mod: S$PBB,,, | Performed by: ORTHOPAEDIC SURGERY

## 2022-06-28 PROCEDURE — 1159F MED LIST DOCD IN RCRD: CPT | Mod: CPTII,,, | Performed by: ORTHOPAEDIC SURGERY

## 2022-06-28 PROCEDURE — 99213 OFFICE O/P EST LOW 20 MIN: CPT | Mod: S$PBB,,, | Performed by: ORTHOPAEDIC SURGERY

## 2022-06-28 PROCEDURE — 99999 PR PBB SHADOW E&M-EST. PATIENT-LVL II: CPT | Mod: PBBFAC,,, | Performed by: ORTHOPAEDIC SURGERY

## 2022-06-28 NOTE — PROGRESS NOTES
H&P  Orthopedics    SUBJECTIVE:     History of Present Illness:  Patient is a 22 y.o. female here for a year and a half post-op visit after left distal femoral osteotomy and Cartimax graft placement. Having significant knee pain with ROM and walking, with associated crepitus.        Review of patient's allergies indicates:  No Known Allergies    Past Medical History:   Diagnosis Date    Anxiety     reports panic attacks    Depression     Fibromyalgia     per pt    Insomnia     Precocious female puberty     Seizures     reports 2 incidents of possible seizures while giving blood    Syncope and collapse     Wrist fracture, bilateral      Past Surgical History:   Procedure Laterality Date    ARTHROSCOPY OF KNEE Right 12/19/2019    Procedure: ARTHROSCOPY, KNEE (Right);  Surgeon: Ranulfo House MD;  Location: [facility] OR [address] [place] [address];  Service: Orthopedics;  Laterality: Right;    ARTHROSCOPY OF KNEE Left 7/9/2020    Procedure: ARTHROSCOPY, KNEE;  Surgeon: Ranulfo House MD;  Location: [facility] OR [address] [place] [address];  Service: Orthopedics;  Laterality: Left;  WITH CHRONDROPLASTY     ARTHROTOMY OF KNEE Left 7/9/2020    Procedure: ARTHROTOMY, KNEE;  Surgeon: Ranulfo House MD;  Location: [facility] OR [address] [place] [address];  Service: Orthopedics;  Laterality: Left;  WITH CHRONDRAL ALLOGRAFT IMPLANTATION    chip      chip implant    CHONDROPLASTY OF KNEE Right 12/19/2019    Procedure: CHONDROPLASTY, KNEE patella;  Surgeon: Ranulfo House MD;  Location: [facility] OR [address] [place] [address];  Service: Orthopedics;  Laterality: Right;    FEMUR OSTEOTOMY Right 12/13/2018    Procedure: OSTEOTOMY, FEMUR - distal to correct valgus (Orthopediatrics plate, MTF bone wedge).  Right knee arthroscopy with MPFL reconstruction, gracilis allograft (Linvatec).  3 hrs.;  Surgeon: Ranulfo House MD;  Location: [address];  Service: Orthopedics;  Laterality: Right;    FEMUR OSTEOTOMY Left 7/9/2020    Procedure: OSTEOTOMY, FEMUR (Left) - distal femoral opening wedge  (Orthopediatrics, MTF), knee scope, removal of tibial tubercle screws (Orthopediatrics 4.0 cannulated screws);  Surgeon: Ranulfo House MD;  [facility];  Service: Orthopedics;  Laterality: Left;  RQ Gianna DAVIS notified 7/8/20 Reiniers RN    HARDWARE REMOVAL Right 12/19/2019    Procedure: REMOVAL, HARDWARE righ distal femoral.;  Surgeon: Ranulfo House MD;  [facility];  Service: Orthopedics;  Laterality: Right;    HARDWARE REMOVAL Left 7/9/2020    Procedure: REMOVAL, HARDWARE;  Surgeon: Ranulfo House MD;  [facility];  Service: Orthopedics;  Laterality: Left;  TIBIA    HEMANGIOMA EXCISION      scalp    INGUINAL HERNIA REPAIR Left     INJECTION OF STEROID Right 7/5/2019    Procedure: INJECTION, STEROID;  Surgeon: Ranulfo House MD;  [facility];  Service: Orthopedics;  Laterality: Right;    KNEE JOINT MANIPULATION Right 7/5/2019    Procedure: MANIPULATION, KNEE;  Surgeon: Ranulfo House MD;  [facility];  Service: Orthopedics;  Laterality: Right;    KNEE SURGERY Left 2/16/15    TOE SURGERY Right     5 th toe     Family History   Problem Relation Age of Onset    Seizures Father     Anxiety disorder Father     Depression Father     Mental illness Father     Schizophrenia Father     Deep vein thrombosis Father      Social History     Tobacco Use    Smoking status: Never Smoker    Smokeless tobacco: Never Used   Substance Use Topics    Alcohol use: No    Drug use: No        Review of Systems:  Patient denies constitutional symptoms, cardiac symptoms, respiratory symptoms, GI symptoms.  The remainder of the musculoskeletal ROS is included in the HPI.      OBJECTIVE:     Physical Exam:  Gen:  No acute distress  CV:  Peripherally well-perfused.  Pulses 2+ bilaterally.  Lungs:  Normal respiratory effort.  Abdomen:  Soft, non-tender, non-distended  Head/Neck:  Normocephalic.  Atraumatic. No TTP, AROM and PROM intact  without pain  Neuro:  CN intact without deficit, SILT throughout B/L Upper & Lower Extremities    MSK:  Incisions all healing well no signs of redness, drainage or wound dehiscence.   Knee ROM limited, 0-120 deg.  Pain with touching skin around knee.  Audible crepitus with knee flexion.  + quads.    Hip to ankle X-rays - good alignment B/L  MRI left knee - defect in Cartimax I patella.  MPFL graft intact.    ASSESSMENT/PLAN:     A/P: Denise Mosher is a 22 y.o.  S/p left distal femur osteotomy and Cartimax graft placement.     Plan:  Offered cortisone shot, patient declined.  As I am leaving [facility], will have patient follow-up with Dr. Nguyen for further care.

## 2022-07-05 ENCOUNTER — PATIENT MESSAGE (OUTPATIENT)
Dept: ORTHOPEDICS | Facility: CLINIC | Age: 22
End: 2022-07-05
Payer: MEDICAID

## 2022-07-07 ENCOUNTER — TELEPHONE (OUTPATIENT)
Dept: SPORTS MEDICINE | Facility: CLINIC | Age: 22
End: 2022-07-07
Payer: MEDICAID

## 2022-07-07 NOTE — TELEPHONE ENCOUNTER
----- Message from Zohra Cardoza sent at 7/7/2022  1:56 PM CDT -----  Contact: @[phone]  Pt is calling in to schedule an appt she is a transfer from Dr. House. Please call to discuss further.

## 2022-07-21 ENCOUNTER — OFFICE VISIT (OUTPATIENT)
Dept: SPORTS MEDICINE | Facility: CLINIC | Age: 22
End: 2022-07-21
Payer: MEDICAID

## 2022-07-21 VITALS
HEART RATE: 88 BPM | DIASTOLIC BLOOD PRESSURE: 78 MMHG | BODY MASS INDEX: 44.17 KG/M2 | WEIGHT: 225 LBS | SYSTOLIC BLOOD PRESSURE: 122 MMHG | HEIGHT: 60 IN

## 2022-07-21 DIAGNOSIS — M25.562 LEFT KNEE PAIN, UNSPECIFIED CHRONICITY: Primary | ICD-10-CM

## 2022-07-21 PROCEDURE — 3078F PR MOST RECENT DIASTOLIC BLOOD PRESSURE < 80 MM HG: ICD-10-PCS | Mod: CPTII,,, | Performed by: ORTHOPAEDIC SURGERY

## 2022-07-21 PROCEDURE — 3074F SYST BP LT 130 MM HG: CPT | Mod: CPTII,,, | Performed by: ORTHOPAEDIC SURGERY

## 2022-07-21 PROCEDURE — 1159F MED LIST DOCD IN RCRD: CPT | Mod: CPTII,,, | Performed by: ORTHOPAEDIC SURGERY

## 2022-07-21 PROCEDURE — 3008F PR BODY MASS INDEX (BMI) DOCUMENTED: ICD-10-PCS | Mod: CPTII,,, | Performed by: ORTHOPAEDIC SURGERY

## 2022-07-21 PROCEDURE — 1159F PR MEDICATION LIST DOCUMENTED IN MEDICAL RECORD: ICD-10-PCS | Mod: CPTII,,, | Performed by: ORTHOPAEDIC SURGERY

## 2022-07-21 PROCEDURE — 3074F PR MOST RECENT SYSTOLIC BLOOD PRESSURE < 130 MM HG: ICD-10-PCS | Mod: CPTII,,, | Performed by: ORTHOPAEDIC SURGERY

## 2022-07-21 PROCEDURE — 3008F BODY MASS INDEX DOCD: CPT | Mod: CPTII,,, | Performed by: ORTHOPAEDIC SURGERY

## 2022-07-21 PROCEDURE — 99999 PR PBB SHADOW E&M-EST. PATIENT-LVL III: ICD-10-PCS | Mod: PBBFAC,,, | Performed by: ORTHOPAEDIC SURGERY

## 2022-07-21 PROCEDURE — 3078F DIAST BP <80 MM HG: CPT | Mod: CPTII,,, | Performed by: ORTHOPAEDIC SURGERY

## 2022-07-21 PROCEDURE — 99999 PR PBB SHADOW E&M-EST. PATIENT-LVL III: CPT | Mod: PBBFAC,,, | Performed by: ORTHOPAEDIC SURGERY

## 2022-07-21 PROCEDURE — 99213 PR OFFICE/OUTPT VISIT, EST, LEVL III, 20-29 MIN: ICD-10-PCS | Mod: S$PBB,,, | Performed by: ORTHOPAEDIC SURGERY

## 2022-07-21 PROCEDURE — 99213 OFFICE O/P EST LOW 20 MIN: CPT | Mod: S$PBB,,, | Performed by: ORTHOPAEDIC SURGERY

## 2022-07-21 PROCEDURE — 99213 OFFICE O/P EST LOW 20 MIN: CPT | Mod: PBBFAC | Performed by: ORTHOPAEDIC SURGERY

## 2022-07-21 RX ORDER — DICLOFENAC SODIUM 10 MG/G
2 GEL TOPICAL 4 TIMES DAILY
Qty: 10 G | Refills: 0 | Status: SHIPPED | OUTPATIENT
Start: 2022-07-21 | End: 2022-12-21

## 2022-07-21 NOTE — PROGRESS NOTES
CC: Left knee pain    22 y.o. Female with a history of left knee pain who is referred to me by Dr. House.  She states that the pain is severe and not responding to any conservative care.      She reports that the pain and weakness. It also bothers her at night.    - mechanical symptoms, - instability    Is affecting ADLs.  Pain is 4/10 at it's worst.    Surgery Date: 6/15/2017      Surgeon(s) and Role:     * Ranulfo House MD - Primary     * Zandra Jenkins MD - Resident - Assisting           Pre-op Diagnosis:  Retained orthopedic hardware [Z96.9]  Patellofemoral pain syndrome of right knee [M22.2X1]     Post-op Diagnosis:  Post-Op Diagnosis Codes:     * Retained orthopedic hardware [Z96.9]     * Patellofemoral pain syndrome of right knee [M22.2X1]     Procedure(s) (LRB):  ARTHROSCOPY-KNEE, removal of two Synthes 4-5 cortical screws (Right)    REVIEW OF SYSTEMS:  Constitution: Negative. Negative for chills, fever and night sweats.   HENT: Negative for congestion and headaches.    Eyes: Negative for blurred vision, left vision loss and right vision loss.   Cardiovascular: Negative for chest pain and syncope.   Respiratory: Negative for cough and shortness of breath.    Endocrine: Negative for polydipsia, polyphagia and polyuria.   Hematologic/Lymphatic: Negative for bleeding problem. Does not bruise/bleed easily.   Skin: Negative for dry skin, itching and rash.   Musculoskeletal: Negative for falls. Positive for left knee pain and  muscle weakness.   Gastrointestinal: Negative for abdominal pain and bowel incontinence.   Genitourinary: Negative for bladder incontinence and nocturia.   Neurological: Negative for disturbances in coordination, loss of balance and seizures.   Psychiatric/Behavioral: Negative for depression. The patient does not have insomnia.    Allergic/Immunologic: Negative for hives and persistent infections.     PAST MEDICAL HISTORY:    Past Medical History:   Diagnosis Date    Anxiety      reports panic attacks    Depression     Fibromyalgia     per pt    Insomnia     Precocious female puberty     Seizures     reports 2 incidents of possible seizures while giving blood    Syncope and collapse     Wrist fracture, bilateral        PAST SURGICAL HISTORY:   Past Surgical History:   Procedure Laterality Date    ARTHROSCOPY OF KNEE Right 12/19/2019    Procedure: ARTHROSCOPY, KNEE (Right);  Surgeon: Ranulfo House MD;  Location: [address];  Service: Orthopedics;  Laterality: Right;    ARTHROSCOPY OF KNEE Left 7/9/2020    Procedure: ARTHROSCOPY, KNEE;  Surgeon: Ranulfo House MD;  Location: [address];  Service: Orthopedics;  Laterality: Left;  WITH CHRONDROPLASTY     ARTHROTOMY OF KNEE Left 7/9/2020    Procedure: ARTHROTOMY, KNEE;  Surgeon: Ranulfo House MD;  Location: [facility] OR [address] [place] [address];  Service: Orthopedics;  Laterality: Left;  WITH CHRONDRAL ALLOGRAFT IMPLANTATION    chip      chip implant    CHONDROPLASTY OF KNEE Right 12/19/2019    Procedure: CHONDROPLASTY, KNEE patella;  Surgeon: Ranulfo House MD;  Location: [address];  Service: Orthopedics;  Laterality: Right;    FEMUR OSTEOTOMY Right 12/13/2018    Procedure: OSTEOTOMY, FEMUR - distal to correct valgus (Orthopediatrics plate, MTF bone wedge).  Right knee arthroscopy with MPFL reconstruction, gracilis allograft (Linvatec).  3 hrs.;  Surgeon: Ranulfo House MD;  Location: [address];  Service: Orthopedics;  Laterality: Right;    FEMUR OSTEOTOMY Left 7/9/2020    Procedure: OSTEOTOMY, FEMUR (Left) - distal femoral opening wedge (Orthopediatrics, MTF), knee scope, removal of tibial tubercle screws (Orthopediatrics 4.0 cannulated screws);  Surgeon: Ranulfo House MD;  Location: [address];  Service: Orthopedics;  Laterality: Left;  RQ NOON START,  MGsalvador notified 7/8/20 Onelia RN    HARDWARE REMOVAL Right 12/19/2019    Procedure: REMOVAL, HARDWARE righ distal femoral.;  Surgeon: Ranulfo House,  MD;  Location: [facility] OR [address] [place] [address];  Service: Orthopedics;  Laterality: Right;    HARDWARE REMOVAL Left 2020    Procedure: REMOVAL, HARDWARE;  Surgeon: Ranulfo House MD;  Location: [facility] OR [address] [place] [address];  Service: Orthopedics;  Laterality: Left;  TIBIA    HEMANGIOMA EXCISION      scalp    INGUINAL HERNIA REPAIR Left     INJECTION OF STEROID Right 2019    Procedure: INJECTION, STEROID;  Surgeon: Ranulfo House MD;  Location: [facility] OR [address] [place] [address];  Service: Orthopedics;  Laterality: Right;    KNEE JOINT MANIPULATION Right 2019    Procedure: MANIPULATION, KNEE;  Surgeon: Ranulfo House MD;  Location: [facility] OR [address] [place] [address];  Service: Orthopedics;  Laterality: Right;    KNEE SURGERY Left 2/16/15    TOE SURGERY Right     5 th toe       FAMILY HISTORY:   Family History   Problem Relation Age of Onset    Seizures Father     Anxiety disorder Father     Depression Father     Mental illness Father     Schizophrenia Father     Deep vein thrombosis Father        SOCIAL HISTORY:   Social History     Socioeconomic History    Marital status: Single   Tobacco Use    Smoking status: Never Smoker    Smokeless tobacco: Never Used   Substance and Sexual Activity    Alcohol use: No    Drug use: No    Sexual activity: Never     Birth control/protection: Abstinence   Social History Narrative    Lives at home with mother and father.       MEDICATIONS:     Current Outpatient Medications:     progesterone (PROMETRIUM) 200 MG capsule, Take by mouth., Disp: , Rfl:     aspirin (ECOTRIN) 81 MG EC tablet, Take 1 tablet (81 mg total) by mouth once daily., Disp: 30 tablet, Rfl: 0    erythromycin (ROMYCIN) ophthalmic ointment, SMARTSI.25 Inch(es) In Eye(s) Every Night, Disp: , Rfl:     gabapentin (NEURONTIN) 100 MG capsule, Take 3 capsules (300 mg total) by mouth every evening., Disp: 90 capsule, Rfl: 1    HYDROcodone-acetaminophen (NORCO) 5-325 mg per tablet, Take 1 tablet by mouth every 4 (four) hours as needed for Pain.  (Patient not taking: No sig reported), Disp: 42 tablet, Rfl: 0    ibuprofen (ADVIL,MOTRIN) 400 MG tablet, Take 1 tablet (400 mg total) by mouth every 4 (four) hours. (Patient not taking: No sig reported), Disp: 42 tablet, Rfl: 0    methocarbamoL (ROBAXIN) 750 MG Tab, Take 750 mg by mouth nightly., Disp: , Rfl:     norethindrone (MICRONOR) 0.35 mg tablet, Take 1 tablet (0.35 mg total) by mouth once daily., Disp: 90 tablet, Rfl: 3    ondansetron (ZOFRAN) 8 MG tablet, Take 1 tablet (8 mg total) by mouth every 8 (eight) hours as needed for Nausea. (Patient not taking: No sig reported), Disp: 30 tablet, Rfl: 0    predniSONE (DELTASONE) 20 MG tablet, Take 2 tablets daily for 5 days (Patient not taking: No sig reported), Disp: 10 tablet, Rfl: 0    ALLERGIES:   Review of patient's allergies indicates:  No Known Allergies    VITAL SIGNS:   /78   Pulse 88   Ht 5' (1.524 m)   Wt 102.1 kg (225 lb)   BMI 43.94 kg/m²      PHYSICAL EXAMINATION  General:  The patient is alert and oriented x 3.  Mood is pleasant.  Observation of ears, eyes and nose reveal no gross abnormalities.  No labored breathing observed.    LEFT KNEE EXAMINATION     OBSERVATION / INSPECTION   Gait:   Nonantalgic   Alignment:  Neutral   Scars:   None   Muscle atrophy: Mild  Effusion:  None   Warmth:  None   Discoloration:   none     TENDERNESS / CREPITUS (T / C):          T / C      T / C   Patella   - / -   Lateral joint line   - / -   Peripatellar medial  -  Medial joint line    - / -   Peripatellar lateral -  Medial plica   - / -   Patellar tendon -   Popliteal fossa  - / -   Quad tendon   -   Gastrocnemius   -   Prepatellar Bursa - / -   Quadricep   -   Tibial tubercle  -  Thigh/hamstring  -   Pes anserine/HS -  Fibula    -   ITB   - / -  Tibia     -   Tib/fib joint  - / -  LCL    -     MFC   - / -   MCL: Proximal  -    LFC   - / -    Distal   -          ROM: (* = pain)  PASSIVE   ACTIVE    Left :   5 / 0 / 145   5 / 0 / 145     Right :     5 / 0 / 145   5 / 0 / 145    Patellofemoral examination:  See above noted areas of tenderness.   Patella position    Subluxation / dislocation: Centered           Sup. / Inf;   Normal   Crepitus (PF):    Absent   Patellar Mobility:       Medial-lateral:   Normal    Superior-inferior:  Normal    Inferior tilt   Normal    Patellar tendon:  Normal   Lateral tilt:    Normal   J-sign:     None   Patellofemoral grind:   No pain       MENISCAL SIGNS:     Pain on terminal extension:  -  Pain on terminal flexion:  -  Bhupinders maneuver:  -   Squat     -     LIGAMENT EXAMINATION:  ACL / Lachman:  normal (-1 to 2mm)    PCL-Post.  drawer: normal 0 to 2mm  MCL- Valgus:  normal 0 to 2mm  LCL- Varus:  normal 0 to 2mm  Pivot shift: normal (Equal)   Dial Test: difference c/w other side   At 30° flexion: normal (< 5°)    At 90° flexion: normal (< 5°)   Reverse Pivot Shift:   normal (Equal)     STRENGTH: (* = with pain) PAINFUL SIDE   Quadricep   5/5   Hamstrin/5    EXTREMITY NEURO-VASCULAR EXAMINATION:   Sensation:  Grossly intact to light touch all dermatomal regions.   Motor Function:  Fully intact motor function at hip, knee, foot and ankle    DTRs;  quadriceps and  achilles 2+.  No clonus and downgoing Babinski.    Vascular status:  DP and PT pulses 2+, brisk capillary refill, symmetric.     OTHER FINDINGS:      IMAGING:   MRI left knee -   Impression:     1. Full-thickness chondral defect over the median ridge of the patella.  Interval resolution of patellar subchondral marrow edema.     ASSESSMENT:    Left Knee  Pain    PLAN:   1. PT at [facility]    2. Voltaren gel     3. F/u in 3 months    All questions were answered, pt will contact us for questions or concerns in the interim.

## 2022-08-10 NOTE — PROGRESS NOTES
[facility]   Physical Therapy Initial Evaluation     Date: 8/11/2022   Name: Denise Mosher  Clinic Number: [id number]    Therapy Diagnosis:   Encounter Diagnoses   Name Primary?    Left knee pain, unspecified chronicity Yes    Difficulty walking      Physician: Vern Nguyen MD    Physician Orders: PT Eval and Treat   Medical Diagnosis from Referral: M25.562 (ICD-10-CM) - Left knee pain, unspecified chronicity   Evaluation Date: 8/11/2022  Authorization Period Expiration: 7/21/2023  Plan of Care Expiration: 10/11/2022  Visit # / Visits authorized: auth pending    Original PHYSICAL THERAPY POC is for 8 visits total  FOTO: #1/3 on 8/11/2022    Precautions: Standard and Fall     Time In: 07:18am  Time Out: 08:18am  Total Appointment Time (timed & untimed codes): 60 minutes    SUBJECTIVE     Date of onset: in high school     History of current condition - Denise reports that she was in high school when her knee pain started. It became hard to kneel down and be in certain positions for too long. She had surgery on her knees but was still in pain. Patient reports having 6-7 surgeries for her knees in total and reports continued pain.     Fall/MVAs: about 2-3 falls in the past year. L knee gives out on her resulting in falls but no reported injuries resulting from the falls.     Imaging,   X-ray of L knee  Impression: Postoperative changes as discussed above, with progressive healing of the distal left femoral osteotomy since 02/10/2021.  No detrimental interval change since that time is observed.    X-ray of hip to ankle  FINDINGS:  I see no acute fracture.  Surgical plate and screws along the distal femoral osteotomy on the left are intact.  Sclerosis along the distal right femur related to healed osteotomy.    MRI of knee  Impression: 1. Full-thickness chondral defect over the median ridge of the patella.  Interval resolution of patellar subchondral marrow edema.    Prior  "Therapy: yes, for knees   Social History: lives with parents, 4 stairs to enter home with B hand rails   Occupation: [employer] employee, full time student   Prior Level of Function: mild limitation in daily routine; limitation in prolonged sitting and being too active because that flares up her knee     Current Level of Function: mild limitation in daily routine; limitation with mobility tasks due to (L) knee giving out at least twice a week, bed mobility tasks (turning in bed).    Pain:  Current 6/10, worst 9/10, best 0/10   Location: L lateral aspect of upper leg < L anterior knee   Description: intermittent, stabbing and burning   Aggravating Factors: worse at night   Easing Factors: none     Patients goals: "I want to strengthen it. I want to be able to do races and not have to do a week of recovery after them". Return to walking 5-10K. Go to gym with  and work knee.      Medical History:   Past Medical History:   Diagnosis Date    Anxiety     reports panic attacks    Depression     Fibromyalgia     per pt    Insomnia     Precocious female puberty     Seizures     reports 2 incidents of possible seizures while giving blood    Syncope and collapse     Wrist fracture, bilateral      Surgical History:   Denise Mosher  has a past surgical history that includes Knee surgery (Left, 2/16/15); Toe Surgery (Right); Inguinal hernia repair (Left); Hemangioma excision; chip; Femur Osteotomy (Right, 12/13/2018); Knee joint manipulation (Right, 7/5/2019); Injection of steroid (Right, 7/5/2019); Arthroscopy of knee (Right, 12/19/2019); Hardware Removal (Right, 12/19/2019); Chondroplasty of knee (Right, 12/19/2019); Femur Osteotomy (Left, 7/9/2020); Arthroscopy of knee (Left, 7/9/2020); Hardware Removal (Left, 7/9/2020); and Arthrotomy of knee (Left, 7/9/2020).    Medications:   Denise OSBORN has a current medication list which includes the following prescription(s): aspirin, diclofenac sodium, " "erythromycin, gabapentin, hydrocodone-acetaminophen, ibuprofen, methocarbamol, norethindrone, ondansetron, prednisone, and progesterone.    Allergies:   Review of patient's allergies indicates:  No Known Allergies     OBJECTIVE   8/11/2022:   Observation/Posture:   Hyper flexibility into B ankle plantar flexion     Range of Motion:   Knee Right  Left    Flexion 134 124   Extension -5 -6     Lower Extremity Strength  Right LE Left LE   Knee extension: 4+ Knee extension: 4- crepitus    Knee flexion: 4+ Knee flexion: 4+   Hip flexion: 4+ Hip flexion: 4+   Hip extension: 4 Hip extension: 4+   Hip abduction: 4 Hip abduction: 3+ pain from palpation    Hip adduction: 4+ Hip adduction: 4-   Ankle dorsiflexion: 4+ Ankle dorsiflexion: 4   Ankle plantarflexion: 25 B heel raises with unilateral HRA  Ankle plantarflexion: 25 B heel raises with unilateral HRA      Palpation:   L patellar tendon, inferior/anterior knee incision tenderness to palpation and hypersensitivity     Joint Mobility:  R PFJ, knee joint: impaired, painful   L PFJ, knee joint: empty end feel, impaired, painful      Function/balance:   Sit- stand: 8 X in 30", pushes with hands on legs   R SLS: 30"  L SLS: 30"  Squat: painful, achieves 60 deg squat, crepitus at B knees     Flexibility:   Popliteal Angle: R = WNL ; L = WNL    Edema:   Girth Measurement Joint line   Left 39.5 cm   Right 40.0 cm     Limitation/Restriction for FOTO Knee Survey    Therapist reviewed FOTO scores for Denise Mosher   FOTO documents entered into EPIC - see Media section.    Limitation Score: 46%  Predicted Score: 35%       TREATMENT     Total Treatment time (time-based codes) separate from Evaluation: 25 minutes    therapeutic exercises for minutes:   Patient education on nature of current condition and PHYSICAL THERAPY POC  Written HOME EXERCISE PROGRAM provided, reviewed, patient demo understanding   Standing L TKE, 3" hold, 3x10  Standing R/L SLR abduction, green band, " 3x10  Supine R SLR flexion, 3x10     manual therapy techniques for minutes:     neuromuscular re-education for minutes:     therapeutic activities for minutes:     gait training for minutes:     PATIENT EDUCATION AND HOME EXERCISES     Education provided:   - yes    Home Exercises Provided: yes. Exercises were reviewed and Denise was able to demonstrate them prior to the end of the session.  Denise demonstrated good  understanding of the education provided. See EMR under Patient Instructions for exercises provided during therapy sessions.    ASSESSMENT     Denise OSBORN is a 22 y.o. female referred to outpatient Physical Therapy with a medical diagnosis of M25.562 (ICD-10-CM) - Left knee pain, unspecified chronicity. Patient presents with decreased ROM, muscle strength, flexibility, joint mobility. Increased pain, stiffness, soft tissue restriction. Impaired posture, joint mechanics, gait pattern, balance.     Patient prognosis is Good.   Patient will benefit from skilled outpatient Physical Therapy to address the deficits stated above and in the chart below, provide patient /family education, and to maximize patientt's level of independence.     Plan of care discussed with patient: Yes  Patient's spiritual, cultural and educational needs considered and patient is agreeable to the plan of care and goals as stated.    Medical Necessity is demonstrated by the following  History  Co-morbidities and personal factors that may impact the plan of care Co-morbidities:    Anxiety       Depression    Fibromyalgia       Insomnia    Precocious female puberty    Seizures       Syncope and collapse    Wrist fracture, bilateral     Personal Factors:   coping style  social background  lifestyle     moderate   Examination  Body Structures and Functions, activity limitations and participation restrictions that may impact the plan of care Body Regions:   lower extremities  trunk    Body Systems:     ROM  strength  balance  gait  transfers  transitions  motor control  edema    Participation Restrictions:   Limitation in ADL, self care, social/recreatioanl tasks    Activity limitations:   Learning and applying knowledge  no deficits    General Tasks and Commands  no deficits    Communication  no deficits    Mobility  lifting and carrying objects  walking  driving (bike, car, motorcycle)    Self care  dressing  looking after one's health    Domestic Life  shopping  cooking  doing house work (cleaning house, washing dishes, laundry)  assisting others    Interactions/Relationships  basic interpersonal interactions  complex interpersonal interactions  relating with strangers  formal relationships  family relationships    Life Areas  school education  employment  basic economic transactions    Community and Social Life  community life  recreation and leisure         moderate   Clinical Presentation evolving clinical presentation with changing clinical characteristics moderate   Decision Making/ Complexity Score: moderate     Anticipated Barriers for therapy: co morbidities, chronicity of current condition, history of multiple surgery, fall risk      Goals:  Short Term Goals: 2 weeks   1 Patient will be I with HOME EXERCISE PROGRAM  Initial, not met   2 Patient will obtain 0 deg L knee ext to facilitate improved standing tolerance for work duties  Initial, not met    Patient will obtain 4+/5 L hip Abduction MMT to facilitate improved ability to participate in personal training sessions  Initial, not met     Long Term Goals: 4 weeks   1 Patient will obtain 134 deg L knee flex ACTIVE RANGE OF MOTION to facilitate improved tolerance for bed mobility tasks  Initial, not met   2 Patient will walk 3.1 miles with < 42 cm edema at R/L knee joint line circumferential measure afterwards  Initial, not met   3 Patient will obtain 12 sit - stand transfers, no hand push on LEs, within 30 sec to facilitate improved safety and  efficiency with community mobility tasks  Initial, not met     PLAN   Plan of care Certification: 8/11/2022 to 10/22/2022    Outpatient Physical Therapy 2 times weekly for 4 weeks to include the following interventions: Cervical/Lumbar Traction, Electrical Stimulation , re-eval, dry needling, , Gait Training, Manual Therapy, Moist Heat/ Ice, Neuromuscular Re-ed, Patient Education, Self Care, Therapeutic Activities and Therapeutic Exercise.     Physical therapist and physical therapy assistant(s) met face to face to discuss patient's treatment plan and progress towards established goals. Pt will be seen by a physical therapist minimally every 6th visit or every 30 days.    Potential for KX modifier and additional visits based on subjective report, objective examination, nature of current condition, co morbidities and current functional status.     Dominga Del Rio, PT      I CERTIFY THE NEED FOR THESE SERVICES FURNISHED UNDER THIS PLAN OF TREATMENT AND WHILE UNDER MY CARE   Physician's comments:     Physician's Signature: ___________________________________________________

## 2022-08-11 ENCOUNTER — CLINICAL SUPPORT (OUTPATIENT)
Dept: REHABILITATION | Facility: OTHER | Age: 22
End: 2022-08-11
Attending: ORTHOPAEDIC SURGERY
Payer: MEDICAID

## 2022-08-11 DIAGNOSIS — M25.562 LEFT KNEE PAIN, UNSPECIFIED CHRONICITY: Primary | ICD-10-CM

## 2022-08-11 DIAGNOSIS — R26.2 DIFFICULTY WALKING: ICD-10-CM

## 2022-08-11 PROCEDURE — 97162 PT EVAL MOD COMPLEX 30 MIN: CPT | Mod: PN

## 2022-08-11 PROCEDURE — 97110 THERAPEUTIC EXERCISES: CPT | Mod: PN

## 2022-08-11 NOTE — PLAN OF CARE
[facility]   Physical Therapy Initial Evaluation     Date: 8/11/2022   Name: Denise Mosher  Clinic Number: [id number]    Therapy Diagnosis:   Encounter Diagnoses   Name Primary?    Left knee pain, unspecified chronicity Yes    Difficulty walking      Physician: Vern Nguyen MD    Physician Orders: PT Eval and Treat   Medical Diagnosis from Referral: M25.562 (ICD-10-CM) - Left knee pain, unspecified chronicity   Evaluation Date: 8/11/2022  Authorization Period Expiration: 7/21/2023  Plan of Care Expiration: 10/11/2022  Visit # / Visits authorized: auth pending    Original PHYSICAL THERAPY POC is for 8 visits total  FOTO: #1/3 on 8/11/2022    Precautions: Standard and Fall     Time In: 07:18am  Time Out: 08:18am  Total Appointment Time (timed & untimed codes): 60 minutes    SUBJECTIVE     Date of onset: in high school     History of current condition - Denise reports that she was in high school when her knee pain started. It became hard to kneel down and be in certain positions for too long. She had surgery on her knees but was still in pain. Patient reports having 6-7 surgeries for her knees in total and reports continued pain.     Fall/MVAs: about 2-3 falls in the past year. L knee gives out on her resulting in falls but no reported injuries resulting from the falls.     Imaging,   X-ray of L knee  Impression: Postoperative changes as discussed above, with progressive healing of the distal left femoral osteotomy since 02/10/2021.  No detrimental interval change since that time is observed.    X-ray of hip to ankle  FINDINGS:  I see no acute fracture.  Surgical plate and screws along the distal femoral osteotomy on the left are intact.  Sclerosis along the distal right femur related to healed osteotomy.    MRI of knee  Impression: 1. Full-thickness chondral defect over the median ridge of the patella.  Interval resolution of patellar subchondral marrow edema.    Prior  "Therapy: yes, for knees   Social History: lives with parents, 4 stairs to enter home with B hand rails   Occupation: [employer] employee, full time student   Prior Level of Function: mild limitation in daily routine; limitation in prolonged sitting and being too active because that flares up her knee     Current Level of Function: mild limitation in daily routine; limitation with mobility tasks due to (L) knee giving out at least twice a week, bed mobility tasks (turning in bed).    Pain:  Current 6/10, worst 9/10, best 0/10   Location: L lateral aspect of upper leg < L anterior knee   Description: intermittent, stabbing and burning   Aggravating Factors: worse at night   Easing Factors: none     Patients goals: "I want to strengthen it. I want to be able to do races and not have to do a week of recovery after them". Return to walking 5-10K. Go to gym with  and work knee.      Medical History:   Past Medical History:   Diagnosis Date    Anxiety     reports panic attacks    Depression     Fibromyalgia     per pt    Insomnia     Precocious female puberty     Seizures     reports 2 incidents of possible seizures while giving blood    Syncope and collapse     Wrist fracture, bilateral      Surgical History:   Denise Mosher  has a past surgical history that includes Knee surgery (Left, 2/16/15); Toe Surgery (Right); Inguinal hernia repair (Left); Hemangioma excision; chip; Femur Osteotomy (Right, 12/13/2018); Knee joint manipulation (Right, 7/5/2019); Injection of steroid (Right, 7/5/2019); Arthroscopy of knee (Right, 12/19/2019); Hardware Removal (Right, 12/19/2019); Chondroplasty of knee (Right, 12/19/2019); Femur Osteotomy (Left, 7/9/2020); Arthroscopy of knee (Left, 7/9/2020); Hardware Removal (Left, 7/9/2020); and Arthrotomy of knee (Left, 7/9/2020).    Medications:   Denise OSBORN has a current medication list which includes the following prescription(s): aspirin, diclofenac sodium, " "erythromycin, gabapentin, hydrocodone-acetaminophen, ibuprofen, methocarbamol, norethindrone, ondansetron, prednisone, and progesterone.    Allergies:   Review of patient's allergies indicates:  No Known Allergies     OBJECTIVE   8/11/2022:   Observation/Posture:   Hyper flexibility into B ankle plantar flexion     Range of Motion:   Knee Right  Left    Flexion 134 124   Extension -5 -6     Lower Extremity Strength  Right LE Left LE   Knee extension: 4+ Knee extension: 4- crepitus    Knee flexion: 4+ Knee flexion: 4+   Hip flexion: 4+ Hip flexion: 4+   Hip extension: 4 Hip extension: 4+   Hip abduction: 4 Hip abduction: 3+ pain from palpation    Hip adduction: 4+ Hip adduction: 4-   Ankle dorsiflexion: 4+ Ankle dorsiflexion: 4   Ankle plantarflexion: 25 B heel raises with unilateral HRA  Ankle plantarflexion: 25 B heel raises with unilateral HRA      Palpation:   L patellar tendon, inferior/anterior knee incision tenderness to palpation and hypersensitivity     Joint Mobility:  R PFJ, knee joint: impaired, painful   L PFJ, knee joint: empty end feel, impaired, painful      Function/balance:   Sit- stand: 8 X in 30", pushes with hands on legs   R SLS: 30"  L SLS: 30"  Squat: painful, achieves 60 deg squat, crepitus at B knees     Flexibility:   Popliteal Angle: R = WNL ; L = WNL    Edema:   Girth Measurement Joint line   Left 39.5 cm   Right 40.0 cm     Limitation/Restriction for FOTO Knee Survey    Therapist reviewed FOTO scores for Denise Mosher   FOTO documents entered into EPIC - see Media section.    Limitation Score: 46%  Predicted Score: 35%       TREATMENT     Total Treatment time (time-based codes) separate from Evaluation: 25 minutes    therapeutic exercises for minutes:   Patient education on nature of current condition and PHYSICAL THERAPY POC  Written HOME EXERCISE PROGRAM provided, reviewed, patient demo understanding   Standing L TKE, 3" hold, 3x10  Standing R/L SLR abduction, green band, " 3x10  Supine R SLR flexion, 3x10     manual therapy techniques for minutes:     neuromuscular re-education for minutes:     therapeutic activities for minutes:     gait training for minutes:     PATIENT EDUCATION AND HOME EXERCISES     Education provided:   - yes    Home Exercises Provided: yes. Exercises were reviewed and Denise was able to demonstrate them prior to the end of the session.  Denise demonstrated good  understanding of the education provided. See EMR under Patient Instructions for exercises provided during therapy sessions.    ASSESSMENT     Denise OSBORN is a 22 y.o. female referred to outpatient Physical Therapy with a medical diagnosis of M25.562 (ICD-10-CM) - Left knee pain, unspecified chronicity. Patient presents with decreased ROM, muscle strength, flexibility, joint mobility. Increased pain, stiffness, soft tissue restriction. Impaired posture, joint mechanics, gait pattern, balance.     Patient prognosis is Good.   Patient will benefit from skilled outpatient Physical Therapy to address the deficits stated above and in the chart below, provide patient /family education, and to maximize patientt's level of independence.     Plan of care discussed with patient: Yes  Patient's spiritual, cultural and educational needs considered and patient is agreeable to the plan of care and goals as stated.    Medical Necessity is demonstrated by the following  History  Co-morbidities and personal factors that may impact the plan of care Co-morbidities:    Anxiety       Depression    Fibromyalgia       Insomnia    Precocious female puberty    Seizures       Syncope and collapse    Wrist fracture, bilateral     Personal Factors:   coping style  social background  lifestyle     moderate   Examination  Body Structures and Functions, activity limitations and participation restrictions that may impact the plan of care Body Regions:   lower extremities  trunk    Body Systems:     ROM  strength  balance  gait  transfers  transitions  motor control  edema    Participation Restrictions:   Limitation in ADL, self care, social/recreatioanl tasks    Activity limitations:   Learning and applying knowledge  no deficits    General Tasks and Commands  no deficits    Communication  no deficits    Mobility  lifting and carrying objects  walking  driving (bike, car, motorcycle)    Self care  dressing  looking after one's health    Domestic Life  shopping  cooking  doing house work (cleaning house, washing dishes, laundry)  assisting others    Interactions/Relationships  basic interpersonal interactions  complex interpersonal interactions  relating with strangers  formal relationships  family relationships    Life Areas  school education  employment  basic economic transactions    Community and Social Life  community life  recreation and leisure         moderate   Clinical Presentation evolving clinical presentation with changing clinical characteristics moderate   Decision Making/ Complexity Score: moderate     Anticipated Barriers for therapy: co morbidities, chronicity of current condition, history of multiple surgery, fall risk      Goals:  Short Term Goals: 2 weeks   1 Patient will be I with HOME EXERCISE PROGRAM  Initial, not met   2 Patient will obtain 0 deg L knee ext to facilitate improved standing tolerance for work duties  Initial, not met    Patient will obtain 4+/5 L hip Abduction MMT to facilitate improved ability to participate in personal training sessions  Initial, not met     Long Term Goals: 4 weeks   1 Patient will obtain 134 deg L knee flex ACTIVE RANGE OF MOTION to facilitate improved tolerance for bed mobility tasks  Initial, not met   2 Patient will walk 3.1 miles with < 42 cm edema at R/L knee joint line circumferential measure afterwards  Initial, not met   3 Patient will obtain 12 sit - stand transfers, no hand push on LEs, within 30 sec to facilitate improved safety and  efficiency with community mobility tasks  Initial, not met     PLAN   Plan of care Certification: 8/11/2022 to 10/22/2022    Outpatient Physical Therapy 2 times weekly for 4 weeks to include the following interventions: Cervical/Lumbar Traction, Electrical Stimulation , re-eval, dry needling, , Gait Training, Manual Therapy, Moist Heat/ Ice, Neuromuscular Re-ed, Patient Education, Self Care, Therapeutic Activities and Therapeutic Exercise.     Physical therapist and physical therapy assistant(s) met face to face to discuss patient's treatment plan and progress towards established goals. Pt will be seen by a physical therapist minimally every 6th visit or every 30 days.    Potential for KX modifier and additional visits based on subjective report, objective examination, nature of current condition, co morbidities and current functional status.     Dominga Del Rio, PT      I CERTIFY THE NEED FOR THESE SERVICES FURNISHED UNDER THIS PLAN OF TREATMENT AND WHILE UNDER MY CARE   Physician's comments:     Physician's Signature: ___________________________________________________

## 2022-08-17 ENCOUNTER — CLINICAL SUPPORT (OUTPATIENT)
Dept: REHABILITATION | Facility: OTHER | Age: 22
End: 2022-08-17
Payer: MEDICAID

## 2022-08-17 DIAGNOSIS — M25.562 CHRONIC PAIN OF LEFT KNEE: Primary | ICD-10-CM

## 2022-08-17 DIAGNOSIS — R26.2 DIFFICULTY WALKING: ICD-10-CM

## 2022-08-17 DIAGNOSIS — G89.29 CHRONIC PAIN OF LEFT KNEE: Primary | ICD-10-CM

## 2022-08-17 PROCEDURE — 97110 THERAPEUTIC EXERCISES: CPT | Mod: PN

## 2022-08-17 NOTE — PROGRESS NOTES
"[facility]   Physical Therapy Treatment Note     Name: Denise Mosher  Clinic Number: [id number]    Therapy Diagnosis:   Encounter Diagnoses   Name Primary?    Chronic pain of left knee Yes    Difficulty walking      Physician: Vern Nguyen MD    Visit Date: 8/17/2022    Physician Orders: PT Evaluate and Treat  Medical Diagnosis: Left knee pain, unspecified chronicity [M25.562]    Evaluation Date: 8/11/22  Authorization Period Expiration: 8/11/23  Plan of Care Certification Period: 10/11/22  Progress Note Due: 9/11/22    Visit # / Visits authorized: 1/1 (pending additional authorization following initial evaluation)   FOTO: 1/ 3       Precautions: Standard    Time In: 1000am  Time Out: 1100am  Total Billable Time: 60 minutes      SUBJECTIVE     Pt reports: "I feel about the same today. Squats and leg raises are hard.".  She was compliant with home exercise program.  Response to previous treatment: no change  Functional change: no change    Pain: 4/10  Location: left knee      OBJECTIVE     Objective Measures updated at progress report unless specified.       TREATMENT     Total Treatment time (time-based codes) separate from Evaluation: 60 minutes     Denise received the treatments listed below:        therapeutic exercises for 45 minutes:   Standing L TKE, 3" hold, 3x10  Standing R/L SLR abduction, green band, 3x10  Supine R SLR flexion, 3x10   +Quad set with 5 second hold 30x R/L   +Bridges with 3" hold 20x       Patient received therapeutic activities for 15  minutes for improved tolerance to functional activities including:  +Step ups on 6 inch step 3x10 repetitions  +Mini squats 3x6 repetitions- pain at L anterior knee  +Sit <> Stand 3x8 repetitions      PATIENT EDUCATION AND HOME EXERCISES     Home Exercises Provided and Patient Education Provided     Education provided:   PT educated pt on importance of compliance with their HEP this visit.     Written Home Exercises " Provided: yes. Exercises were reviewed and Denise was able to demonstrate them prior to the end of the session.  Denise demonstrated good  understanding of the education provided. See EMR under Patient Instructions for exercises provided during therapy sessions    ASSESSMENT   Pt tolerated tx session fairly today and completed all therapeutic exercises with minimal/no increase in left knee pain. Pt c/o left knee pain with most/all exercises and requires frequent verbal cueing/modifications.    Denise Is progressing well towards her goals.   Pt prognosis is Fair.     Pt will continue to benefit from skilled outpatient physical therapy to address the deficits listed in the problem list box on initial evaluation, provide pt/family education and to maximize pt's level of independence in the home and community environment.     Pt's spiritual, cultural and educational needs considered and pt agreeable to plan of care and goals.     Anticipated barriers to physical therapy: None    Goals:   Short Term Goals: 2 weeks   1 Patient will be I with HOME EXERCISE PROGRAM  Initial, not met   2 Patient will obtain 0 deg L knee ext to facilitate improved standing tolerance for work duties  Initial, not met     Patient will obtain 4+/5 L hip Abduction MMT to facilitate improved ability to participate in personal training sessions  Initial, not met      Long Term Goals: 4 weeks   1 Patient will obtain 134 deg L knee flex ACTIVE RANGE OF MOTION to facilitate improved tolerance for bed mobility tasks  Initial, not met   2 Patient will walk 3.1 miles with < 42 cm edema at R/L knee joint line circumferential measure afterwards  Initial, not met   3 Patient will obtain 12 sit - stand transfers, no hand push on LEs, within 30 sec to facilitate improved safety and efficiency with community mobility tasks           PLAN   Plan of care Certification: 8/11/2022 to 10/22/2022     Outpatient Physical Therapy 2 times weekly for 4 weeks to  include the following interventions: Cervical/Lumbar Traction, Electrical Stimulation , re-eval, dry needling, , Gait Training, Manual Therapy, Moist Heat/ Ice, Neuromuscular Re-ed, Patient Education, Self Care, Therapeutic Activities and Therapeutic Exercise.      Physical therapist and physical therapy assistant(s) met face to face to discuss patient's treatment plan and progress towards established goals. Pt will be seen by a physical therapist minimally every 6th visit or every 30 days.     Potential for KX modifier and additional visits based on subjective report, objective examination, nature of current condition, co morbidities and current functional status.         Lizzy Noe, PT

## 2022-08-26 NOTE — PROGRESS NOTES
"[facility]   Physical Therapy Treatment Note     Name: Denise Mosher  Clinic Number: [id number]    Therapy Diagnosis:   Encounter Diagnoses   Name Primary?    Chronic pain of left knee Yes    Difficulty walking      Physician: Vern Nguyen MD    Visit Date: 8/29/2022    Physician Orders: PT Evaluate and Treat  Medical Diagnosis: Left knee pain, unspecified chronicity [M25.562]    Evaluation Date: 8/11/22  Authorization Period Expiration: 8/11/23  Plan of Care Certification Period: 10/11/22  Progress Note Due: 9/11/22    Visit # / Visits authorized: 1/1 (pending additional authorization following initial evaluation)   FOTO: 1/ 3       Precautions: Standard    Time In: 1000am  Time Out: 1100am  Total Billable Time: 60 minutes      SUBJECTIVE     Pt reports:that she is doing well today. Pt went to the gym today with no increase in pain.  She was compliant with home exercise program.  Response to previous treatment: no change  Functional change: no change    Pain: 4/10  Location: left knee      OBJECTIVE     Objective Measures updated at progress report unless specified.       TREATMENT     Total Treatment time (time-based codes) separate from Evaluation: 60 minutes     Denise received the treatments listed below:        therapeutic exercises for 45 minutes:   Standing L TKE, 3" hold, 3x10  Standing R/L SLR abduction, green band, 3x10  Supine R SLR flexion, 3x10   Quad set with 5 second hold 30x R/L   Bridges with 3" hold 20x       Patient received therapeutic activities for 15  minutes for improved tolerance to functional activities including:  Step ups on 6 inch step 3x10 repetitions  Mini squats 3x6 repetitions- pain at L anterior knee  Sit <> Stand 3x8 repetitions      PATIENT EDUCATION AND HOME EXERCISES     Home Exercises Provided and Patient Education Provided     Education provided:   PT educated pt on importance of compliance with their HEP this visit.     Written Home " Exercises Provided: yes. Exercises were reviewed and Denise was able to demonstrate them prior to the end of the session.  Denise demonstrated good  understanding of the education provided. See EMR under Patient Instructions for exercises provided during therapy sessions    ASSESSMENT   Pt tolerate tx session fairly today. Provided updated HEP.    Denise Is progressing well towards her goals.   Pt prognosis is Fair.     Pt will continue to benefit from skilled outpatient physical therapy to address the deficits listed in the problem list box on initial evaluation, provide pt/family education and to maximize pt's level of independence in the home and community environment.     Pt's spiritual, cultural and educational needs considered and pt agreeable to plan of care and goals.     Anticipated barriers to physical therapy: None    Goals:   Short Term Goals: 2 weeks   1 Patient will be I with HOME EXERCISE PROGRAM  Initial, not met   2 Patient will obtain 0 deg L knee ext to facilitate improved standing tolerance for work duties  Initial, not met     Patient will obtain 4+/5 L hip Abduction MMT to facilitate improved ability to participate in personal training sessions  Initial, not met      Long Term Goals: 4 weeks   1 Patient will obtain 134 deg L knee flex ACTIVE RANGE OF MOTION to facilitate improved tolerance for bed mobility tasks  Initial, not met   2 Patient will walk 3.1 miles with < 42 cm edema at R/L knee joint line circumferential measure afterwards  Initial, not met   3 Patient will obtain 12 sit - stand transfers, no hand push on LEs, within 30 sec to facilitate improved safety and efficiency with community mobility tasks           PLAN   Plan of care Certification: 8/11/2022 to 10/22/2022     Outpatient Physical Therapy 2 times weekly for 4 weeks to include the following interventions: Cervical/Lumbar Traction, Electrical Stimulation , re-eval, dry needling, , Gait Training, Manual Therapy, Moist  Heat/ Ice, Neuromuscular Re-ed, Patient Education, Self Care, Therapeutic Activities and Therapeutic Exercise.      Physical therapist and physical therapy assistant(s) met face to face to discuss patient's treatment plan and progress towards established goals. Pt will be seen by a physical therapist minimally every 6th visit or every 30 days.     Potential for KX modifier and additional visits based on subjective report, objective examination, nature of current condition, co morbidities and current functional status.         Lizzy Noe, PT

## 2022-08-29 ENCOUNTER — CLINICAL SUPPORT (OUTPATIENT)
Dept: REHABILITATION | Facility: OTHER | Age: 22
End: 2022-08-29
Attending: ORTHOPAEDIC SURGERY
Payer: MEDICAID

## 2022-08-29 DIAGNOSIS — R26.2 DIFFICULTY WALKING: ICD-10-CM

## 2022-08-29 DIAGNOSIS — M25.562 CHRONIC PAIN OF LEFT KNEE: Primary | ICD-10-CM

## 2022-08-29 DIAGNOSIS — G89.29 CHRONIC PAIN OF LEFT KNEE: Primary | ICD-10-CM

## 2022-08-29 PROBLEM — Z98.890 S/P LEFT KNEE SURGERY: Status: RESOLVED | Noted: 2020-07-31 | Resolved: 2022-08-29

## 2022-08-29 PROCEDURE — 97110 THERAPEUTIC EXERCISES: CPT | Mod: PN

## 2022-09-08 ENCOUNTER — DOCUMENTATION ONLY (OUTPATIENT)
Dept: REHABILITATION | Facility: OTHER | Age: 22
End: 2022-09-08
Payer: MEDICAID

## 2022-09-08 NOTE — PROGRESS NOTES
Patient was scheduled for a physical therapy treatment appointment at [facility] on 9/8/22. Patient failed to appear for the appointment without prior notification today.     Lizzy Noe , PT

## 2022-09-14 NOTE — ADDENDUM NOTE
Addendum  created 07/11/20 2213 by Roxann Burkett MD    Clinical Note Signed       Lab results faxed to Dr. Alford's office @ [phone]

## 2022-11-09 ENCOUNTER — TELEPHONE (OUTPATIENT)
Dept: SPORTS MEDICINE | Facility: CLINIC | Age: 22
End: 2022-11-09
Payer: MEDICAID

## 2022-11-09 NOTE — TELEPHONE ENCOUNTER
----- Message from Evelyn Garcia sent at 11/8/2022  4:57 PM CST -----  Regarding: appt  Contact: [phone]  Pt requesting call back concerning an appt. Attempted to schedule, none found. Suggested other providers, pt prefers Dr Nguyen. Pls call

## 2022-11-18 ENCOUNTER — OFFICE VISIT (OUTPATIENT)
Dept: SPORTS MEDICINE | Facility: CLINIC | Age: 22
End: 2022-11-18
Payer: MEDICAID

## 2022-11-18 VITALS
HEART RATE: 77 BPM | HEIGHT: 60 IN | BODY MASS INDEX: 44.17 KG/M2 | WEIGHT: 225 LBS | DIASTOLIC BLOOD PRESSURE: 77 MMHG | SYSTOLIC BLOOD PRESSURE: 134 MMHG

## 2022-11-18 DIAGNOSIS — M25.562 LEFT KNEE PAIN, UNSPECIFIED CHRONICITY: Primary | ICD-10-CM

## 2022-11-18 PROCEDURE — 99213 OFFICE O/P EST LOW 20 MIN: CPT | Mod: PBBFAC | Performed by: ORTHOPAEDIC SURGERY

## 2022-11-18 PROCEDURE — 99214 PR OFFICE/OUTPT VISIT, EST, LEVL IV, 30-39 MIN: ICD-10-PCS | Mod: S$PBB,,, | Performed by: ORTHOPAEDIC SURGERY

## 2022-11-18 PROCEDURE — 99999 PR PBB SHADOW E&M-EST. PATIENT-LVL III: ICD-10-PCS | Mod: PBBFAC,,, | Performed by: ORTHOPAEDIC SURGERY

## 2022-11-18 PROCEDURE — 3078F PR MOST RECENT DIASTOLIC BLOOD PRESSURE < 80 MM HG: ICD-10-PCS | Mod: CPTII,,, | Performed by: ORTHOPAEDIC SURGERY

## 2022-11-18 PROCEDURE — 3008F BODY MASS INDEX DOCD: CPT | Mod: CPTII,,, | Performed by: ORTHOPAEDIC SURGERY

## 2022-11-18 PROCEDURE — 99999 PR PBB SHADOW E&M-EST. PATIENT-LVL III: CPT | Mod: PBBFAC,,, | Performed by: ORTHOPAEDIC SURGERY

## 2022-11-18 PROCEDURE — 1159F PR MEDICATION LIST DOCUMENTED IN MEDICAL RECORD: ICD-10-PCS | Mod: CPTII,,, | Performed by: ORTHOPAEDIC SURGERY

## 2022-11-18 PROCEDURE — 1159F MED LIST DOCD IN RCRD: CPT | Mod: CPTII,,, | Performed by: ORTHOPAEDIC SURGERY

## 2022-11-18 PROCEDURE — 3075F SYST BP GE 130 - 139MM HG: CPT | Mod: CPTII,,, | Performed by: ORTHOPAEDIC SURGERY

## 2022-11-18 PROCEDURE — 3078F DIAST BP <80 MM HG: CPT | Mod: CPTII,,, | Performed by: ORTHOPAEDIC SURGERY

## 2022-11-18 PROCEDURE — 3075F PR MOST RECENT SYSTOLIC BLOOD PRESS GE 130-139MM HG: ICD-10-PCS | Mod: CPTII,,, | Performed by: ORTHOPAEDIC SURGERY

## 2022-11-18 PROCEDURE — 99214 OFFICE O/P EST MOD 30 MIN: CPT | Mod: S$PBB,,, | Performed by: ORTHOPAEDIC SURGERY

## 2022-11-18 PROCEDURE — 3008F PR BODY MASS INDEX (BMI) DOCUMENTED: ICD-10-PCS | Mod: CPTII,,, | Performed by: ORTHOPAEDIC SURGERY

## 2022-11-18 NOTE — PROGRESS NOTES
CC: Left knee pain    Patient returns to clinic for follow up of left knee. She attended 3 sessions of PT with no  relief.  Patient states her knee is worse with no new injury or MARS. She is not interested in attending PT and states it does not help her.     Hx:   22 y.o. Female with a history of left knee pain who is referred to me by Dr. House.  She states that the pain is severe and not responding to any conservative care.      She reports that the pain and weakness. It also bothers her at night.    - mechanical symptoms, - instability    Is affecting ADLs.  Pain is 4/10 at it's worst.    Surgery Date: 6/15/2017      Surgeon(s) and Role:     * Ranulfo House MD - Primary     * Zandra Jenkins MD - Resident - Assisting           Pre-op Diagnosis:  Retained orthopedic hardware [Z96.9]  Patellofemoral pain syndrome of right knee [M22.2X1]     Post-op Diagnosis:  Post-Op Diagnosis Codes:     * Retained orthopedic hardware [Z96.9]     * Patellofemoral pain syndrome of right knee [M22.2X1]     Procedure(s) (LRB):  ARTHROSCOPY-KNEE, removal of two Synthes 4-5 cortical screws (Right)    REVIEW OF SYSTEMS:  Constitution: Negative. Negative for chills, fever and night sweats.   HENT: Negative for congestion and headaches.    Eyes: Negative for blurred vision, left vision loss and right vision loss.   Cardiovascular: Negative for chest pain and syncope.   Respiratory: Negative for cough and shortness of breath.    Endocrine: Negative for polydipsia, polyphagia and polyuria.   Hematologic/Lymphatic: Negative for bleeding problem. Does not bruise/bleed easily.   Skin: Negative for dry skin, itching and rash.   Musculoskeletal: Negative for falls. Positive for left knee pain and  muscle weakness.   Gastrointestinal: Negative for abdominal pain and bowel incontinence.   Genitourinary: Negative for bladder incontinence and nocturia.   Neurological: Negative for disturbances in coordination, loss of balance and  seizures.   Psychiatric/Behavioral: Negative for depression. The patient does not have insomnia.    Allergic/Immunologic: Negative for hives and persistent infections.     PAST MEDICAL HISTORY:    Past Medical History:   Diagnosis Date    Anxiety     reports panic attacks    Depression     Fibromyalgia     per pt    Insomnia     Precocious female puberty     Seizures     reports 2 incidents of possible seizures while giving blood    Syncope and collapse     Wrist fracture, bilateral        PAST SURGICAL HISTORY:   Past Surgical History:   Procedure Laterality Date    ARTHROSCOPY OF KNEE Right 12/19/2019    Procedure: ARTHROSCOPY, KNEE (Right);  Surgeon: Ranulfo House MD;  Location: [address];  Service: Orthopedics;  Laterality: Right;    ARTHROSCOPY OF KNEE Left 7/9/2020    Procedure: ARTHROSCOPY, KNEE;  Surgeon: Ranulfo House MD;  Location: [facility] OR [address] [place] [address];  Service: Orthopedics;  Laterality: Left;  WITH CHRONDROPLASTY     ARTHROTOMY OF KNEE Left 7/9/2020    Procedure: ARTHROTOMY, KNEE;  Surgeon: Ranulfo House MD;  Location: [facility] OR [address] [place] [address];  Service: Orthopedics;  Laterality: Left;  WITH CHRONDRAL ALLOGRAFT IMPLANTATION    chip      chip implant    CHONDROPLASTY OF KNEE Right 12/19/2019    Procedure: CHONDROPLASTY, KNEE patella;  Surgeon: Ranulfo House MD;  Location: [facility] OR [address] [place] [address];  Service: Orthopedics;  Laterality: Right;    FEMUR OSTEOTOMY Right 12/13/2018    Procedure: OSTEOTOMY, FEMUR - distal to correct valgus (Orthopediatrics plate, MTF bone wedge).  Right knee arthroscopy with MPFL reconstruction, gracilis allograft (L[employer]atec).  3 hrs.;  Surgeon: Ranulfo House MD;  Location: [address];  Service: Orthopedics;  Laterality: Right;    FEMUR OSTEOTOMY Left 7/9/2020    Procedure: OSTEOTOMY, FEMUR (Left) - distal femoral opening wedge (Orthopediatrics, MTF), knee scope, removal of tibial tubercle screws (Orthopediatrics 4.0 cannulated screws);  Surgeon: Ranulfo House MD;  Location:  [facility] OR 1ST FLR;  Service: Orthopedics;  Laterality: Left;  RQ NOGianna [place] notified 20 HDavis RN    HARDWARE REMOVAL Right 2019    Procedure: REMOVAL, HARDWARE righ distal femoral.;  Surgeon: Ranulfo House MD;  [facility];  Service: Orthopedics;  Laterality: Right;    HARDWARE REMOVAL Left 2020    Procedure: REMOVAL, HARDWARE;  Surgeon: Ranulfo House MD;  [facility];  Service: Orthopedics;  Laterality: Left;  TIBIA    HEMANGIOMA EXCISION      scalp    INGUINAL HERNIA REPAIR Left     INJECTION OF STEROID Right 2019    Procedure: INJECTION, STEROID;  Surgeon: Ranulfo Hosue MD;  [facility];  Service: Orthopedics;  Laterality: Right;    KNEE JOINT MANIPULATION Right 2019    Procedure: MANIPULATION, KNEE;  Surgeon: Ranulfo House MD;  Location: [facility] OR [address] [place] [address];  Service: Orthopedics;  Laterality: Right;    KNEE SURGERY Left 2/16/15    TOE SURGERY Right     5 th toe       FAMILY HISTORY:   Family History   Problem Relation Age of Onset    Seizures Father     Anxiety disorder Father     Depression Father     Mental illness Father     Schizophrenia Father     Deep vein thrombosis Father        SOCIAL HISTORY:   Social History     Socioeconomic History    Marital status: Single   Tobacco Use    Smoking status: Never    Smokeless tobacco: Never   Substance and Sexual Activity    Alcohol use: No    Drug use: No    Sexual activity: Never     Birth control/protection: Abstinence   Social History Narrative    Lives at home with mother and father.       MEDICATIONS:     Current Outpatient Medications:     erythromycin (ROMYCIN) ophthalmic ointment, SMARTSI.25 Inch(es) In Eye(s) Every Night, Disp: , Rfl:     methocarbamoL (ROBAXIN) 750 MG Tab, Take 750 mg by mouth nightly., Disp: , Rfl:     progesterone (PROMETRIUM) 200 MG capsule, Take by mouth., Disp: , Rfl:     aspirin (ECOTRIN) 81 MG EC tablet, Take 1 tablet (81 mg total) by mouth once  daily., Disp: 30 tablet, Rfl: 0    diclofenac sodium (VOLTAREN) 1 % Gel, Apply 2 g topically 4 (four) times daily. for 10 days, Disp: 10 g, Rfl: 0    gabapentin (NEURONTIN) 100 MG capsule, Take 3 capsules (300 mg total) by mouth every evening., Disp: 90 capsule, Rfl: 1    HYDROcodone-acetaminophen (NORCO) 5-325 mg per tablet, Take 1 tablet by mouth every 4 (four) hours as needed for Pain. (Patient not taking: Reported on 8/21/2020), Disp: 42 tablet, Rfl: 0    ibuprofen (ADVIL,MOTRIN) 400 MG tablet, Take 1 tablet (400 mg total) by mouth every 4 (four) hours. (Patient not taking: Reported on 6/28/2022), Disp: 42 tablet, Rfl: 0    norethindrone (MICRONOR) 0.35 mg tablet, Take 1 tablet (0.35 mg total) by mouth once daily., Disp: 90 tablet, Rfl: 3    ondansetron (ZOFRAN) 8 MG tablet, Take 1 tablet (8 mg total) by mouth every 8 (eight) hours as needed for Nausea. (Patient not taking: Reported on 8/21/2020), Disp: 30 tablet, Rfl: 0    predniSONE (DELTASONE) 20 MG tablet, Take 2 tablets daily for 5 days (Patient not taking: Reported on 2/26/2022), Disp: 10 tablet, Rfl: 0    ALLERGIES:   Review of patient's allergies indicates:  No Known Allergies    VITAL SIGNS:   /77   Pulse 77   Ht 5' (1.524 m)   Wt 102.1 kg (225 lb)   BMI 43.94 kg/m²      PHYSICAL EXAMINATION  General:  The patient is alert and oriented x 3.  Mood is pleasant.  Observation of ears, eyes and nose reveal no gross abnormalities.  No labored breathing observed.    LEFT KNEE EXAMINATION     OBSERVATION / INSPECTION   Gait:   Nonantalgic   Alignment:  Neutral   Scars:   None   Muscle atrophy: Mild  Effusion:  None   Warmth:  None   Discoloration:   none     TENDERNESS / CREPITUS (T / C):          T / C      T / C   Patella   - / -   Lateral joint line   - / -   Peripatellar medial  -  Medial joint line    - / -   Peripatellar lateral -  Medial plica   - / -   Patellar tendon -   Popliteal fossa  - / -   Quad tendon   -   Gastrocnemius    -   Prepatellar Bursa - / -   Quadricep   -   Tibial tubercle  -  Thigh/hamstring  -   Pes anserine/HS -  Fibula    -   ITB   - / -  Tibia     -   Tib/fib joint  - / -  LCL    -     MFC   - / -   MCL: Proximal  -    LFC   - / -    Distal   -          ROM: (* = pain)  PASSIVE   ACTIVE    Left :   5 / 0 / 145   5 / 0 / 145     Right :    5 / 0 / 145   5 / 0 / 145    Patellofemoral examination:  See above noted areas of tenderness.   Patella position    Subluxation / dislocation: Centered           Sup. / Inf;   Normal   Crepitus (PF):    Absent   Patellar Mobility:       Medial-lateral:   Normal    Superior-inferior:  Normal    Inferior tilt   Normal    Patellar tendon:  Normal   Lateral tilt:    Normal   J-sign:     None   Patellofemoral grind:   No pain       MENISCAL SIGNS:     Pain on terminal extension:  -  Pain on terminal flexion:  -  Bhupinders maneuver:  -   Squat     -     LIGAMENT EXAMINATION:  ACL / Lachman:  normal (-1 to 2mm)    PCL-Post.  drawer: normal 0 to 2mm  MCL- Valgus:  normal 0 to 2mm  LCL- Varus:  normal 0 to 2mm  Pivot shift: normal (Equal)   Dial Test: difference c/w other side   At 30° flexion: normal (< 5°)    At 90° flexion: normal (< 5°)   Reverse Pivot Shift:   normal (Equal)     STRENGTH: (* = with pain) PAINFUL SIDE   Quadricep   5/5   Hamstrin/5    EXTREMITY NEURO-VASCULAR EXAMINATION:   Sensation:  Grossly intact to light touch all dermatomal regions.   Motor Function:  Fully intact motor function at hip, knee, foot and ankle    DTRs;  quadriceps and  achilles 2+.  No clonus and downgoing Babinski.    Vascular status:  DP and PT pulses 2+, brisk capillary refill, symmetric.     OTHER FINDINGS:      IMAGING:   MRI left knee -   Impression:     1. Full-thickness chondral defect over the median ridge of the patella.  Interval resolution of patellar subchondral marrow edema.     ASSESSMENT:    Left Knee  Pain    PLAN:   Referral to Dr. Souza for cartilage restoration  consultation.        All questions were answered, pt will contact us for questions or concerns in the interim.

## 2022-11-29 NOTE — PROGRESS NOTES
Subjective:          Chief Complaint: Denise Mosher is a 22 y.o. female who had concerns including Pain of the Left Knee.    Denise Mosher is a 22 y.o. female, [place] Yates here for evaluation of her bilateral  Knee. She is a referral from Dr. Nguyen who was referred this patient from Dr. House. She is s/p the below left knee procedures. Her left knee pain is greater than right. She has continued mechanical symptoms of the left knee despite the below procedures and conservative measures including physical therapy, CSI, weight reduction.          DATE OF PROCEDURE:  7/9/2020     PREOPERATIVE DIAGNOSES:  1.  Left knee pain.  2.  Left genu valgum.     POSTOPERATIVE DIAGNOSES:  1.  Left knee pain.  2.  Left genu valgum.  3. Left patellar chondromalacia     PROCEDURES:  1. Left distal femoral varus-producing osteotomy with plate fixation.  2. Left knee arthroscopy with chondroplasty.  3. Left knee arthrotomy with chondral allograft implantation (Cartimax)     ATTENDING PHYSICIAN:  Ranulfo House M.D.     ASSISTANT: Edward Alvarado M.D. (RES).     ANESTHESIA:  General and a femoral nerve catheter.     ESTIMATED BLOOD LOSS:  20 mL.     COMPLICATIONS:  None.     IMPLANTS USED:  1.  Orthopediatrics 4.5 mm distal femoral osteotomy plate with 7 screws.  2.  MTF 10 mm Lopez wedge  3.  Synthetic bone graft  4. Cartimax cartilage allograft    DATE OF PROCEDURE: (2/16/2015)     PREOPERATIVE DIAGNOSES: Left knee patellar chondromalacia with patellar instability.  POSTOPERATIVE DIAGNOSES: Left knee patellar chondromalacia with patellar instability.  PROCEDURES:  1. Left knee arthroscopy.  2. Left tibial tubercle osteotomy with anteromedial tibial tubercle transfer   ATTENDING PHYSICIAN: Ranulfo House M.D.  ASSISTANTS: Eduardo Mejia M.D. (RES)  ANESTHESIA: General and a regional block.  ESTIMATED BLOOD LOSS: 20 mL.  COMPLICATIONS: None.  IMPLANTS USED: Two Ortho-Pediatrics 4-0 cannulated screws        Review of Systems    Constitutional: Negative for fever and night sweats.   HENT:  Negative for hearing loss.    Eyes:  Negative for blurred vision and visual disturbance.   Cardiovascular:  Negative for chest pain and leg swelling.   Respiratory:  Negative for shortness of breath.    Endocrine: Negative for polyuria.   Hematologic/Lymphatic: Negative for bleeding problem.   Skin:  Negative for rash.   Musculoskeletal:  Negative for back pain, joint pain, joint swelling, muscle cramps and muscle weakness.   Gastrointestinal:  Negative for melena.   Genitourinary:  Negative for hematuria.   Neurological:  Negative for loss of balance, numbness and paresthesias.   Psychiatric/Behavioral:  Negative for altered mental status.                  Objective:        General: Denise OSOBRN is well-developed, well-nourished, appears stated age, in no acute distress, alert and oriented to time, place and person.     General    Vitals reviewed.  Constitutional: She is oriented to person, place, and time. She appears well-developed and well-nourished. No distress.   HENT:   Mouth/Throat: No oropharyngeal exudate.   Eyes: Right eye exhibits no discharge. Left eye exhibits no discharge.   Pulmonary/Chest: Effort normal and breath sounds normal. No respiratory distress.   Neurological: She is alert and oriented to person, place, and time. She has normal reflexes. No cranial nerve deficit. Coordination normal.   Psychiatric: She has a normal mood and affect. Her behavior is normal. Judgment and thought content normal.     General Musculoskeletal Exam   Gait: abnormal       Right Knee Exam     Inspection   Erythema: absent  Scars: present  Swelling: absent  Effusion: absent  Deformity: absent  Bruising: absent    Tenderness   The patient is tender to palpation of the medial joint line and lateral joint line.    Crepitus   The patient has crepitus of the patella (Moderate).    Range of Motion   Extension:  0   Flexion:  140     Tests   Meniscus   Bhupinder:   Medial - negative Lateral - negative  Ligament Examination   Lachman: normal (-1 to 2mm)   PCL-Posterior Drawer: normal (0 to 2mm)     MCL - Valgus: normal (0 to 2mm)  LCL - Varus: normal  Pivot Shift: normal (Equal)  Reverse Pivot Shift: normal (Equal)  Dial Test at 30 degrees: normal (< 5 degrees)  Dial Test at 90 degrees: normal (< 5 degrees)  Posterior Sag Test: negative  Posterolateral Corner: stable  Patella   Patellar apprehension: negative  Passive Patellar Tilt: neutral  Patellar Tracking: normal  Patellar Glide (quadrants): Lateral - 1   Medial - 2  Q-Angle at 90 degrees: normal  Patellar Grind: positive  J-Sign: none    Other   Meniscal Cyst: absent  Popliteal (Baker's) Cyst: absent  Sensation: normal    Left Knee Exam     Inspection   Erythema: absent  Scars: present  Swelling: absent  Effusion: absent  Deformity: absent  Bruising: absent    Tenderness   The patient tender to palpation of the patella, medial joint line and lateral joint line.    Crepitus   Left knee crepitus location: Severe.    Range of Motion   Extension:  0   Flexion:  140     Tests   Meniscus   Bhupinder:  Medial - negative Lateral - negative  Stability   Lachman: normal (-1 to 2mm)   PCL-Posterior Drawer: normal (0 to 2mm)  MCL - Valgus: normal (0 to 2mm)  LCL - Varus: normal (0 to 2mm)  Pivot Shift: normal (Equal)  Reverse Pivot Shift: normal (Equal)  Dial Test at 30 degrees: normal (< 5 degrees)  Dial Test at 90 degrees: normal (< 5 degrees)  Posterior Sag Test: negative  Posterolateral Corner: stable  Patella   Patellar apprehension: negative  Passive Patellar Tilt: neutral  Patellar Tracking: normal  Patellar Glide (Quadrants): Lateral - 1 Medial - 2  Q-Angle at 90 degrees: normal  Patellar Grind: positive  J-Sign: J sign absent    Other   Meniscal Cyst: absent  Popliteal (Baker's) Cyst: absent  Sensation: normal    Right Hip Exam     Tests   Isaiah: negative  Left Hip Exam     Tests   Isaiah: negative          Muscle Strength    Right Lower Extremity   Hip Abduction: 5/5   Quadriceps:  5/5   Hamstrin/5   Left Lower Extremity   Hip Abduction: 5/5   Quadriceps:  5/5   Hamstrin/5     Reflexes     Left Side  Achilles:  2+  Quadriceps:  2+    Right Side   Achilles:  2+  Quadriceps:  2+    Vascular Exam     Right Pulses  Dorsalis Pedis:      2+  Posterior Tibial:      2+        Left Pulses  Dorsalis Pedis:      2+  Posterior Tibial:      2+      Radiographic Findings:    MRI Knee Without Contrast Left  Narrative: EXAMINATION:  MRI KNEE WITHOUT CONTRAST LEFT    CLINICAL HISTORY:  Left knee cartilage defect s/p Cartimax, now with pain and clicking.;Chondromalacia, left knee    TECHNIQUE:  Multiplanar, multisequence MRI of the left knee performed per metallic artifact reduction protocol without contrast.  Lateral plate and screws along the distal femur result in metallic artifact partially obscuring the patella.    COMPARISON:  Radiograph 2022; MRI 2021, 2020    FINDINGS:  Menisci:  No tear of the medial or lateral meniscus.    Ligaments:  ACL, PCL, MCL, and LCL complex are intact.    Tendons:  Extensor mechanism is maintained.    Cartilage:    Patellofemoral: Status post patellar Cartimax graft placement.  Full-thickness defect along the patellar ridge at the level of the Cartimax graft measuring approximately 1 cm CC (sagittal series 12, image 17).  Adjacent synovitis.  Interval resolution of subchondral marrow edema when compared to MRI 2021.    Medial tibiofemoral: Articular cartilage is maintained.    Lateral tibiofemoral: Articular cartilage is maintained.    Bone: Postsurgical changes of distal femoral osteotomy and tubercle tibial osteotomy.  No fracture.  No diffuse marrow replacement process.    Miscellaneous: No joint effusion.  Impression: 1. Full-thickness chondral defect over the median ridge of the patella.  Interval resolution of patellar subchondral marrow edema.    Electronically signed by  resident: Ugo Solares  Date:    04/05/2022  Time:    08:41    Electronically signed by: Sean Mansfield MD  Date:    04/05/2022  Time:    11:50  X-Ray Hip to Ankle  Narrative: EXAMINATION:  XR HIP TO ANKLE    CLINICAL HISTORY:  Chondromalacia, left knee    TECHNIQUE:  Frontal radiographs obtained from the hips through the ankles for purposes of orthopedic measurement.    COMPARISON:  02/10/2021    FINDINGS:  I see no acute fracture.  Surgical plate and screws along the distal femoral osteotomy on the left are intact.  Sclerosis along the distal right femur related to healed osteotomy.  Impression: Please see above.    Electronically signed by: Liliya Ferris  Date:    04/05/2022  Time:    08:16  X-Ray Knee 3 View Left  Narrative: EXAMINATION:  XR KNEE 3 VIEW LEFT    TECHNIQUE:  Three views of the left knee were obtained, with AP, lateral, and patellar projections submitted.    COMPARISON:  Comparison is made to 02/10/2021.    FINDINGS:  Postoperative changes are again identified relating to a prior distal left femoral osteotomy procedure, a laterally positioned plate/screw construct again seen bridging this osteotomy site in anatomic position/alignment.  A remnant of a threaded pin in the proximal left tibia oriented in an anteroposterior dimension at the level of the tibial tubercle is again incidentally seen as well.  The distal femoral osteotomy site demonstrates progressive interval healing since 02/10/2021.  No evidence of recent fracture, lytic destructive process, or hardware failure.  No interval tibiofemoral or patellofemoral joint space narrowing.  No significant joint effusion.  Impression: Postoperative changes as discussed above, with progressive healing of the distal left femoral osteotomy since 02/10/2021.  No detrimental interval change since that time is observed.    Electronically signed by: Bhanu Blankenship MD  Date:    04/05/2022  Time:    07:33         These findings were discussed and reviewed with  the patient.           Assessment:       Encounter Diagnoses   Name Primary?    Left knee pain, unspecified chronicity Yes    Morbid obesity with BMI of 40.0-44.9, adult     Patellar instability of right knee     Patellofemoral pain syndrome of right knee     Acquired genu valgum of right knee     Chondromalacia patellae of left knee           Plan:       1. RTC in 3 weeks with Dr. Kal Souza for virtual result review. IKDC, SF-12 and KOOS was filled out today in clinic. Patient will fill out IKDC, SF-12 and KOOS on return.    2. Medications: Refills of the following Rx were sent to patients preferred Pharmacy:  No Refills Needed Today    3. Physical Therapy: [facility]     4. HEP: N/A    5. Procedures/Procedural Planning:   Given extreme dysfunction and discomfort, and non-diagnostic x-ray imaging, we will obtain CT MRI w/ T2 mapping and cartilage specific sequencing imaging for further evaluation of the left knee.     Pending MRI/CT results:     85354 -osteochondral allograft knee (patella and femoral condyles)   91717- arthroscopy with chondroplasty  80839- arthroscopy with synovectomy   90582- removal of hardware (distal femoral osteotomy plate)     6. DME: N/A    7. Work/Sport Status: works at [employer]    8. Visit Summary: Patient needs cartilage procedure but she may have limitations due to insurance. We will proceed with official plan following MRI and CT. Preliminary booking sheet filled out today. Patient referred to nutrition services and [employer] program. Discussed needing BMI below 40, preferably 35 in order to proceed with surgery                           [facility] patient questionnaires have been collected today.

## 2022-11-30 ENCOUNTER — OFFICE VISIT (OUTPATIENT)
Dept: SPORTS MEDICINE | Facility: CLINIC | Age: 22
End: 2022-11-30
Payer: MEDICAID

## 2022-11-30 ENCOUNTER — HOSPITAL ENCOUNTER (OUTPATIENT)
Dept: RADIOLOGY | Facility: HOSPITAL | Age: 22
Discharge: HOME | End: 2022-11-30
Attending: ORTHOPAEDIC SURGERY
Payer: MEDICAID

## 2022-11-30 VITALS
DIASTOLIC BLOOD PRESSURE: 77 MMHG | WEIGHT: 232 LBS | SYSTOLIC BLOOD PRESSURE: 126 MMHG | HEIGHT: 60 IN | HEART RATE: 78 BPM | BODY MASS INDEX: 45.55 KG/M2

## 2022-11-30 DIAGNOSIS — M25.562 LEFT KNEE PAIN, UNSPECIFIED CHRONICITY: Primary | ICD-10-CM

## 2022-11-30 DIAGNOSIS — M22.42 CHONDROMALACIA PATELLAE OF LEFT KNEE: ICD-10-CM

## 2022-11-30 DIAGNOSIS — E66.01 MORBID OBESITY WITH BMI OF 40.0-44.9, ADULT: ICD-10-CM

## 2022-11-30 DIAGNOSIS — M25.562 LEFT KNEE PAIN, UNSPECIFIED CHRONICITY: ICD-10-CM

## 2022-11-30 DIAGNOSIS — M22.2X1 PATELLOFEMORAL PAIN SYNDROME OF RIGHT KNEE: ICD-10-CM

## 2022-11-30 DIAGNOSIS — M25.361 PATELLAR INSTABILITY OF RIGHT KNEE: ICD-10-CM

## 2022-11-30 DIAGNOSIS — M22.2X1 PATELLOFEMORAL SYNDROME OF BOTH KNEES: ICD-10-CM

## 2022-11-30 DIAGNOSIS — M22.2X2 PATELLOFEMORAL SYNDROME OF BOTH KNEES: ICD-10-CM

## 2022-11-30 PROBLEM — M21.062 ACQUIRED GENU VALGUM OF LEFT KNEE: Status: RESOLVED | Noted: 2020-07-09 | Resolved: 2022-11-30

## 2022-11-30 PROCEDURE — 99999 PR PBB SHADOW E&M-EST. PATIENT-LVL IV: CPT | Mod: PBBFAC,,, | Performed by: ORTHOPAEDIC SURGERY

## 2022-11-30 PROCEDURE — 3078F PR MOST RECENT DIASTOLIC BLOOD PRESSURE < 80 MM HG: ICD-10-PCS | Mod: CPTII,,, | Performed by: ORTHOPAEDIC SURGERY

## 2022-11-30 PROCEDURE — 99215 PR OFFICE/OUTPT VISIT, EST, LEVL V, 40-54 MIN: ICD-10-PCS | Mod: S$PBB,,, | Performed by: ORTHOPAEDIC SURGERY

## 2022-11-30 PROCEDURE — 73564 X-RAY EXAM KNEE 4 OR MORE: CPT | Mod: TC,50

## 2022-11-30 PROCEDURE — 73564 X-RAY EXAM KNEE 4 OR MORE: CPT | Mod: 26,50,, | Performed by: RADIOLOGY

## 2022-11-30 PROCEDURE — 99214 OFFICE O/P EST MOD 30 MIN: CPT | Mod: PBBFAC | Performed by: ORTHOPAEDIC SURGERY

## 2022-11-30 PROCEDURE — 1159F PR MEDICATION LIST DOCUMENTED IN MEDICAL RECORD: ICD-10-PCS | Mod: CPTII,,, | Performed by: ORTHOPAEDIC SURGERY

## 2022-11-30 PROCEDURE — 99215 OFFICE O/P EST HI 40 MIN: CPT | Mod: S$PBB,,, | Performed by: ORTHOPAEDIC SURGERY

## 2022-11-30 PROCEDURE — 3008F BODY MASS INDEX DOCD: CPT | Mod: CPTII,,, | Performed by: ORTHOPAEDIC SURGERY

## 2022-11-30 PROCEDURE — 99999 PR PBB SHADOW E&M-EST. PATIENT-LVL IV: ICD-10-PCS | Mod: PBBFAC,,, | Performed by: ORTHOPAEDIC SURGERY

## 2022-11-30 PROCEDURE — 3008F PR BODY MASS INDEX (BMI) DOCUMENTED: ICD-10-PCS | Mod: CPTII,,, | Performed by: ORTHOPAEDIC SURGERY

## 2022-11-30 PROCEDURE — 3074F SYST BP LT 130 MM HG: CPT | Mod: CPTII,,, | Performed by: ORTHOPAEDIC SURGERY

## 2022-11-30 PROCEDURE — 73564 XR KNEE ORTHO BILAT WITH FLEXION: ICD-10-PCS | Mod: 26,50,, | Performed by: RADIOLOGY

## 2022-11-30 PROCEDURE — 1159F MED LIST DOCD IN RCRD: CPT | Mod: CPTII,,, | Performed by: ORTHOPAEDIC SURGERY

## 2022-11-30 PROCEDURE — 3074F PR MOST RECENT SYSTOLIC BLOOD PRESSURE < 130 MM HG: ICD-10-PCS | Mod: CPTII,,, | Performed by: ORTHOPAEDIC SURGERY

## 2022-11-30 PROCEDURE — 3078F DIAST BP <80 MM HG: CPT | Mod: CPTII,,, | Performed by: ORTHOPAEDIC SURGERY

## 2022-11-30 NOTE — PATIENT INSTRUCTIONS
The knee is the most frequently injured joint in the body. Most injuries are due to the extreme stresses during twisting or turning activities or sports. The knee joint is made up of three bones, four major ligaments and two types of cartilage.    FEMUR (Thigh Bone)  The femoral condyles are the two rounded prominences at the end of the femur. The motion of the condyles include rocking, gliding and rotating. Any abnormal surface structure or cartilage damage can lead to cartilage breakdown and arthritis (loss of cartilage padding).    TIBIA (Shin Bone)  The Tibia meets the Femur at the knee in two areas on which the Femur rides. This area is called the Tibial Plateau.    PATELLA (Knee Cap)  The Patella is a bone that lies within the quadriceps tendon. It rides in the shallow groove over the front part if the Femur called the Trochlea. The Patella acts as a lever arm to help the quadriceps muscle extend the knee.    Articular Cartilage covers the ends of these bones at the knee joint. This glistening white substance has the consistency of firm rubber but is actually a mixture of collagen and special large sponge-like molecules all maintained by living cartilage cells (chondrocytes). With normal joint fluid for lubrication, the surface is more slippery than ice on ice and allows smooth and easy knee joint motion.      MENISCUS  The other type of knee cartilage is the Meniscal Cartilage (fibrocartilage). These C-shaped pads are found between the thigh bone and shin bone -- one on each side -- thus called the Medial Meniscus (inner thigh aspect) and Lateral Meniscus (outer aspect). The menisci are attached to the Tibial Plateaus, and serve to cushion and transfer joint force more evenly to the tibia. They accomplish this by distributing joint forces over a larger area of the joint -- transferring force from the curved femoral condylar margins to the flatter tibial plateaus. Damage to the meniscal cushion may result  "in increased stress on the articular cartilage of the tibia and femur and early arthritis.      The smooth, white shiny covering of the bones in the joint is known as Articular Cartilage, and is made of a material called "hyaline cartilage". Damage to this articular cartilage can occur from trauma (such as a fall or car accident), but more often, it is the result of repetitive injuries over a long period of time.    Articular cartilage injuries are common, occurring in 20- 70% of knee injuries. The function of articular cartilage is to optimize joint function by reducing friction and increasing shock absorption. Articular cartilage is similar to the "tread on a car tire".    Injuries to the articular cartilage have a low capacity for healing. Both superficial and full-thickness cartilage injuries may progress to the mechanical wear and breakdown of the cartilage matrix.  The breakdown of articular cartilage will eventually cause osteoarthritis, which is a very serious painful condition. With a full-thickness cartilage injury, continued activity may cause the articular cartilage injury to progress rapidly to traumatic arthritis. The larger the initial cartilage injury, the faster the potential progression of arthritis. Articular cartilage injury or wear leads to joint pain.      Common symptoms include pain when the joint is moved or loaded (like walking or running). Catching, locking, or creaking (crepitation) may occur with joint motion or weight bearing (like twisting or standing  from a seated position). Articular cartilage damage may be superficial (partial), deep (complete full-thickness), or osteochondral (bone and cartilage).    Superficial cartilage injuries extend into the upper 50% of the depth of the cartilage. These injuries typically do not heal, but may not progress to arthritis unless they are large and located in a weight-bearing area of the knee.  Deep or full-thickness lesions extend down to the " "bone, but not through it. These injuries have very little healing potential and tend to progress to osteoarthritis if located in a weight-bearing area.    Osteochondral (bone plus cartilage) injuries extend down through the subchondral bone (deep to the cartilage). These injuries may heal by allowing blood vessel ingrowth with fibrous cells to produce a fibrous (scar-like) tissue repair.      Treatment Options For Smaller Defects  Most studies suggest the repair tissue formed from bone marrow stimulation techniques is fibrocartilage (scar tissue -not hyaline cartilage), which is less resistant to wear with the forces in the knee joint and often deteriorates over time Bone marrow stimulation techniques can be successful in eliminating symptoms in many patients, especially young patients with small lesions.   Without early intervention, cartilage degeneration may proceed, and prevent a chance for successful pain- free function.    Arthroscopic Debridement  This is an arthroscopic procedure, often known as a "knee scope". The surgeon uses minimally invasive techniques with a small fiberoptic light source attached to a video camera to locate damaged cartilage and trim the loose edges away to smooth the surface. The surgeon may trim meniscus tears and remove loose cartilage that causes catching or locking symptoms and attempts to prevent any further flaking off that can irritate the knee joint lining and cause swelling.  Arthroscopic debridement is most effective for small lesions, less than 1 cm2 (3/8 inch). It is not as effective for larger lesions because it does not fill the lesion with any repair tissue, leaving the edges exposed to high forces in the knee and continued wear. Despite relieving some symptoms from cartilage tears or loss, arthroscopic cleaning does not prevent the progression of arthritis, but may relieve mechanical symptoms.    Bone Marrow Stimulation Techniques  Three different techniques are " "available to create bleeding and clot formation at the cartilage defect. Blood vessels and fibrous cells migrate to the area to form a fibrous scar-tissue repair tissue to fill the hole in the articular cartilage. Drilling creates multiple small holes through the subchondral bone to allow bleeding into the defect.   Microfracture or "picking" creates small fractures in the subchondral bone to encourage bleeding into the defect.      Abrasion arthroplasty is a superficial shaving of the subchondral bone surface to create bleeding into the defect. Bone marrow stimulation techniques are successful in eliminating symptoms in many patients, especially younger patients with smaller lesions well contained lesions.       For patients with more extensive cartilage damage there are several techniques available    Osteochondral Autograft  This technique is analogous to a hair-plug transfer. The surgeon removes a small section of the patient's own cartilage along with the underlying bone plug, hence the name osteochondral (bone and cartilage) graft.      Bone and cartilage cylinders are harvested from a minor weight bearing area of the knee joint and transplanted into prepared holes in the damage area. This process works much like a hair transplant. Unfortunately, a limited amount of normal osteochondral tissue is available for harvest. The typical size of defect treatable with this method is between 1 to 2 cm2.      Treatment Options For Larger Lesions    Autologous Chondrocyte Implantation (ACI) Carticel  For cartilage defects greater than 2 square centimeters or if there are multiple defects in the knee, one of the newer techniques for the cartilage regeneration, is ACI. ACI is FDA indicated for full-thickness cartilage or osteochondral lesions located in the knee.    ACI is performed in two stages. The first stage is performed when initially assessing the joint arthroscopically. A small amount of cartilage is harvested and " sent to a laboratory for cell culture and growth.    The patient's own cartilage cells (chondrocytes) are grown in culture increasing the number of cells by 10 fold.    Twelve million chondrocytes are then re-implanted into the cartilage defect and covered with a ceiling of native tissue (periosteum).    The cells then grow to fill the defect and resurface areas of cartilage loss with hyaline-like cartilage.    The long-term outcomes (of 14 years) are encouraging for treating medium and large cartilage lesions. ACI is the only procedure with a formal patient outcomes registry.    Osteochondral Allograft     For larger defects that involve both cartilage and bone loss, surgeons may custom fit the defect with a cadaver implant of donated cartilage and bone. The transplanted tissue is matched to the patient based on size of the knee, but tissue typing (similar to organ transplantation) is not necessary. Osteochondral transplantation may allow restoration of the joint surface. The long-term outcomes with fresh allograft (cadaver) tissue have been very encouraging.      RESTORING THE MENISCUS  Our goal is to maintain normal anatomy, if possible. In the case of meniscal tears, the first line of treatment is attempt at repair. Historically, in the days of open cartilage surgery, the entire meniscus was removed. Unfortunately, long term follow-up studies of these patients after removal of the meniscus have found that many go on to degenerative arthritis. Today, cartilage surgeons recognize the protective value of the meniscal cartilage and make every attempt to conserve this valuable tissue.    To maximally preserve function after a meniscus tear, surgeons may repair the meniscus using a variety of techniques. Options include using special sutures or absorbable implants to staple, bradley, or otherwise fix and secure the torn meniscal cushion.    Replacing The Meniscus  For patients who have had the meniscus removed, an  innovative solution called a meniscal transplant may be an option. The indications for transplant need to be assessed by your cartilage surgeon. Unlike some other forms of tissue transplantation, this procedure does not require patients to be on medications to prevent organ rejection. Intermediate term outcome studies are encouraging.        RESTORING KNEE ALIGNMENT    Osteotomy  When the bones of the knee do not align properly, joint forces are not evenly distributed and may overload one side of the knee causing pain and cartilage degeneration. This overload problem is made worse when there has been a traumatic cartilage injury or the meniscus has been removed.    An osteotomy is designed to shift the stress from the damaged part of the knee to a more normal area in the knee. An osteotomy requires the surgeon to cut the bone in order to remove a wedge of bone in order to adjust the angle of the knee. For individuals with medial compartment narrowing (the most common situation), there are three options for high tibial osteotomy (HTO).      One involves removing a wedge shape piece of bone from the lateral side of the tibia and then closing the remaining surfaces together and holding it with a plate and screws (Fig A).    The second technique involves making one cut partially across the top of the tibia and opening the bone to establish the correct alignment. This is held in place with a plate and screws and a bone graft. (Fig B)    The final technique is called medial hemicallotasis, which means stretching healing bone. This technique requires a single cut partially across the bone. The bone is then gradually opened on the medial side by an external fixation frame. This technique is attractive because it allows adjustments to be made in the angle of correction and the hardware is removed three months after the procedure (Fig C).      Each of these techniques require a period of non-weightbearing or partial  "weightbearing crutch ambulation. These techniques may be necessary at the same time or prior to other reconstructive procedures such as cartilage replacement or meniscus replacement surgery in order to remove excessive forces off the repair site.      OSTEOARTHRITIS    Partial (Unicompartmental) Joint Replacement  This procedure replaces only the damaged portion of the joint with metal and/or plastic, leaving the remaining portion of the joint intact. It is often known as a partial knee replacement. New techniques allow replacement of a portion of the knee joint through smaller incisions with fewer days of hospitalization required.    Total Joint Replacement  If there is extensive damage with bone-on-bone apposition, many of the above procedures are not indicated. In these cases of end-stage arthritis, the entire joint surface is replaced with artificial metal and plastic components, allowing most patients to return to pain-free activities.    Future Trends  Investigators are now examining the potential of using collagen or other biologic tissues to serve as a bridge or scaffold for the body's own healing/repair mechanism to use in reestablishing meniscal form and function. In the future, biological "healing glues" may become available to allow repair without sutures. Even with the newest techniques available, certain tears are not repairable and a portion of the meniscus is removed using the arthroscope (partial meniscectomy).    The term osteoarthritis indicates the degeneration and excessive wear of articular cartilage with gradual changes occurring in the underlying bone.    Initially the articular cartilage softens, the surface becomes uneven and the cartilage frays and develops cracks, which can extend down to bone.  In advanced osteoarthritis the cartilage is worn away to reveal the underlying bone and nerve endings causing pain.  The bone hardens, cysts begin to form, and new cartilage cells laid down around " the worn cartilage ossify and form bony projections (bone spurs).  Untreated articular cartilage defects can progress to osteoarthritis with both mechanical and enzymatic breakdown resulting in permanent dysfunction of the joint. Our goal is to diagnose and prevent or halt the progression of arthritis      Many patients suffer with end-stage arthritis that limits even the simplest activities of daily living, significantly altering the patient's quality of fife. For these patient's, current cartilage surgical options DO NOT apply. There are no curative therapies for end-stage arthritis. A wide range of methods may be used to relieve pain and restore function. Conventional treatment methods include exercise, physical therapy and medications, such as anti-inflammatories. Recently, new biological compounds have been shown to be effective and safe for treating arthritis. These compounds include lubricants (hyaluronic acid) and building blocks of cartilage (Chondroitin Sulfate and Glucosamine).    Medications  Oral medications such as non-steroidal anti-inflammatories (NSAID's) tend to have a beneficial effect on the degenerative conditions described above. Immediately following an injury, these medications help to decrease pain and swelling. On a more long term nature, these medications help to relieve the pain and swelling associated with arthritic joints. Newer specific anti-inflammatories medications have been developed to block the enzymes necessary for production of inflammation (cyclooxygenase-2 or SAUNDERS-2). These medications, such as Ceibrex or Bextra tend to have less adverse effects on the stomach and gastrointestinal tract than older, more traditional NSAID's. These prescription-strength NSAID medications are taken by mouth once or twice per day. Other non-prescription over-the-counter NSAID's are available.    Cartilage Supplements  Other oral medications which can be purchased without a prescription include  Glucosamine and Chondroitin Sulfate. These two compounds are normally found in the substance of articular cartilage. Several recent studies using Cosamin®DS have demonstrated a pain relieving effect with the combination of Glucosamine Hydrochloride, highly purified low molecular weight Chondroitin Sulfate and Manganese Ascorbate available only in this product. The National Institutes of Health ([facility]) has selected the exact same Glucosamine and Chondroitin Sulfate used in CosaminDS for a large multi-center clinical trial currently in progress.      Oral administration of these compounds does not have a cartilage building effect, but rather a cartilage sparing effect. Laboratory studies have confirmed a stimulatory action on cartilage cells as well as an inhibition of cartilage degeneration with CosaminDS, which can improve the health of existing cartilage. Few negative side-effects have been demonstrated in patients taking these compounds, and many patients with arthritis benefit from the addition of this supplement to their arthritis treatment regimen.    Cortisone (Steroid) Injections  Injection of steroids into joints have been performed for well over 100 years with good results. Typically, steroid injection is utilized in a patient with degenerative arthritic changes to treat acute inflammation.  Steroids are powerful anti-inflammatory substances. When injected into a joint, the steroid has primarily a local effect, not a whole-body or systemic effect. These medications tend to decrease pain and inflammation when used appropriately. If overused, local steroid injection can have a negative effect on the tissues, such as weakening of bone and tendons. These injections typically are not permanent in their beneficial effect.    Injectable Viscosupplementation  The space between the cartilage surfaces in the knee joint contains synovial fluid that acts as a lubricant for the joint. With the progression of arthritis,  "the synovial fluid becomes thinner and is ineffective as a shock absorber. As a result simple movements become painful. Hyaluronic acid injections supplement the existing synovial fluid and act as a shock absorber and lubricant for the knee.      Synvisc is a hyluronan based, naturally occurring lubricant with similar properties to synovial fluid found in healthy joints. Synvisc or Euflexxa are injected over a 3 week series to provide lubrication to the knee joint. For some patients, Synvisc One may be an option, this is a single-shot injection.    Braces  In some cases of arthritis, only a portion of the knee is degenerative and it may be advantageous to "unload" the affected area and force more weight towards the "good" part of the knee. Special braces called "unloaders" are used for this purpose. Typically the brace is worn for work or activity and tends to decrease the pain caused by single compartment arthritis.        Physical Therapy  Exercise is a vital component of the treatment of cartilage injury. If the knee becomes stiff after an injury or over the course of time, it may be difficult to regain the lost motion. In most cases, early range of motion exercises are instituted in order to prevent this problem. In some cases, a loss of strength in certain muscle groups can lead to increased stress on the already degenerative articular surfaces. If muscles are strong and working properly they will take up some of the stress and divert it away from the cartilage surfaces. As symptoms decrease exercise is increased, but always below the pain threshold. Muscle strength is never harmful to a joint. It is therefore common to have a doctor prescribe strengthening exercises as an integral part of the treatment program for arthritis.    TECHNIQUES  Biologic tissue engineering is continuing to bring exciting new approaches from the lab to the clinical arena. It may be possible to induce primitive cells from the bone " "marrow called pluripotential cells or mesenchymal stem cells to transform into hyaline articular cartilage with the use of a variety of growth factors or hormones. Genetic reprogram¬stephanie and tissue engineering may eventually replace surgery - but not at this stage in the new millennium.  Other biological patches may act as a temporary home for chondrocytes or "prechondrocytes." This exciting field will offer many new surgical techniques, which will hopefully lead to opportunities to restore function with less invasive means.    "

## 2022-12-20 ENCOUNTER — HOSPITAL ENCOUNTER (OUTPATIENT)
Dept: RADIOLOGY | Facility: HOSPITAL | Age: 22
Discharge: HOME | End: 2022-12-20
Attending: ORTHOPAEDIC SURGERY
Payer: MEDICAID

## 2022-12-20 DIAGNOSIS — M22.2X1 PATELLOFEMORAL PAIN SYNDROME OF RIGHT KNEE: ICD-10-CM

## 2022-12-20 PROCEDURE — 73721 MRI JNT OF LWR EXTRE W/O DYE: CPT | Mod: TC,LT

## 2022-12-20 PROCEDURE — 73721 MRI KNEE WITHOUT CONTRAST LEFT: ICD-10-PCS | Mod: 26,LT,GC, | Performed by: INTERNAL MEDICINE

## 2022-12-20 PROCEDURE — 73700 CT LOWER EXTREMITY W/O DYE: CPT | Mod: 26,59,LT, | Performed by: INTERNAL MEDICINE

## 2022-12-20 PROCEDURE — 73721 MRI JNT OF LWR EXTRE W/O DYE: CPT | Mod: 26,LT,GC, | Performed by: INTERNAL MEDICINE

## 2022-12-20 PROCEDURE — 73700 CT LOWER EXTREMITY W/O DYE: CPT | Mod: TC,LT

## 2022-12-20 PROCEDURE — 73700 CT KNEE WITHOUT CONTRAST LEFT: ICD-10-PCS | Mod: 26,59,LT, | Performed by: INTERNAL MEDICINE

## 2022-12-21 ENCOUNTER — OFFICE VISIT (OUTPATIENT)
Dept: SPORTS MEDICINE | Facility: CLINIC | Age: 22
End: 2022-12-21
Payer: MEDICAID

## 2022-12-21 DIAGNOSIS — M25.561 CHRONIC PAIN OF BOTH KNEES: ICD-10-CM

## 2022-12-21 DIAGNOSIS — T85.848S PAIN FROM IMPLANTED HARDWARE, SEQUELA: ICD-10-CM

## 2022-12-21 DIAGNOSIS — M25.562 CHRONIC PAIN OF BOTH KNEES: ICD-10-CM

## 2022-12-21 DIAGNOSIS — M22.2X2 PATELLOFEMORAL SYNDROME OF BOTH KNEES: Primary | ICD-10-CM

## 2022-12-21 DIAGNOSIS — M25.361 PATELLAR INSTABILITY OF RIGHT KNEE: ICD-10-CM

## 2022-12-21 DIAGNOSIS — G89.29 CHRONIC PAIN OF BOTH KNEES: ICD-10-CM

## 2022-12-21 DIAGNOSIS — M22.42 CHONDROMALACIA PATELLAE OF LEFT KNEE: ICD-10-CM

## 2022-12-21 DIAGNOSIS — M22.2X1 PATELLOFEMORAL SYNDROME OF BOTH KNEES: Primary | ICD-10-CM

## 2022-12-21 PROCEDURE — 99214 OFFICE O/P EST MOD 30 MIN: CPT | Mod: 95,,, | Performed by: ORTHOPAEDIC SURGERY

## 2022-12-21 PROCEDURE — 99214 PR OFFICE/OUTPT VISIT, EST, LEVL IV, 30-39 MIN: ICD-10-PCS | Mod: 95,,, | Performed by: ORTHOPAEDIC SURGERY

## 2022-12-21 NOTE — PROGRESS NOTES
Telemedicine/Virtual Visit Documentation:     The patient location is: home    The chief complaint leading to consultation is: see HPI    VISIT TYPE X   Virtual visit with synchronous audio and video    Telephone E/M service      Total time spent with patient: see X eduarda on chart below.   More than half of the time was spent counseling or coordinating care including prognosis, differential diagnosis, risks and benefits of treatment, instructions, compliance risk reductions     EST MINUTES X   [id number] 5    70556 10    74948 15    [id number] 25 X   99215 40    NEW     04769 10    02971 20    20066 30    19728 45    25459 60    PHONE      5-10    95230 11-20    85822 21-30      H&P  Orthopaedics      SUBJECTIVE:     History of Present Illness:  Patient is a 22 y.o. female with Left knee patellofemoral pain and malalignment issues with MRI and CT results    Review of patient's allergies indicates:  No Known Allergies    Past Medical History:   Diagnosis Date    Anxiety     reports panic attacks    Depression     Fibromyalgia     per pt    Insomnia     Precocious female puberty     Seizures     reports 2 incidents of possible seizures while giving blood    Syncope and collapse     Wrist fracture, bilateral      Past Surgical History:   Procedure Laterality Date    ARTHROSCOPY OF KNEE Right 12/19/2019    Procedure: ARTHROSCOPY, KNEE (Right);  Surgeon: Ranulfo House MD;  Location: [address];  Service: Orthopedics;  Laterality: Right;    ARTHROSCOPY OF KNEE Left 7/9/2020    Procedure: ARTHROSCOPY, KNEE;  Surgeon: Ranulfo House MD;  Location: [address];  Service: Orthopedics;  Laterality: Left;  WITH CHRONDROPLASTY     ARTHROTOMY OF KNEE Left 7/9/2020    Procedure: ARTHROTOMY, KNEE;  Surgeon: Ranulfo House MD;  Location: [address];  Service: Orthopedics;  Laterality: Left;  WITH CHRONDRAL ALLOGRAFT IMPLANTATION    chip      chip implant    CHONDROPLASTY OF KNEE Right 12/19/2019    Procedure:  CHONDROPLASTY, KNEE patella;  Surgeon: Ranulfo House MD;  Location: [facility] OR [address] [place] [address];  Service: Orthopedics;  Laterality: Right;    FEMUR OSTEOTOMY Right 12/13/2018    Procedure: OSTEOTOMY, FEMUR - distal to correct valgus (Orthopediatrics plate, MTF bone wedge).  Right knee arthroscopy with MPFL reconstruction, gracilis allograft ([employer]ate[employer]).  3 hrs.;  Surgeon: Ranulfo House MD;  Location: [address];  Service: Orthopedics;  Laterality: Right;    FEMUR OSTEOTOMY Left 7/9/2020    Procedure: OSTEOTOMY, FEMUR (Left) - distal femoral opening wedge (Orthopediatrics, MTF), knee scope, removal of tibial tubercle screws (Orthopediatrics 4.0 cannulated screws);  Surgeon: Ranulfo House MD;  Location: [facility] OR [address] [place] [address];  Service: Orthopedics;  Laterality: Left;  RQ NOON START,  MGsalvador notified 7/8/20 HDavis RN    HARDWARE REMOVAL Right 12/19/2019    Procedure: REMOVAL, HARDWARE righ distal femoral.;  Surgeon: Ranulfo House MD;  Location: [address];  Service: Orthopedics;  Laterality: Right;    HARDWARE REMOVAL Left 7/9/2020    Procedure: REMOVAL, HARDWARE;  Surgeon: Ranulfo House MD;  Location: [address];  Service: Orthopedics;  Laterality: Left;  TIBIA    HEMANGIOMA EXCISION      scalp    INGUINAL HERNIA REPAIR Left     INJECTION OF STEROID Right 7/5/2019    Procedure: INJECTION, STEROID;  Surgeon: Ranulfo House MD;  Location: [address];  Service: Orthopedics;  Laterality: Right;    KNEE JOINT MANIPULATION Right 7/5/2019    Procedure: MANIPULATION, KNEE;  Surgeon: Ranulfo House MD;  Location: [address];  Service: Orthopedics;  Laterality: Right;    KNEE SURGERY Left 2/16/15    TOE SURGERY Right     5 th toe     Family History   Problem Relation Age of Onset    Seizures Father     Anxiety disorder Father     Depression Father     Mental illness Father     Schizophrenia Father     Deep vein thrombosis Father      Social History     Tobacco Use    Smoking status: Never     Smokeless tobacco: Never   Substance Use Topics    Alcohol use: No    Drug use: No        Review of Systems:  Patient denies constitutional symptoms, cardiac symptoms, respiratory symptoms, GI symptoms.  The remainder of the musculoskeletal ROS is included in the HPI.      OBJECTIVE:     Physical Exam:  Gen:  No acute distress  CV:  Peripherally well-perfused.  Pulses 2+ bilaterally.  Lungs:  Normal respiratory effort.  Abdomen:  Soft, non-tender, non-distended  Head/Neck:  Normocephalic.  Atraumatic. No TTP, AROM and PROM intact without pain  Neuro:  CN intact without deficit, SILT throughout B/L Upper & Lower Extremities    MSK:  No interval change    MRI Knee Without Contrast Left  Narrative: EXAMINATION:  MRI KNEE WITHOUT CONTRAST LEFT    CLINICAL HISTORY:  Knee pain, chronic, positive xray (Age >= 5y);Patellofemoral disorders, right knee    TECHNIQUE:  Multiplanar, multisequence MR imaging of the left knee was obtained without the administration of intravenous contrast.    COMPARISON:  Same day CT knee, MRI knee 04/04/2022, knee radiograph 11/30/2022    FINDINGS:  Bone: Operative changes from distal femoral osteotomy and tibial tubercle transfer osteotomy.  Susceptibility artifact degrades evaluation of adjacent structures.  Unchanged alignment.  No acute fracture, osteonecrosis, or marrow replacing lesion.    Menisci: No tear of the medial or lateral meniscus.    Ligaments: ACL, PCL, MCL, and LCL complex are intact.    Tendons: Extensor mechanism is maintained.    Cartilage:    Patellofemoral: Operative changes from patellar chondral allograft implantation.  Unchanged full-thickness chondral defect over the median patellar ridge at the level of the chondral allograft measuring approximately 1.1 cm in craniocaudal dimension (09:17).  Mild irregularity of the subchondral bone plate.  No subchondral cysts or bone marrow edema.    Medial tibiofemoral: Articular cartilage is maintained.    Lateral tibiofemoral:  Articular cartilage is maintained.    Miscellaneous: No joint effusion.  No popliteal cyst.  Impression: Postoperative changes from patellar chondral allograft implantation.  Unchanged full-thickness cartilage defect over the median patellar ridge at the level of the chondral allograft.  No new abnormality compared to 04/04/2022.    Electronically signed by resident: Reddy Somers MD  Date:    12/20/2022  Time:    09:13    Electronically signed by: Ben Lugo  Date:    12/20/2022  Time:    15:14  CT Knee Without Contrast Left  Narrative: EXAMINATION:  CT KNEE WITHOUT CONTRAST LEFT    CLINICAL HISTORY:  Knee pain, chronic, positive xray (Age >= 5y);  Patellofemoral disorders, right knee    TECHNIQUE:  0.625 mm axial thin slice images were obtained through the left knee without the administration of intravenous contrast.  Sagittal and coronal reconstructions were provided.    COMPARISON:  Same day MRI knee, MRI knee 04/04/2022, knee radiograph 11/30/2022    FINDINGS:  Bones: Operative change of distal femoral osteotomy, secured by a lateral plate and screw construct.  Visualized hardware is intact and in satisfactory position.  No evidence of loosening or periprosthetic fracture.  Alignment is unchanged, with slight anterior bowing.  There is solid periosteal new bone formation and solid osseous bridging across the osteotomy.    Additional operative changes from patellar chondral allograft implantation and tibial tubercle transfer osteotomy, with subsequent hardware removal.    There is lateral patellar subluxation and tilting.  No evidence of trochlear dysplasia.  Tibial tuberosity-trochlear groove distance measures 1.4 cm, within normal limits.    No acute fracture, osteonecrosis, or focal osseous lesion.    Joint: There are degenerative changes in the lateral aspect of the patellofemoral compartment with joint space narrowing and small marginal osteophytes.  Tibiofemoral joint spaces are preserved.  No joint  effusion.    Soft tissues: Muscle bulk is maintained.  No bursal collection.    Refer to same-day knee MRI for additional details.  Impression: Extensive postoperative changes as described.    Lateral patellar subluxation and tilting, with patellofemoral degenerative changes.    Electronically signed by resident: Reddy Somers MD  Date:    12/20/2022  Time:    09:15    Electronically signed by: Ben Lugo  Date:    12/20/2022  Time:    14:56        ASSESSMENT/PLAN:     A/P: Denise Mosher is a 22 y.o. Left knee pain after DFO/TTO and Cartimax    Plan:  1. RTC in 4 weeks with Dr. Kal Souza preop. H&P;IKDC, SF-12 and KOOS was not filled out today in clinic. Patient will fill out IKDC, SF-12 and KOOS on return.    2. Medications: Refills of the following Rx were sent to patients preferred Pharmacy:  No Refills Needed Today    3. Physical Therapy: Continue/Begin: Pre-Rehabilitation at [facility]    4. HEP: N/A, weight reduction program    5. Procedures/Procedural Planning:   We reviewed with Denise OSBORN today, the pathology and natural history of her diagnosis. We have discussed a variety of treatment options including medications, physical therapy and other alternative treatments. I also explained the indications, risks and benefits of surgery. After discussion, Denise OSBORN decided to proceed with surgery. The decision was made to go forward with:     Left knee     Tibial Tubercleplasty Osteotomy (TTO) - 01048  MQTFL - 16522  Lateral Retinacular Lengthening - 05582  Arthroscopic asssited Microfracture patella and application Cartilage Allograft Matrix (CAM)  Deep Hardware Removal 91443    The details of the surgical procedure were explained, including the location of probable incisions and a description of likely hardware and/or grafts to be used.  The patient understands the likely convalescence after surgery.  Also, we have thoroughly discussed the risks, benefits and alternatives to surgery,  including, but not limited to, the risk of infection, joint stiffness, blood clot (including DVT and/or pulmonary embolus), neurologic and vascular injury.  It was explained that, if tissue has been repaired or reconstructed, there is a chance of failure, which may require further management.      All of the patient's questions were answered and informed consent was obtained. The patient will contact us if they have any questions or concerns in the interim.    6. DME: N/A    7. Work/Sport Status: No interval change    8. Visit Summary: Above plan; she will discuss with her parents and contact us if she wants to proceed. She may need to come back with her parents for discussion.

## 2023-01-03 NOTE — PROGRESS NOTES
Therapy Diagnosis:   Encounter Diagnoses   Name Primary?    Patellofemoral syndrome of both knees     Chronic pain of left knee      Physician: Kal Souza MD

## 2023-01-05 ENCOUNTER — CLINICAL SUPPORT (OUTPATIENT)
Dept: REHABILITATION | Facility: OTHER | Age: 23
End: 2023-01-05
Attending: ORTHOPAEDIC SURGERY
Payer: MEDICAID

## 2023-01-05 DIAGNOSIS — M22.2X1 PATELLOFEMORAL SYNDROME OF BOTH KNEES: ICD-10-CM

## 2023-01-05 DIAGNOSIS — M25.562 CHRONIC PAIN OF LEFT KNEE: ICD-10-CM

## 2023-01-05 DIAGNOSIS — M22.2X2 PATELLOFEMORAL SYNDROME OF BOTH KNEES: ICD-10-CM

## 2023-01-05 DIAGNOSIS — G89.29 CHRONIC PAIN OF LEFT KNEE: ICD-10-CM

## 2023-01-05 PROCEDURE — 97162 PT EVAL MOD COMPLEX 30 MIN: CPT | Mod: PN

## 2023-01-05 PROCEDURE — 97110 THERAPEUTIC EXERCISES: CPT | Mod: PN

## 2023-01-06 PROBLEM — G89.29 CHRONIC PAIN OF LEFT KNEE: Status: ACTIVE | Noted: 2023-01-06

## 2023-01-06 PROBLEM — M25.562 CHRONIC PAIN OF LEFT KNEE: Status: ACTIVE | Noted: 2023-01-06

## 2023-01-06 NOTE — PLAN OF CARE
[facility]  Physical Therapy Initial Evaluation    Name: Denise Mosher  Clinic Number: [id number]    Therapy Diagnosis:   Encounter Diagnoses   Name Primary?    Patellofemoral syndrome of both knees     Chronic pain of left knee      Physician: Kal Souza MD    Physician Orders: PT Eval and Treat Left knee - prehab  1.         Tibial Tubercleplasty Osteotomy (TTO) - 90598  2.         MQTFL - 25580  3.         Lateral Retinacular Lengthening - 92537  4.         Arthroscopic asssited Microfracture patella and application Cartilage Allograft Matrix (CAM)  5.         Deep Hardware Removal 20680  Medical Diagnosis from Referral: Patellofemoral syndrome of both knees (M22.2X1,M22.2X2)   Evaluation Date: 1/5/2023  Authorization Period Expiration: 12/21/2023  Plan of Care Expiration: 03/03/2023  Visit # / Visits authorized: 1/ 1    Time In: 2:36 pm  Time Out: 3:20 pm  Total Billable Time: 10 minutes    Precautions: Standard    Subjective   Date of onset: ongoing issues with recent referral  History of current condition - Denise reports: her doctor wanted her to return to therapy because it may help with everything.  She has been to therapy multiple times with minimal improvements.  She followed her prior PT team here.  She has been trying to do strengthening in PT since surgeries.  Exercises are the same and don't help her.  She didn't like the prior therapy team.  Knee is painful and aggrivating.  Pain comes and goes and there isn't much that she can do about it.  She would prefer to not do therapy until after the surgery.  She does not currently have surgery scheduled as she doesn't have time to do that right now.  She is also looking to lose weight before the surgery to improve outcomes. She is not currently doing legs at the gym because her  doesn't want to touch the legs at this moment but is open to doing approved exercises from PT.     She is considering surgery to remove  the plate and screws and another surgery (cartilage graft) on the same knee.      Burning, tingling, numbness: none  Falls in the last 6 months: none    Patient denies dizziness, headaches, blurry vision, nausea, vomiting, impaired sensations in groin and saddle region and changes in bowel/bladder.   Patient reports IUD insertion where she tore and has stitches currently.        Medical History:   Past Medical History:   Diagnosis Date    Anxiety     reports panic attacks    Depression     Fibromyalgia     per pt    Insomnia     Precocious female puberty     Seizures     reports 2 incidents of possible seizures while giving blood    Syncope and collapse     Wrist fracture, bilateral        Surgical History:   Denise Mosher  has a past surgical history that includes Knee surgery (Left, 2/16/15); Toe Surgery (Right); Inguinal hernia repair (Left); Hemangioma excision; chip; Femur Osteotomy (Right, 12/13/2018); Knee joint manipulation (Right, 7/5/2019); Injection of steroid (Right, 7/5/2019); Arthroscopy of knee (Right, 12/19/2019); Hardware Removal (Right, 12/19/2019); Chondroplasty of knee (Right, 12/19/2019); Femur Osteotomy (Left, 7/9/2020); Arthroscopy of knee (Left, 7/9/2020); Hardware Removal (Left, 7/9/2020); and Arthrotomy of knee (Left, 7/9/2020).    Medications:   Denise OSBORN has a current medication list which includes the following prescription(s): ibuprofen.    Allergies:   Review of patient's allergies indicates:  No Known Allergies     Imaging,  X-ray of L knee  Impression: Postoperative changes as discussed above, with progressive healing of the distal left femoral osteotomy since 02/10/2021.  No detrimental interval change since that time is observed.     X-ray of hip to ankle  FINDINGS:  I see no acute fracture.  Surgical plate and screws along the distal femoral osteotomy on the left are intact.  Sclerosis along the distal right femur related to healed osteotomy.     MRI of knee  Impression: 1.  Full-thickness chondral defect over the median ridge of the patella.  Interval resolution of patellar subchondral marrow edema.    Prior Therapy: Yes for similar symptoms  Social History:  lives with parents, 4 stairs to enter home with B hand rails   Occupation: working at [employer], constantly walking and standing all day  Prior Level of Function: independent with knee pain  Current Level of Function: unable to squat due to pain; mild limitation in daily activities    Pain:  Current 3/10, worst 9/10, best 0/10   Location: bilateral knees with L worse than R   Description: Aching, Dull, Sharp, and stabbing, uncomfortable  Aggravating Factors: Sitting, Standing, and squatting  Easing Factors: nothing    Pts goals: perform squats    Objective     Observation:   Good quad contraction; L weaker than R    Palpation:  TTP over lateral aspect of B thighs, particularly on L more than R (pt states this is due to nerve damage from prior surgeries)    Flexibility:   Difficulty assessing due to tenderness with palpation and knee pain    Range of Motion:    Hip Right AROM Left AROM   Flexion 130 120 with tenderness in the knee   Extension 0 0       MMT/Strength:    Hip Right Left   Flexion 5/5 5/5   Extension 4/5 4/5   Internal Rotation 4/5 4-/5   External Rotation 4/5 3+/5 (P! With PT pressure on thigh)   Adduction 5/5 4/5   Abduction 4-/5 (P! With PT pressure on thigh) 3+/5 (P! With PT pressure on thigh)   Knee     Flexion 4+/5 4/5   Extension 4+/5 4/5   Ankle     Dorsiflexion 5/5 5/5   Plantarflexion 5/5 4/5     Other:   Squat/OKC knee extension: Audible crepitus and reports discomfort and intermittent cavitations, patient is able to purposefully reproduce some cavitations.  Reduced crepitus and discomfort with prone knee flexion.    Gait: Initial contact with toes intermittently, slight toeing in    CMS Impairment/Limitation/Restriction for FOTO Knee Survey    Therapist reviewed FOTO scores for Denise Mosher on  "1/5/2023.   FOTO documents entered into [employer] - see Media section.    Limitation Score: 33%  Category: Mobility    Current : CJ = at least 20% but < 40% impaired, limited or restricted  Goal: CJ = at least 20% but < 40% impaired, limited or restricted             TREATMENT   Treatment Time In: 3:10 pm  Treatment Time Out: 3:20 pm  Total Treatment time separate from Evaluation: 10 minutes    Denise received therapeutic exercises to develop strength for 10 minutes including:  HEP review, performance and education  Standing hip ABD/Ext with band  Mini squat to significantly elevated surface within painfree and crepitus free range  Seated hip Abd/ADD with band/ball  Hip Hinge    Home Exercises and Patient Education Provided    Education provided:   Patient was educated on initial evaluation findings and expectations as well as future PT services, procedures, and expectations for optimal compliance with therapy.   Gym modifications      Written Home Exercises Provided: Yes, see patient instructions/Media for HEP.    Assessment   Denise OSBORN is a 22 y.o. female referred to outpatient Physical Therapy with a medical diagnosis of Patellofemoral syndrome of both knees. Pt presents with decreased strength, flexibility, TTP with moderate palpation, poor posture and limited functional mobility in general due to knee pain and crepitus/cavitations  causing decreased participation with recreational and household duties including stair climbing, sitting/standing for long periods of time, squatting and participating in gym activities for the lower extremities.  Patient is looking for exercises she can perform safely in the gym to help promote weight loss so patient can continue with prehab activities in order to schedule surgery to remove plate and screws as recommended by MD. Continue to progress strengthening as tolerated and provided exercises that patient can show  to begin slowly adding more "leg day" exercises.       Pt " prognosis is Fair-guarded  Pt will benefit from skilled outpatient Physical Therapy to address the deficits stated above and in the chart below, provide pt/family education, and to maximize pt's level of independence to return to PLOF.     Plan of care discussed with patient: Yes  Pt's spiritual, cultural and educational needs considered and patient is agreeable to the plan of care and goals as stated below:     Anticipated Barriers for therapy: multiple rounds of PT previously with minimal changes in pain or function per pt report.     Medical Necessity is demonstrated by the following  History  Co-morbidities and personal factors that may impact the plan of care Co-morbidities:   anxiety, coping style/mechanism, depression, high BMI, young age, and multiple previous knee surgeries    Personal Factors:   coping style  social background  lifestyle  attitudes     moderate   Examination  Body Structures and Functions, activity limitations and participation restrictions that may impact the plan of care Body Regions:   lower extremities  trunk    Body Systems:    strength  balance  gait  transfers  transitions  motor control    Participation Restrictions:   Squatting/sit<>stand transitions, standing for long periods of time, unable to participate in LE gym activities    Activity limitations:   Learning and applying knowledge  no deficits    General Tasks and Commands  no deficits    Communication  no deficits    Mobility  lifting and carrying objects  walking  driving (bike, car, motorcycle)    Self care  dressing  looking after one's health    Domestic Life  shopping  cooking  doing house work (cleaning house, washing dishes, laundry)  assisting others    Interactions/Relationships  no deficits    Life Areas  employment    Community and Social Life  community life  recreation and leisure         moderate   Clinical Presentation evolving clinical presentation with changing clinical characteristics moderate   Decision  "Making/ Complexity Score: moderate     Goals:      In 4 weeks,   Goal Status   Patient will be independent with HEP to promote improved therapy outcomes.  STG    2. Patient will increase B LE MMT to 4-/5 to improve ambulation and stair navigation. STG    3. Patient will perform mini squat with no increase in symptoms and minimal cueing for form to improve participation in gym activities and general LE strengthening.  STG        In 8 weeks,   Goal Status   4. Patient will be independent with progressed HEP to allow for self management of symptoms.  LTG    5. Patient will increase B LE MMT to 4/5 for improved ambulation and stair navigation.  LTG    6. Patient will improve FOTO score to </= 26% limited to decrease perceived limitation with mobility LTG    7. Patient will report completing a "leg day" workkout with  and no increase in symptoms from baseline to improve strength, functional mobility and activity tolerance for stair climbing LTG         Plan   Plan of care Certification: 1/5/2023 to 03/03/2023.    Outpatient Physical Therapy 1 times weekly for 8 visits to include the following interventions: Gait Training, Manual Therapy, Moist Heat/ Ice, Neuromuscular Re-ed, Patient Education, Therapeutic Activities, and Therapeutic Exercise.     Michelle Loera, PT      "

## 2023-01-09 ENCOUNTER — CLINICAL SUPPORT (OUTPATIENT)
Dept: REHABILITATION | Facility: OTHER | Age: 23
End: 2023-01-09
Payer: MEDICAID

## 2023-01-09 DIAGNOSIS — G89.29 CHRONIC PAIN OF LEFT KNEE: Primary | ICD-10-CM

## 2023-01-09 DIAGNOSIS — M25.562 CHRONIC PAIN OF LEFT KNEE: Primary | ICD-10-CM

## 2023-01-09 PROCEDURE — 97110 THERAPEUTIC EXERCISES: CPT | Mod: PN,CQ

## 2023-01-09 NOTE — PROGRESS NOTES
"                            Physical Therapy Daily Treatment Note     Name: Denise Mosher  Clinic Number: [id number]    Therapy Diagnosis:   Encounter Diagnosis   Name Primary?    Chronic pain of left knee Yes     Physician: Vern Nguyen MD    Visit Date: 1/9/2023  Physician Orders: PT Eval and Treat Left knee - prehab  1.         Tibial Tubercleplasty Osteotomy (TTO) - 25860  2.         MQTFL - 26674  3.         Lateral Retinacular Lengthening - 34190  4.         Arthroscopic asssited Microfracture patella and application Cartilage Allograft Matrix (CAM)  5.         Deep Hardware Removal 20680  Medical Diagnosis from Referral: Patellofemoral syndrome of both knees (M22.2X1,M22.2X2)   Evaluation Date: 1/5/2023  Authorization Period Expiration: 12/21/2023  Plan of Care Expiration: 03/03/2023  Visit # / Visits authorized: 2 1/ 1   FOTO:  1/5 1/5/2023  PTA: 1/5     Time In: 0845  Time Out: 0935  Total Billable Time: 45 minutes     Precautions: Standard     Subjective    Pt states feeling the same.  Pt reports able to mayuri Recumbent bike in gym for no longer than 15 min.  Pt mentioned she might want the help of a dietician for weight loss goals.    Pt reports: she was compliant with home exercise program given last session.   Response to previous treatment:no adverse effects  Functional change: no change     Pain: not reported   Location: bilateral knee      Objective     Denise received therapeutic exercises to develop strength, endurance, ROM, flexibility, posture, and core stabilization for 50 minutes (45 min 1 on 1 w/ PTA) including:  Standing hip abd and ext GTB x 15 ea R and L x 15 ea   Standing HR 15 x 5 sec hold   Mini squats as mayuri w/ ROM  15 x   Seated Hip abd GTB  15 x   Seated hip add ball sq 15 x 5 sec hold   Bike 10 min for ROM   Step up 2" x 10 ea   Hip[ hinge on wall x 10       Home Exercises Provided and Patient Education Provided     Education provided:   - Pt edu on proper exercise " "technique.      Written Home Exercises Provided: Patient instructed to cont prior HEP. Exercises were reviewed and Denise was able to demonstrate them prior to the end of the session.  Denise demonstrated good  understanding of the education provided. See EMR under Patient Instructions for exercises provided during therapy sessions.      Assessment     Pt mayuri tx well w/ no increase in pn.  Pt displayed increased strength and endurance during therex.  Pt cont to lack some quad and glute strength.    Denise Is progressing well towards her goals.   Pt prognosis is Good.     Pt will continue to benefit from skilled outpatient physical therapy to address the deficits listed in the problem list box on initial evaluation, provide pt/family education and to maximize pt's level of independence in the home and community environment.     Pt's spiritual, cultural and educational needs considered and pt agreeable to plan of care and goals.    Anticipated barriers to physical therapy: none    Goals: Goals:        In 4 weeks,    Goal Status   Patient will be independent with HEP to promote improved therapy outcomes.  STG  progressing, not met   2. Patient will increase B LE MMT to 4-/5 to improve ambulation and stair navigation. STG  progressing, not met   3. Patient will perform mini squat with no increase in symptoms and minimal cueing for form to improve participation in gym activities and general LE strengthening.  STG  progressing, not met         In 8 weeks,    Goal Status   4. Patient will be independent with progressed HEP to allow for self management of symptoms.  LTG  progressing, not met   5. Patient will increase B LE MMT to 4/5 for improved ambulation and stair navigation.  LTG  progressing, not met   6. Patient will improve FOTO score to </= 26% limited to decrease perceived limitation with mobility LTG  progressing, not met   7. Patient will report completing a "leg day" workkout with  and no increase " in symptoms from baseline to improve strength, functional mobility and activity tolerance for stair climbing LTG  progressing, not met        Plan     Cont to progress towards goals set by PT.  Work to increase glute/ quad strength next visit.      Ramiro Payne, PTA

## 2023-01-19 ENCOUNTER — CLINICAL SUPPORT (OUTPATIENT)
Dept: REHABILITATION | Facility: OTHER | Age: 23
End: 2023-01-19
Payer: MEDICAID

## 2023-01-19 DIAGNOSIS — G89.29 CHRONIC PAIN OF LEFT KNEE: Primary | ICD-10-CM

## 2023-01-19 DIAGNOSIS — M25.562 CHRONIC PAIN OF LEFT KNEE: Primary | ICD-10-CM

## 2023-01-19 PROCEDURE — 97110 THERAPEUTIC EXERCISES: CPT | Mod: PN

## 2023-01-19 NOTE — PROGRESS NOTES
"                            Physical Therapy Daily Treatment Note     Name: Denise Mosher  Clinic Number: [id number]    Therapy Diagnosis:   Encounter Diagnosis   Name Primary?    Chronic pain of left knee Yes       Physician: Vern Nguyen MD    Visit Date: 1/19/2023  Physician Orders: PT Eval and Treat Left knee - prehab  1.         Tibial Tubercleplasty Osteotomy (TTO) - 14360  2.         MQTFL - 45538  3.         Lateral Retinacular Lengthening - 02938  4.         Arthroscopic asssited Microfracture patella and application Cartilage Allograft Matrix (CAM)  5.         Deep Hardware Removal 20680  Medical Diagnosis from Referral: Patellofemoral syndrome of both knees (M22.2X1,M22.2X2)   Evaluation Date: 1/5/2023  Authorization Period Expiration: 3/16/2023  Plan of Care Expiration: 03/03/2023  Visit # / Visits authorized: 2/12 (+ eval)   FOTO:  1/5 1/5/2023  PTA: 0/5     Time In: 8:34 am  Time Out: 9:18 am   Total Billable Time: 43  minutes     Precautions: Standard     Subjective    Pt states she hasn't been to the  since her last visit and was unable to show him the exercises. She has an appointment with a dietician on 3/7/22.    Pt reports: she was compliant with home exercise program given last session.   Response to previous treatment:no adverse effects  Functional change: no change     Pain: 6/10 (L knee) 0/10 in the R   Location: bilateral knee      Objective     Denise received therapeutic exercises to develop strength, endurance, ROM, flexibility, posture, and core stabilization for 40 minutes including:  Recumbent bike level 1 x10 min for ROM   Supine ADD with ball squeeze 2x 10   Prone EOM hip extension x15 B  Standing hip 3-way with GTB x 15 ea B  Standing HR 15 x 5 sec hold     Mini squats as mayuri w/ ROM  15 x   Seated Hip abd GTB  15 x   Seated hip add ball sq 15 x 5 sec hold   Step up 2" x 10 ea   Hip hinge on wall x 10     Denise received the following manual therapy techniques: " Joint mobilizations were applied to the: L knee for 3 minutes, including:  Patellar mobilizations (superior, inferior, medial, lateral) Grade I-IV  A/P and P/A tibial glides on femur Grade I-III       Home Exercises Provided and Patient Education Provided     Education provided:   - Pt edu on proper exercise technique.      Written Home Exercises Provided: Patient instructed to cont prior HEP. Exercises were reviewed and Denise was able to demonstrate them prior to the end of the session.  Denise demonstrated good  understanding of the education provided. See EMR under Patient Instructions for exercises provided during therapy sessions.      Assessment     Patient notes increased stiffness today in the knee that is slightly reduced after manual interventions. Held off with significant progressions due to increased knee pain although no increase in discomfort with addition of standing hip flexion and supine 90/90 adduction with ball.  Weakness in quads remains evident today with slight flexion during standing hip exercises. Continue with exercises as tolerated and provide home program for gym performance with .        Denise Is progressing well towards her goals.   Pt prognosis is Good.     Pt will continue to benefit from skilled outpatient physical therapy to address the deficits listed in the problem list box on initial evaluation, provide pt/family education and to maximize pt's level of independence in the home and community environment.     Pt's spiritual, cultural and educational needs considered and pt agreeable to plan of care and goals.    Anticipated barriers to physical therapy: none    Goals: Goals:        In 4 weeks,    Goal Status   Patient will be independent with HEP to promote improved therapy outcomes.  STG  progressing, not met   2. Patient will increase B LE MMT to 4-/5 to improve ambulation and stair navigation. STG  progressing, not met   3. Patient will perform mini squat with no  "increase in symptoms and minimal cueing for form to improve participation in gym activities and general LE strengthening.  STG  progressing, not met         In 8 weeks,    Goal Status   4. Patient will be independent with progressed HEP to allow for self management of symptoms.  LTG  progressing, not met   5. Patient will increase B LE MMT to 4/5 for improved ambulation and stair navigation.  LTG  progressing, not met   6. Patient will improve FOTO score to </= 26% limited to decrease perceived limitation with mobility LTG  progressing, not met   7. Patient will report completing a "leg day" workkout with  and no increase in symptoms from baseline to improve strength, functional mobility and activity tolerance for stair climbing LTG  progressing, not met        Plan     Cont to progress towards goals set by PT.  Work to increase glute/ quad strength next visit.      Plan of care Certification: 1/5/2023 to 03/03/2023.     Outpatient Physical Therapy 1 times weekly for 8 visits to include the following interventions: Gait Training, Manual Therapy, Moist Heat/ Ice, Neuromuscular Re-ed, Patient Education, Therapeutic Activities, and Therapeutic Exercise.     Michelle Loera, PT       "

## 2023-01-23 ENCOUNTER — CLINICAL SUPPORT (OUTPATIENT)
Dept: REHABILITATION | Facility: OTHER | Age: 23
End: 2023-01-23
Payer: MEDICAID

## 2023-01-23 DIAGNOSIS — G89.29 CHRONIC PAIN OF LEFT KNEE: Primary | ICD-10-CM

## 2023-01-23 DIAGNOSIS — M25.562 CHRONIC PAIN OF LEFT KNEE: Primary | ICD-10-CM

## 2023-01-23 PROCEDURE — 97110 THERAPEUTIC EXERCISES: CPT | Mod: PN,CQ

## 2023-01-23 NOTE — PROGRESS NOTES
"                            Physical Therapy Daily Treatment Note     Name: Denise Mosher  Clinic Number: [id number]    Therapy Diagnosis:   Encounter Diagnosis   Name Primary?    Chronic pain of left knee Yes       Physician: Vern Nguyen MD    Visit Date: 1/23/2023  Physician Orders: PT Eval and Treat Left knee - prehab  1.         Tibial Tubercleplasty Osteotomy (TTO) - 87658  2.         MQTFL - 98505  3.         Lateral Retinacular Lengthening - 20431  4.         Arthroscopic asssited Microfracture patella and application Cartilage Allograft Matrix (CAM)  5.         Deep Hardware Removal 20680  Medical Diagnosis from Referral: Patellofemoral syndrome of both knees (M22.2X1,M22.2X2)   Evaluation Date: 1/5/2023  Authorization Period Expiration: 3/16/2023  Plan of Care Expiration: 03/03/2023  Visit # / Visits authorized: 4/12 (+ eval)   FOTO:  1/23/2023 1/5  PTA: 1/5     Time In:0850  Time Out:0928  Total Billable Time: 38 minutes     Precautions: Standard     Subjective    Pt states feeling okay w/ no c/o pn in either knee currently and no joint stiffness.     Pt reports: she was compliant with home exercise program given last session.   Response to previous treatment:increased pn and soreness that has subsided since  Functional change: no change     Pain: 0/10 (L knee) 0/10 in the R   Location: bilateral knee      Objective     Denise received therapeutic exercises to develop strength, endurance, ROM, flexibility, posture, and core stabilization for 38 minutes including:    Recumbent bike level 1 x10 min for ROM   Standing hip 3-way with GTB x 30 ea B  Standing HR 30 x 5 sec hold   Shuttle press x 15 50#, x 15 62.5 #    Supine ADD with ball squeeze 2x 10   Prone EOM hip extension x15 B  Mini squats as mayuri w/ ROM  15 x   Seated Hip abd GTB  15 x   Seated hip add ball sq 15 x 5 sec hold   Step up 2" x 10 ea   Hip hinge on wall x 10     Denise received the following manual therapy techniques: Joint " mobilizations were applied to the: L knee for 0 minutes, including:  Patellar mobilizations (superior, inferior, medial, lateral) Grade I-IV  A/P and P/A tibial glides on femur Grade I-III       Home Exercises Provided and Patient Education Provided     Education provided:   - Pt edu on proper exercise technique.      Written Home Exercises Provided: Patient instructed to cont prior HEP. Exercises were reviewed and Denise was able to demonstrate them prior to the end of the session.  Denise demonstrated good  understanding of the education provided. See EMR under Patient Instructions for exercises provided during therapy sessions.      Assessment     Pt mayuri tx well w/ no c/o pn.  Pt displayed increased strength and endurance during therex.  Pt cont to lack some quad and glute strength.    Denise Is progressing well towards her goals.   Pt prognosis is Good.     Pt will continue to benefit from skilled outpatient physical therapy to address the deficits listed in the problem list box on initial evaluation, provide pt/family education and to maximize pt's level of independence in the home and community environment.     Pt's spiritual, cultural and educational needs considered and pt agreeable to plan of care and goals.    Anticipated barriers to physical therapy: none    Goals: Goals:        In 4 weeks,    Goal Status   Patient will be independent with HEP to promote improved therapy outcomes.  STG  progressing, not met   2. Patient will increase B LE MMT to 4-/5 to improve ambulation and stair navigation. STG  progressing, not met   3. Patient will perform mini squat with no increase in symptoms and minimal cueing for form to improve participation in gym activities and general LE strengthening.  STG  progressing, not met         In 8 weeks,    Goal Status   4. Patient will be independent with progressed HEP to allow for self management of symptoms.  LTG  progressing, not met   5. Patient will increase B LE MMT  "to 4/5 for improved ambulation and stair navigation.  LTG  progressing, not met   6. Patient will improve FOTO score to </= 26% limited to decrease perceived limitation with mobility LTG  progressing, not met   7. Patient will report completing a "leg day" workkout with  and no increase in symptoms from baseline to improve strength, functional mobility and activity tolerance for stair climbing LTG  progressing, not met        Plan     Cont to progress towards goals set by PT.  Work to increase glute/ quad strength next visit.      Plan of care Certification: 1/5/2023 to 03/03/2023.     Outpatient Physical Therapy 1 times weekly for 8 visits to include the following interventions: Gait Training, Manual Therapy, Moist Heat/ Ice, Neuromuscular Re-ed, Patient Education, Therapeutic Activities, and Therapeutic Exercise.     Ramiro Payne, PTA         "

## 2023-01-30 ENCOUNTER — CLINICAL SUPPORT (OUTPATIENT)
Dept: REHABILITATION | Facility: OTHER | Age: 23
End: 2023-01-30
Payer: MEDICAID

## 2023-01-30 DIAGNOSIS — M25.562 CHRONIC PAIN OF LEFT KNEE: Primary | ICD-10-CM

## 2023-01-30 DIAGNOSIS — G89.29 CHRONIC PAIN OF LEFT KNEE: Primary | ICD-10-CM

## 2023-01-30 PROCEDURE — 97110 THERAPEUTIC EXERCISES: CPT | Mod: PN,CQ

## 2023-01-30 NOTE — PROGRESS NOTES
"                            Physical Therapy Daily Treatment Note     Name: Denise Mosher  Clinic Number: [id number]    Therapy Diagnosis:   No diagnosis found.      Physician: Vern Nguyen MD    Visit Date: 1/30/2023  Physician Orders: PT Eval and Treat Left knee - prehab  1.         Tibial Tubercleplasty Osteotomy (TTO) - 09875  2.         MQTFL - 91946  3.         Lateral Retinacular Lengthening - 04584  4.         Arthroscopic asssited Microfracture patella and application Cartilage Allograft Matrix (CAM)  5.         Deep Hardware Removal 20680  Medical Diagnosis from Referral: Patellofemoral syndrome of both knees (M22.2X1,M22.2X2)   Evaluation Date: 1/5/2023  Authorization Period Expiration: 3/16/2023  Plan of Care Expiration: 03/03/2023  Visit # / Visits authorized: 5/12 (+ eval)   FOTO:  1/23/2023 1/5  PTA: 2/5     Time In:0845  Time Out: 0930  Total Billable Time:  45 minutes     Precautions: Standard     Subjective    Pt reports w/ no c/o pn in either knee currently.    Pt reports: she was compliant with home exercise program given last session.   Response to previous treatment: no adverse effects  Functional change: no change     Pain: 0/10 (L knee) 0/10 in the R   Location: bilateral knee      Objective     Denise received therapeutic exercises to develop strength, endurance, ROM, flexibility, posture, and core stabilization for  45 minutes including:    Recumbent bike level 1 x10 min for ROM   Standing hip 3-way with GTB x 30 ea B  Standing HR 30 x 5 sec hold   Shuttle press x 15 50#, x 15 62.5 #  Step up 4" 2 x 10 ea     Supine ADD with ball squeeze 2x 10   Prone EOM hip extension x15 B  Mini squats as mayuri w/ ROM  15 x   Seated Hip abd GTB  15 x   Seated hip add ball sq 15 x 5 sec hold   Step up 2" x 10 ea   Hip hinge on wall x 10     Denise received the following manual therapy techniques: Joint mobilizations were applied to the: L knee for 0 minutes, including:  Patellar " mobilizations (superior, inferior, medial, lateral) Grade I-IV  A/P and P/A tibial glides on femur Grade I-III       Home Exercises Provided and Patient Education Provided     Education provided:   - Pt edu on proper exercise technique.      Written Home Exercises Provided: Patient instructed to cont prior HEP. Exercises were reviewed and Denise was able to demonstrate them prior to the end of the session.  Denise demonstrated good  understanding of the education provided. See EMR under Patient Instructions for exercises provided during therapy sessions.      Assessment   Pt cont to have quad weakness and limited knee flexion/ ext.  Pt showed improved strength and endurance during therex.  Pt mayuri tx well w/ no c/o pn.      Denise Is progressing well towards her goals.   Pt prognosis is Good.     Pt will continue to benefit from skilled outpatient physical therapy to address the deficits listed in the problem list box on initial evaluation, provide pt/family education and to maximize pt's level of independence in the home and community environment.     Pt's spiritual, cultural and educational needs considered and pt agreeable to plan of care and goals.    Anticipated barriers to physical therapy: none    Goals: Goals:        In 4 weeks,    Goal Status   Patient will be independent with HEP to promote improved therapy outcomes.  STG  progressing, not met   2. Patient will increase B LE MMT to 4-/5 to improve ambulation and stair navigation. STG  progressing, not met   3. Patient will perform mini squat with no increase in symptoms and minimal cueing for form to improve participation in gym activities and general LE strengthening.  STG  progressing, not met         In 8 weeks,    Goal Status   4. Patient will be independent with progressed HEP to allow for self management of symptoms.  LTG  progressing, not met   5. Patient will increase B LE MMT to 4/5 for improved ambulation and stair navigation.  LTG   "progressing, not met   6. Patient will improve FOTO score to </= 26% limited to decrease perceived limitation with mobility LTG  progressing, not met   7. Patient will report completing a "leg day" workkout with  and no increase in symptoms from baseline to improve strength, functional mobility and activity tolerance for stair climbing LTG  progressing, not met        Plan     Cont to progress towards goals set by PT.  Work to increase glute/ quad strength next visit.          Ramiro Payne, PTA           "

## 2023-03-06 ENCOUNTER — CLINICAL SUPPORT (OUTPATIENT)
Dept: REHABILITATION | Facility: OTHER | Age: 23
End: 2023-03-06
Payer: MEDICAID

## 2023-03-06 DIAGNOSIS — G89.29 CHRONIC PAIN OF LEFT KNEE: Primary | ICD-10-CM

## 2023-03-06 DIAGNOSIS — M25.562 CHRONIC PAIN OF LEFT KNEE: Primary | ICD-10-CM

## 2023-03-06 PROCEDURE — 97164 PT RE-EVAL EST PLAN CARE: CPT | Mod: PN

## 2023-03-06 PROCEDURE — 97110 THERAPEUTIC EXERCISES: CPT | Mod: PN

## 2023-03-06 NOTE — PLAN OF CARE
Outpatient Therapy Updated Plan of Care     Visit Date: 3/6/2023  Name: Denise Mosher  Clinic Number: [id number]    Therapy Diagnosis:   Encounter Diagnosis   Name Primary?    Chronic pain of left knee Yes     Physician: Vern Nguyen MD    Physician Orders: PT Eval and Treat Left knee - prehab  1.         Tibial Tubercleplasty Osteotomy (TTO) - 74911  2.         MQTFL - 47909  3.         Lateral Retinacular Lengthening - 46102  4.         Arthroscopic asssited Microfracture patella and application Cartilage Allograft Matrix (CAM)  5.         Deep Hardware Removal   Medical Diagnosis from Referral: Patellofemoral syndrome of both knees (M22.2X1,M22.2X2)   Evaluation Date: 2023    Prior Certification Period: 2023 to 3/3/2023  Current Certification Period:  3/6/2023 to 2023    Visits from Evaluation Date:  5  Total Visits Received: 6  Cancelled Visits: 2  No Show Visits: 1    Precautions:  Standard    Functional Level Prior to Evaluation:  independent with knee pain    Subjective     Update: she has had a lot of personal things going on that have really been affecting her (her grandfather , her boyfriend broke up with her, and her friend cut off communication with her).  She has been feeling very down over the last few weeks which has affected her gym and therapy attendance.  She has signed up for the [employer] and is trying to beat her 2:17 hour time.  She really wants to continue with more exercises so she can just keep up her gym activity and stop therapy until after she has the surgery. Her knee has still not popped and she notes this is continuing to bother her.    Pt reports: she was compliant with home exercise program given last session.   Response to previous treatment: no adverse effects  Functional change: no change     Pain: 0/10 (L knee) 0/10 in the R   Location: bilateral knee    Objective     Update:     Range of Motion:     Hip Right AROM Left AROM    Flexion 130 125 with P!    Extension 0 0         MMT/Strength:     Hip Right Left   Flexion 5/5 5/5   Extension 4/5 4/5   Internal Rotation 4/5 4-/5   External Rotation 4/5 4-/5 (P! With PT pressure on thigh)   Adduction 5/5 4/5   Abduction 4/5 (P! With PT pressure on thigh) 4/5 (P! With PT pressure on thigh)   Knee       Flexion 4+/5 4/5   Extension 4+/5 4/5   Ankle       Dorsiflexion 5/5 5/5   Plantarflexion 5/5 4/5      Other:   Squat/OKC knee extension: Audible crepitus and reports discomfort and intermittent cavitations, patient is able to purposefully reproduce some cavitations.  Reduced crepitus and discomfort with prone knee flexion.    Gait: Initial contact with toes intermittently, slight toeing in        CMS Impairment/Limitation/Restriction for FOTO Knee Survey    Therapist reviewed FOTO scores for Denise Mosher on 3/6/2023.   FOTO documents entered into [employer] - see Media section.    Limitation Score: 37%  Category: Mobility    Current : CJ = at least 20% but < 40% impaired, limited or restricted  Goal: CJ = at least 20% but < 40% impaired, limited or restricted  Discharge: CJ = at least 20% but < 40% impaired, limited or restricted          Assessment     Update: Patient has made some progress with RANGE OF MOTION and strength although she continues to have pain.  She is able to perform a mini squat with deeper depth and no change in pain compared to initial evaluation indicating slow progress.  She continues to experience pain that is limiting her participation in strengthening and gym activities, although she has provided her  with the HEP to perform there.  Continued with strengthening within available and tolerable ranges, adding in posterior chain strengthening. Plan to continue updating HEP with LOWER EXTREMITY exercises as tolerated to perform in the gym.       In 4 weeks,    Goal Status   Patient will be independent with HEP to promote improved therapy outcomes.  STG  MET   2.  "Patient will increase B LE MMT to 4-/5 to improve ambulation and stair navigation. STG  MET   3. Patient will perform mini squat with no increase in symptoms and minimal cueing for form to improve participation in gym activities and general LE strengthening.  STG  MET         In 8 weeks,    Goal Status   4. Patient will be independent with progressed HEP to allow for self management of symptoms.  LTG  progressing, not met   5. Patient will increase B LE MMT to 4/5 for improved ambulation and stair navigation.  LTG  progressing, not met   6. Patient will improve FOTO score to </= 26% limited to decrease perceived limitation with mobility LTG  progressing, not met   7. Patient will report completing a "leg day" workkout with  and no increase in symptoms from baseline to improve strength, functional mobility and activity tolerance for stair climbing LTG  progressing, not met        Previous Short Term Goals Status:   in progress  New Short Term Goals Status:   MET  Long Term Goal Status:   continue per initial plan of care.  Reasons for Recertification of Therapy:   Patient will continue to benefit from additional skilled physical therapy to update HEP for patient to provide to  to continue with lower extremity strengthening, promote independence and weight loss so patient can get her surgery.      Plan     Updated Certification Period: 3/6/2023 to 04/07/2023  Recommended Treatment Plan: 1 times per week for 4 weeks: Gait Training, Manual Therapy, Moist Heat/ Ice, Neuromuscular Re-ed, Patient Education, Therapeutic Activities, and Therapeutic Exercise    Michelle Loera, PT  3/6/2023    "

## 2023-03-06 NOTE — PROGRESS NOTES
"                          Physical Therapy Daily Treatment Note     Name: Denise Mosher  Clinic Number: [id number]    Therapy Diagnosis:   Encounter Diagnosis   Name Primary?    Chronic pain of left knee Yes     Physician: Vern Nguyen MD    Visit Date: 3/6/2023  Physician Orders: PT Eval and Treat Left knee - prehab  1.         Tibial Tubercleplasty Osteotomy (TTO) - 93112  2.         MQTFL - 85085  3.         Lateral Retinacular Lengthening - 84115  4.         Arthroscopic asssited Microfracture patella and application Cartilage Allograft Matrix (CAM)  5.         Deep Hardware Removal 20680  Medical Diagnosis from Referral: Patellofemoral syndrome of both knees (M22.2X1,M22.2X2)   Evaluation Date: 1/5/2023  Authorization Period Expiration: 3/16/2023  Plan of Care Expiration: 03/03/2023 --> 3/6/2023 - 4/7/2023   Visit # / Visits authorized: 6/12 (+ eval)   FOTO:  3/6/2023 (3/3)  -d/c  PTA: 0/5     Time In: 10:00 am   Time Out: 10:48 am   Total Billable Time:  45 minutes     Precautions: Standard     Subjective    Pt reports See UPOC  Pt reports: she was compliant with home exercise program given last session.   Response to previous treatment: no adverse effects  Functional change: no change     Pain: 0/10 (L knee) 0/10 in the R   Location: bilateral knee      Objective     Denise received therapeutic exercises to develop strength, endurance, ROM, flexibility, posture, and core stabilization for 45 minutes including:  Re-assessment/UPOC  Mini squat within tolerable RANGE OF MOTION 2x 10  Standing HR 30 x 5 sec hold   Hip hinge with grey theratube 2x 10  Mini squat with yellow (medium) loop x25  Standing slider 3-way x5 each bilateral -> stopped with left stance due to pain      Recumbent bike level 1 x10 min for ROM   Standing hip 3-way with GTB x 30 ea B  Shuttle press x 15 50#, x 15 62.5 #  Step up 4" 2 x 10 ea   Supine ADD with ball squeeze 2x 10   Prone EOM hip extension x15 B  Seated Hip abd GTB  " "15 x   Seated hip add ball sq 15 x 5 sec hold   Step up 2" x 10 ea   Hip hinge on wall x 10     Denise received the following manual therapy techniques: Joint mobilizations were applied to the: L knee for 0 minutes, including:  Patellar mobilizations (superior, inferior, medial, lateral) Grade I-IV  A/P and P/A tibial glides on femur Grade I-III       Home Exercises Provided and Patient Education Provided     Education provided:   - Pt edu on proper exercise technique.      Written Home Exercises Provided: Patient instructed to cont prior HEP. Exercises were reviewed and Denise was able to demonstrate them prior to the end of the session.  Denise demonstrated good  understanding of the education provided. See EMR under Patient Instructions for exercises provided during therapy sessions.      Assessment   See UPOC     Denise Is progressing well towards her goals.   Pt prognosis is Good.     Pt will continue to benefit from skilled outpatient physical therapy to address the deficits listed in the problem list box on initial evaluation, provide pt/family education and to maximize pt's level of independence in the home and community environment.     Pt's spiritual, cultural and educational needs considered and pt agreeable to plan of care and goals.    Anticipated barriers to physical therapy: none    Goals:       In 4 weeks,    Goal Status   Patient will be independent with HEP to promote improved therapy outcomes.  STG  MET   2. Patient will increase B LE MMT to 4-/5 to improve ambulation and stair navigation. STG  MET   3. Patient will perform mini squat with no increase in symptoms and minimal cueing for form to improve participation in gym activities and general LE strengthening.  STG  MET         In 8 weeks,    Goal Status   4. Patient will be independent with progressed HEP to allow for self management of symptoms.  LTG  progressing, not met   5. Patient will increase B LE MMT to 4/5 for improved " "ambulation and stair navigation.  LTG  progressing, not met   6. Patient will improve FOTO score to </= 26% limited to decrease perceived limitation with mobility LTG  progressing, not met   7. Patient will report completing a "leg day" workkout with  and no increase in symptoms from baseline to improve strength, functional mobility and activity tolerance for stair climbing LTG  progressing, not met            Plan     Cont to progress towards goals set by PT.  Work to increase glute/ quad strength next visit.     Updated Certification Period: 3/6/2023 to 04/07/2023  Recommended Treatment Plan: 1 times per week for 4 weeks: Gait Training, Manual Therapy, Moist Heat/ Ice, Neuromuscular Re-ed, Patient Education, Therapeutic Activities, and Therapeutic Exercise       iMchelle Loera, PT             "

## 2023-03-07 ENCOUNTER — NUTRITION (OUTPATIENT)
Dept: NUTRITION | Facility: CLINIC | Age: 23
End: 2023-03-07
Payer: MEDICAID

## 2023-03-07 VITALS — HEIGHT: 60 IN | BODY MASS INDEX: 47.51 KG/M2 | WEIGHT: 242 LBS

## 2023-03-07 DIAGNOSIS — E66.01 MORBID OBESITY WITH BMI OF 40.0-44.9, ADULT: ICD-10-CM

## 2023-03-07 PROCEDURE — 97802 MEDICAL NUTRITION INDIV IN: CPT | Mod: PBBFAC,PO | Performed by: NUTRITIONIST

## 2023-03-07 PROCEDURE — 99999 PR PBB SHADOW E&M-EST. PATIENT-LVL II: ICD-10-PCS | Mod: PBBFAC,,, | Performed by: NUTRITIONIST

## 2023-03-07 PROCEDURE — 99999 PR PBB SHADOW E&M-EST. PATIENT-LVL II: CPT | Mod: PBBFAC,,, | Performed by: NUTRITIONIST

## 2023-03-07 PROCEDURE — 99212 OFFICE O/P EST SF 10 MIN: CPT | Mod: PBBFAC,PO | Performed by: NUTRITIONIST

## 2023-03-07 NOTE — PROGRESS NOTES
Nutrition Assessment    Visit Type: Insurance initial  Session Time:  2 Hours  Reason for MNT visit: Pt in for education and nutrition counseling regarding Obesity.       Age: 22 y.o.  Wt:   Wt Readings from Last 1 Encounters:   03/07/23 109.8 kg (242 lb)     Ht:   Ht Readings from Last 1 Encounters:   03/07/23 5' (1.524 m)     BMI:   BMI Readings from Last 1 Encounters:   03/07/23 47.26 kg/m²       Client states: She wants to get down to 200 pounds in order for her to have another needed knee surgery. She has a high stress level and does not sleep well. She stated she has a hard time loosing weight.    Client works full time at [employer]    Medical History  Problem List             Resolved    Bilateral hand pain         Wound infection after surgery         Arthrofibrosis of knee joint         Altered mental status         Cervicalgia         Neck pain         Abnormal EEG         Family history of epilepsy         Anxiety         Syncope         Morbid obesity with BMI of 40.0-44.9, adult         Psychogenic nonepileptic seizure         Trochanteric bursitis of both hips         Chronic pain of both knees         Patellofemoral syndrome of both knees         Pain from implanted hardware         Right calf pain         Patellar instability of right knee         Acquired genu valgum of right knee         Incontinence in female         Arthrofibrosis of knee joint, right         Painful orthopaedic hardware         Genu valgum, left         Left knee pain         Difficulty walking         Chondromalacia patellae of left knee         Chronic pain of left knee            Past Medical History:   Diagnosis Date    Anxiety     reports panic attacks    Depression     Fibromyalgia     per pt    Insomnia     Precocious female puberty     Seizures     reports 2 incidents of possible seizures while giving blood    Syncope and collapse     Wrist fracture, bilateral        Past Surgical History:   Procedure Laterality Date     ARTHROSCOPY OF KNEE Right 12/19/2019    Procedure: ARTHROSCOPY, KNEE (Right);  Surgeon: Ranulfo House MD;  Location: [facility] OR [address] [place] [address];  Service: Orthopedics;  Laterality: Right;    ARTHROSCOPY OF KNEE Left 7/9/2020    Procedure: ARTHROSCOPY, KNEE;  Surgeon: Ranulfo House MD;  Location: [facility] OR [address] [place] [address];  Service: Orthopedics;  Laterality: Left;  WITH CHRONDROPLASTY     ARTHROTOMY OF KNEE Left 7/9/2020    Procedure: ARTHROTOMY, KNEE;  Surgeon: Ranulfo House MD;  Location: [facility] OR [address] [place] [address];  Service: Orthopedics;  Laterality: Left;  WITH CHRONDRAL ALLOGRAFT IMPLANTATION    chip      chip implant    CHONDROPLASTY OF KNEE Right 12/19/2019    Procedure: CHONDROPLASTY, KNEE patella;  Surgeon: Ranulfo House MD;  Location: [facility] OR [address] [place] [address];  Service: Orthopedics;  Laterality: Right;    FEMUR OSTEOTOMY Right 12/13/2018    Procedure: OSTEOTOMY, FEMUR - distal to correct valgus (Orthopediatrics plate, MTF bone wedge).  Right knee arthroscopy with MPFL reconstruction, gracilis allograft (Linvatec).  3 hrs.;  Surgeon: Ranulfo House MD;  Location: [facility] OR [address] [place] [address];  Service: Orthopedics;  Laterality: Right;    FEMUR OSTEOTOMY Left 7/9/2020    Procedure: OSTEOTOMY, FEMUR (Left) - distal femoral opening wedge (Orthopediatrics, MTF), knee scope, removal of tibial tubercle screws (Orthopediatrics 4.0 cannulated screws);  Surgeon: Ranulfo House MD;  Location: [facility] OR [address] [place] [address];  Service: Orthopedics;  Laterality: Left;  RQ Gianna DAVIS notified 7/8/20 Onelia RN    HARDWARE REMOVAL Right 12/19/2019    Procedure: REMOVAL, HARDWARE righ distal femoral.;  Surgeon: Ranulfo House MD;  Location: [facility] OR [address] [place] [address];  Service: Orthopedics;  Laterality: Right;    HARDWARE REMOVAL Left 7/9/2020    Procedure: REMOVAL, HARDWARE;  Surgeon: Ranulfo House MD;  Location: [facility] OR [address] [place] [address];  Service: Orthopedics;  Laterality: Left;  TIBIA    HEMANGIOMA EXCISION      scalp    INGUINAL HERNIA REPAIR Left     INJECTION OF STEROID  "Right 7/5/2019    Procedure: INJECTION, STEROID;  Surgeon: Ranulfo House MD;  Location: [facility] OR [address] [place] [address];  Service: Orthopedics;  Laterality: Right;    KNEE JOINT MANIPULATION Right 7/5/2019    Procedure: MANIPULATION, KNEE;  Surgeon: Ranulfo House MD;  Location: [facility] OR [address] [place] [address];  Service: Orthopedics;  Laterality: Right;    KNEE SURGERY Left 2/16/15    TOE SURGERY Right     5 th toe          Medications    Prior to Admission medications    Medication Sig Start Date End Date Taking? Authorizing Provider   ibuprofen (ADVIL,MOTRIN) 400 MG tablet Take 1 tablet (400 mg total) by mouth every 4 (four) hours.  Patient not taking: Reported on 6/28/2022 7/10/20   Edward Alvarado MD        Vitamins, Minerals, and/or Supplements:  none     Food Allergies or Intolerances:  NKFA    Social History    Marital status:  Single    Social History     Tobacco Use    Smoking status: Never    Smokeless tobacco: Never   Substance Use Topics    Alcohol use: No     Current Alcohol use: socially: 1-2 x a month (a couple glasses of wine or a Smirnoff ice)    Lab Reports   Reviewed and noted, but no current lipid panel on file    Lab Results   Component Value Date    TSH 1.13 05/12/2010    FREET4 1.10 05/12/2010    CRP 11.5 (H) 03/11/2016    SEDRATE 17 03/11/2016    AST 19 11/03/2017    ALT 39 11/03/2017         BP Readings from Last 1 Encounters:   11/30/22 126/77        24-hour Recall:  -Breakfast: patient skips 90& of the time. If she has breakfast she will have a Boost ready to drink protein shake with Premier protein powder scoop. If she doesn't have a protein shake she will have either a Special K or Nutrigrain granola bar  Morning snack: Skips  Lunch: Either a 20 oz Hulk or Yogurt [place] from [employer] where she works -OR- 6" Subway (tuna or chicken) with 3 sauces (honey mustard, ranch and rodríguez) + 1 slices cheese + sweet tea -or- leftovers (See dinner)  Pm Snack: Currently skips. In the past Denise stated she would eat " "chips and non-nutritive/filling items  Dinner: at least 1 cup white rice with a stew; 6" Subway; Fried fish platter on Fridays during lent; protein shake; Spaghetti and meat sauce (meat on the side)  Once a month: large glass of [place] chocolate milk             Beverages       LIFESTYLE FACTORS    Dinning out: mostly fast food (Smoothie [place] or Subway)    Meal preparation/shopping: self; parent    Sleep: poor    Stress Level: high    Support System:  parents    Exercise Regimen: Sedentary (little or no exercise)      Diagnosis    Excessive energy intake related to eating too many calories from sweetened beverages and large portions for some meals as evidenced by 24 hour recall      Intervention    Estimated Energy Requirements:   Calories: 1135-0495  Protein: 140-160 grams  Carbohydrates: 140-160 grams  Fats: ~90 grams: Choose plant-based heart healthy fats  Total fluid: 115 oz + sweat loss  -Ideally have ~7, 16.9 oz bottle water daily, but since you're consuming 3, 16.9 oz bottles, start off by increasing to 5, 16.9 oz bottles daily  Limit added sugar to 20 grams or less per day (Note: 1 teaspoon of sugar = ~5 gram)    Recommendations & Goals:  Patient goals and recommendations are tailored to the specific patient's needs, readiness to change, lifestyle, culture, skills, resources, & abilities. Strategies to help achieve these nutrition-related goals were discussed which can include but are not limited to SMART goal setting & mindful eating.     Aim for a minimum of 7 hours sleep   Exercise 60 minutes most days  Eat breakfast within 1-2 hours of waking up  Try not to skip any meals or snacks, not going more than 3-4 hours without eating   At each meal and snack, try to include a source of fiber + lean protein + healthy fat     Written Materials Provided  These resources are intended to assist the patient in making it easier to choose recommended options when eating out & to identify better-for-you brands at " the grocery store:    Meal Planning Guide with recommendations discussed along with portion sizes and a customized meal plan   Fueling Well On-the-Go Food Guide  Eat Fit Shopping Guide  Lifestyle Nutrition Meal Guide  RD contact information    Goals:  Increase exercise to our goal of walking 1 hour at least 5 days a week  Eat every 3-4 hours   Increases Energy  Helps with portion control  Helps with metabolism since we burn calories digesting food      Monitoring/Evaluation    Monitor the following:  Weight  Sleep  Stress Management  Movement  Nutrient intake in reference to meal plan    Communicated with healthcare provider and documented plan for referral to appropriate agency/healthcare provider as needed    Patient motivation, anticipated barriers, expected compliance: Patient is motivated and has verbalized understanding and intent to comply.     Comprehension: fair     Follow-up: 6-8 weeks

## 2023-03-07 NOTE — PATIENT INSTRUCTIONS
Name: Denise Mosher  Date: 3.7.2023    Daily Energy Intake:   Calories: 9447-6696  Protein: 140-160 grams  Carbohydrates: 140-160 grams  Fats: ~90 grams: Choose plant-based heart healthy fats  Total fluid: 115 oz + sweat loss  -Ideally have ~7, 16.9 oz bottle water daily, but since you're consuming 3, 16.9 oz bottles, start off by increasing to 5, 16.9 oz bottles daily  Limit added sugar to 20 grams or less per day (Note: 1 teaspoon of sugar = ~5 gram)    Goals:  Increase exercise to our goal of walking 1 hour at least 5 days a week  Eat every 3-4 hours   Increases Energy  Helps with portion control  Helps with metabolism since we burn calories digesting food    Breakfast: 2 servings of carbohydrates = 30-40 grams + at least 35 grams lean protein/heart healthy fat  Homemade protein shake: Ready to drink protein shake (see brand examples below) + 1 scoop premier protein powder + 1.5 cups fruit of your choice + spinach and kale + 1 tablespoon regular peanut butter  Granola bar (see notes below) + 8-10 oz glass of regular Fairlife 2% + string cheese  2 whole eggs + 2 slices center-cut mcgarry + 2-3 tablespoons shredded cheese on eggs + side of 1.5 cups chopped frozen mixed fruit of your choice    Snack: A mid-morning snack may be needed if going >3-4 hours between meals      Lunch: 5 oz lean protein + any non-starchy veggies + up to 3 servings of carbs = 45-60 grams  Examples of 3 servings of carbohydrates = ~45-60 grams:  1.5 cups cooked whole wheat pasta; 1 cup cooked rice; 1 medium potato or sweet potato (6 oz in weight); 1 piece of fruit + 2 slices 100-calorie bread; 1 cup cooked beans + 1/3 cup cooked rice; 6 ear of corn + 1 cup chopped fruit, etc  The rest of your plate will consist of 5 oz lean protein + at least 1 cup cooked non-starchy veggies  Meal Examples:  Fast Food  Convenient Options: Refer to Fueling Well On-The-Go Guide   Smoothie [place] oz: See options below instead of on FUELING well  on the go guide  Subway: 6 from fueling well on the guide and request double protein and 2 sauces of your choice (rodríguez, ranch, honey mustard- 2 two of these). Drink water as your beverage   [place]: 2 slices Macho's Killer 21 whole grains and seeds regular sliced bread + 5 oz low sodium freshly sliced turkey or ham + 1 tablespoon regular rodríguez + 1 slice cheese + non-starchy veggies of your choice  Spaghetti & meat sauce: 1.5 cups cooked pasta + 5 oz lean 90/10 ground beef (on the side eating separately is perfectly fine) + side of non-starchy veggies  Red beans & rice: 1 cup cooked beans + 1/3 cup cooked rice + additional 2-3 oz lean protein (ex: chicken sausage, ham, chicken, etc) + side of non-starchy veggies  If you don't want rice, then you can do 1 cup cooked beans over riced cauliflower + additional 2-3 oz lean protein (ex: chicken sausage, ham, chicken, etc) + side of non-starchy veggies  5 oz lean protein + at least 1 cup non-starchy veggies of your choice + up to 1/3 cup cooked rice + 6 ear of corn (or 1 cup chopped corn)      Snack: ~1 serving of carbs = 15-25 grams + at least 25 grams lean protein/heart healthy fat + any non-starchy vegetables  [place] balance break + banana paired with 1 tablespoon peanut butter  Piece of fruit + 2 tablespoons regular nut butter  Flapjacked protein mighty muffin (typically found on baking aisle of [employer] stores- this is good to bring to work)  Protein/Granola bar (see below brands) + piece of fruit with 1 tablespoon peanut butter  Flavored Greek yogurt (See below for brands) + ¼ cup nuts or low sugar granola  Mini meal: 1 sandwich: 2 slices 50-calorie whole grain bread + 3 oz lean protein + smear of rodríguez      Dinner: Lower carbohydrate à ~ 1 serving of carbohydrates = 15-20 grams = ~½ cup cooked whole grain starch -or- a piece of fruit  The rest of your plate will consist of 6 oz lean protein + 1 cup or more non-starchy veggies cooked in ~1 tableson heart healthy  fat (avocado oil/EVOO)  If eating Fried fish for lent on Friday, then choose 1 fried option such as the fish. I would not also do fried French fries or hushpuppies for example  Carb swaps if desired:  'Riced' Cauliflower  'Riced' Broccoli  Cauliflower mash  [place] makes a delicious frozen cauliflower mash that come in different flavors  Spaghetti squash  Zoodles  Spiralized Beets     Think outside the box for low carb dishes:  Kabobs, stir [place]dwell with riced cauliflower or riced broccoli, stuffed bell peppers with no rice, lettuce wraps, shrimp etouffee made with riced cauliflower, etc  Quest pizza à low carb + high protein frozen pizza. Add additional side of non-starchy veggies (Note: This frozen entrée is high in sodium)      Optional post-dinner snack or sweet: Anything up to 200 calories  'Fun size'                          Restaurant Tips   Pick 1 out of the 4 Rule: Instead of eating bread/tortilla chips, an appetizer, alcoholic drink and dessert, choose just one to have with your entrée  Focus on lean proteins: Refer to lean meat/meat substitutes page in meal planning guidebook. Select items grilled, baked, broiled, braised, poached or roasted      For your heart health, avoid crispy, crunchy, breaded, paneed or stuffed items and items that are cream based, au gratin or buttered      Order sauces, dressings, and gravies on the side. This way you can add 1-2 tablespoons yourself. This helps with portion control     Request extra non-starchy vegetables instead of a starchy side dish. If the starchy side is something you love, consider splitting it with someone else at the table     Beverages: Order water with lemon, sparkling water, or unsweetened tea. Avoid sugary soft drinks, juices and mixers  Deep fried foods: Enjoy no more than 2x/month      Dining Out     [facility]  Designed to take the guesswork out of dining out healthfully, [facility] makes the healthy choice the easy  choice  Visit    Order the Eat Fit Cookbook to create restaurant quality dishes at home  Download the [facility] Eat [facility] shelly for free on your smartphone  All Eat Fit restaurants & dishes by location   Nutrition facts for every Eat Fit dish  200 + Eat Fit approved recipes  Grocery shopping guides + community wellness resources      Building A Better Smoothie  Choose your protein. Pick 1 from the following:  Premier or Gladiator whey protein powder  Make it sweet:  Add ~1-1.5 cups fresh -or- No Sugar Added frozen fruit   Add your veggies:  Instead of ice, choose frozen cauliflower florets   Cauliflower helps with the detoxification process in our bodies, and it's virtually tasteless!  Greens: spinach, kale, or other leafy greens  Beets are a great addition to smoothies, especially paired with strawberries and lemon  Cucumbers and celery are refreshing ways to enhance nutrition and hydration within your smoothie  Add one of the following plant-based fats:   Nut butter: 1 tablespoon  Natural peanut butter  [place] butter  Cashew butter  Avocado oil  Extra Virgin Olive Oil: 1 tablespoon (you don't taste the oil if you're blending your smoothie at home)  Add a liquid to reach your desired consistency:  Unsweetened/No Sugar Added plant-based milk alternative   [place], Hemp, Cashew, Coconut, Rice or Soy   Fairlife Milk: 2%  Healthy add-ins for an extra nutritional boost:  1 tablespoon flaxseeds -or- pankaj seeds          Additional Nutrition Tips    -Exercise  Movement: walk for 1 hour 5-6 days a week   If you have a busy day, even a 20-minute walk is better than nothing    -Grocery Aisle Food Brand Guidelines: Rahul #7 rule  Look for products that have 7 grams or less added sugar, and at least 7 grams or more protein  -Frozen veggies and fruits (unsweetened) contain the same nutrition facts as fresh    -Frozen Meal Guidelines:   ~45 grams or less carbohydrates & at least 20 grams protein  If you find a brand you enjoy that  doesn't provide 20 grams protein, you can add leftover lean protein to the frozen meal  See notes below for several brand examples  Refer to the Eat Fit Shopping Guide for additional brand specific recommendations    -Portion Control:  Measure and/or weigh your food in cooked portions for the first time you start your plan  See what each portion looks like on your plate and then eyeball each portion for future meals and snacks    -Avoid/Limit the following as these may be inflammatory to our body and can decrease Energy throughout the day if consumed often:  Added Sugar  White, refined, processed carbohydrates  Alcohol  Fried Foods    -If you're having trouble finding a specific food brand, try looking on the brands website. Most companies have a 'store   find a store' on their website        Favorite Food Brands    - Whole Grain Breads:  Macho's Killer Bread - 21 Whole Grains and Seeds (green label), Good Seed (yellow label), Power Seed (red label)   Thin sliced -or- regular slice  Any 100% whole wheat/whole grain bread    -Whole Grain Tortillas  Wraps:  Corn   Howe - Whole wheat tortillas + whole wheat carb balance    -Grab and Go Protein Muffin:  FlapJacked: Mighty Muffin [typically found on baking aisle]    -Whole Grain Frozen Waffles:  Kashi GO Protein Waffles (only found at [facility] [place])    -Frozen Breakfast Sandwiches  Bowls for On-The-Go:  Moustapha Awad' (English muffin)    -Whole Grain Chips:  SunChips  Beanito's  Beanfields bean chips  PopCorners- Flex Energy packed protein crisps       -Whole Grain Crackers:  Triscuits - Regular + thin crisps  Wheat Thins  Blue Genet almond nut thins  Keira's Gone Cracker  Crunchmaster protein sea salt cracker    - Red Sauce (In A Jar):  Jasper & Claudia's Heart Smart Sauce   Classico Original  Engine 2 Plant-Strong    -Protein Bars:  Oatmega Bars  Powercrunch  Quest  Pure Protein  (I know you said you don't like nuts, but does this include nuts in bars? The  following do contain nuts)  Nature Valley Protein Chewy   Kashi Go Protein Bar   KIND Protein + KIND Bars (with 7 grams sugar or less)     -Ready-to-Drink Protein Drinks:  Fairlife 30-gram Protein Drink (I think you'll love these)  Fairlife CORE POWER Protein Drink (I think you'll love these)  Orgain Clean- grass-fed + plant based  Premier    -Frozen Meals à Single Serving to keep in your freezer as a backup source  Healthy Choice: MAX   Healthy Choice: POWER Bowls  Eating Well: [Rouses]  Quest: Thin crust  low carb     -Already Flavored Greek Yogurt:  Oikos Pro (20 grams protein  3 grams total sugar)  Chobani Less Sugar  Chobani Zero Sugar  Oikos Triple Zero  Two Good - All varieties     -Milk:  Fairlife 2% white milk  Fairlife 2% chocolate milk okay to have once a week if portion is 6-8 oz  If you don't like the chocolate Fairlife milk, then doing 6-8 oz [place] chocolate milk once a month is fine  -BBQ Sauce:   PADMINI all natural   Primal Kitchen Classic BBQ sauce      -Smoothie [place] Options:  Get Fit Blends  The Activator Recovery  Chocolate, Strawberry Banana, Blueberry Tart Cherry, Pineapple Spinach  Gladiator  Vanilla, Chocolate, Strawberry with any two of the following:  Plant-based fats  almond butter, peanut butter, almonds  Fruit  bananas, wild blueberries, mangoes, pineapples, raspberries, no-sugar-added strawberries, tart cherries  Vegetables + Spices  organic carrots, organic kale, organic pumpkin, organic spinach, organic markie, cinnamon  Original High Protein  chocolate, banana, pineapple  High Intensity Workout  Veggie Gus, chocolate cinnamon     Feel Energized Blends  Keto Champ  Coffee  The Activator Recovery Coffee  Coffee High Protein  Vanilla, [place] mocha    Manage Weight Blends  Keto Champ  Chocolate, berry  Slim-N-Trim  Vanilla, chocolate, strawberry, veggie (Veggie, use almond milk in place of juice)  The Shredder  chocolate, vanilla  Lean 1  Vanilla, chocolate,  strawberry    Be Well Blends  Vegan Pineapple Spinach (almond milk in place of juice)  Vegan [place] Kale (almond milk in place of juice)  Vegan Nutty Super Grain (no juice blend)  Veggie Lemon Nereyda Spinach (almond milk in place of juice; add Sun Warrior Vegan Protein)  Veggie Carrot Kale Dream (almond milk in place of juice; add Sun Warrior Vegan Protein)                    If you need to reschedule a nutrition follow-up appointment, please call [facility] at [phone]

## 2023-03-28 ENCOUNTER — PATIENT MESSAGE (OUTPATIENT)
Dept: REHABILITATION | Facility: OTHER | Age: 23
End: 2023-03-28
Payer: MEDICAID

## 2023-04-20 ENCOUNTER — NUTRITION (OUTPATIENT)
Dept: NUTRITION | Facility: CLINIC | Age: 23
End: 2023-04-20
Payer: MEDICAID

## 2023-04-20 DIAGNOSIS — E66.01 MORBID OBESITY WITH BMI OF 40.0-44.9, ADULT: ICD-10-CM

## 2023-04-20 DIAGNOSIS — Z71.3 NUTRITIONAL COUNSELING: Primary | ICD-10-CM

## 2023-04-20 PROCEDURE — 99999 PR PBB SHADOW E&M-EST. PATIENT-LVL I: CPT | Mod: PBBFAC,,, | Performed by: NUTRITIONIST

## 2023-04-20 PROCEDURE — 97803 MED NUTRITION INDIV SUBSEQ: CPT | Mod: PBBFAC,PO | Performed by: NUTRITIONIST

## 2023-04-20 PROCEDURE — 99211 OFF/OP EST MAY X REQ PHY/QHP: CPT | Mod: PBBFAC,25,PO | Performed by: NUTRITIONIST

## 2023-04-20 PROCEDURE — 99999 PR PBB SHADOW E&M-EST. PATIENT-LVL I: ICD-10-PCS | Mod: PBBFAC,,, | Performed by: NUTRITIONIST

## 2023-04-20 NOTE — PATIENT INSTRUCTIONS
Please read and review your recommended meal plan. Learn/understand the nutrition principles.  I recommend measure your recommended portions for at least the first time, see what that portion looks like on your plate and bowls, then eyeball after. This will also help with learning what your portions are for when you go to festivals.                  Name: Denise Mosher  Date: 3.7.2023    Daily Energy Intake:   Calories: 6900-8292  Protein: 140-160 grams  Carbohydrates: 140-160 grams  Fats: ~90 grams: Choose plant-based heart healthy fats  Total fluid: 115 oz + sweat loss  -Ideally have ~7, 16.9 oz bottle water daily, but since you're consuming 3, 16.9 oz bottles, start off by increasing to 5, 16.9 oz bottles daily  Limit added sugar to 20 grams or less per day (Note: 1 teaspoon of sugar = ~5 gram)    Goals:  Increase exercise to our goal of walking 1 hour at least 5 days a week  Eat every 3-4 hours   Increases Energy  Helps with portion control  Helps with metabolism since we burn calories digesting food    Breakfast: 2 servings of carbohydrates = 30-40 grams + at least 35 grams lean protein/heart healthy fat  Homemade protein shake: Ready to drink protein shake (see brand examples below) + 1 scoop premier protein powder + 1.5 cups fruit of your choice + spinach and kale + 1 tablespoon regular peanut butter  Granola bar (see notes below) + 8-10 oz glass of regular Fairlife 2% + string cheese  2 whole eggs + 2 slices center-cut mcgarry + 2-3 tablespoons shredded cheese on eggs + side of 1.5 cups chopped frozen mixed fruit of your choice    Snack: A mid-morning snack may be needed if going >3-4 hours between meals      Lunch: 5 oz lean protein + any non-starchy veggies + up to 3 servings of carbs = 45-60 grams  Examples of 3 servings of carbohydrates = ~45-60 grams:  1.5 cups cooked whole wheat pasta; 1 cup cooked rice; 1 medium potato or sweet potato (6 oz in weight); 1 piece of fruit + 2 slices 100-calorie bread;  1 cup cooked beans + 1/3 cup cooked rice; 6 ear of corn + 1 cup chopped fruit, etc  The rest of your plate will consist of 5 oz lean protein + at least 1 cup cooked non-starchy veggies  Meal Examples:  Fast Food  Convenient Options: Refer to Fueling Well On-The-Go Guide   Smoothalton Yates: 20 oz: See options below instead of on FUELING well on the go guide  Subway: 6 from fueling well on the guide and request double protein and 2 sauces of your choice (rodríguez, ranch, honey mustard- 2 two of these). Drink water as your beverage   [place] [employer]: 2 slices Macho's Killer 21 whole grains and seeds regular sliced bread + 5 oz low sodium freshly sliced turkey or ham + 1 tablespoon regular rodríguez + 1 slice cheese + non-starchy veggies of your choice  Spaghetti & meat sauce: 1.5 cups cooked pasta + 5 oz lean 90/10 ground beef (on the side eating separately is perfectly fine) + side of non-starchy veggies  Red beans & rice: 1 cup cooked beans + 1/3 cup cooked rice + additional 2-3 oz lean protein (ex: chicken sausage, ham, chicken, etc) + side of non-starchy veggies  If you don't want rice, then you can do 1 cup cooked beans over riced cauliflower + additional 2-3 oz lean protein (ex: chicken sausage, ham, chicken, etc) + side of non-starchy veggies  5 oz lean protein + at least 1 cup non-starchy veggies of your choice + up to 1/3 cup cooked rice + 6 ear of corn (or 1 cup chopped corn)      Snack: ~1 serving of carbs = 15-25 grams + at least 25 grams lean protein/heart healthy fat + any non-starchy vegetables  [place] balance break + banana paired with 1 tablespoon peanut butter  Piece of fruit + 2 tablespoons regular nut butter  Flapjacked protein mighty muffin (typically found on baking aisle of grocery stores- this is good to bring to work)  Protein/Granola bar (see below brands) + piece of fruit with 1 tablespoon peanut butter  Flavored Greek yogurt (See below for brands) + ¼ cup nuts or low sugar granola  Mini meal: 1 sandwich:  2 slices 50-calorie whole grain bread + 3 oz lean protein + smear of rodríguez      Dinner: Lower carbohydrate à ~ 1 serving of carbohydrates = 15-20 grams = ~½ cup cooked whole grain starch -or- a piece of fruit  The rest of your plate will consist of 6 oz lean protein + 1 cup or more non-starchy veggies cooked in ~1 tablespoon heart healthy fat (avocado oil/EVOO)  If eating Fried fish for lent on Friday, then choose 1 fried option such as the fish. I would not also do fried French fries or hushpuppies for example  Carb swaps if desired:  'Riced' Cauliflower  'Riced' Broccoli  Cauliflower mash  [place] makes a delicious frozen cauliflower mash that come in different flavors  Spaghetti squash  Zoodles  Spiralized Beets     Think outside the box for low carb dishes:  Kabobs, stir caldwell with riced cauliflower or riced broccoli, stuffed bell peppers with no rice, lettuce wraps, shrimp etouffee made with riced cauliflower, etc  Quest pizza à low carb + high protein frozen pizza. Add additional side of non-starchy veggies (Note: This frozen entrée is high in sodium)      Optional post-dinner snack or sweet: Anything up to 200 calories  'Fun size'                          Restaurant Tips   Pick 1 out of the 4 Rule: Instead of eating bread/tortilla chips, an appetizer, alcoholic drink and dessert, choose just one to have with your entrée  Focus on lean proteins: Refer to lean meat/meat substitutes page in meal planning guidebook. Select items grilled, baked, broiled, braised, poached or roasted      For your heart health, avoid crispy, crunchy, breaded, paneed or stuffed items and items that are cream based, au gratin or buttered      Order sauces, dressings, and gravies on the side. This way you can add 1-2 tablespoons yourself. This helps with portion control     Request extra non-starchy vegetables instead of a starchy side dish. If the starchy side is something you love, consider splitting it with someone  else at the table     Beverages: Order water with lemon, sparkling water, or unsweetened tea. Avoid sugary soft drinks, juices and mixers  Deep fried foods: Enjoy no more than 2x/month      Dining Out     [facility]  Designed to take the guesswork out of dining out healthfully, [facility] makes the healthy choice the easy choice  Visit    Order the Eat Fit Cookbook to create restaurant quality dishes at home  Download the [facility] Eat [facility] shelly for free on your smartphone  All Eat Fit restaurants & dishes by location   Nutrition facts for every Eat Fit dish  200 + Eat Fit approved recipes  Grocery shopping guides + community wellness resources      Building A Better Smoothie  Choose your protein. Pick 1 from the following:  Premier or Gladiator whey protein powder  Make it sweet:  Add ~1-1.5 cups fresh -or- No Sugar Added frozen fruit   Add your veggies:  Instead of ice, choose frozen cauliflower florets   Cauliflower helps with the detoxification process in our bodies, and it's virtually tasteless!  Greens: spinach, kale, or other leafy greens  Beets are a great addition to smoothies, especially paired with strawberries and lemon  Cucumbers and celery are refreshing ways to enhance nutrition and hydration within your smoothie  Add one of the following plant-based fats:   Nut butter: 1 tablespoon  Natural peanut butter  [place] butter  Cashew butter  Avocado oil  Extra Virgin Olive Oil: 1 tablespoon (you don't taste the oil if you're blending your smoothie at home)  Add a liquid to reach your desired consistency:  Unsweetened/No Sugar Added plant-based milk alternative   [place], [place]p, Cashew, Coconut, Rice or Soy   Fairlife Milk: 2%  Healthy add-ins for an extra nutritional boost:  1 tablespoon flaxseeds -or- pankaj seeds          Additional Nutrition Tips    -Exercise  Movement: walk for 1 hour 5-6 days a week   If you have a busy day, even a 20-minute walk is better than nothing    -Grocery Aisle Food Brand  Guidelines: Rahul #7 rule  Look for products that have 7 grams or less added sugar, and at least 7 grams or more protein  -Frozen veggies and fruits (unsweetened) contain the same nutrition facts as fresh    -Frozen Meal Guidelines:   ~45 grams or less carbohydrates & at least 20 grams protein  If you find a brand you enjoy that doesn't provide 20 grams protein, you can add leftover lean protein to the frozen meal  See notes below for several brand examples  Refer to the Eat Fit Shopping Guide for additional brand specific recommendations    -Portion Control:  Measure and/or weigh your food in cooked portions for the first time you start your plan  See what each portion looks like on your plate and then eyeball each portion for future meals and snacks    -Avoid/Limit the following as these may be inflammatory to our body and can decrease Energy throughout the day if consumed often:  Added Sugar  White, refined, processed carbohydrates  Alcohol  Fried Foods    -If you're having trouble finding a specific food brand, try looking on the brands website. Most companies have a 'store   find a store' on their website        Favorite Food Brands    - Whole Grain Breads:  Macho's Killer Bread - 21 Whole Grains and Seeds (green label), Good Seed (yellow label), Power Seed (red label)   Thin sliced -or- regular slice  Any 100% whole wheat/whole grain bread    -Whole Grain Tortillas  Wraps:  Corn   [place] - Whole wheat tortillas + whole wheat carb balance    -Grab and Go Protein Muffin:  FlapJacked: Mighty Muffin [typically found on baking aisle]    -Whole Grain Frozen Waffles:  Kashi GO Protein Waffles (only found at [employer])    -Frozen Breakfast Sandwiches  Bowls for On-The-Go:  Moustapha Pope (English muffin)    -Whole Grain Chips:  SunChips  Beanito's  Beanfields bean chips  PopCorners- Flex Energy packed protein crisps       -Whole Grain Crackers:  Triscuits - Regular + thin crisps  Wheat Thins  Blue  Genet almond nut thins  Keira's Gone Cracker  Crunchmaster protein sea salt cracker    - Red Sauce (In A Jar):  Jasper & Claudia's Heart Smart Sauce   Classico Original  Engine 2 Plant-Strong    -Protein Bars:  Oatmega Bars  Powercrunch  Quest  Pure Protein  (I know you said you don't like nuts, but does this include nuts in bars? The following do contain nuts)  Nature Valley Protein Chewy   Kashi Go Protein Bar   KIND Protein + KIND Bars (with 7 grams sugar or less)     -Ready-to-Drink Protein Drinks:  Pipeliner CRM 30-gram Protein Drink (I think you'll love these)  Pipeliner CRM CORE POWER Protein Drink (I think you'll love these)  Orgain Clean- grass-fed + plant based  Premier    -Frozen Meals à Single Serving to keep in your freezer as a backup source  Healthy Choice: MAX   Healthy Choice: POWER Bowls  Eating Well: [Rouses]  Quest: Thin crust  low carb     -Already Flavored Greek Yogurt:  Oikos Pro (20 grams protein  3 grams total sugar)  Chobani Less Sugar  Chobani Zero Sugar  Oikos Triple Zero  Two Good - All varieties     -Milk:  Fairlife 2% white milk  Fairlife 2% chocolate milk okay to have once a week if portion is 6-8 oz  If you don't like the chocolate Fairlife milk, then doing 6-8 oz [place] chocolate milk once a month is fine  -BBQ Sauce:   PADMINI all natural   Primal Kitchen Classic BBQ sauce      -Smoothie [place] Options:  Get Fit Blends  The Activator Recovery  Chocolate, Strawberry Banana, Blueberry Tart Cherry, Pineapple Spinach  Gladiator  Vanilla, Chocolate, Strawberry with any two of the following:  Plant-based fats  almond butter, peanut butter, almonds  Fruit  bananas, wild blueberries, mangoes, pineapples, raspberries, no-sugar-added strawberries, tart cherries  Vegetables + Spices  organic carrots, organic kale, organic pumpkin, organic spinach, organic markie, cinnamon  Original High Protein  chocolate, banana, pineapple  High Intensity Workout  Veggie Gus, chocolate cinnamon     Feel  Energized Blends  Keto Champ  Coffee  The Activator Recovery Coffee  Coffee High Protein  Vanilla, [place] mocha    Manage Weight Blends  Keto Champ  Chocolate, berry  Slim-N-Trim  Vanilla, chocolate, strawberry, veggie (Veggie, use almond milk in place of juice)  The Shredder  chocolate, vanilla  Lean 1  Vanilla, chocolate, strawberry    Be Well Blends  Vegan Pineapple Spinach (almond milk in place of juice)  Vegan Gus Kale (almond milk in place of juice)  Vegan Nutty Super Grain (no juice blend)  Veggie Lemon Nereyda Spinach (almond milk in place of juice; add Sun Warrior Vegan Protein)  Veggie Carrot Kale Dream (almond milk in place of juice; add Sun Warrior Vegan Protein)                    If you need to reschedule a nutrition follow-up appointment, please call [facility] at [phone]

## 2023-04-20 NOTE — PROGRESS NOTES
Nutrition Assessment- Established Patient    Visit Type: Insurance follow-up  Session Time:  45 minutes  Reason for MNT visit: Pt in for education and nutrition counseling regarding Obesity.       Age: 22 y.o.  Wt:   Wt Readings from Last 1 Encounters:   03/07/23 109.8 kg (242 lb)     Ht:   Ht Readings from Last 1 Encounters:   03/07/23 5' (1.524 m)     BMI:   BMI Readings from Last 1 Encounters:   03/07/23 47.26 kg/m²       Client states: She does not know what her weight is. She said her clothes are fitting a little looser, but also stated she only skimmed through her recommended meal plan once at the beginning and has not looked at it since.   I informed client that her recommended portions and ideas as well as important nutrition principles are located in her meal plan. We are scheduling another follow-up where her goal is to review her meal plan and learn her portions before we can have a follow-up to discuss    Client works full time at [employer]    Medical History  Problem List             Resolved    Bilateral hand pain         Wound infection after surgery         Arthrofibrosis of knee joint         Altered mental status         Cervicalgia         Neck pain         Abnormal EEG         Family history of epilepsy         Anxiety         Syncope         Morbid obesity with BMI of 40.0-44.9, adult         Psychogenic nonepileptic seizure         Trochanteric bursitis of both hips         Chronic pain of both knees         Patellofemoral syndrome of both knees         Pain from implanted hardware         Right calf pain         Patellar instability of right knee         Acquired genu valgum of right knee         Incontinence in female         Arthrofibrosis of knee joint, right         Painful orthopaedic hardware         Genu valgum, left         Left knee pain         Difficulty walking         Chondromalacia patellae of left knee         Chronic pain of left knee            Past Medical History:    Diagnosis Date    Anxiety     reports panic attacks    Depression     Fibromyalgia     per pt    Insomnia     Precocious female puberty     Seizures     reports 2 incidents of possible seizures while giving blood    Syncope and collapse     Wrist fracture, bilateral        Past Surgical History:   Procedure Laterality Date    ARTHROSCOPY OF KNEE Right 12/19/2019    Procedure: ARTHROSCOPY, KNEE (Right);  Surgeon: Ranulfo House MD;  Location: [address];  Service: Orthopedics;  Laterality: Right;    ARTHROSCOPY OF KNEE Left 7/9/2020    Procedure: ARTHROSCOPY, KNEE;  Surgeon: Ranulfo House MD;  Location: [address];  Service: Orthopedics;  Laterality: Left;  WITH CHRONDROPLASTY     ARTHROTOMY OF KNEE Left 7/9/2020    Procedure: ARTHROTOMY, KNEE;  Surgeon: Ranulfo House MD;  Location: [facility] OR [address] [place] [address];  Service: Orthopedics;  Laterality: Left;  WITH CHRONDRAL ALLOGRAFT IMPLANTATION    chip      chip implant    CHONDROPLASTY OF KNEE Right 12/19/2019    Procedure: CHONDROPLASTY, KNEE patella;  Surgeon: Ranulfo House MD;  Location: [address];  Service: Orthopedics;  Laterality: Right;    FEMUR OSTEOTOMY Right 12/13/2018    Procedure: OSTEOTOMY, FEMUR - distal to correct valgus (Orthopediatrics plate, MTF bone wedge).  Right knee arthroscopy with MPFL reconstruction, gracilis allograft (Linvatec).  3 hrs.;  Surgeon: Ranulfo House MD;  Location: [address];  Service: Orthopedics;  Laterality: Right;    FEMUR OSTEOTOMY Left 7/9/2020    Procedure: OSTEOTOMY, FEMUR (Left) - distal femoral opening wedge (Orthopediatrics, MTF), knee scope, removal of tibial tubercle screws (Orthopediatrics 4.0 cannulated screws);  Surgeon: Ranulfo House MD;  Location: [address];  Service: Orthopedics;  Laterality: Left;  RQ NOON START,  MGonzalez notified 7/8/20 Onelia RN    HARDWARE REMOVAL Right 12/19/2019    Procedure: REMOVAL, HARDWARE righ distal femoral.;  Surgeon: Ranulfo House MD;   Location: [address];  Service: Orthopedics;  Laterality: Right;    HARDWARE REMOVAL Left 7/9/2020    Procedure: REMOVAL, HARDWARE;  Surgeon: Ranulfo House MD;  Location: [facility] OR [address] [place] [address];  Service: Orthopedics;  Laterality: Left;  TIBIA    HEMANGIOMA EXCISION      scalp    INGUINAL HERNIA REPAIR Left     INJECTION OF STEROID Right 7/5/2019    Procedure: INJECTION, STEROID;  Surgeon: Ranulfo House MD;  Location: [address];  Service: Orthopedics;  Laterality: Right;    KNEE JOINT MANIPULATION Right 7/5/2019    Procedure: MANIPULATION, KNEE;  Surgeon: Ranulfo House MD;  Location: [address];  Service: Orthopedics;  Laterality: Right;    KNEE SURGERY Left 2/16/15    TOE SURGERY Right     5 th toe          Medications    Prior to Admission medications    Medication Sig Start Date End Date Taking? Authorizing Provider   ibuprofen (ADVIL,MOTRIN) 400 MG tablet Take 1 tablet (400 mg total) by mouth every 4 (four) hours.  Patient not taking: Reported on 6/28/2022 7/10/20   Edward Alvarado MD        Vitamins, Minerals, and/or Supplements:  none     Food Allergies or Intolerances:  NKFA    Social History    Marital status:  Single    Social History     Tobacco Use    Smoking status: Never    Smokeless tobacco: Never   Substance Use Topics    Alcohol use: No     Current Alcohol use: socially: 1-2 x a month (a couple glasses of wine or a Smirnoff ice)    Lab Reports   Reviewed and noted, but no current lipid panel on file    Lab Results   Component Value Date    TSH 1.13 05/12/2010    FREET4 1.10 05/12/2010    CRP 11.5 (H) 03/11/2016    SEDRATE 17 03/11/2016    AST 19 11/03/2017    ALT 39 11/03/2017         BP Readings from Last 1 Encounters:   11/30/22 126/77        24-hour Recall:  -Breakfast: patient skips 90& of the time. If she has breakfast she will have a Boost ready to drink protein shake with Premier protein powder scoop. If she doesn't have a protein shake she will have either a Special K  "or Nutrigrain granola bar  Morning snack: Skips  Lunch: Either a 20 oz Hulk or Yogurt [place] from [employer] where she works -OR- 6" Subway (tuna or chicken) with 3 sauces (honey mustard, ranch and rodríguez) + 1 slices cheese + sweet tea -or- leftovers (See dinner)  Pm Snack: Currently skips. In the past Denise stated she would eat chips and non-nutritive/filling items  Dinner: at least 1 cup white rice with a stew; 6" Subway; Fried fish platter on Fridays during lent; protein shake; Spaghetti and meat sauce (meat on the side)  Once a month: large glass of [place] chocolate milk             Beverages       LIFESTYLE FACTORS    Dinning out: mostly fast food (Smoothie [place] or Grid20/20)    Meal preparation/shopping: self; parent    Sleep: poor    Stress Level: high    Support System:  parents    Exercise Regimen: Sedentary (little or no exercise)      Diagnosis    Excessive energy intake related to eating too many calories from sweetened beverages and large portions for some meals as evidenced by 24 hour recall      Intervention    Estimated Energy Requirements:   Calories: 2495-3434  Protein: 140-160 grams  Carbohydrates: 140-160 grams  Fats: ~90 grams: Choose plant-based heart healthy fats  Total fluid: 115 oz + sweat loss  -Ideally have ~7, 16.9 oz bottle water daily, but since you're consuming 3, 16.9 oz bottles, start off by increasing to 5, 16.9 oz bottles daily  Limit added sugar to 20 grams or less per day (Note: 1 teaspoon of sugar = ~5 gram)    Recommendations & Goals:  Patient goals and recommendations are tailored to the specific patient's needs, readiness to change, lifestyle, culture, skills, resources, & abilities. Strategies to help achieve these nutrition-related goals were discussed which can include but are not limited to SMART goal setting & mindful eating.     Aim for a minimum of 7 hours sleep   Exercise 60 minutes most days  Eat breakfast within 1-2 hours of waking up  Try not to skip any " meals or snacks, not going more than 3-4 hours without eating   At each meal and snack, try to include a source of fiber + lean protein + healthy fat     Written Materials Provided  These resources are intended to assist the patient in making it easier to choose recommended options when eating out & to identify better-for-you brands at the grocery store:    Meal Planning Guide with recommendations discussed along with portion sizes and a customized meal plan   Fueling Well On-the-Go Food Guide  Eat Fit Shopping Guide  Lifestyle Nutrition Meal Guide  RD contact information    Goals:  Increase exercise to our goal of walking 1 hour at least 5 days a week  Eat every 3-4 hours   Increases Energy  Helps with portion control  Helps with metabolism since we burn calories digesting food      Monitoring/Evaluation    Monitor the following:  Weight  Sleep  Stress Management  Movement  Nutrient intake in reference to meal plan    Communicated with healthcare provider and documented plan for referral to appropriate agency/healthcare provider as needed    Patient motivation, anticipated barriers, expected compliance: Patient is not fully motivated and has verbalized understanding and intent to comply.     Comprehension: low    Follow-up: 6-8 weeks

## 2023-11-07 NOTE — TRANSFER OF CARE
"Anesthesia Transfer of Care Note    Patient: Denise Mosher    Procedure(s) Performed: Procedure(s) (LRB):  OSTEOTOMY, FEMUR - distal to correct valgus (Orthopediatrics plate, MTF bone wedge).  Right knee arthroscopy with MPFL reconstruction, gracilis allograft (Linvatec).  3 hrs. (Right)    Patient location: PACU    Anesthesia Type: general    Transport from OR: Transported from OR on room air with adequate spontaneous ventilation    Post pain: adequate analgesia    Post assessment: no apparent anesthetic complications and tolerated procedure well    Post vital signs: stable    Level of consciousness: sedated    Nausea/Vomiting: no nausea/vomiting    Complications: none    Transfer of care protocol was followed      Last vitals:   Visit Vitals  BP (!) 90/52   Pulse 70   Temp 36.9 °C (98.5 °F) (Oral)   Resp 14   Ht 5' 5" (1.651 m)   Wt 91.9 kg (202 lb 9.6 oz)   LMP 12/02/2018   SpO2 95%   Breastfeeding? No   BMI 33.71 kg/m²     " Ms. Araujo is a 78-year-old female patient of Dr. Solis presenting for colonoscopy.  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  She subsequently underwent ileocecal resection with Dr. Samano. She is doing well, denies change in bowels and rectal bleeding. She is on ASA 81mg QD, also takes meds for HTN. She lives on her own, is here today with her daughter. . Colonoscopy 2021:11 mm polyp; recall 1 year to inspect polyp site

## 2023-12-20 ENCOUNTER — OFFICE VISIT (OUTPATIENT)
Dept: URGENT CARE | Facility: CLINIC | Age: 23
End: 2023-12-20
Payer: MEDICAID

## 2023-12-20 VITALS
WEIGHT: 244 LBS | HEIGHT: 60 IN | OXYGEN SATURATION: 98 % | RESPIRATION RATE: 18 BRPM | HEART RATE: 95 BPM | DIASTOLIC BLOOD PRESSURE: 68 MMHG | BODY MASS INDEX: 47.91 KG/M2 | TEMPERATURE: 98 F | SYSTOLIC BLOOD PRESSURE: 103 MMHG

## 2023-12-20 DIAGNOSIS — J02.9 SORE THROAT: ICD-10-CM

## 2023-12-20 DIAGNOSIS — R05.9 COUGH, UNSPECIFIED TYPE: ICD-10-CM

## 2023-12-20 DIAGNOSIS — J06.9 UPPER RESPIRATORY TRACT INFECTION, UNSPECIFIED TYPE: Primary | ICD-10-CM

## 2023-12-20 LAB
CTP QC/QA: YES
MOLECULAR STREP A: NEGATIVE
POC MOLECULAR INFLUENZA A AGN: NEGATIVE
POC MOLECULAR INFLUENZA B AGN: NEGATIVE
SARS-COV-2 AG RESP QL IA.RAPID: NEGATIVE

## 2023-12-20 PROCEDURE — 99213 PR OFFICE/OUTPT VISIT, EST, LEVL III, 20-29 MIN: ICD-10-PCS | Mod: S$GLB,,, | Performed by: NURSE PRACTITIONER

## 2023-12-20 PROCEDURE — 87811 SARS-COV-2 COVID19 W/OPTIC: CPT | Mod: QW,S$GLB,, | Performed by: NURSE PRACTITIONER

## 2023-12-20 PROCEDURE — 87502 POCT INFLUENZA A/B MOLECULAR: ICD-10-PCS | Mod: QW,S$GLB,, | Performed by: NURSE PRACTITIONER

## 2023-12-20 PROCEDURE — 87811 SARS CORONAVIRUS 2 ANTIGEN POCT, MANUAL READ: ICD-10-PCS | Mod: QW,S$GLB,, | Performed by: NURSE PRACTITIONER

## 2023-12-20 PROCEDURE — 87651 POCT STREP A MOLECULAR: ICD-10-PCS | Mod: QW,S$GLB,, | Performed by: NURSE PRACTITIONER

## 2023-12-20 PROCEDURE — 87502 INFLUENZA DNA AMP PROBE: CPT | Mod: QW,S$GLB,, | Performed by: NURSE PRACTITIONER

## 2023-12-20 PROCEDURE — 87651 STREP A DNA AMP PROBE: CPT | Mod: QW,S$GLB,, | Performed by: NURSE PRACTITIONER

## 2023-12-20 PROCEDURE — 99213 OFFICE O/P EST LOW 20 MIN: CPT | Mod: S$GLB,,, | Performed by: NURSE PRACTITIONER

## 2023-12-20 RX ORDER — PROMETHAZINE HYDROCHLORIDE AND DEXTROMETHORPHAN HYDROBROMIDE 6.25; 15 MG/5ML; MG/5ML
5 SYRUP ORAL EVERY 4 HOURS PRN
Qty: 240 ML | Refills: 0 | Status: SHIPPED | OUTPATIENT
Start: 2023-12-20 | End: 2023-12-30

## 2023-12-20 RX ORDER — BENZONATATE 100 MG/1
100 CAPSULE ORAL 3 TIMES DAILY PRN
Qty: 30 CAPSULE | Refills: 0 | Status: SHIPPED | OUTPATIENT
Start: 2023-12-20 | End: 2023-12-30

## 2023-12-20 RX ORDER — FLUTICASONE PROPIONATE 50 MCG
2 SPRAY, SUSPENSION (ML) NASAL DAILY
Qty: 11.1 ML | Refills: 0 | Status: SHIPPED | OUTPATIENT
Start: 2023-12-20 | End: 2024-01-19

## 2023-12-20 RX ORDER — LEVOCETIRIZINE DIHYDROCHLORIDE 5 MG/1
5 TABLET, FILM COATED ORAL NIGHTLY
Qty: 30 TABLET | Refills: 0 | Status: SHIPPED | OUTPATIENT
Start: 2023-12-20 | End: 2024-01-19

## 2023-12-21 ENCOUNTER — HOSPITAL ENCOUNTER (EMERGENCY)
Facility: HOSPITAL | Age: 23
Discharge: HOME | End: 2023-12-21
Attending: EMERGENCY MEDICINE
Payer: MEDICAID

## 2023-12-21 ENCOUNTER — OFFICE VISIT (OUTPATIENT)
Dept: URGENT CARE | Facility: CLINIC | Age: 23
End: 2023-12-21
Payer: MEDICAID

## 2023-12-21 VITALS
HEART RATE: 83 BPM | RESPIRATION RATE: 16 BRPM | HEIGHT: 60 IN | SYSTOLIC BLOOD PRESSURE: 141 MMHG | DIASTOLIC BLOOD PRESSURE: 83 MMHG | TEMPERATURE: 98 F | WEIGHT: 244 LBS | BODY MASS INDEX: 47.91 KG/M2 | OXYGEN SATURATION: 100 %

## 2023-12-21 VITALS
BODY MASS INDEX: 47.91 KG/M2 | OXYGEN SATURATION: 94 % | WEIGHT: 244 LBS | DIASTOLIC BLOOD PRESSURE: 71 MMHG | RESPIRATION RATE: 18 BRPM | HEART RATE: 88 BPM | HEIGHT: 60 IN | SYSTOLIC BLOOD PRESSURE: 92 MMHG | TEMPERATURE: 98 F

## 2023-12-21 DIAGNOSIS — R04.0 EPISTAXIS: Primary | ICD-10-CM

## 2023-12-21 DIAGNOSIS — J06.9 VIRAL URI: ICD-10-CM

## 2023-12-21 DIAGNOSIS — R53.83 FATIGUE, UNSPECIFIED TYPE: ICD-10-CM

## 2023-12-21 DIAGNOSIS — R41.82 ALTERED MENTAL STATUS, UNSPECIFIED ALTERED MENTAL STATUS TYPE: Primary | ICD-10-CM

## 2023-12-21 LAB
ALBUMIN SERPL-MCNC: 3.8 G/DL (ref 3.3–5.5)
ALP SERPL-CCNC: 58 U/L (ref 42–141)
AMPHET+METHAMPHET UR QL: NEGATIVE
B-HCG UR QL: NEGATIVE
BARBITURATES UR QL SCN>200 NG/ML: NEGATIVE
BENZODIAZ UR QL SCN>200 NG/ML: NEGATIVE
BILIRUB SERPL-MCNC: 1.2 MG/DL (ref 0.2–1.6)
BILIRUBIN, POC UA: NEGATIVE
BLOOD, POC UA: NEGATIVE
BUN SERPL-MCNC: 11 MG/DL (ref 7–22)
BZE UR QL SCN: NEGATIVE
CALCIUM SERPL-MCNC: 9.5 MG/DL (ref 8–10.3)
CANNABINOIDS UR QL SCN: NEGATIVE
CHLORIDE SERPL-SCNC: 104 MMOL/L (ref 98–108)
CLARITY, POC UA: CLEAR
COLOR, POC UA: YELLOW
CREAT SERPL-MCNC: 0.6 MG/DL (ref 0.6–1.2)
CREAT UR-MCNC: 205.7 MG/DL (ref 15–325)
CTP QC/QA: YES
CTP QC/QA: YES
GLUCOSE SERPL-MCNC: 70 MG/DL (ref 73–118)
GLUCOSE, POC UA: NEGATIVE
HCT, POC: NORMAL
HGB, POC: NORMAL (ref 14–18)
INFLUENZA A ANTIGEN, POC: NEGATIVE
INFLUENZA B ANTIGEN, POC: NEGATIVE
KETONES, POC UA: NEGATIVE
LEUKOCYTE EST, POC UA: ABNORMAL
MCH, POC: NORMAL
MCHC, POC: NORMAL
MCV, POC: NORMAL
METHADONE UR QL SCN>300 NG/ML: NEGATIVE
MPV, POC: NORMAL
NITRITE, POC UA: NEGATIVE
OPIATES UR QL SCN: NEGATIVE
PCP UR QL SCN>25 NG/ML: NEGATIVE
PH UR STRIP: 6 [PH]
POC ALT (SGPT): 46 U/L (ref 10–47)
POC AST (SGOT): 28 U/L (ref 11–38)
POC PLATELET COUNT: NORMAL
POC TCO2: 32 MMOL/L (ref 18–33)
POCT GLUCOSE: 67 MG/DL (ref 70–110)
POTASSIUM BLD-SCNC: 3.8 MMOL/L (ref 3.6–5.1)
PROTEIN, POC UA: NEGATIVE
PROTEIN, POC: 7.7 G/DL (ref 6.4–8.1)
RBC, POC: NORMAL
RDW, POC: NORMAL
SARS-COV-2 RDRP RESP QL NAA+PROBE: NEGATIVE
SODIUM BLD-SCNC: 141 MMOL/L (ref 128–145)
SPECIFIC GRAVITY, POC UA: >=1.03
TOXICOLOGY INFORMATION: NORMAL
UROBILINOGEN, POC UA: 1 E.U./DL
WBC, POC: NORMAL

## 2023-12-21 PROCEDURE — 80053 COMPREHEN METABOLIC PANEL: CPT | Mod: ER

## 2023-12-21 PROCEDURE — 82962 GLUCOSE BLOOD TEST: CPT | Mod: ER

## 2023-12-21 PROCEDURE — 81025 URINE PREGNANCY TEST: CPT | Mod: ER

## 2023-12-21 PROCEDURE — 81025 URINE PREGNANCY TEST: CPT | Mod: ER | Performed by: EMERGENCY MEDICINE

## 2023-12-21 PROCEDURE — 63600175 PHARM REV CODE 636 W HCPCS: Mod: ER | Performed by: EMERGENCY MEDICINE

## 2023-12-21 PROCEDURE — 99212 OFFICE O/P EST SF 10 MIN: CPT | Mod: S$GLB,,, | Performed by: FAMILY MEDICINE

## 2023-12-21 PROCEDURE — 99212 PR OFFICE/OUTPT VISIT, EST, LEVL II, 10-19 MIN: ICD-10-PCS | Mod: S$GLB,,, | Performed by: FAMILY MEDICINE

## 2023-12-21 PROCEDURE — 96374 THER/PROPH/DIAG INJ IV PUSH: CPT | Mod: ER

## 2023-12-21 PROCEDURE — 99284 EMERGENCY DEPT VISIT MOD MDM: CPT | Mod: 25,ER

## 2023-12-21 PROCEDURE — 87804 INFLUENZA ASSAY W/OPTIC: CPT | Mod: ER

## 2023-12-21 PROCEDURE — 96361 HYDRATE IV INFUSION ADD-ON: CPT | Mod: ER

## 2023-12-21 PROCEDURE — 80307 DRUG TEST PRSMV CHEM ANLYZR: CPT | Performed by: EMERGENCY MEDICINE

## 2023-12-21 PROCEDURE — 87635 SARS-COV-2 COVID-19 AMP PRB: CPT | Mod: ER | Performed by: EMERGENCY MEDICINE

## 2023-12-21 PROCEDURE — 96375 TX/PRO/DX INJ NEW DRUG ADDON: CPT | Mod: ER

## 2023-12-21 RX ORDER — ONDANSETRON 2 MG/ML
4 INJECTION INTRAMUSCULAR; INTRAVENOUS
Status: COMPLETED | OUTPATIENT
Start: 2023-12-21 | End: 2023-12-21

## 2023-12-21 RX ORDER — METHYLPREDNISOLONE SOD SUCC 125 MG
125 VIAL (EA) INJECTION
Status: COMPLETED | OUTPATIENT
Start: 2023-12-21 | End: 2023-12-21

## 2023-12-21 RX ADMIN — ONDANSETRON 4 MG: 2 INJECTION INTRAMUSCULAR; INTRAVENOUS at 05:12

## 2023-12-21 RX ADMIN — METHYLPREDNISOLONE SODIUM SUCCINATE 125 MG: 125 INJECTION, POWDER, FOR SOLUTION INTRAMUSCULAR; INTRAVENOUS at 05:12

## 2023-12-21 NOTE — PROGRESS NOTES
Lethargic   Difficult to arouse  Does answer questions  Prolonged nose bleed - anemia?   Vomiting? - dehydration?   URI dx yesterday, could have something else going on.   Rec blood work, xray, UA.   Hypotensive? BP is low in clinic today.   Family states this is an acute worsening from when they left their home. She was not this lethargic. Patient can barely walk. She is being wheel chair transported to [facility] to stand alone ED.     Discussed with family. They will take her by private vehicle since ED is close by.     Vitals:    12/21/23 1601   BP: 92/71   Pulse: 88   Resp: 18   Temp: 98.3 °F (36.8 °C)

## 2023-12-21 NOTE — PROGRESS NOTES
Subjective:      Patient ID: Denise Mosher is a 23 y.o. female.    Vitals:  height is 5' (1.524 m) and weight is 110.7 kg (244 lb). Her oral temperature is 98.2 °F (36.8 °C). Her blood pressure is 103/68 and her pulse is 95. Her respiration is 18 and oxygen saturation is 98%.     Chief Complaint: Cough    22yo female pt reports cough, nasal/sinus congestion, postnasal drip, and sore throat for the past 3 days.  Denies fever/chills.  Reports some nausea and dry heaving on first day of symptoms, reports only mild intermittent nausea remains now, denies diarrhea or abd pain.  Denies chest pain, but reports intermittent chest tightness.  Denies HX of asthma or other respiratory DX.  Denies known sick contacts.  Reports no improvement with DayQuil or NyQuil.  Chart review shows no COVID or flu vaccinations.    Cough  This is a new problem. Episode onset: 3 DAYS AGO. The problem has been unchanged. The problem occurs constantly. The cough is Productive of sputum. Associated symptoms include headaches, nasal congestion, postnasal drip, a sore throat and wheezing. Pertinent negatives include no chest pain, chills, ear pain, fever or shortness of breath. Nothing aggravates the symptoms. She has tried OTC cough suppressant for the symptoms. The treatment provided no relief.       Constitution: Negative for chills and fever.   HENT:  Positive for congestion, postnasal drip and sore throat. Negative for ear pain and trouble swallowing.    Cardiovascular:  Negative for chest pain.   Respiratory:  Positive for chest tightness, cough and wheezing. Negative for sputum production, shortness of breath and asthma.    Gastrointestinal:  Positive for nausea and vomiting. Negative for abdominal pain and diarrhea.   Allergic/Immunologic: Negative for asthma.   Neurological:  Positive for headaches.      Objective:     Physical Exam   Constitutional: She is oriented to person, place, and time. She appears well-developed. She is  cooperative.  Non-toxic appearance. She does not appear ill. No distress.   HENT:   Head: Normocephalic and atraumatic.   Ears:   Right Ear: Hearing, external ear and ear canal normal. Tympanic membrane is bulging. Tympanic membrane is not erythematous and not retracted. A middle ear effusion (clear fluid) is present.   Left Ear: Hearing, external ear and ear canal normal. Tympanic membrane is bulging. Tympanic membrane is not erythematous and not retracted. A middle ear effusion (clear fluid) is present.   Nose: Mucosal edema (erythema to BL turbinates) present. No rhinorrhea, purulent discharge or nasal deformity. No epistaxis. Right sinus exhibits no maxillary sinus tenderness and no frontal sinus tenderness. Left sinus exhibits no maxillary sinus tenderness and no frontal sinus tenderness.   Mouth/Throat: Uvula is midline and mucous membranes are normal. No trismus in the jaw. Normal dentition. No uvula swelling. Oropharyngeal exudate (clear postnasal drip), posterior oropharyngeal erythema (mild) and cobblestoning present. No posterior oropharyngeal edema. Tonsils are 1+ on the right. Tonsils are 1+ on the left. No tonsillar exudate.   Eyes: Conjunctivae and lids are normal. No scleral icterus.   Neck: Trachea normal and phonation normal. Neck supple. No edema present. No erythema present. No neck rigidity present.   Cardiovascular: Normal rate, regular rhythm, normal heart sounds and normal pulses.   Pulmonary/Chest: Effort normal and breath sounds normal. No accessory muscle usage or stridor. No tachypnea. No respiratory distress. She has no decreased breath sounds. She has no wheezes. She has no rhonchi. She has no rales.   Abdominal: Normal appearance.   Musculoskeletal: Normal range of motion.         General: No deformity. Normal range of motion.   Lymphadenopathy:        Head (right side): No submandibular adenopathy present.        Head (left side): No submandibular adenopathy present.     She has no  cervical adenopathy.   Neurological: She is alert and oriented to person, place, and time. She exhibits normal muscle tone. Coordination normal.   Skin: Skin is warm, dry, intact, not diaphoretic and not pale.   Psychiatric: Her speech is normal and behavior is normal. Judgment and thought content normal.   Nursing note and vitals reviewed.    Results for orders placed or performed in visit on 12/20/23   POCT Influenza A/B MOLECULAR   Result Value Ref Range    POC Molecular Influenza A Ag Negative Negative, Not Reported    POC Molecular Influenza B Ag Negative Negative, Not Reported     Acceptable Yes    SARS Coronavirus 2 Antigen, POCT Manual Read   Result Value Ref Range    SARS Coronavirus 2 Antigen Negative Negative     Acceptable Yes    POCT Strep A, Molecular   Result Value Ref Range    Molecular Strep A, POC Negative Negative     Acceptable Yes          Assessment:     1. Upper respiratory tract infection, unspecified type    2. Sore throat    3. Cough, unspecified type        Plan:     Provided education on prescribed medications, recommended re-testing for COVID in 2-3 days if symptoms do not improve with treatment.  Provided education on return/ER precautions.  Pt verbalized understanding and agreed to plan.      Upper respiratory tract infection, unspecified type  -     levocetirizine (XYZAL) 5 MG tablet; Take 1 tablet (5 mg total) by mouth every evening.  Dispense: 30 tablet; Refill: 0  -     fluticasone propionate (FLONASE) 50 mcg/actuation nasal spray; 2 sprays (100 mcg total) by Each Nostril route once daily.  Dispense: 11.1 mL; Refill: 0    Sore throat  -     POCT Influenza A/B MOLECULAR  -     SARS Coronavirus 2 Antigen, POCT Manual Read  -     POCT Strep A, Molecular  -     benzocaine-menthoL 6-10 mg lozenge; Take 1 lozenge by mouth every 2 (two) hours as needed for Pain.  Dispense: 18 tablet; Refill: 0    Cough, unspecified type  -     benzonatate  "(TESSALON) 100 MG capsule; Take 1 capsule (100 mg total) by mouth 3 (three) times daily as needed for Cough.  Dispense: 30 capsule; Refill: 0  -     promethazine-dextromethorphan (PROMETHAZINE-DM) 6.25-15 mg/5 mL Syrp; Take 5 mLs by mouth every 4 (four) hours as needed (cough).  Dispense: 240 mL; Refill: 0      Patient Instructions   If your condition worsens or fails to improve, we recommend that you receive another evaluation at the ER immediately contact your PCP to discuss your concerns, or return here.  You must understand that you've received an urgent care treatment only, and that you may be released before all your medical problems are known or treated.  You, the patient, will arrange for follow-up care as instructed.     If we discussed that I think your illness is viral, it will not respond to antibiotics and will last 10-14 days.  If we discussed "wait and see" antibiotics, and if over the next few days the symptoms worsen, start the antibiotics I have given you.     If you are female and on birth control pills and do take the antibiotics, use additional methods to prevent pregnancy while on the antibiotics and for one cycle after.     Flonase (fluticasone) is a nasal spray which is available over the counter and may help with your symptoms.  Zyrtec D, Claritin D, or Allegra D can also help with symptoms of congestion and drainage.  If you have hypertension, avoid using the "D" which is the decongestant formula.    If you just have drainage, you can take plain Zyrtec, Claritin, or Allegra.  If you just have a congested feeling, you can take pseudoephedrine (unless you have high blood pressure), which you have to sign for behind the counter.  Do not buy phenylephrine OTC, as it is not effective.    Rest and fluids are also important.  Tylenol or ibuprofen can also be used as directed for pain, unless you have an allergy to them or medical condition (such as stomach ulcers, kidney or liver disease, or use " blood thinners, etc.) for which you should not be taking these type of medications.     If you are flying in the next few days, Afrin nose drops for the airplane flight upon take off and landing may help.  Other than at those times, refrain from using Afrin.     If you were prescribed a narcotic or sedating cough medicine, do not drive or operate heavy machinery while taking these medications.

## 2023-12-21 NOTE — ED PROVIDER NOTES
"Encounter Date: 12/21/2023    SCRIBE #1 NOTE: I, Chuyita Holly, am scribing for, and in the presence of,  Michelle Walter MD. I have scribed the following portions of the note - Other sections scribed: HPI, ROS, PE.       History     Chief Complaint   Patient presents with    Fatigue     GEN    General Illness     WAS SEEN @  URGENT  CARE- STATES   ' I DON'T  FEEL  GOOD"-  TESTED   NEGATIVE FOR  FLU, COVID AND  STREP-  GIVEN  RX   FOR   PROMETHAZINE  AND TESSALON  PEAR LES   HAS  NOT  TAKEN  ANY  TODAY-   FATHER  STATES  SHE  IS  LETHARGIC-  SHE  REFUSES  TO  ANSWER  QUESTIONS @  [facility]     Denise Mosher is a 23 y.o. female, with a past medical history of seizures, who presents to the ED with epistaxis that began yesterday. Patient reports an episode of epistaxis that lasted 45 minutes today. Patient was seen at Urgent Care yesterday for a cough and prescribed Tessalon Perles and promethazine after testing negative for strep, COVID, and influenza. Patient took medications last night, but none today. She was unable to sleep and was up until 0300. Patient was able to go to work today, but states she left early due to not feeling well. Patient reports associated fatigue and notes her gums bleed when she brushed her teeth. Patient denies any associated hematuria. Patient denies bruising easily. Patient denies smoking, EtOH consumption, or recreational drug use. NKDA.   Patient was sent from Urgent Care today. Patient presented with a nose bleed and waited a long time. Patient became lethargic and altered while in waiting room per staff and repeat vitals showed a BP of 90/60. Patient was sent to ED POV.       The history is provided by the patient and a parent. No  was used.     Review of patient's allergies indicates:  No Known Allergies  Past Medical History:   Diagnosis Date    Anxiety     reports panic attacks    Depression     Fibromyalgia     per pt    Insomnia     Precocious female " puberty     Seizures     reports 2 incidents of possible seizures while giving blood    Syncope and collapse     Wrist fracture, bilateral      Past Surgical History:   Procedure Laterality Date    ARTHROSCOPY OF KNEE Right 12/19/2019    Procedure: ARTHROSCOPY, KNEE (Right);  Surgeon: Ranulfo House MD;  Location: [address];  Service: Orthopedics;  Laterality: Right;    ARTHROSCOPY OF KNEE Left 7/9/2020    Procedure: ARTHROSCOPY, KNEE;  Surgeon: Ranulfo House MD;  Location: [address];  Service: Orthopedics;  Laterality: Left;  WITH CHRONDROPLASTY     ARTHROTOMY OF KNEE Left 7/9/2020    Procedure: ARTHROTOMY, KNEE;  Surgeon: Ranulfo House MD;  Location: [facility] OR [address] [place] [address];  Service: Orthopedics;  Laterality: Left;  WITH CHRONDRAL ALLOGRAFT IMPLANTATION    chip      chip implant    CHONDROPLASTY OF KNEE Right 12/19/2019    Procedure: CHONDROPLASTY, KNEE patella;  Surgeon: Ranulfo House MD;  Location: [facility] OR [address] [place] [address];  Service: Orthopedics;  Laterality: Right;    FEMUR OSTEOTOMY Right 12/13/2018    Procedure: OSTEOTOMY, FEMUR - distal to correct valgus (Orthopediatrics plate, MTF bone wedge).  Right knee arthroscopy with MPFL reconstruction, gracilis allograft ([employer]).  3 hrs.;  Surgeon: Ranulfo House MD;  Location: [address];  Service: Orthopedics;  Laterality: Right;    FEMUR OSTEOTOMY Left 7/9/2020    Procedure: OSTEOTOMY, FEMUR (Left) - distal femoral opening wedge (Orthopediatrics, MTF), knee scope, removal of tibial tubercle screws (Orthopediatrics 4.0 cannulated screws);  Surgeon: Ranulfo House MD;  Location: [address];  Service: Orthopedics;  Laterality: Left;  RQ NOON START,  MGonzalez notified 7/8/20 Reiniers RN    HARDWARE REMOVAL Right 12/19/2019    Procedure: REMOVAL, HARDWARE righ distal femoral.;  Surgeon: Ranulfo House MD;  Location: [address];  Service: Orthopedics;  Laterality: Right;    HARDWARE REMOVAL Left 7/9/2020    Procedure: REMOVAL, HARDWARE;   Surgeon: Ranulfo House MD;  Location: [address];  Service: Orthopedics;  Laterality: Left;  TIBIA    HEMANGIOMA EXCISION      scalp    INGUINAL HERNIA REPAIR Left     INJECTION OF STEROID Right 7/5/2019    Procedure: INJECTION, STEROID;  Surgeon: Ranulfo House MD;  Location: [address];  Service: Orthopedics;  Laterality: Right;    KNEE JOINT MANIPULATION Right 7/5/2019    Procedure: MANIPULATION, KNEE;  Surgeon: Ranulfo House MD;  Location: [address];  Service: Orthopedics;  Laterality: Right;    KNEE SURGERY Left 2/16/15    TOE SURGERY Right     5 th toe     Family History   Problem Relation Age of Onset    Seizures Father     Anxiety disorder Father     Depression Father     Mental illness Father     Schizophrenia Father     Deep vein thrombosis Father      Social History     Tobacco Use    Smoking status: Never    Smokeless tobacco: Never   Substance Use Topics    Alcohol use: No    Drug use: No     Review of Systems   Constitutional:  Positive for fatigue. Negative for activity change, appetite change, chills and fever.   HENT:  Positive for nosebleeds. Negative for congestion, rhinorrhea, sneezing and sore throat.    Respiratory:  Negative for cough, choking, shortness of breath and wheezing.    Cardiovascular:  Negative for chest pain and palpitations.   Gastrointestinal:  Negative for abdominal pain, diarrhea, nausea and vomiting.   Genitourinary:  Negative for hematuria.   Neurological:  Negative for dizziness, syncope, light-headedness and headaches.   Hematological:  Does not bruise/bleed easily.   All other systems reviewed and are negative.      Physical Exam     Initial Vitals [12/21/23 1625]   BP Pulse Resp Temp SpO2   137/84 94 16 98.4 °F (36.9 °C) 100 %      MAP       --         Physical Exam    Nursing note and vitals reviewed.  Constitutional: She appears well-developed and well-nourished. She is Obese . No distress.   Appears sleepy, but responds promptly with  appropriate answers when questioned.    HENT:   Head: Normocephalic and atraumatic.   Mouth/Throat: Oropharynx is clear and moist.   Eyes: Conjunctivae and EOM are normal. Pupils are equal, round, and reactive to light.   Neck:   Normal range of motion.  Cardiovascular:  Normal rate, regular rhythm and normal heart sounds.           No murmur heard.  Pulmonary/Chest: Breath sounds normal. No respiratory distress.   Abdominal: Bowel sounds are normal. She exhibits no distension.   Musculoskeletal:         General: Normal range of motion.      Cervical back: Normal range of motion.     Neurological: She is alert and oriented to person, place, and time.   Skin: Skin is warm and dry.   No petechiae, bruising, or ecchymosis.    Psychiatric: She has a normal mood and affect. Her behavior is normal.         ED Course   Procedures  Labs Reviewed   POCT URINALYSIS W/O SCOPE - Abnormal; Notable for the following components:       Result Value    Spec Grav UA >=1.030 (*)     Leukocytes, UA 1+ (*)     All other components within normal limits   POCT GLUCOSE - Abnormal; Notable for the following components:    POCT Glucose 67 (*)     All other components within normal limits   POCT CMP - Abnormal; Notable for the following components:    POC Glucose 70 (*)     All other components within normal limits   DRUG SCREEN PANEL, URINE EMERGENCY    Narrative:     Specimen Source->Urine   POCT CBC   POCT URINE PREGNANCY   SARS-COV-2 RDRP GENE    Narrative:     This test utilizes isothermal nucleic acid amplification technology to detect the SARS-CoV-2 RdRp nucleic acid segment. The analytical sensitivity (limit of detection) is 500 copies/swab.     A POSITIVE result is indicative of the presence of SARS-CoV-2 RNA; clinical correlation with patient history and other diagnostic information is necessary to determine patient infection status.    A NEGATIVE result means that SARS-CoV-2 nucleic acids are not present above the limit of  detection. A NEGATIVE result should be treated as presumptive. It does not rule out the possibility of COVID-19 and should not be the sole basis for treatment decisions. If COVID-19 is strongly suspected based on clinical and exposure history, re-testing using an alternate molecular assay should be considered.     This test is only for use under the Food and Drug Administration s Emergency Use Authorization (EUA).     Commercial kits are provided by [employer]. Performance characteristics of the EUA have been independently verified by [facility] Department of Pathology and Laboratory Medicine.   _________________________________________________________________   The authorized Fact Sheet for Healthcare Providers and the authorized Fact Sheet for Patients of the ID NOW COVID-19 are available on the FDA website:    https://www.fda.gov/media/069366/download      https://www.fda.gov/media/337969/download      POCT GLUCOSE, HAND-HELD DEVICE   POCT URINALYSIS(INSTRUMENT)   POCT INFLUENZA A/B MOLECULAR   POCT CMP   POCT RAPID INFLUENZA A/B          Imaging Results              X-Ray Chest PA And Lateral (Final result)  Result time 12/21/23 18:31:40      Final result by Brett Koo MD (12/21/23 18:31:40)                   Impression:      1. Interstitial findings are accentuated by habitus, no large focal consolidation.      Electronically signed by: Brett Koo MD  Date:    12/21/2023  Time:    18:31               Narrative:    EXAMINATION:  XR CHEST PA AND LATERAL    CLINICAL HISTORY:  Chest Pain;    TECHNIQUE:  PA and lateral views of the chest were performed.    COMPARISON:  04/15/2019    FINDINGS:  The cardiomediastinal silhouette is not enlarged, magnified by technique..  There is no pleural effusion.  The trachea is midline.  The lungs are symmetrically expanded bilaterally with coarse interstitial attenuation.  No large focal consolidation seen.  There is no pneumothorax.  The osseous  structures are unremarkable.                                       Medications   sodium chloride 0.9% bolus 1,000 mL 1,000 mL (0 mLs Intravenous Stopped 12/21/23 1849)   methylPREDNISolone sodium succinate injection 125 mg (125 mg Intravenous Given 12/21/23 1729)   ondansetron injection 4 mg (4 mg Intravenous Given 12/21/23 1744)     Medical Decision Making  23F with seizures, presents with epistaxis that began yesterday. On exam, patient appears sleepy but responds promptly with appropriate answers when questioned. Patient was sent from Urgent Care today. Patient presented with a nose bleed and waited a long time. Patient was lethargic per staff and repeat vitals showed a BP of 90/60. Patient was sent to ED POV. In shared decision making with the patient and family I will order labs and imaging. I considered but excluded hypotension, sepsis, flu, covid, pneumonia, dehydration, anemia, thrombocytopenia in my differential diagnosis. She was mildly hypoglycemic but not altered here. She was fed. She was never hypotensive here. She states she only took medicine around 9:30 last night, none today. I have not gotten tox screens results. Pt is now awake, alert, neuro intact, and nosebleed is resolved. Will discharge to care of mother.     Amount and/or Complexity of Data Reviewed  Independent Historian: parent     Details: Father  Labs: ordered.  Radiology: ordered.    Risk  Prescription drug management.            Scribe Attestation:   Scribe #1: I performed the above scribed service and the documentation accurately describes the services I performed. I attest to the accuracy of the note.                             I, Dr. Michelle Walter, personally performed the services described in this documentation.   All medical record entries made by the scribe were at my direction and in my presence.   I have reviewed the chart and agree that the record is accurate and complete.   Michelle Walter MD.  4:52 PM 12/21/2023     Clinical  Impression:  Final diagnoses:  [R04.0] Epistaxis (Primary)  [R53.83] Fatigue, unspecified type  [J06.9] Viral URI          ED Disposition Condition    Discharge Stable          ED Prescriptions    None       Follow-up Information       Follow up With Specialties Details Why Contact Info    [facility] ED Emergency Medicine  As needed [address] [facility] [id number] [phone]             Michelle Walter MD  12/21/23 0417

## 2023-12-22 NOTE — DISCHARGE INSTRUCTIONS
If your nose bleeds, lean forward and apply firm pressure. Use a humidifier while sleeping. Use vaseline to moisturize. Use Afrin spray to stop bleeding. Continue current medications.

## 2024-02-29 ENCOUNTER — OFFICE VISIT (OUTPATIENT)
Dept: URGENT CARE | Facility: CLINIC | Age: 24
End: 2024-02-29
Payer: MEDICAID

## 2024-02-29 VITALS
TEMPERATURE: 98 F | BODY MASS INDEX: 48.95 KG/M2 | SYSTOLIC BLOOD PRESSURE: 104 MMHG | HEART RATE: 77 BPM | RESPIRATION RATE: 19 BRPM | DIASTOLIC BLOOD PRESSURE: 70 MMHG | HEIGHT: 60 IN | WEIGHT: 249.31 LBS | OXYGEN SATURATION: 97 %

## 2024-02-29 DIAGNOSIS — J02.9 SORE THROAT: ICD-10-CM

## 2024-02-29 DIAGNOSIS — K29.70 VIRAL GASTRITIS: Primary | ICD-10-CM

## 2024-02-29 DIAGNOSIS — R11.2 NAUSEA AND VOMITING, UNSPECIFIED VOMITING TYPE: ICD-10-CM

## 2024-02-29 LAB
B-HCG UR QL: NEGATIVE
BILIRUB UR QL STRIP: NEGATIVE
CTP QC/QA: YES
GLUCOSE UR QL STRIP: NEGATIVE
KETONES UR QL STRIP: NEGATIVE
LEUKOCYTE ESTERASE UR QL STRIP: NEGATIVE
MOLECULAR STREP A: NEGATIVE
PH, POC UA: 6.5
POC BLOOD, URINE: NEGATIVE
POC MOLECULAR INFLUENZA A AGN: NEGATIVE
POC MOLECULAR INFLUENZA B AGN: NEGATIVE
POC NITRATES, URINE: NEGATIVE
PROT UR QL STRIP: NEGATIVE
SARS-COV-2 AG RESP QL IA.RAPID: NEGATIVE
SP GR UR STRIP: 1.01 (ref 1–1.03)
UROBILINOGEN UR STRIP-ACNC: NORMAL (ref 0.1–1.1)

## 2024-02-29 PROCEDURE — 81025 URINE PREGNANCY TEST: CPT | Mod: S$GLB,,,

## 2024-02-29 PROCEDURE — 99213 OFFICE O/P EST LOW 20 MIN: CPT | Mod: S$GLB,,,

## 2024-02-29 PROCEDURE — 87811 SARS-COV-2 COVID19 W/OPTIC: CPT | Mod: QW,S$GLB,,

## 2024-02-29 PROCEDURE — 87502 INFLUENZA DNA AMP PROBE: CPT | Mod: QW,S$GLB,,

## 2024-02-29 PROCEDURE — 81003 URINALYSIS AUTO W/O SCOPE: CPT | Mod: QW,S$GLB,,

## 2024-02-29 PROCEDURE — 87651 STREP A DNA AMP PROBE: CPT | Mod: QW,S$GLB,,

## 2024-02-29 RX ORDER — ONDANSETRON 4 MG/1
4 TABLET, ORALLY DISINTEGRATING ORAL
Status: COMPLETED | OUTPATIENT
Start: 2024-02-29 | End: 2024-02-29

## 2024-02-29 RX ORDER — ONDANSETRON 4 MG/1
4 TABLET, ORALLY DISINTEGRATING ORAL EVERY 6 HOURS PRN
Qty: 12 TABLET | Refills: 0 | Status: SHIPPED | OUTPATIENT
Start: 2024-02-29 | End: 2024-03-03

## 2024-02-29 RX ADMIN — ONDANSETRON 4 MG: 4 TABLET, ORALLY DISINTEGRATING ORAL at 10:02

## 2024-02-29 RX ADMIN — ONDANSETRON 4 MG: 4 TABLET, ORALLY DISINTEGRATING ORAL at 09:02

## 2024-02-29 NOTE — LETTER
February 29, 2024      [facility]  [address]  [place] [address]  Phone: [phone]  Fax: [phone]       Patient: Denise Mosher   YOB: 2000  Date of Visit: 02/29/2024    To Whom It May Concern:    Saima Mosher  was at [facility] on 02/29/2024. The patient may return to work on 3/4/2024 with no restrictions. If you have any questions or concerns, or if I can be of further assistance, please do not hesitate to contact me.    Sincerely,      Nataly Gilman PA-C

## 2024-02-29 NOTE — PATIENT INSTRUCTIONS
Strep, COVID, and flu tests today were negative.  Urinalysis showed no signs of urinary tract infection.  Pregnancy test today was negative.  Zofran has been prescribed as needed for nausea and vomiting.   Home tips  Eat small meals more often to help with belly pain.  Keep a diary about your pain and the foods you eat. Then you can avoid those that bother your stomach.  Avoid or limit spicy foods.  Avoid or limit beer, wine, and mixed drinks.  If you smoke, try to quit. Your doctor or nurse can help.  Try to learn ways to manage stress. Stress may cause the acid levels in your stomach to rise.  If possible, avoid long-term use of aspirin and other anti-inflammatory drugs.  Please follow up with your primary care doctor within the next 2-3 days for re-evaluation.  Should symptoms continue or worsen, please return to urgent care or go to the ED. Symptoms to watch out for include but are not limited to  You start throwing up blood or pass a lot of blood in your stool.  Your belly pain becomes much worse all of a sudden or over a few hours.  Your belly becomes hard or tender.  You have chest pain or trouble breathing  Your stools are bright red, black, or tar-colored.  You are throwing up often.  Your belly pain does not get better even after taking medicine, changing your diet, and following treatment instructions.  You lose a lot of weight without trying.    Please remember that you have received care at an urgent care today. Urgent cares are not emergency rooms and are not equipped to handle life threatening emergencies and cannot rule in or out certain medical conditions and you may be released before all of your medical problems are known or treated, please schedule all follow up appointments as discussed and if you have worsening symptoms please go to the ER to rule out potential life threatening problems, as discussed.

## 2024-02-29 NOTE — PROGRESS NOTES
"Subjective:      Patient ID: Denise Mosher is a 23 y.o. female.    Vitals:  height is 5' (1.524 m) and weight is 113.1 kg (249 lb 5.4 oz). Her oral temperature is 97.9 °F (36.6 °C). Her blood pressure is 104/70 and her pulse is 77. Her respiration is 19 and oxygen saturation is 97%.     Chief Complaint: Sore Throat    Pt ishere for sore throat and vomiting. Pt symp sore throat started yesterday, vomiting happened three days ago. Pt hasn't treated with anything yet.     Provider note starts below:  Patient presents to clinic for evaluation of nausea, vomiting, sore throat, and body aches. Symptoms have been ongoing for 3 days. Multiple episodes of vomiting, most recent episode was several minutes ago. She tried going to work this morning but was sent home due to vomiting. States she is unable to tolerate anything PO. Patient has not taken any medications at home. She also reports feeling like she was "on fire" 3 days ago and believes she may have been running fever. Did not check her temperature at home. Denies any known sick contacts. Denies chest pain, palpitations, shortness of breath, abdominal pain, diarrhea, constipation, dizziness, lightheadedness, headache, ear pain, congestion, trouble swallowing, voice change.     Sore Throat   This is a new problem. The current episode started yesterday. The problem has been unchanged. Neither side of throat is experiencing more pain than the other. There has been no fever. The fever has been present for Less than 1 day. The pain is at a severity of 8/10. The pain is severe. Associated symptoms include vomiting. Pertinent negatives include no abdominal pain, congestion, coughing, diarrhea, ear discharge, ear pain, headaches, neck pain, shortness of breath or trouble swallowing. She has tried nothing for the symptoms. The treatment provided no relief.     Constitution: Positive for chills and fever (subjective). Negative for activity change and appetite change.   HENT: "  Positive for sore throat. Negative for ear pain, ear discharge, congestion, trouble swallowing and voice change.    Neck: Negative for neck pain and neck stiffness.   Cardiovascular:  Negative for chest pain and palpitations.   Eyes:  Negative for eye discharge, eye itching, double vision and blurred vision.   Respiratory:  Negative for cough, shortness of breath and wheezing.    Gastrointestinal:  Positive for nausea and vomiting. Negative for abdominal pain, history of abdominal surgery, constipation, diarrhea, bright red blood in stool and dark colored stools.   Musculoskeletal:  Positive for muscle ache. Negative for muscle cramps.   Skin:  Negative for pale and rash.   Neurological:  Negative for dizziness, light-headedness, headaches, disorientation and altered mental status.   Psychiatric/Behavioral:  Negative for altered mental status, disorientation and confusion.       Objective:     Physical Exam   Constitutional: She is oriented to person, place, and time.  Non-toxic appearance. She does not appear ill. No distress.   HENT:   Head: Normocephalic and atraumatic.   Eyes: Conjunctivae are normal. Extraocular movement intact   Neck: Neck supple.   Cardiovascular: Normal rate, regular rhythm, normal heart sounds and normal pulses.   Pulmonary/Chest: Effort normal and breath sounds normal. She has no wheezes. She has no rhonchi. She has no rales.   Abdominal: Normal appearance. She exhibits no distension. There is no abdominal tenderness. There is no rebound and no guarding.   Musculoskeletal: Normal range of motion.         General: Normal range of motion.   Neurological: She is alert, oriented to person, place, and time and at baseline.   Skin: Skin is warm and dry.   Psychiatric: Her behavior is normal. Mood normal.   Nursing note and vitals reviewed.    Assessment:     Results for orders placed or performed in visit on 02/29/24   POCT Strep A, Molecular   Result Value Ref Range    Molecular Strep A, POC  Negative Negative     Acceptable Yes    POCT Influenza A/B MOLECULAR   Result Value Ref Range    POC Molecular Influenza A Ag Negative Negative, Not Reported    POC Molecular Influenza B Ag Negative Negative, Not Reported     Acceptable Yes    SARS Coronavirus 2 Antigen, POCT Manual Read   Result Value Ref Range    SARS Coronavirus 2 Antigen Negative Negative     Acceptable Yes    POCT Urinalysis, Dipstick, Automated, W/O Scope   Result Value Ref Range    POC Blood, Urine Negative Negative    POC Bilirubin, Urine Negative Negative    POC Urobilinogen, Urine norm 0.1 - 1.1    POC Ketones, Urine Negative Negative    POC Protein, Urine Negative Negative    POC Nitrates, Urine Negative Negative    POC Glucose, Urine Negative Negative    pH, UA 6.5     POC Specific Gravity, Urine 1.010 1.003 - 1.029    POC Leukocytes, Urine Negative Negative   POCT urine pregnancy   Result Value Ref Range    POC Preg Test, Ur Negative Negative     Acceptable Yes        1. Viral gastritis    2. Sore throat    3. Nausea and vomiting, unspecified vomiting type        Plan:     Viral gastritis  -     ondansetron (ZOFRAN-ODT) 4 MG TbDL; Take 1 tablet (4 mg total) by mouth every 6 (six) hours as needed (for nausea).  Dispense: 12 tablet; Refill: 0    Sore throat  -     POCT Strep A, Molecular  -     POCT Influenza A/B MOLECULAR  -     SARS Coronavirus 2 Antigen, POCT Manual Read    Nausea and vomiting, unspecified vomiting type  -     ondansetron disintegrating tablet 4 mg  -     ondansetron disintegrating tablet 4 mg  -     POCT Urinalysis, Dipstick, Automated, W/O Scope  -     POCT urine pregnancy            Patient Instructions   Strep, COVID, and flu tests today were negative.  Urinalysis showed no signs of urinary tract infection.  Pregnancy test today was negative.  Zofran has been prescribed as needed for nausea and vomiting.   Home tips  Eat small meals more often to help with  belly pain.  Keep a diary about your pain and the foods you eat. Then you can avoid those that bother your stomach.  Avoid or limit spicy foods.  Avoid or limit beer, wine, and mixed drinks.  If you smoke, try to quit. Your doctor or nurse can help.  Try to learn ways to manage stress. Stress may cause the acid levels in your stomach to rise.  If possible, avoid long-term use of aspirin and other anti-inflammatory drugs.  Please follow up with your primary care doctor within the next 2-3 days for re-evaluation.  Should symptoms continue or worsen, please return to urgent care or go to the ED. Symptoms to watch out for include but are not limited to  You start throwing up blood or pass a lot of blood in your stool.  Your belly pain becomes much worse all of a sudden or over a few hours.  Your belly becomes hard or tender.  You have chest pain or trouble breathing  Your stools are bright red, black, or tar-colored.  You are throwing up often.  Your belly pain does not get better even after taking medicine, changing your diet, and following treatment instructions.  You lose a lot of weight without trying.    Please remember that you have received care at an urgent care today. Urgent cares are not emergency rooms and are not equipped to handle life threatening emergencies and cannot rule in or out certain medical conditions and you may be released before all of your medical problems are known or treated, please schedule all follow up appointments as discussed and if you have worsening symptoms please go to the ER to rule out potential life threatening problems, as discussed.

## 2024-03-28 ENCOUNTER — OFFICE VISIT (OUTPATIENT)
Dept: URGENT CARE | Facility: CLINIC | Age: 24
End: 2024-03-28
Payer: MEDICAID

## 2024-03-28 VITALS
HEIGHT: 62 IN | WEIGHT: 240 LBS | SYSTOLIC BLOOD PRESSURE: 121 MMHG | BODY MASS INDEX: 44.16 KG/M2 | HEART RATE: 69 BPM | DIASTOLIC BLOOD PRESSURE: 76 MMHG | RESPIRATION RATE: 20 BRPM | OXYGEN SATURATION: 95 % | TEMPERATURE: 98 F

## 2024-03-28 DIAGNOSIS — B34.9 ACUTE VIRAL SYNDROME: Primary | ICD-10-CM

## 2024-03-28 DIAGNOSIS — R05.9 COUGH, UNSPECIFIED TYPE: ICD-10-CM

## 2024-03-28 DIAGNOSIS — M94.0 ACUTE COSTOCHONDRITIS: ICD-10-CM

## 2024-03-28 DIAGNOSIS — R11.2 NAUSEA AND VOMITING, UNSPECIFIED VOMITING TYPE: ICD-10-CM

## 2024-03-28 DIAGNOSIS — R07.9 CHEST PAIN, UNSPECIFIED TYPE: ICD-10-CM

## 2024-03-28 LAB
CTP QC/QA: YES
CTP QC/QA: YES
OHS QRS DURATION: 90 MS
OHS QTC CALCULATION: 408 MS
POC MOLECULAR INFLUENZA A AGN: NEGATIVE
POC MOLECULAR INFLUENZA B AGN: NEGATIVE
SARS-COV-2 AG RESP QL IA.RAPID: NEGATIVE

## 2024-03-28 PROCEDURE — 93005 ELECTROCARDIOGRAM TRACING: CPT | Mod: S$GLB,,,

## 2024-03-28 PROCEDURE — 87811 SARS-COV-2 COVID19 W/OPTIC: CPT | Mod: QW,S$GLB,,

## 2024-03-28 PROCEDURE — 93010 ELECTROCARDIOGRAM REPORT: CPT | Mod: S$PBB,,, | Performed by: INTERNAL MEDICINE

## 2024-03-28 PROCEDURE — 87502 INFLUENZA DNA AMP PROBE: CPT | Mod: QW,S$GLB,,

## 2024-03-28 PROCEDURE — 99213 OFFICE O/P EST LOW 20 MIN: CPT | Mod: S$GLB,,,

## 2024-03-28 RX ORDER — NAPROXEN 500 MG/1
500 TABLET ORAL 2 TIMES DAILY WITH MEALS
Qty: 20 TABLET | Refills: 0 | OUTPATIENT
Start: 2024-03-28 | End: 2024-04-25

## 2024-03-28 RX ORDER — KETOROLAC TROMETHAMINE 30 MG/ML
30 INJECTION, SOLUTION INTRAMUSCULAR; INTRAVENOUS
Status: COMPLETED | OUTPATIENT
Start: 2024-03-28 | End: 2024-03-28

## 2024-03-28 RX ORDER — PROMETHAZINE HYDROCHLORIDE 25 MG/1
25 TABLET ORAL EVERY 6 HOURS PRN
Qty: 20 TABLET | Refills: 0 | Status: SHIPPED | OUTPATIENT
Start: 2024-03-28

## 2024-03-28 RX ORDER — ONDANSETRON 8 MG/1
8 TABLET, ORALLY DISINTEGRATING ORAL
Status: COMPLETED | OUTPATIENT
Start: 2024-03-28 | End: 2024-03-28

## 2024-03-28 RX ADMIN — ONDANSETRON 8 MG: 8 TABLET, ORALLY DISINTEGRATING ORAL at 03:03

## 2024-03-28 RX ADMIN — KETOROLAC TROMETHAMINE 30 MG: 30 INJECTION, SOLUTION INTRAMUSCULAR; INTRAVENOUS at 03:03

## 2024-03-28 NOTE — PATIENT INSTRUCTIONS
- Rest and stay hydrated  - Naproxen for pain  - Promethazine for nausea    - Follow up with your PCP or specialty clinic as directed in the next 1-2 weeks if not improved or as needed.  You can call [phone] to schedule an appointment with the appropriate provider.    - Go to the ER or seek medical attention immediately if you develop new or worsening symptoms.    - You must understand that you have received an Urgent Care treatment only and that you may be released before all of your medical problems are known or treated.   - You, the patient, will arrange for follow up care as instructed.   - If your condition worsens or fails to improve we recommend that you receive another evaluation at the ER immediately or contact your PCP to discuss your concerns or return here.

## 2024-03-28 NOTE — PROGRESS NOTES
"Subjective:      Patient ID: Denise Mosher is a 23 y.o. female.    Vitals:  height is 5' 2" (1.575 m) and weight is 108.9 kg (240 lb). Her temperature is 97.7 °F (36.5 °C). Her blood pressure is 121/76 and her pulse is 69. Her respiration is 20 and oxygen saturation is 95%.     Chief Complaint: Chest Pain    Patient is a 23-year-old female with history of anxiety, fibromyalgia, depression presenting with coughing, congestion, nausea, vomiting, chest pain, SOB, headaches, fatigue that started 4 days ago.  Chest pain is to the left of her sternum, feels like a pressure.  Does not radiate.  It is tender to touch.  Nonexertional.  No history of asthma or smoking. Took dayquil and zofran. Denies any fever, chills, hemoptysis, leg swelling or pain, abdominal pain, diarrhea, constipation, syncope, urinary symptoms.    Chest Pain   This is a new problem. The current episode started in the past 7 days. The onset quality is gradual. The problem occurs constantly. The problem has been gradually worsening. The pain is present in the substernal region. The pain is at a severity of 7/10. The pain is moderate. The quality of the pain is described as pressure and tightness. The pain does not radiate. Associated symptoms include a cough, headaches, nausea, shortness of breath and vomiting. Pertinent negatives include no abdominal pain, dizziness, fever or palpitations. Nothing relieves the cough. Treatments tried: dayquil.   Her past medical history is significant for anxiety/panic attacks.   Pertinent negatives for past medical history include no DVT.       Constitution: Negative for chills and fever.   HENT:  Positive for congestion. Negative for ear pain and sore throat.    Neck: Negative for neck pain and neck stiffness.   Cardiovascular:  Positive for chest pain. Negative for leg swelling, palpitations and sob on exertion.   Eyes:  Negative for double vision and blurred vision.   Respiratory:  Positive for cough and " shortness of breath. Negative for wheezing.    Gastrointestinal:  Positive for nausea and vomiting. Negative for abdominal pain, constipation and diarrhea.   Genitourinary:  Negative for dysuria, frequency and urgency.   Musculoskeletal:  Negative for muscle ache.   Skin:  Negative for rash.   Allergic/Immunologic: Negative for sneezing.   Neurological:  Positive for headaches. Negative for dizziness.      Objective:     Physical Exam   Constitutional: She is oriented to person, place, and time. She appears well-developed. obesity  HENT:   Head: Normocephalic and atraumatic.   Ears:   Right Ear: Tympanic membrane, external ear and ear canal normal.   Left Ear: Tympanic membrane, external ear and ear canal normal.   Nose: Congestion present.   Mouth/Throat: Oropharynx is clear and moist. Mucous membranes are moist.   Eyes: Conjunctivae, EOM and lids are normal. Pupils are equal, round, and reactive to light. Extraocular movement intact   Neck: Trachea normal and phonation normal. Neck supple.   Cardiovascular: Normal rate, regular rhythm, normal heart sounds and normal pulses.   Pulmonary/Chest: Effort normal and breath sounds normal. No respiratory distress. She has no wheezes. She has no rhonchi. She has no rales. She exhibits tenderness (left sternal border.  Pain reproducible with torso and arm movements.).   Abdominal: She exhibits no distension. Soft. There is no abdominal tenderness. There is no rebound and no guarding.   Musculoskeletal: Normal range of motion.         General: Normal range of motion.   Neurological: no focal deficit. She is alert and oriented to person, place, and time. She has normal motor skills and intact cranial nerves (2-12). No cranial nerve deficit. GCS eye subscore is 4. GCS verbal subscore is 5. GCS motor subscore is 6.   Skin: Skin is warm, dry and intact. Capillary refill takes less than 2 seconds.   Psychiatric: Her speech is normal and behavior is normal. Judgment and thought  content normal.   Nursing note and vitals reviewed.    Results for orders placed or performed in visit on 03/28/24   SARS Coronavirus 2 Antigen, POCT Manual Read   Result Value Ref Range    SARS Coronavirus 2 Antigen Negative Negative     Acceptable Yes    POCT Influenza A/B MOLECULAR   Result Value Ref Range    POC Molecular Influenza A Ag Negative Negative, Not Reported    POC Molecular Influenza B Ag Negative Negative, Not Reported     Acceptable Yes        EKG: HR 57, sinus bradycardia, no acute ischemic changes    Assessment:     1. Acute viral syndrome    2. Chest pain, unspecified type    3. Cough, unspecified type    4. Nausea and vomiting, unspecified vomiting type    5. Acute costochondritis        Plan:     Acute viral syndrome    Chest pain, unspecified type  -     IN OFFICE EKG 12-LEAD (to Muse)  -     Cancel: XR CHEST PA AND LATERAL; Future; Expected date: 03/28/2024    Cough, unspecified type  -     SARS Coronavirus 2 Antigen, POCT Manual Read  -     POCT Influenza A/B MOLECULAR    Nausea and vomiting, unspecified vomiting type  -     ondansetron disintegrating tablet 8 mg  -     promethazine (PHENERGAN) 25 MG tablet; Take 1 tablet (25 mg total) by mouth every 6 (six) hours as needed for Nausea.  Dispense: 20 tablet; Refill: 0    Acute costochondritis  -     ketorolac injection 30 mg  -     naproxen (NAPROSYN) 500 MG tablet; Take 1 tablet (500 mg total) by mouth 2 (two) times daily with meals.  Dispense: 20 tablet; Refill: 0    Patient is a 23-year-old female with history of anxiety, fibromyalgia, depression presenting with coughing, congestion, nausea, vomiting, chest pain, SOB, headaches, fatigue that started 4 days ago.  Vital signs are within normal limits.  On exam, patient is nontoxic.  She does appear fatigued.  Lungs CTAB.  RRR.  There is anterior chest tenderness over the left sternal border, and pain is reproducible with torso and arm movements.  Soft nontender  abdomen without rebound or guarding.  AAOx3.  Answers questions appropriately.  Neurologically intact without focal deficits.  Differential diagnosis includes but not limited to influenza, COVID-19, other viral syndrome, acute bronchitis, pneumonia, pneumothorax, GERD, acute gastroenteritis, costochondritis, pleuritis muscle strain,, less likely ACS or PE.  COVID and flu negative. EKG with sinus bradycardia, no acute ischemic changes.  Unfortunately x-rays unavailable in clinic today, however lungs CTAB on exam.  Doubt pneumonia. PERC 0. Low suspicion for cardiac etiology.  Suspecting acute viral syndrome and costochondritis.  Zofran given in clinic. Toradol IM given with improvement in pain. Phenergan for nausea and naproxen for costochondritis, sent Rx to pharmacy. Discussed strict ED precautions with both patient and patient's parents for worsening symptoms.          Patient Instructions   - Rest and stay hydrated  - Naproxen for pain  - Promethazine for nausea    - Follow up with your PCP or specialty clinic as directed in the next 1-2 weeks if not improved or as needed.  You can call [phone] to schedule an appointment with the appropriate provider.    - Go to the ER or seek medical attention immediately if you develop new or worsening symptoms.    - You must understand that you have received an Urgent Care treatment only and that you may be released before all of your medical problems are known or treated.   - You, the patient, will arrange for follow up care as instructed.   - If your condition worsens or fails to improve we recommend that you receive another evaluation at the ER immediately or contact your PCP to discuss your concerns or return here.

## 2024-03-30 ENCOUNTER — HOSPITAL ENCOUNTER (EMERGENCY)
Facility: HOSPITAL | Age: 24
Discharge: HOME | End: 2024-03-30
Attending: STUDENT IN AN ORGANIZED HEALTH CARE EDUCATION/TRAINING PROGRAM
Payer: MEDICAID

## 2024-03-30 VITALS
RESPIRATION RATE: 16 BRPM | SYSTOLIC BLOOD PRESSURE: 121 MMHG | OXYGEN SATURATION: 99 % | DIASTOLIC BLOOD PRESSURE: 71 MMHG | HEART RATE: 71 BPM | BODY MASS INDEX: 44.16 KG/M2 | WEIGHT: 240 LBS | TEMPERATURE: 98 F | HEIGHT: 62 IN

## 2024-03-30 DIAGNOSIS — R07.9 CHEST PAIN: ICD-10-CM

## 2024-03-30 DIAGNOSIS — R55 SYNCOPE: Primary | ICD-10-CM

## 2024-03-30 LAB
ALBUMIN SERPL BCP-MCNC: 3.9 G/DL (ref 3.5–5.2)
ALP SERPL-CCNC: 77 U/L (ref 55–135)
ALT SERPL W/O P-5'-P-CCNC: 31 U/L (ref 10–44)
AMPHET+METHAMPHET UR QL: NEGATIVE
ANION GAP SERPL CALC-SCNC: 10 MMOL/L (ref 8–16)
AST SERPL-CCNC: 21 U/L (ref 10–40)
B-HCG UR QL: NEGATIVE
BACTERIA #/AREA URNS AUTO: ABNORMAL /HPF
BARBITURATES UR QL SCN>200 NG/ML: NEGATIVE
BASOPHILS # BLD AUTO: 0.06 K/UL (ref 0–0.2)
BASOPHILS NFR BLD: 0.4 % (ref 0–1.9)
BENZODIAZ UR QL SCN>200 NG/ML: NEGATIVE
BILIRUB SERPL-MCNC: 1.3 MG/DL (ref 0.1–1)
BILIRUB UR QL STRIP: NEGATIVE
BUN SERPL-MCNC: 14 MG/DL (ref 6–20)
BZE UR QL SCN: NEGATIVE
CALCIUM SERPL-MCNC: 10 MG/DL (ref 8.7–10.5)
CANNABINOIDS UR QL SCN: NEGATIVE
CHLORIDE SERPL-SCNC: 107 MMOL/L (ref 95–110)
CK SERPL-CCNC: 155 U/L (ref 20–180)
CLARITY UR REFRACT.AUTO: CLEAR
CO2 SERPL-SCNC: 21 MMOL/L (ref 23–29)
COLOR UR AUTO: YELLOW
CREAT SERPL-MCNC: 0.8 MG/DL (ref 0.5–1.4)
CREAT UR-MCNC: 157 MG/DL (ref 15–325)
CTP QC/QA: YES
D DIMER PPP IA.FEU-MCNC: 0.43 MG/L FEU
DIFFERENTIAL METHOD BLD: ABNORMAL
EOSINOPHIL # BLD AUTO: 0 K/UL (ref 0–0.5)
EOSINOPHIL NFR BLD: 0.2 % (ref 0–8)
ERYTHROCYTE [DISTWIDTH] IN BLOOD BY AUTOMATED COUNT: 13.6 % (ref 11.5–14.5)
EST. GFR  (NO RACE VARIABLE): >60 ML/MIN/1.73 M^2
ETHANOL SERPL-MCNC: <10 MG/DL
GLUCOSE SERPL-MCNC: 101 MG/DL (ref 70–110)
GLUCOSE UR QL STRIP: NEGATIVE
HCT VFR BLD AUTO: 40 % (ref 37–48.5)
HCV AB SERPL QL IA: NORMAL
HGB BLD-MCNC: 13.2 G/DL (ref 12–16)
HGB UR QL STRIP: NEGATIVE
HIV 1+2 AB+HIV1 P24 AG SERPL QL IA: NORMAL
IMM GRANULOCYTES # BLD AUTO: 0.06 K/UL (ref 0–0.04)
IMM GRANULOCYTES NFR BLD AUTO: 0.4 % (ref 0–0.5)
KETONES UR QL STRIP: ABNORMAL
LEUKOCYTE ESTERASE UR QL STRIP: ABNORMAL
LYMPHOCYTES # BLD AUTO: 2.9 K/UL (ref 1–4.8)
LYMPHOCYTES NFR BLD: 17.4 % (ref 18–48)
MAGNESIUM SERPL-MCNC: 2 MG/DL (ref 1.6–2.6)
MCH RBC QN AUTO: 26.7 PG (ref 27–31)
MCHC RBC AUTO-ENTMCNC: 33 G/DL (ref 32–36)
MCV RBC AUTO: 81 FL (ref 82–98)
METHADONE UR QL SCN>300 NG/ML: NEGATIVE
MICROSCOPIC COMMENT: ABNORMAL
MONOCYTES # BLD AUTO: 1.2 K/UL (ref 0.3–1)
MONOCYTES NFR BLD: 6.8 % (ref 4–15)
NEUTROPHILS # BLD AUTO: 12.7 K/UL (ref 1.8–7.7)
NEUTROPHILS NFR BLD: 74.8 % (ref 38–73)
NITRITE UR QL STRIP: NEGATIVE
NRBC BLD-RTO: 0 /100 WBC
OPIATES UR QL SCN: NEGATIVE
PCP UR QL SCN>25 NG/ML: NEGATIVE
PH UR STRIP: 5 [PH] (ref 5–8)
PLATELET # BLD AUTO: 368 K/UL (ref 150–450)
PMV BLD AUTO: 10.6 FL (ref 9.2–12.9)
POTASSIUM SERPL-SCNC: 4 MMOL/L (ref 3.5–5.1)
PROT SERPL-MCNC: 7.5 G/DL (ref 6–8.4)
PROT UR QL STRIP: NEGATIVE
RBC # BLD AUTO: 4.94 M/UL (ref 4–5.4)
RBC #/AREA URNS AUTO: 4 /HPF (ref 0–4)
SODIUM SERPL-SCNC: 138 MMOL/L (ref 136–145)
SP GR UR STRIP: 1.02 (ref 1–1.03)
SQUAMOUS #/AREA URNS AUTO: 0 /HPF
TOXICOLOGY INFORMATION: NORMAL
TROPONIN I SERPL DL<=0.01 NG/ML-MCNC: 0.01 NG/ML (ref 0–0.03)
TROPONIN I SERPL DL<=0.01 NG/ML-MCNC: <0.006 NG/ML (ref 0–0.03)
TROPONIN I SERPL DL<=0.01 NG/ML-MCNC: <0.006 NG/ML (ref 0–0.03)
URN SPEC COLLECT METH UR: ABNORMAL
WBC # BLD AUTO: 16.91 K/UL (ref 3.9–12.7)
WBC #/AREA URNS AUTO: 34 /HPF (ref 0–5)

## 2024-03-30 PROCEDURE — 93010 ELECTROCARDIOGRAM REPORT: CPT | Mod: 59,,, | Performed by: INTERNAL MEDICINE

## 2024-03-30 PROCEDURE — 85025 COMPLETE CBC W/AUTO DIFF WBC: CPT | Performed by: PHYSICIAN ASSISTANT

## 2024-03-30 PROCEDURE — 83735 ASSAY OF MAGNESIUM: CPT | Performed by: PHYSICIAN ASSISTANT

## 2024-03-30 PROCEDURE — 99285 EMERGENCY DEPT VISIT HI MDM: CPT | Mod: 25

## 2024-03-30 PROCEDURE — 82077 ASSAY SPEC XCP UR&BREATH IA: CPT | Performed by: PHYSICIAN ASSISTANT

## 2024-03-30 PROCEDURE — 25000003 PHARM REV CODE 250: Performed by: PHYSICIAN ASSISTANT

## 2024-03-30 PROCEDURE — 81001 URINALYSIS AUTO W/SCOPE: CPT | Mod: XB | Performed by: PHYSICIAN ASSISTANT

## 2024-03-30 PROCEDURE — 80053 COMPREHEN METABOLIC PANEL: CPT | Performed by: PHYSICIAN ASSISTANT

## 2024-03-30 PROCEDURE — 86803 HEPATITIS C AB TEST: CPT | Performed by: EMERGENCY MEDICINE

## 2024-03-30 PROCEDURE — 93005 ELECTROCARDIOGRAM TRACING: CPT

## 2024-03-30 PROCEDURE — 93010 ELECTROCARDIOGRAM REPORT: CPT | Mod: ,,, | Performed by: INTERNAL MEDICINE

## 2024-03-30 PROCEDURE — 84484 ASSAY OF TROPONIN QUANT: CPT | Mod: 91 | Performed by: PHYSICIAN ASSISTANT

## 2024-03-30 PROCEDURE — 85379 FIBRIN DEGRADATION QUANT: CPT | Performed by: PHYSICIAN ASSISTANT

## 2024-03-30 PROCEDURE — 96374 THER/PROPH/DIAG INJ IV PUSH: CPT

## 2024-03-30 PROCEDURE — 87086 URINE CULTURE/COLONY COUNT: CPT | Performed by: PHYSICIAN ASSISTANT

## 2024-03-30 PROCEDURE — 81025 URINE PREGNANCY TEST: CPT | Performed by: PHYSICIAN ASSISTANT

## 2024-03-30 PROCEDURE — 63600175 PHARM REV CODE 636 W HCPCS: Performed by: PHYSICIAN ASSISTANT

## 2024-03-30 PROCEDURE — 80307 DRUG TEST PRSMV CHEM ANLYZR: CPT | Performed by: PHYSICIAN ASSISTANT

## 2024-03-30 PROCEDURE — 87389 HIV-1 AG W/HIV-1&-2 AB AG IA: CPT | Performed by: EMERGENCY MEDICINE

## 2024-03-30 PROCEDURE — 96361 HYDRATE IV INFUSION ADD-ON: CPT

## 2024-03-30 PROCEDURE — 82550 ASSAY OF CK (CPK): CPT | Performed by: PHYSICIAN ASSISTANT

## 2024-03-30 RX ORDER — ALUMINUM HYDROXIDE, MAGNESIUM HYDROXIDE, AND SIMETHICONE 1200; 120; 1200 MG/30ML; MG/30ML; MG/30ML
30 SUSPENSION ORAL
Status: COMPLETED | OUTPATIENT
Start: 2024-03-30 | End: 2024-03-30

## 2024-03-30 RX ORDER — ONDANSETRON HYDROCHLORIDE 2 MG/ML
4 INJECTION, SOLUTION INTRAVENOUS
Status: COMPLETED | OUTPATIENT
Start: 2024-03-30 | End: 2024-03-30

## 2024-03-30 RX ORDER — ACETAMINOPHEN 500 MG
1000 TABLET ORAL
Status: COMPLETED | OUTPATIENT
Start: 2024-03-30 | End: 2024-03-30

## 2024-03-30 RX ADMIN — ACETAMINOPHEN 1000 MG: 500 TABLET ORAL at 07:03

## 2024-03-30 RX ADMIN — SODIUM CHLORIDE, POTASSIUM CHLORIDE, SODIUM LACTATE AND CALCIUM CHLORIDE 1000 ML: 600; 310; 30; 20 INJECTION, SOLUTION INTRAVENOUS at 03:03

## 2024-03-30 RX ADMIN — ALUMINUM HYDROXIDE, MAGNESIUM HYDROXIDE, AND SIMETHICONE 30 ML: 1200; 120; 1200 SUSPENSION ORAL at 03:03

## 2024-03-30 RX ADMIN — ONDANSETRON 4 MG: 2 INJECTION INTRAMUSCULAR; INTRAVENOUS at 03:03

## 2024-03-30 RX ADMIN — SODIUM CHLORIDE, POTASSIUM CHLORIDE, SODIUM LACTATE AND CALCIUM CHLORIDE 1000 ML: 600; 310; 30; 20 INJECTION, SOLUTION INTRAVENOUS at 05:03

## 2024-03-30 NOTE — ED PROVIDER NOTES
"Encounter Date: 3/30/2024       History     Chief Complaint   Patient presents with    Fatigue     Pt. Started to feel fatigued accompanied with chest pain during the [place] classic.   Presenting with re-producible chest pain and nausea.     The history is provided by the patient and medical records. No  was used.     Denise Mosher is a 23 y.o. female with medical history of seizures, obesity presenting to the ED with the chief complaint of syncopal episode.     Arrives via EMS after syncopal episode after walking the 10k [employer] this morning. [place]estead at her baseline health this morning. Ate chicken and waffles. Completed the race without difficulty. Reports developing tachycardia and chest tightness while walking to her car at her friends house. Reports falling to the ground. Declines seizure-like activity. Unclear if she experienced LOC. Friends called EMS who brought her to the ED for further evaluation. EMS unable to start an IV and did not get meds prior to ED arrival. Patient reports feeling fatigue and headache at the time of my exam. [place] like her vision was "fuzzy" which improved after putting her eyeglasses on. Reports social ETOH use but did not drink any today. Denies recreational drug use. Mother at bedside who denies family history of cardiac disease or sudden cardiac death in her family. Patient has had palpitations in the past and had Holter monitors which did not show significant findings. No neck pain, abdominal pain, vomiting, urinary or bowel movement changes.     Review of patient's allergies indicates:  No Known Allergies  Past Medical History:   Diagnosis Date    Anxiety     reports panic attacks    Depression     Fibromyalgia     per pt    Insomnia     Precocious female puberty     Seizures     reports 2 incidents of possible seizures while giving blood    Syncope and collapse     Wrist fracture, bilateral      Past Surgical History:   Procedure " Laterality Date    ARTHROSCOPY OF KNEE Right 12/19/2019    Procedure: ARTHROSCOPY, KNEE (Right);  Surgeon: Ranulfo House MD;  Location: [facility] OR [address] [place] [address];  Service: Orthopedics;  Laterality: Right;    ARTHROSCOPY OF KNEE Left 7/9/2020    Procedure: ARTHROSCOPY, KNEE;  Surgeon: Ranulfo House MD;  Location: [facility] OR [address] [place] [address];  Service: Orthopedics;  Laterality: Left;  WITH CHRONDROPLASTY     ARTHROTOMY OF KNEE Left 7/9/2020    Procedure: ARTHROTOMY, KNEE;  Surgeon: Ranulfo House MD;  Location: [facility] OR [address] [place] [address];  Service: Orthopedics;  Laterality: Left;  WITH CHRONDRAL ALLOGRAFT IMPLANTATION    chip      chip implant    CHONDROPLASTY OF KNEE Right 12/19/2019    Procedure: CHONDROPLASTY, KNEE patella;  Surgeon: Ranulfo House MD;  Location: [facility] OR [address] [place] [address];  Service: Orthopedics;  Laterality: Right;    FEMUR OSTEOTOMY Right 12/13/2018    Procedure: OSTEOTOMY, FEMUR - distal to correct valgus (Orthopediatrics plate, MTF bone wedge).  Right knee arthroscopy with MPFL reconstruction, gracilis allograft (Linvatec).  3 hrs.;  Surgeon: Ranulfo House MD;  Location: [facility] OR [address] [place] [address];  Service: Orthopedics;  Laterality: Right;    FEMUR OSTEOTOMY Left 7/9/2020    Procedure: OSTEOTOMY, FEMUR (Left) - distal femoral opening wedge (Orthopediatrics, MTF), knee scope, removal of tibial tubercle screws (Orthopediatrics 4.0 cannulated screws);  Surgeon: Ranulfo House MD;  Location: [facility] OR [address] [place] [address];  Service: Orthopedics;  Laterality: Left;  RQ Gianna DAVIS notified 7/8/20 Onelia RN    HARDWARE REMOVAL Right 12/19/2019    Procedure: REMOVAL, HARDWARE righ distal femoral.;  Surgeon: Ranulfo House MD;  Location: [facility] OR [address] [place] [address];  Service: Orthopedics;  Laterality: Right;    HARDWARE REMOVAL Left 7/9/2020    Procedure: REMOVAL, HARDWARE;  Surgeon: Ranulfo House MD;  Location: [facility] OR [address] [place] [address];  Service: Orthopedics;  Laterality: Left;  TIBIA    HEMANGIOMA EXCISION      scalp    INGUINAL HERNIA REPAIR Left      INJECTION OF STEROID Right 7/5/2019    Procedure: INJECTION, STEROID;  Surgeon: Ranulfo House MD;  Location: [facility] OR [address] [place] [address];  Service: Orthopedics;  Laterality: Right;    KNEE JOINT MANIPULATION Right 7/5/2019    Procedure: MANIPULATION, KNEE;  Surgeon: Ranulfo House MD;  Location: [facility] OR [address] [place] [address];  Service: Orthopedics;  Laterality: Right;    KNEE SURGERY Left 2/16/15    TOE SURGERY Right     5 th toe     Family History   Problem Relation Age of Onset    Seizures Father     Anxiety disorder Father     Depression Father     Mental illness Father     Schizophrenia Father     Deep vein thrombosis Father      Social History     Tobacco Use    Smoking status: Never    Smokeless tobacco: Never   Substance Use Topics    Alcohol use: No    Drug use: No     Review of Systems   Constitutional:  Positive for fatigue.     Physical Exam     Initial Vitals   BP Pulse Resp Temp SpO2   03/30/24 1350 03/30/24 1350 03/30/24 1350 03/30/24 1613 03/30/24 1350   110/60 84 20 98.2 °F (36.8 °C) 96 %      MAP       --                Physical Exam    Constitutional: She is Obese .   HENT:   Head: Normocephalic and atraumatic.   Mouth/Throat: Oropharynx is clear and moist. No oropharyngeal exudate.   No tongue trauma   Eyes: EOM are normal. Pupils are equal, round, and reactive to light.   Neck: Neck supple.   Normal range of motion.  Cardiovascular:  Normal rate and regular rhythm.           Pulmonary/Chest: Breath sounds normal. No respiratory distress. She has no wheezes. She has no rales.   Abdominal: Abdomen is soft. She exhibits no distension. There is no abdominal tenderness. There is no rebound.   Musculoskeletal:         General: No tenderness or edema. Normal range of motion.      Cervical back: Normal range of motion and neck supple.      Comments: No midline spinal tenderness. Full A/P ROM of extremities     Neurological: She has normal strength.   Somnolent. Answers questions and joking on exam. Follows commands  appropriately. No dysmetria, dysdiadochokinesia, peripheral vision deficits. Negative pronator drift. No focal weakness or sensory deficit to extremities   Skin: Skin is warm and dry. No rash noted. No erythema.       ED Course   Procedures  Labs Reviewed   CBC W/ AUTO DIFFERENTIAL - Abnormal; Notable for the following components:       Result Value    WBC 16.91 (*)     MCV 81 (*)     MCH 26.7 (*)     Gran # (ANC) 12.7 (*)     Immature Grans (Abs) 0.06 (*)     Mono # 1.2 (*)     Gran % 74.8 (*)     Lymph % 17.4 (*)     All other components within normal limits   COMPREHENSIVE METABOLIC PANEL - Abnormal; Notable for the following components:    CO2 21 (*)     Total Bilirubin 1.3 (*)     All other components within normal limits   URINALYSIS, REFLEX TO URINE CULTURE - Abnormal; Notable for the following components:    Ketones, UA 1+ (*)     Leukocytes, UA 2+ (*)     All other components within normal limits    Narrative:     Specimen Source->Urine   URINALYSIS MICROSCOPIC - Abnormal; Notable for the following components:    WBC, UA 34 (*)     All other components within normal limits    Narrative:     Specimen Source->Urine   CULTURE, URINE   HIV 1 / 2 ANTIBODY    Narrative:     Release to patient->Immediate   HEPATITIS C ANTIBODY    Narrative:     Release to patient->Immediate   CK   D DIMER, QUANTITATIVE   MAGNESIUM   TROPONIN I   DRUG SCREEN PANEL, URINE EMERGENCY    Narrative:     Specimen Source->Urine   ALCOHOL,MEDICAL (ETHANOL)   TROPONIN I    Narrative:     2nd trop at 6pm   TROPONIN I   POCT URINE PREGNANCY     EKG Readings: (Independently Interpreted)   NSR 83 bpm. Normal axis. Normal T waves. No STEMI       Imaging Results              X-Ray Chest AP Portable (Final result)  Result time 03/30/24 16:39:14      Final result by Brett Koo MD (03/30/24 16:39:14)                   Impression:      1. Interstitial findings are accentuated by habitus, no large focal consolidation.      Electronically  signed by: Brett Koo MD  Date:    03/30/2024  Time:    16:39               Narrative:    EXAMINATION:  XR CHEST AP PORTABLE    CLINICAL HISTORY:  Syncope and collapse    TECHNIQUE:  Single frontal view of the chest was performed.    COMPARISON:  12/21/2023    FINDINGS:  The cardiomediastinal silhouette is prominent, magnified by technique, stable..  There is no pleural effusion.  The trachea is midline.  The lungs are symmetrically expanded bilaterally with coarse interstitial attenuation, accentuated by habitus..  No large focal consolidation seen.  There is no pneumothorax.  The osseous structures are unremarkable.                                       Medications   lactated ringers bolus 1,000 mL (0 mLs Intravenous Stopped 3/30/24 1604)   ondansetron injection 4 mg (4 mg Intravenous Given 3/30/24 1513)   aluminum-magnesium hydroxide-simethicone 200-200-20 mg/5 mL suspension 30 mL (30 mLs Oral Given 3/30/24 1549)   lactated ringers bolus 1,000 mL (0 mLs Intravenous Stopped 3/30/24 1718)   acetaminophen tablet 1,000 mg (1,000 mg Oral Given 3/30/24 1945)     Medical Decision Making  23 y.o. female with medical history of seizures, obesity presenting to the ED via EMS after witnessed syncopal episode this afternoon after walking the 10k [employer] classic this morning. Felt chest tightness and palpitations prior to the episode.    DDx includes but not limited to cardiac arrhythmia, dehydration, rhabdomyolysis, ACS, pulmonary embolism, pneumonia, electrolyte disturbance, ETOH/substance use. Clinical presentation is less concerning for seizure-like activity.     Amount and/or Complexity of Data Reviewed  Labs: ordered. Decision-making details documented in ED Course.  Radiology: ordered and independent interpretation performed.  ECG/medicine tests: ordered and independent interpretation performed.    Risk  OTC drugs.  Prescription drug management.               ED Course as of 03/30/24 2130   Sat Mar 30, 2024    1409 EKG with normal sinus rhythm, rate 83, no STEMI [NN]   1535 Repeat EKG with normal sinus rhythm, rate 82, no STEMI [NN]   1538 Patient reported chest discomfort after going to [facility]. Repeat ECG without significant findings. Initial Trop WNL and D-dimer negative. She has been experiencing some belching. Will give Maalox right now. Will trend troponin. IVF being administered.  [BA]   1539 Alcohol, Serum: <10 [BA]   1540 Troponin I: 0.013 [BA]   2129 WBC elevated 16k which I suspect is reactionary to her recent race. Afebrile and non-toxic appearing. UA with elevated WBC, but she denies any urinary symptoms and will hold ABx at this time. Trop trended x2 and negative. ETOH and UDS negative. CPK normal. D-dimer negative. CXR with no focal consolidations. Patient feeling much better after 2L IVF and requesting to go home which I feel is appropriate at this time. Somnolence improved and ambulating around independently. Advised importance of follow-up with her Cardiologist. Patient and family express understanding and agreeable to the plan. Patient expresses understanding and agreeable to the plan. Return to ED precautions given for new, worsening, or concerning symptoms. I have discussed the care of this patient with my supervising physician.  [BA]      ED Course User Index  [BA] Stanley Lopez PA-C  [NN] Diana Leslie MD                             Clinical Impression:  Final diagnoses:  [R07.9] Chest pain  [R55] Syncope (Primary)          ED Disposition Condition    Discharge Stable          ED Prescriptions    None       Follow-up Information       Follow up With Specialties Details Why Contact Info Additional Information    [facility] - 3rd Fl Cardiology   [address]  [facility] [address] [phone] Cardiology Services Clinics - 3rd floor             Stanley Lopez PA-C  03/30/24 0806

## 2024-03-31 LAB
BACTERIA UR CULT: NORMAL
BACTERIA UR CULT: NORMAL
OHS QRS DURATION: 90 MS
OHS QRS DURATION: 92 MS
OHS QTC CALCULATION: 420 MS
OHS QTC CALCULATION: 432 MS

## 2024-03-31 NOTE — DISCHARGE INSTRUCTIONS
Follow-up with your primary care provider for further evaluation.    Hydrate by drinking 8-10 glasses of water per day.     Return to the emergency room for new, worsening, or concerning symptoms.

## 2024-04-17 ENCOUNTER — HOSPITAL ENCOUNTER (EMERGENCY)
Facility: HOSPITAL | Age: 24
Discharge: HOME | End: 2024-04-17
Attending: EMERGENCY MEDICINE
Payer: MEDICAID

## 2024-04-17 VITALS
HEIGHT: 62 IN | DIASTOLIC BLOOD PRESSURE: 69 MMHG | RESPIRATION RATE: 18 BRPM | BODY MASS INDEX: 45.08 KG/M2 | TEMPERATURE: 98 F | SYSTOLIC BLOOD PRESSURE: 106 MMHG | HEART RATE: 72 BPM | OXYGEN SATURATION: 100 % | WEIGHT: 245 LBS

## 2024-04-17 DIAGNOSIS — R07.9 CHEST PAIN: ICD-10-CM

## 2024-04-17 DIAGNOSIS — R55 SYNCOPE, UNSPECIFIED SYNCOPE TYPE: Primary | ICD-10-CM

## 2024-04-17 LAB
ALBUMIN SERPL BCP-MCNC: 4.3 G/DL (ref 3.5–5.2)
ALP SERPL-CCNC: 91 U/L (ref 55–135)
ALT SERPL W/O P-5'-P-CCNC: 40 U/L (ref 10–44)
ANION GAP SERPL CALC-SCNC: 11 MMOL/L (ref 8–16)
AST SERPL-CCNC: 22 U/L (ref 10–40)
B-HCG UR QL: NEGATIVE
BASOPHILS # BLD AUTO: 0.02 K/UL (ref 0–0.2)
BASOPHILS NFR BLD: 0.3 % (ref 0–1.9)
BILIRUB SERPL-MCNC: 1.5 MG/DL (ref 0.1–1)
BILIRUB UR QL STRIP: NEGATIVE
BUN SERPL-MCNC: 9 MG/DL (ref 6–20)
CALCIUM SERPL-MCNC: 9.9 MG/DL (ref 8.7–10.5)
CHLORIDE SERPL-SCNC: 104 MMOL/L (ref 95–110)
CLARITY UR: CLEAR
CO2 SERPL-SCNC: 25 MMOL/L (ref 23–29)
COLOR UR: COLORLESS
CREAT SERPL-MCNC: 0.9 MG/DL (ref 0.5–1.4)
CTP QC/QA: YES
DIFFERENTIAL METHOD BLD: ABNORMAL
EOSINOPHIL # BLD AUTO: 0 K/UL (ref 0–0.5)
EOSINOPHIL NFR BLD: 0.5 % (ref 0–8)
ERYTHROCYTE [DISTWIDTH] IN BLOOD BY AUTOMATED COUNT: 13.5 % (ref 11.5–14.5)
EST. GFR  (NO RACE VARIABLE): >60 ML/MIN/1.73 M^2
GLUCOSE SERPL-MCNC: 89 MG/DL (ref 70–110)
GLUCOSE UR QL STRIP: NEGATIVE
HCT VFR BLD AUTO: 43.9 % (ref 37–48.5)
HGB BLD-MCNC: 14.6 G/DL (ref 12–16)
HGB UR QL STRIP: NEGATIVE
IMM GRANULOCYTES # BLD AUTO: 0.03 K/UL (ref 0–0.04)
IMM GRANULOCYTES NFR BLD AUTO: 0.4 % (ref 0–0.5)
KETONES UR QL STRIP: NEGATIVE
LEUKOCYTE ESTERASE UR QL STRIP: ABNORMAL
LYMPHOCYTES # BLD AUTO: 1.3 K/UL (ref 1–4.8)
LYMPHOCYTES NFR BLD: 16.8 % (ref 18–48)
MCH RBC QN AUTO: 27.2 PG (ref 27–31)
MCHC RBC AUTO-ENTMCNC: 33.3 G/DL (ref 32–36)
MCV RBC AUTO: 82 FL (ref 82–98)
MICROSCOPIC COMMENT: ABNORMAL
MONOCYTES # BLD AUTO: 0.8 K/UL (ref 0.3–1)
MONOCYTES NFR BLD: 10 % (ref 4–15)
NEUTROPHILS # BLD AUTO: 5.7 K/UL (ref 1.8–7.7)
NEUTROPHILS NFR BLD: 72 % (ref 38–73)
NITRITE UR QL STRIP: NEGATIVE
NRBC BLD-RTO: 0 /100 WBC
PH UR STRIP: 7 [PH] (ref 5–8)
PLATELET # BLD AUTO: 292 K/UL (ref 150–450)
PMV BLD AUTO: 9.8 FL (ref 9.2–12.9)
POC MOLECULAR INFLUENZA A AGN: NEGATIVE
POC MOLECULAR INFLUENZA B AGN: NEGATIVE
POTASSIUM SERPL-SCNC: 3.6 MMOL/L (ref 3.5–5.1)
PROT SERPL-MCNC: 8.2 G/DL (ref 6–8.4)
PROT UR QL STRIP: NEGATIVE
RBC # BLD AUTO: 5.36 M/UL (ref 4–5.4)
RBC #/AREA URNS HPF: 0 /HPF (ref 0–4)
SARS-COV-2 RDRP RESP QL NAA+PROBE: NEGATIVE
SODIUM SERPL-SCNC: 140 MMOL/L (ref 136–145)
SP GR UR STRIP: 1.01 (ref 1–1.03)
SQUAMOUS #/AREA URNS HPF: 0 /HPF
TROPONIN I SERPL DL<=0.01 NG/ML-MCNC: <0.006 NG/ML (ref 0–0.03)
URN SPEC COLLECT METH UR: ABNORMAL
UROBILINOGEN UR STRIP-ACNC: NEGATIVE EU/DL
WBC # BLD AUTO: 7.97 K/UL (ref 3.9–12.7)
WBC #/AREA URNS HPF: 7 /HPF (ref 0–5)

## 2024-04-17 PROCEDURE — 87635 SARS-COV-2 COVID-19 AMP PRB: CPT | Performed by: EMERGENCY MEDICINE

## 2024-04-17 PROCEDURE — 25000003 PHARM REV CODE 250: Performed by: EMERGENCY MEDICINE

## 2024-04-17 PROCEDURE — 96374 THER/PROPH/DIAG INJ IV PUSH: CPT

## 2024-04-17 PROCEDURE — 81025 URINE PREGNANCY TEST: CPT | Performed by: EMERGENCY MEDICINE

## 2024-04-17 PROCEDURE — 87502 INFLUENZA DNA AMP PROBE: CPT

## 2024-04-17 PROCEDURE — 81000 URINALYSIS NONAUTO W/SCOPE: CPT | Performed by: EMERGENCY MEDICINE

## 2024-04-17 PROCEDURE — 96361 HYDRATE IV INFUSION ADD-ON: CPT

## 2024-04-17 PROCEDURE — 84484 ASSAY OF TROPONIN QUANT: CPT | Performed by: EMERGENCY MEDICINE

## 2024-04-17 PROCEDURE — 93005 ELECTROCARDIOGRAM TRACING: CPT

## 2024-04-17 PROCEDURE — 99285 EMERGENCY DEPT VISIT HI MDM: CPT | Mod: 25

## 2024-04-17 PROCEDURE — 96375 TX/PRO/DX INJ NEW DRUG ADDON: CPT

## 2024-04-17 PROCEDURE — 85025 COMPLETE CBC W/AUTO DIFF WBC: CPT | Performed by: EMERGENCY MEDICINE

## 2024-04-17 PROCEDURE — 80053 COMPREHEN METABOLIC PANEL: CPT | Performed by: EMERGENCY MEDICINE

## 2024-04-17 PROCEDURE — 63600175 PHARM REV CODE 636 W HCPCS: Performed by: EMERGENCY MEDICINE

## 2024-04-17 PROCEDURE — 93010 ELECTROCARDIOGRAM REPORT: CPT | Mod: ,,, | Performed by: INTERNAL MEDICINE

## 2024-04-17 RX ORDER — KETOROLAC TROMETHAMINE 30 MG/ML
15 INJECTION, SOLUTION INTRAMUSCULAR; INTRAVENOUS
Status: COMPLETED | OUTPATIENT
Start: 2024-04-17 | End: 2024-04-17

## 2024-04-17 RX ORDER — DIPHENHYDRAMINE HYDROCHLORIDE 50 MG/ML
25 INJECTION INTRAMUSCULAR; INTRAVENOUS
Status: COMPLETED | OUTPATIENT
Start: 2024-04-17 | End: 2024-04-17

## 2024-04-17 RX ORDER — ONDANSETRON HYDROCHLORIDE 2 MG/ML
4 INJECTION, SOLUTION INTRAVENOUS
Status: COMPLETED | OUTPATIENT
Start: 2024-04-17 | End: 2024-04-17

## 2024-04-17 RX ADMIN — ONDANSETRON 4 MG: 2 INJECTION INTRAMUSCULAR; INTRAVENOUS at 02:04

## 2024-04-17 RX ADMIN — DIPHENHYDRAMINE HYDROCHLORIDE 25 MG: 50 INJECTION INTRAMUSCULAR; INTRAVENOUS at 02:04

## 2024-04-17 RX ADMIN — KETOROLAC TROMETHAMINE 15 MG: 30 INJECTION, SOLUTION INTRAMUSCULAR; INTRAVENOUS at 02:04

## 2024-04-17 RX ADMIN — SODIUM CHLORIDE 1000 ML: 9 INJECTION, SOLUTION INTRAVENOUS at 02:04

## 2024-04-17 NOTE — ED PROVIDER NOTES
Encounter Date: 4/17/2024       History     Chief Complaint   Patient presents with    Syncope     Patient states she suddenly felt dizzy and passed out. EMS reports patient initially appeared to be dazed and verbally unresponsive but then ambulated to stretcher. On arrival, awake, alert. C/o CP and generalized weakness. No recent illness. + sun exposure yesterday with mild sunburn noted to face.     The patient is a 23-year-old female who came to the emergency department after having a syncopal episode.  She was running errands with her significant other.  She was standing up and started feeling dizzy then had a syncopal episode.  She went fishing yesterday and got sunburned.  She works all night long and sleeps during the day.  She has not been asleep today after working all night.      Review of patient's allergies indicates:  No Known Allergies  Past Medical History:   Diagnosis Date    Anxiety     reports panic attacks    Depression     Fibromyalgia     per pt    Insomnia     Precocious female puberty     Seizures     reports 2 incidents of possible seizures while giving blood    Syncope and collapse     Wrist fracture, bilateral      Past Surgical History:   Procedure Laterality Date    ARTHROSCOPY OF KNEE Right 12/19/2019    Procedure: ARTHROSCOPY, KNEE (Right);  Surgeon: Ranulfo House MD;  Location: [address];  Service: Orthopedics;  Laterality: Right;    ARTHROSCOPY OF KNEE Left 7/9/2020    Procedure: ARTHROSCOPY, KNEE;  Surgeon: Ranulfo House MD;  Location: [address];  Service: Orthopedics;  Laterality: Left;  WITH CHRONDROPLASTY     ARTHROTOMY OF KNEE Left 7/9/2020    Procedure: ARTHROTOMY, KNEE;  Surgeon: Ranulfo House MD;  Location: [address];  Service: Orthopedics;  Laterality: Left;  WITH CHRONDRAL ALLOGRAFT IMPLANTATION    chip      chip implant    CHONDROPLASTY OF KNEE Right 12/19/2019    Procedure: CHONDROPLASTY, KNEE patella;  Surgeon: Ranulfo House MD;  Location:  [facility];  Service: Orthopedics;  Laterality: Right;    FEMUR OSTEOTOMY Right 12/13/2018    Procedure: OSTEOTOMY, FEMUR - distal to correct valgus (Orthopediatrics plate, MTF bone wedge).  Right knee arthroscopy with MPFL reconstruction, gracilis allograft (Linvatec).  3 hrs.;  Surgeon: Ranulfo House MD;  Location: [facility] OR [address] [place] [address];  Service: Orthopedics;  Laterality: Right;    FEMUR OSTEOTOMY Left 7/9/2020    Procedure: OSTEOTOMY, FEMUR (Left) - distal femoral opening wedge (Orthopediatrics, MTF), knee scope, removal of tibial tubercle screws (Orthopediatrics 4.0 cannulated screws);  Surgeon: Ranulfo House MD;  Location: [facility] OR [address] [place] [address];  Service: Orthopedics;  Laterality: Left;  RQ MC GUZMAN  Gianna notified 7/8/20 HDwaynes RN    HARDWARE REMOVAL Right 12/19/2019    Procedure: REMOVAL, HARDWARE righ distal femoral.;  Surgeon: Ranulfo House MD;  [facility];  Service: Orthopedics;  Laterality: Right;    HARDWARE REMOVAL Left 7/9/2020    Procedure: REMOVAL, HARDWARE;  Surgeon: Ranulfo House MD;  [facility];  Service: Orthopedics;  Laterality: Left;  TIBIA    HEMANGIOMA EXCISION      scalp    INGUINAL HERNIA REPAIR Left     INJECTION OF STEROID Right 7/5/2019    Procedure: INJECTION, STEROID;  Surgeon: Ranulfo House MD;  Location: [address];  Service: Orthopedics;  Laterality: Right;    KNEE JOINT MANIPULATION Right 7/5/2019    Procedure: MANIPULATION, KNEE;  Surgeon: Ranulfo House MD;  Location: [address];  Service: Orthopedics;  Laterality: Right;    KNEE SURGERY Left 2/16/15    TOE SURGERY Right     5 th toe     Family History   Problem Relation Name Age of Onset    Seizures Father      Anxiety disorder Father      Depression Father      Mental illness Father      Schizophrenia Father      Deep vein thrombosis Father       Social History     Tobacco Use    Smoking status: Never    Smokeless tobacco: Never   Substance Use Topics    Alcohol use: No     Drug use: No     Review of Systems   Constitutional:  Negative for activity change, appetite change and fever.       Physical Exam     Initial Vitals [04/17/24 1236]   BP Pulse Resp Temp SpO2   (!) 141/76 82 16 97.8 °F (36.6 °C) 97 %      MAP       --         Physical Exam    Nursing note and vitals reviewed.  Constitutional: She appears well-developed and well-nourished.   HENT:   Head: Normocephalic and atraumatic.   Neck: Neck supple.   Normal range of motion.  Pulmonary/Chest: Breath sounds normal.   Abdominal: Abdomen is soft. She exhibits no distension. There is no abdominal tenderness.   Musculoskeletal:      Cervical back: Normal range of motion and neck supple.     Neurological: She is alert and oriented to person, place, and time.   lethargic   Skin: Skin is warm and dry. There is erythema (Sun-exposed areas of her face arms and legs are erythematous consistent with a first-degree burn).   Psychiatric: She has a normal mood and affect. Her behavior is normal. Judgment and thought content normal.         ED Course   Procedures  Labs Reviewed   CBC W/ AUTO DIFFERENTIAL - Abnormal; Notable for the following components:       Result Value    Lymph % 16.8 (*)     All other components within normal limits   COMPREHENSIVE METABOLIC PANEL - Abnormal; Notable for the following components:    Total Bilirubin 1.5 (*)     All other components within normal limits   URINALYSIS, REFLEX TO URINE CULTURE - Abnormal; Notable for the following components:    Color, UA Colorless (*)     Leukocytes, UA 1+ (*)     All other components within normal limits    Narrative:     Specimen Source->Urine   URINALYSIS MICROSCOPIC - Abnormal; Notable for the following components:    WBC, UA 7 (*)     All other components within normal limits    Narrative:     Specimen Source->Urine   TROPONIN I   POCT URINE PREGNANCY   SARS-COV-2 RDRP GENE   POCT INFLUENZA A/B MOLECULAR        ECG Results              EKG 12-lead (Preliminary result)   Result time 04/17/24 15:48:11      Wet Read by Yodit Frank MD (04/17/24 15:48:11, [facility] Emergency Dept, Emergency Medicine)    1546:  Normal sinus rhythm, rate 72 beats per minute.  Nonspecific ST T wave changes.                                  Imaging Results              X-Ray Chest AP Portable (Final result)  Result time 04/17/24 15:19:09      Final result by Mike Ramirez III, MD (04/17/24 15:19:09)                   Impression:      Possible cardiomegaly.      Electronically signed by: Mike Ramirez MD  Date:    04/17/2024  Time:    15:19               Narrative:    EXAMINATION:  XR CHEST AP PORTABLE    CLINICAL HISTORY:  chest pain;    FINDINGS:  There is borderline cardiomegaly.  The lungs are clear.  The bones bowel gas are noncontributory.                                       CT Head Without Contrast (Final result)  Result time 04/17/24 14:09:15      Final result by Mike Ramirez III, MD (04/17/24 14:09:15)                   Impression:      No acute process seen.      Electronically signed by: Mike Ramirez MD  Date:    04/17/2024  Time:    14:09               Narrative:    EXAMINATION:  CT HEAD WITHOUT CONTRAST    CLINICAL HISTORY:  Dizziness, persistent/recurrent, cardiac or vascular cause suspected;    FINDINGS:  No bleed, mass, mass effect seen.  The brain parenchyma is unremarkable.  No skull lesion or skull fracture seen.  No acute process seen.                                       Medications   sodium chloride 0.9% bolus 1,000 mL 1,000 mL (0 mLs Intravenous Stopped 4/17/24 1505)   ondansetron injection 4 mg (4 mg Intravenous Given 4/17/24 1406)   ketorolac injection 15 mg (15 mg Intravenous Given 4/17/24 1409)   diphenhydrAMINE injection 25 mg (25 mg Intravenous Given 4/17/24 1408)     Medical Decision Making  Differential Diagnosis includes, but is not limited to:  Arrhythmia, aortic dissection, MI/unstable angina, PE, cardiogenic shock, CHF, CVA/TIA, intracranial lesion/mass,  seizure, perforated viscous, ruptured AAA, orthostatic hypotension, vasovagal episode, anemia, dehydration, medication reaction, intentional overdose     MDM:  The patient is an otherwise healthy 23-year-old who had a syncopal episode today.  Her workup is normal.  The patient is sleeping soundly and is arousable.  She will be discharged at this time.  I recommend lots of fluids and follow-up with her primary care doctor.  The patient has had a workup in the past including seeing a cardiologist and having a 30 day Holter monitor placed with no arrhythmias.    1615:  The patient's headache is resolved and she is sleeping soundly.  Incidentally, she worked all night last night and has not slept so has been awake for almost 24 hours.    Amount and/or Complexity of Data Reviewed  Labs: ordered. Decision-making details documented in ED Course.  Radiology: ordered. Decision-making details documented in ED Course.  ECG/medicine tests: ordered. Decision-making details documented in ED Course.     Details: 1329:  Heart rate 86 beats per minute.  Normal sinus rhythm, no ST T wave changes.    Risk  Prescription drug management.               ED Course as of 04/17/24 1616 Wed Apr 17, 2024   1547 POCT COVID-19 Rapid Screening [ST]   1547 POCT Influenza A/B Molecular [ST]   1547 Troponin I [ST]   1547 Comprehensive metabolic panel(!) [ST]   1547 POCT urine pregnancy [ST]   1547 CBC auto differential(!) [ST]   1547 Urinalysis Microscopic(!) [ST]   1547 Urinalysis, Reflex to Urine Culture Urine, Clean Catch(!) [ST]   1547 X-Ray Chest AP Portable [ST]   1547 CT Head Without Contrast [ST]   1612 According to the patient's mom, the patient has had similar symptoms many times in the past and the etiology of the syncope has never been discovered. [ST]      ED Course User Index  [ST] Yodit Frank MD                             Clinical Impression:  Final diagnoses:  [R07.9] Chest pain  [R55] Syncope, unspecified syncope type  (Primary)          ED Disposition Condition    Discharge Stable          ED Prescriptions    None       Follow-up Information    None          Yodit Frank MD  04/17/24 1352

## 2024-04-21 LAB
OHS QRS DURATION: 88 MS
OHS QTC CALCULATION: 435 MS

## 2024-04-25 ENCOUNTER — HOSPITAL ENCOUNTER (EMERGENCY)
Facility: HOSPITAL | Age: 24
Discharge: HOME | End: 2024-04-25
Attending: STUDENT IN AN ORGANIZED HEALTH CARE EDUCATION/TRAINING PROGRAM
Payer: MEDICAID

## 2024-04-25 VITALS
DIASTOLIC BLOOD PRESSURE: 60 MMHG | RESPIRATION RATE: 16 BRPM | WEIGHT: 240 LBS | BODY MASS INDEX: 44.16 KG/M2 | TEMPERATURE: 99 F | OXYGEN SATURATION: 97 % | SYSTOLIC BLOOD PRESSURE: 107 MMHG | HEART RATE: 60 BPM | HEIGHT: 62 IN

## 2024-04-25 DIAGNOSIS — R07.89 CHEST WALL TENDERNESS: Primary | ICD-10-CM

## 2024-04-25 LAB
ALBUMIN SERPL BCP-MCNC: 4 G/DL (ref 3.5–5.2)
ALLENS TEST: NORMAL
ALP SERPL-CCNC: 88 U/L (ref 55–135)
ALT SERPL W/O P-5'-P-CCNC: 53 U/L (ref 10–44)
ANION GAP SERPL CALC-SCNC: 13 MMOL/L (ref 8–16)
ANION GAP SERPL CALC-SCNC: 7 MMOL/L (ref 8–16)
AST SERPL-CCNC: 28 U/L (ref 10–40)
B-HCG UR QL: NEGATIVE
BASOPHILS # BLD AUTO: 0.1 K/UL (ref 0–0.2)
BASOPHILS NFR BLD: 1.2 % (ref 0–1.9)
BILIRUB SERPL-MCNC: 1.3 MG/DL (ref 0.1–1)
BUN SERPL-MCNC: 13 MG/DL (ref 6–30)
BUN SERPL-MCNC: 14 MG/DL (ref 6–20)
CALCIUM SERPL-MCNC: 9.8 MG/DL (ref 8.7–10.5)
CHLORIDE SERPL-SCNC: 102 MMOL/L (ref 95–110)
CHLORIDE SERPL-SCNC: 106 MMOL/L (ref 95–110)
CO2 SERPL-SCNC: 25 MMOL/L (ref 23–29)
CREAT SERPL-MCNC: 1 MG/DL (ref 0.5–1.4)
CREAT SERPL-MCNC: 1 MG/DL (ref 0.5–1.4)
CTP QC/QA: YES
DELSYS: NORMAL
DIFFERENTIAL METHOD BLD: ABNORMAL
EOSINOPHIL # BLD AUTO: 0.1 K/UL (ref 0–0.5)
EOSINOPHIL NFR BLD: 0.6 % (ref 0–8)
ERYTHROCYTE [DISTWIDTH] IN BLOOD BY AUTOMATED COUNT: 13.5 % (ref 11.5–14.5)
EST. GFR  (NO RACE VARIABLE): >60 ML/MIN/1.73 M^2
FIO2: 21
GLUCOSE SERPL-MCNC: 77 MG/DL (ref 70–110)
GLUCOSE SERPL-MCNC: 81 MG/DL (ref 70–110)
HCT VFR BLD AUTO: 42 % (ref 37–48.5)
HCT VFR BLD CALC: 41 %PCV (ref 36–54)
HGB BLD-MCNC: 13.7 G/DL (ref 12–16)
IMM GRANULOCYTES # BLD AUTO: 0.05 K/UL (ref 0–0.04)
IMM GRANULOCYTES NFR BLD AUTO: 0.6 % (ref 0–0.5)
LYMPHOCYTES # BLD AUTO: 3.6 K/UL (ref 1–4.8)
LYMPHOCYTES NFR BLD: 44.1 % (ref 18–48)
MAGNESIUM SERPL-MCNC: 2 MG/DL (ref 1.6–2.6)
MCH RBC QN AUTO: 26.2 PG (ref 27–31)
MCHC RBC AUTO-ENTMCNC: 32.6 G/DL (ref 32–36)
MCV RBC AUTO: 80 FL (ref 82–98)
MODE: NORMAL
MONOCYTES # BLD AUTO: 0.9 K/UL (ref 0.3–1)
MONOCYTES NFR BLD: 10.4 % (ref 4–15)
NEUTROPHILS # BLD AUTO: 3.6 K/UL (ref 1.8–7.7)
NEUTROPHILS NFR BLD: 43.1 % (ref 38–73)
NRBC BLD-RTO: 0 /100 WBC
OHS QRS DURATION: 84 MS
OHS QTC CALCULATION: 411 MS
PLATELET # BLD AUTO: 224 K/UL (ref 150–450)
PMV BLD AUTO: 9.5 FL (ref 9.2–12.9)
POC IONIZED CALCIUM: 1.22 MMOL/L (ref 1.06–1.42)
POC TCO2 (MEASURED): 27 MMOL/L (ref 23–29)
POTASSIUM BLD-SCNC: 3.6 MMOL/L (ref 3.5–5.1)
POTASSIUM SERPL-SCNC: 3.7 MMOL/L (ref 3.5–5.1)
PROT SERPL-MCNC: 7.7 G/DL (ref 6–8.4)
RBC # BLD AUTO: 5.23 M/UL (ref 4–5.4)
SAMPLE: NORMAL
SITE: NORMAL
SODIUM BLD-SCNC: 138 MMOL/L (ref 136–145)
SODIUM SERPL-SCNC: 138 MMOL/L (ref 136–145)
SP02: 98
WBC # BLD AUTO: 8.25 K/UL (ref 3.9–12.7)

## 2024-04-25 PROCEDURE — 85025 COMPLETE CBC W/AUTO DIFF WBC: CPT | Performed by: STUDENT IN AN ORGANIZED HEALTH CARE EDUCATION/TRAINING PROGRAM

## 2024-04-25 PROCEDURE — 82565 ASSAY OF CREATININE: CPT | Mod: 91

## 2024-04-25 PROCEDURE — 82330 ASSAY OF CALCIUM: CPT

## 2024-04-25 PROCEDURE — 85014 HEMATOCRIT: CPT

## 2024-04-25 PROCEDURE — 93005 ELECTROCARDIOGRAM TRACING: CPT

## 2024-04-25 PROCEDURE — 84295 ASSAY OF SERUM SODIUM: CPT | Mod: 91

## 2024-04-25 PROCEDURE — 82962 GLUCOSE BLOOD TEST: CPT

## 2024-04-25 PROCEDURE — 63600175 PHARM REV CODE 636 W HCPCS: Performed by: STUDENT IN AN ORGANIZED HEALTH CARE EDUCATION/TRAINING PROGRAM

## 2024-04-25 PROCEDURE — 25500020 PHARM REV CODE 255: Performed by: STUDENT IN AN ORGANIZED HEALTH CARE EDUCATION/TRAINING PROGRAM

## 2024-04-25 PROCEDURE — 93010 ELECTROCARDIOGRAM REPORT: CPT | Mod: ,,, | Performed by: INTERNAL MEDICINE

## 2024-04-25 PROCEDURE — 82565 ASSAY OF CREATININE: CPT

## 2024-04-25 PROCEDURE — 80053 COMPREHEN METABOLIC PANEL: CPT | Performed by: STUDENT IN AN ORGANIZED HEALTH CARE EDUCATION/TRAINING PROGRAM

## 2024-04-25 PROCEDURE — 83735 ASSAY OF MAGNESIUM: CPT | Performed by: STUDENT IN AN ORGANIZED HEALTH CARE EDUCATION/TRAINING PROGRAM

## 2024-04-25 PROCEDURE — 84132 ASSAY OF SERUM POTASSIUM: CPT | Mod: 91

## 2024-04-25 PROCEDURE — 99900035 HC TECH TIME PER 15 MIN (STAT)

## 2024-04-25 PROCEDURE — 81025 URINE PREGNANCY TEST: CPT | Performed by: STUDENT IN AN ORGANIZED HEALTH CARE EDUCATION/TRAINING PROGRAM

## 2024-04-25 PROCEDURE — 99285 EMERGENCY DEPT VISIT HI MDM: CPT | Mod: 25

## 2024-04-25 PROCEDURE — 96374 THER/PROPH/DIAG INJ IV PUSH: CPT | Mod: 59

## 2024-04-25 RX ORDER — NAPROXEN 500 MG/1
500 TABLET ORAL 2 TIMES DAILY WITH MEALS
Qty: 20 TABLET | Refills: 0 | Status: SHIPPED | OUTPATIENT
Start: 2024-04-25 | End: 2024-05-05

## 2024-04-25 RX ORDER — KETOROLAC TROMETHAMINE 30 MG/ML
30 INJECTION, SOLUTION INTRAMUSCULAR; INTRAVENOUS
Status: COMPLETED | OUTPATIENT
Start: 2024-04-25 | End: 2024-04-25

## 2024-04-25 RX ADMIN — KETOROLAC TROMETHAMINE 30 MG: 30 INJECTION, SOLUTION INTRAMUSCULAR at 05:04

## 2024-04-25 RX ADMIN — IOHEXOL 75 ML: 350 INJECTION, SOLUTION INTRAVENOUS at 05:04

## 2024-04-25 NOTE — ED NOTES
Patient provided specimen collection cup and made aware of need for urine sample. Understanding verbalized. Patient will alert nursing staff when sample able to be provided.

## 2024-04-25 NOTE — DISCHARGE INSTRUCTIONS
You were evaluated and treated in the emergency department today. You were found to have a diagnoses that can be managed well at home, however that requires your commitment to getting better.    Diagnoses specific instructions:  Take medication as prescribed.  Follow-up with primary care provider.  Return emergency room for any new or worsening symptoms.       If you received or are discharged with pain medicine or muscle relaxers, understand that they can make you sleepy or impair your judgement. Do not make important decisions, drink, drive, swim or perform any other tasks you would not otherwise perform while impaired for at least 24 hours after your last dose.      Ensure you follow up with your Primary Care Provider or any additional providers listed on this discharge sheet. While you may be healthy enough to go home today, I cannot predict the exact course of your diagnoses. It is important to remember that some problems are difficult to diagnose and may not be found during your first visit. As such, it is your responsibility to monitor symptoms, follow-up with another healthcare provider, or return to the emergency room for new or worsening concerns. Unless otherwise instructed, continue all home medications and any new medications prescribed to you in the Emergency Department.     General Maintenance for otherwise healthy adults: Ensure adequate hydration to prevent prolonged illness and recovery. This should include about 14-16 cups of water daily.  Monitor your caloric intake with a goal of obtaining and maintaining a healthy weight and BMI to help prevent the development of chronic and life-threatening medical conditions. Talk with your primary care provider about how to start healthy fitness habits and aim for a goal of 30 minutes to an hour of exercise 3-5 times a week. Avoid the use of tobacco, alcohol, and illicit drugs as these may be detrimental to your health goals.

## 2024-04-25 NOTE — ED PROVIDER NOTES
Encounter Date: 4/25/2024       History     Chief Complaint   Patient presents with    Emesis     Pt presents to the ED with c/o n/v and left anterior chest wall pain since approx 2AM while at work. 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 Denies cardiac hx. Denies OTC meds or worsening/alleviating factors. Pt not cooperative with triage process.     23-year-old female with past medical history significant for anxiety, depression, fibromyalgia presents emergency room for evaluation of acute onset chest pain.  She reports the chest pain as severe, reproducible.  It is worse with cough.  Patient has had a couple days of nonproductive cough.  This chest pain has happened twice before this month.  She was evaluated at primary care provider who was concerned for pulmonary embolism and scheduled CTA chest for later this morning at outside facility.    The history is provided by the patient. No  was used.     Review of patient's allergies indicates:  No Known Allergies  Past Medical History:   Diagnosis Date    Anxiety     reports panic attacks    Depression     Fibromyalgia     per pt    Insomnia     Precocious female puberty     Seizures     reports 2 incidents of possible seizures while giving blood    Syncope and collapse     Wrist fracture, bilateral      Past Surgical History:   Procedure Laterality Date    ARTHROSCOPY OF KNEE Right 12/19/2019    Procedure: ARTHROSCOPY, KNEE (Right);  Surgeon: Ranulfo House MD;  Location: [address];  Service: Orthopedics;  Laterality: Right;    ARTHROSCOPY OF KNEE Left 7/9/2020    Procedure: ARTHROSCOPY, KNEE;  Surgeon: Ranulfo House MD;  Location: [address];  Service: Orthopedics;  Laterality: Left;  WITH CHRONDROPLASTY     ARTHROTOMY OF KNEE Left 7/9/2020    Procedure: ARTHROTOMY, KNEE;  Surgeon: Ranulfo House MD;  Location: [address];  Service: Orthopedics;  Laterality: Left;  WITH CHRONDRAL ALLOGRAFT IMPLANTATION    chip       chip implant    CHONDROPLASTY OF KNEE Right 12/19/2019    Procedure: CHONDROPLASTY, KNEE patella;  Surgeon: Ranulfo House MD;  Location: [address];  Service: Orthopedics;  Laterality: Right;    FEMUR OSTEOTOMY Right 12/13/2018    Procedure: OSTEOTOMY, FEMUR - distal to correct valgus (Orthopediatrics plate, MTF bone wedge).  Right knee arthroscopy with MPFL reconstruction, gracilis allograft (Linvatec).  3 hrs.;  Surgeon: Ranulfo House MD;  Location: [address];  Service: Orthopedics;  Laterality: Right;    FEMUR OSTEOTOMY Left 7/9/2020    Procedure: OSTEOTOMY, FEMUR (Left) - distal femoral opening wedge (Orthopediatrics, MTF), knee scope, removal of tibial tubercle screws (Orthopediatrics 4.0 cannulated screws);  Surgeon: Ranulfo House MD;  Location: [facility] OR [address] [place] [address];  Service: Orthopedics;  Laterality: Left;  RQ Gianna DAVIS notified 7/8/20 Onelia RN    HARDWARE REMOVAL Right 12/19/2019    Procedure: REMOVAL, HARDWARE righ distal femoral.;  Surgeon: Ranulfo House MD;  Location: [address];  Service: Orthopedics;  Laterality: Right;    HARDWARE REMOVAL Left 7/9/2020    Procedure: REMOVAL, HARDWARE;  Surgeon: Ranulfo House MD;  Location: [address];  Service: Orthopedics;  Laterality: Left;  TIBIA    HEMANGIOMA EXCISION      scalp    INGUINAL HERNIA REPAIR Left     INJECTION OF STEROID Right 7/5/2019    Procedure: INJECTION, STEROID;  Surgeon: Ranulfo House MD;  Location: [address];  Service: Orthopedics;  Laterality: Right;    KNEE JOINT MANIPULATION Right 7/5/2019    Procedure: MANIPULATION, KNEE;  Surgeon: Ranulfo House MD;  Location: [address];  Service: Orthopedics;  Laterality: Right;    KNEE SURGERY Left 2/16/15    TOE SURGERY Right     5 th toe     Family History   Problem Relation Name Age of Onset    Seizures Father      Anxiety disorder Father      Depression Father      Mental illness Father      Schizophrenia Father      Deep vein thrombosis  Father       Social History     Tobacco Use    Smoking status: Never    Smokeless tobacco: Never   Substance Use Topics    Alcohol use: No    Drug use: No     Review of Systems   Constitutional:  Negative for chills, fatigue and fever.   HENT:  Negative for congestion, hearing loss, sore throat and trouble swallowing.    Eyes:  Negative for visual disturbance.   Respiratory:  Positive for cough. Negative for chest tightness and shortness of breath.    Cardiovascular:  Positive for chest pain.   Gastrointestinal:  Negative for abdominal pain and nausea.   Endocrine: Negative for polyuria.   Genitourinary:  Negative for difficulty urinating.   Musculoskeletal:  Negative for arthralgias and myalgias.   Skin:  Negative for rash.   Neurological:  Negative for dizziness and headaches.   Psychiatric/Behavioral:  The patient is not nervous/anxious.        Physical Exam     Initial Vitals [04/25/24 0328]   BP Pulse Resp Temp SpO2   129/64 95 (!) 22 98.5 °F (36.9 °C) 96 %      MAP       --         Physical Exam    Nursing note and vitals reviewed.  Constitutional: She appears well-developed and well-nourished.   Lying down in bed, moaning   HENT:   Head: Normocephalic and atraumatic.   Eyes: Conjunctivae are normal. Pupils are equal, round, and reactive to light.   Neck: Neck supple.   Normal range of motion.  Cardiovascular:  Normal rate, regular rhythm, normal heart sounds and intact distal pulses.           Pulmonary/Chest: Breath sounds normal. No respiratory distress. She has no wheezes. She has no rhonchi. She has no rales. She exhibits tenderness.   Reproducible anterior chest wall tenderness.   Musculoskeletal:         General: Normal range of motion.      Cervical back: Normal range of motion and neck supple.     Neurological: She is alert and oriented to person, place, and time. GCS score is 15. GCS eye subscore is 4. GCS verbal subscore is 5. GCS motor subscore is 6.   Skin: Skin is warm and dry. Capillary refill  takes less than 2 seconds.   Psychiatric: She has a normal mood and affect. Her behavior is normal. Judgment and thought content normal.         ED Course   Procedures  Labs Reviewed   CBC W/ AUTO DIFFERENTIAL - Abnormal; Notable for the following components:       Result Value    MCV 80 (*)     MCH 26.2 (*)     Immature Granulocytes 0.6 (*)     Immature Grans (Abs) 0.05 (*)     All other components within normal limits   COMPREHENSIVE METABOLIC PANEL - Abnormal; Notable for the following components:    Total Bilirubin 1.3 (*)     ALT 53 (*)     Anion Gap 7 (*)     All other components within normal limits   POCT URINE PREGNANCY - Normal   MAGNESIUM   ISTAT PROCEDURE        ECG Results              EKG 12-lead (Final result)        Collection Time Result Time QRS Duration OHS QTC Calculation    04/25/24 03:31:17 04/25/24 16:09:21 84 411                     Final result by Interface, Lab In [facility] (04/25/24 16:09:26)                   Narrative:    Test Reason : R07.9,    Vent. Rate : 087 BPM     Atrial Rate : 087 BPM     P-R Int : 158 ms          QRS Dur : 084 ms      QT Int : 342 ms       P-R-T Axes : 069 065 049 degrees     QTc Int : 411 ms    Normal sinus rhythm  Low voltage QRS  Borderline Abnormal ECG  When compared with ECG of 17-APR-2024 13:29,  No significant change was found  Confirmed by Jose Best MD ([id number]) on 4/25/2024 4:09:20 PM    Referred By: [email or web address]   SELF           Confirmed By:Jose Best MD                                     EKG 12-lead (Final result)  Result time 04/25/24 15:44:50      Final result by Unknown User (04/25/24 15:44:50)                                      Imaging Results              CTA Chest Non-Coronary (PE Studies) (Final result)  Result time 04/25/24 05:41:01      Final result by Speedy Daniel MD (04/25/24 05:41:01)                   Impression:      No acute pulmonary thromboemboli.    Mild cardiomegaly, similar to prior.  Thyroid enlargement, similar to  prior.        Electronically signed by: Speedy Daniel MD  Date:    04/25/2024  Time:    05:41               Narrative:    EXAMINATION:  CTA CHEST NON CORONARY (PE STUDIES)    CLINICAL HISTORY:  Pulmonary embolism (PE) suspected, unknown D-dimer;    TECHNIQUE:  Low dose axial images, sagittal and coronal reformations were obtained from the thoracic inlet to the lung bases following the IV administration of 75 mL of Omnipaque 350.  Contrast timing was optimized to evaluate the pulmonary arteries.  MIP images were performed.    COMPARISON:  04/16/2019.    FINDINGS:  Thyroid enlargement, similar to prior.  Good contrast bolus opacification of the pulmonary arteries. No acute pulmonary thromboembolism. No hilar or mediastinal mass or lymphadenopathy. No pleural or pericardial effusion. Underinflated lungs with hypoventilatory change.  No lobar consolidation.  No pneumothorax or pleural effusion.  Mild cardiomegaly, similar to prior.  Limited images through the upper abdomen are unremarkable.                                       Medications   ketorolac injection 30 mg (30 mg Intravenous Given 4/25/24 0500)   iohexoL (OMNIPAQUE 350) injection 75 mL (75 mLs Intravenous Given 4/25/24 0526)     Medical Decision Making  23-year-old female presents emergency room for evaluation 3rd episode this month chest pain.  Patient is nontoxic and well-appearing, cardiopulmonary exam unremarkable.  She has reproducible chest wall pain.  No cardiac risk factors.  Outpatient provider originally planned to perform CT PE rule out pulmonary embolism.  Given this, elected to proceed with imaging today.  EKG nonischemic, doubt ACS.  CT PE without evidence of pulmonary embolus, pneumonia, pneumothorax.  Labs unremarkable, no leukocytosis, anemia and normal platelets.  No significant metabolic abnormality.  Normal hepatic and renal function.  Normal magnesium.  Patient was given Toradol in the emergency room with significant improvement pain.   On reassessment she has no pain.  Discussed at length family that this likely represents noncardiac chest pain, costochondritis versus myofascial strain.  Will placed on anti-inflammatory medicine and encouraged follow-up with primary care provider.    Given patient presentation and workup, doubt emergent or surgical pathology necessitating consultation or admission from the emergency department.  I discussed the findings with the patient.  I discussed strict and strong return precautions. They will be discharged home in stable condition.      Amount and/or Complexity of Data Reviewed  Labs: ordered. Decision-making details documented in ED Course.  Radiology: ordered. Decision-making details documented in ED Course.  ECG/medicine tests:  Decision-making details documented in ED Course.    Risk  Prescription drug management.               ED Course as of 04/25/24 2051   Thu Apr 25, 2024   0337 BP: 129/64 [AN]   0337 Temp: 98.5 °F (36.9 °C) [AN]   0337 Pulse: 95 [AN]   0337 Resp(!): 22 [AN]   0337 SpO2: 96 % [AN]   0345 EKG 12-lead  EKG independently interpreted by me.  Demonstrates sinus rhythm rate 86 beats per minute.  Normal axis, normal intervals.  No acute ST elevation, depression or T-wave inversion to suggest ischemia.  No STEMI. [AN]   0503 hCG Qualitative, Urine: Negative [AN]   0552 CTA Chest Non-Coronary (PE Studies)    No acute pulmonary thromboemboli.     Mild cardiomegaly, similar to prior.  Thyroid enlargement, similar to prior.   [AN]   0555 CBC auto differential(!)  No leukocytosis.  No anemia.  Normal platelets. [AN]   0555 Comprehensive metabolic panel(!)  No significant metabolic abnormality.  Noted hyperbilirubinemia stable as compared to prior. [AN]   0555 Magnesium : 2.0 [AN]      ED Course User Index  [AN] Dilshad Carlos PA-C                           Clinical Impression:  Final diagnoses:  [R07.89] Chest wall tenderness (Primary)          ED Disposition Condition    Discharge Stable           ED Prescriptions       Medication Sig Dispense Start Date End Date Auth. Provider    naproxen (NAPROSYN) 500 MG tablet Take 1 tablet (500 mg total) by mouth 2 (two) times daily with meals. for 10 days 20 tablet 4/25/2024 5/5/2024 Dilshad Carlos PA-C          Follow-up Information       Follow up With Specialties Details Why Contact Info    Farzana Marcelo MD Family Medicine Schedule an appointment as soon as possible for a visit in 1 week  [address] [facility] [address]  [phone]      [facility] Emergency Dept Emergency Medicine Go to  As needed, If symptoms worsen [address] [facility] [place] [address] [phone]             Dilshad Carlos PA-C  04/25/24 2051

## 2024-08-13 DIAGNOSIS — G89.29 CHRONIC PAIN OF LEFT KNEE: Primary | ICD-10-CM

## 2024-08-13 DIAGNOSIS — M25.562 CHRONIC PAIN OF LEFT KNEE: Primary | ICD-10-CM

## 2024-08-15 ENCOUNTER — OFFICE VISIT (OUTPATIENT)
Dept: ORTHOPEDICS | Facility: CLINIC | Age: 24
End: 2024-08-15
Payer: MEDICAID

## 2024-08-15 VITALS — HEIGHT: 62 IN | WEIGHT: 234.56 LBS | BODY MASS INDEX: 43.16 KG/M2

## 2024-08-15 DIAGNOSIS — M17.12 ARTHROPATHY OF LEFT KNEE: ICD-10-CM

## 2024-08-15 PROCEDURE — 99999 PR PBB SHADOW E&M-EST. PATIENT-LVL III: CPT | Mod: PBBFAC,,, | Performed by: SURGERY

## 2024-08-15 PROCEDURE — 1159F MED LIST DOCD IN RCRD: CPT | Mod: CPTII,,, | Performed by: SURGERY

## 2024-08-15 PROCEDURE — 3008F BODY MASS INDEX DOCD: CPT | Mod: CPTII,,, | Performed by: SURGERY

## 2024-08-15 PROCEDURE — 99213 OFFICE O/P EST LOW 20 MIN: CPT | Mod: PBBFAC,PN | Performed by: SURGERY

## 2024-08-15 PROCEDURE — 99213 OFFICE O/P EST LOW 20 MIN: CPT | Mod: S$PBB,,, | Performed by: SURGERY

## 2024-08-15 NOTE — PROGRESS NOTES
"SUBJECTIVE:    Ms. Mosher is here today for a follow up visit.  She has had multiple previous surgeries to the left femur.  She is interested in revision cartilage procedure.  She is looking for a surgeon who does this procedure for her specific condition.  She is also has questions as to whether she should remove or revise her osteotomy hardware.        OBJECTIVE:      Vitals:    08/15/24 0918   Weight: 106.4 kg (234 lb 9.1 oz)   Height: 5' 2" (1.575 m)   PainSc:   6       Lower Extremity Exam  Alert, oriented   Left knee with crepitus, full range of motion, well healed incisions, tenderness to palpation, pain with flexion, neurovascularly intact     DIAGNOSTIC STUDIES:  X-rays of the left knee three views independently interpreted by me.  Joint space narrowing distal femoral osteotomy hardware in place.  Broken screw in bone in the tibia.    ASSESSMENT:   1. Status post knee procedures as above with joint space narrowing bilateral knees      PLAN:  Denise Quezada" was seen today for pain.    Diagnoses and all orders for this visit:    Arthropathy of left knee  -     Ambulatory referral/consult to Orthopedics        Unfortunately, I do not do this surgery in a revision setting which may require an osteotomy.  I gave her the names of some potential surgeons who may perform this procedure and advised her to call their office to potentially establish care.  She is welcome to follow up with them.  All questions answered.    Kyler Brown MD  [facility]  Orthopedic Surgery      This note was done with voice recognition software. Please excuse any errors missed in proof reading.      "

## 2024-08-27 ENCOUNTER — TELEPHONE (OUTPATIENT)
Dept: SPORTS MEDICINE | Facility: CLINIC | Age: 24
End: 2024-08-27
Payer: MEDICAID

## 2024-08-27 NOTE — TELEPHONE ENCOUNTER
Called patient regarding message below. Patient should f/u with Dr. Souza staff for scheduling to discuss Procedures/Procedural Planning (CAM). ALANNA

## 2024-08-27 NOTE — TELEPHONE ENCOUNTER
----- Message from Barbie Goodrich sent at 8/27/2024  3:06 PM CDT -----  Type:  Sooner Apoointment Request    Caller is requesting a sooner appointment.  Caller declined first available appointment listed below.  Caller will not accept being placed on the waitlist and is requesting a message be sent to doctor.    Name of Caller:pt  When is the first available appointment?na  Symptoms:cartilage graft  Would the patient rather a call back or a response via [facility]? call  Best Call Back Number: [phone]

## 2024-10-19 ENCOUNTER — OFFICE VISIT (OUTPATIENT)
Dept: URGENT CARE | Facility: CLINIC | Age: 24
End: 2024-10-19
Payer: MEDICAID

## 2024-10-19 VITALS
SYSTOLIC BLOOD PRESSURE: 102 MMHG | BODY MASS INDEX: 43.06 KG/M2 | HEART RATE: 60 BPM | WEIGHT: 234 LBS | TEMPERATURE: 98 F | OXYGEN SATURATION: 96 % | HEIGHT: 62 IN | RESPIRATION RATE: 18 BRPM | DIASTOLIC BLOOD PRESSURE: 66 MMHG

## 2024-10-19 DIAGNOSIS — B34.9 VIRAL ILLNESS: Primary | ICD-10-CM

## 2024-10-19 DIAGNOSIS — R11.0 NAUSEA: ICD-10-CM

## 2024-10-19 LAB
B-HCG UR QL: NEGATIVE
CTP QC/QA: YES

## 2024-10-19 PROCEDURE — 99213 OFFICE O/P EST LOW 20 MIN: CPT | Mod: S$GLB,,, | Performed by: FAMILY MEDICINE

## 2024-10-19 PROCEDURE — 81025 URINE PREGNANCY TEST: CPT | Mod: S$GLB,,, | Performed by: FAMILY MEDICINE

## 2024-10-19 NOTE — PATIENT INSTRUCTIONS
General Discharge Instructions   PLEASE READ YOUR DISCHARGE INSTRUCTIONS ENTIRELY AS IT CONTAINS IMPORTANT INFORMATION.  If you were prescribed a narcotic or controlled medication, do not drive or operate heavy equipment or machinery while taking these medications.  If you were prescribed antibiotics, please take them to completion.  You must understand that you've received an Urgent Care treatment only and that you may be released before all your medical problems are known or treated. You, the patient, will arrange for follow up care as instructed.    OVER THE COUNTER RECOMMENDATIONS/SUGGESTIONS.    Make sure to stay well hydrated.    Use Nasal Saline to mechanically move any post nasal drip from your eustachian tube or from the back of your throat.    Use warm salt water gargles to ease your throat pain. Warm salt water gargles as needed for sore throat- 1/2 tsp salt to 1 cup warm water, gargle as desired.    Use an antihistamine such as Claritin, Zyrtec or Allegra to dry you out.    Use pseudoephedrine (behind the counter) to decongest. Pseudoephedrine 30 mg up to 240 mg /day. It can raise your blood pressure and give you palpitations.    Use mucinex (guaifenesin) to break up mucous up to 2400mg/day to loosen any mucous.    The mucinex DM pill has a cough suppressant that can be sedating. It can be used at night to stop the tickle at the back of your throat.    You can use Mucinex D (it has guaifenesin and a high dose of pseudoephedrine) in the mornings to help decongest.    Use Afrin in each nare for no longer than 3 days, as it is addictive. It can also dry out your mucous membranes and cause elevated blood pressure. This is especially useful if you are flying.    Use Flonase 1-2 sprays/nostril per day. It is a local acting steroid nasal spray, if you develop a bloody nose, stop using the medication immediately.    Sometimes Nyquil at night is beneficial to help you get some rest, however it is sedating and it  does have an antihistamine, and tylenol.    Honey is a natural cough suppressant that can be used.    Tylenol up to 4,000 mg a day is safe for short periods and can be used for body aches, pain, and fever. However in high doses and prolonged use it can cause liver irritation.    Ibuprofen is a non-steroidal anti-inflammatory that can be used for body aches, pain, and fever.However it can also cause stomach irritation if over used.     Follow up with your PCP or specialty clinic as instructed in the next 2-3 days if not improved or as needed. You can call [phone] to schedule an appointment with appropriate provider.      If you condition worsens, we recommend that you receive another evaluation at the emergency room immediately or contact your primary medical clinic's after hours call service to discuss your concerns.      Please return here or go to the Emergency Department for any concerns or worsening condition.   You can also call [phone] to schedule an appointment with the appropriate provider.    Please return here or go to the Emergency Department for any concerns or worsening of condition.    Thank you for choosing [facility]!    Our goal in the Urgent Care is to always provide outstanding medical care. You may receive a survey by mail or e-mail in the next week regarding your experience today. We would greatly appreciate you completing and returning the survey. Your feedback provides us with a way to recognize our staff who provide very good care, and it helps us learn how to improve when your experience was below our aspiration of excellence.      We appreciate you trusting us with your medical care. We hope you feel better soon. We will be happy to take care of you for all of your future medical needs.    Sincerely,    CHRIST Torres  Nausea & Vomiting  If your condition worsens or fails to improve we recommend that you receive another evaluation at the ER immediately or  contact your PCP to discuss your concerns or return here. You must understand that you've received an urgent care treatment only and that you may be released before all your medical problems are known or treated. You the patient will arrange for followup care as instructed.   Use prescribed anti-nausea medicine as needed and prescribed   Increase fluids and rest is important   Start off with liquid diet and progress as tolerated (see below)  Water and clear liquids are important so you do not get dehydrated. Drink a small amount at a time.  Do not force yourself to eat, especially if you have cramps, vomiting, or diarrhea. When you finally decide to start eating, do not eat large amounts at a time, even if you are hungry.  If you eat, avoid fatty, greasy, spicy, or fried foods.  Do not eat dairy products if you have diarrhea; they can make the diarrhea worse  Watch for any increase pain, fever, localized pain to right lower abdomen or continued vomiting or diarrhea.                                        Viral Syndrome  If your condition worsens or fails to improve we recommend that you receive another evaluation at the ER immediately or contact your PCP to discuss your concerns or return here. You must understand that you've received an urgent care treatment only and that you may be released before all your medical problems are known or treated. You the patient will arrange for follouwp care as instructed.   We discussed that your illness is viral in nature so you will treat this symptomatically.    Claritin or Zyrtec for allergy symptoms  Flonase for Nasal congestion and allergy symptoms   Tylenol or Motrin for fever, pain or sore throat  Rest and fluids are important

## 2024-10-19 NOTE — PROGRESS NOTES
"Subjective:      Patient ID: Denise Mosher is a 24 y.o. female.    Vitals:  height is 5' 2" (1.575 m) and weight is 106.1 kg (234 lb). Her oral temperature is 97.9 °F (36.6 °C). Her blood pressure is 102/66 and her pulse is 60. Her respiration is 18 and oxygen saturation is 96%.     Chief Complaint: Nausea    Pt states she has had nausea for 3 days and threw up one time today . Pt took OTC nausea meds with no relief   Provider note begins below:  Pt reports she has had nausea and a headache for about 2-3 days. She says she vomited 2x and one time today. She says she had some nausea on Thursday. She says she struggles with nausea, she has zofran and phenergan at home. She denies any possible food exposures. She is having good uop, yellow and pale yellow. She denies any abd pain. Denies any diarrhea. She is est with pcp at [facility]. She took a zofran today around 0945, and says she vomited around 1030. She denies any fever. She says she has also tried pepto bismol.     Nausea  This is a new problem. Episode onset: 3 days. The problem occurs constantly. The problem has been waxing and waning. Associated symptoms include chills, nausea and vomiting. Pertinent negatives include no abdominal pain, anorexia, arthralgias, change in bowel habit, chest pain, congestion, coughing, diaphoresis, fatigue, fever, headaches, joint swelling, myalgias, neck pain, numbness, rash, sore throat, swollen glands, urinary symptoms, vertigo, visual change or weakness. Treatments tried: Nausea meds. The treatment provided no relief.       Constitution: Positive for chills. Negative for activity change, appetite change, sweating, fatigue, fever, unexpected weight change and generalized weakness.   HENT:  Negative for congestion and sore throat.    Neck: Negative for neck pain.   Cardiovascular:  Negative for chest pain.   Respiratory:  Negative for cough.    Gastrointestinal:  Positive for nausea and vomiting. Negative for abdominal " trauma, abdominal pain, abdominal bloating, history of abdominal surgery, constipation, diarrhea, bright red blood in stool, dark colored stools, rectal bleeding, rectal pain, hemorrhoids, heartburn and bowel incontinence.   Musculoskeletal:  Negative for joint pain, joint swelling and muscle ache.   Skin:  Negative for rash.   Neurological:  Negative for history of vertigo, headaches and numbness.      Objective:     Physical Exam   Constitutional: She is oriented to person, place, and time. She appears well-developed.  Non-toxic appearance. She does not appear ill. No distress. obesity  HENT:   Head: Normocephalic and atraumatic.   Ears:   Right Ear: External ear normal.   Left Ear: External ear normal.   Nose: Nose normal.   Mouth/Throat: Oropharynx is clear and moist.   Eyes: Conjunctivae, EOM and lids are normal. Pupils are equal, round, and reactive to light.   Neck: Trachea normal and phonation normal. Neck supple.   Pulmonary/Chest: Effort normal and breath sounds normal.   Abdominal: Bowel sounds are normal. Soft. There is generalized abdominal tenderness. There is no rebound, no guarding, no tenderness at McBurney's point, negative Cevallos's sign, no left CVA tenderness, negative Rovsing's sign, negative psoas sign, no right CVA tenderness and negative obturator sign.   Musculoskeletal: Normal range of motion.         General: Normal range of motion.   Neurological: She is alert and oriented to person, place, and time.   Skin: Skin is warm, dry, intact and not diaphoretic.   Psychiatric: Her speech is normal and behavior is normal. Judgment and thought content normal.   Nursing note and vitals reviewed.      Assessment:     1. Viral illness    2. Nausea        Plan:     Patient tolerated p.o. in clinic, she has Phenergan and Zofran at home to take as needed.  Advised to can continue to hydrate, increase diet as tolerated.  Take Phenergan when she gets home.  Benign abd exam without focal tenderness, good  uop, MMM    Discussed results/diagnosis/plan with patient in clinic. Strict precautions given to patient to monitor for worsening signs and symptoms. Advised to follow up with PCP or specialist.    Explained side effects of medications prescribed with patient and informed him/her to discontinue use if he/she has any side effects and to inform UC or PCP if this occurs. All questions answered. Strict ED verses clinic return precautions stressed and given in depth. Advised if symptoms worsens of fail to improve he/she should go to the Emergency Room. Discharge and follow-up instructions given verbally/printed with the patient who expressed understanding and willingness to comply with my recommendations. Patient voiced understanding and in agreement with current treatment plan. Patient exits the exam room in no acute distress. Conversant and engaged during discharge discussion, verbalized understanding.      Viral illness    Nausea  -     POCT urine pregnancy

## 2025-03-07 ENCOUNTER — OFFICE VISIT (OUTPATIENT)
Dept: URGENT CARE | Facility: CLINIC | Age: 25
End: 2025-03-07
Payer: MEDICAID

## 2025-03-07 VITALS
TEMPERATURE: 99 F | BODY MASS INDEX: 43.06 KG/M2 | RESPIRATION RATE: 19 BRPM | HEIGHT: 62 IN | HEART RATE: 87 BPM | OXYGEN SATURATION: 98 % | SYSTOLIC BLOOD PRESSURE: 121 MMHG | WEIGHT: 234 LBS | DIASTOLIC BLOOD PRESSURE: 76 MMHG

## 2025-03-07 DIAGNOSIS — S62.639A CLOSED AVULSION FRACTURE OF DISTAL PHALANX OF FINGER, INITIAL ENCOUNTER: Primary | ICD-10-CM

## 2025-03-07 PROCEDURE — 73140 X-RAY EXAM OF FINGER(S): CPT | Mod: LT,S$GLB,, | Performed by: RADIOLOGY

## 2025-03-07 RX ORDER — IBUPROFEN 600 MG/1
600 TABLET ORAL EVERY 6 HOURS PRN
Qty: 20 TABLET | Refills: 0 | Status: SHIPPED | OUTPATIENT
Start: 2025-03-07

## 2025-03-07 NOTE — PATIENT INSTRUCTIONS
You can take ibuprofen as needed for pain.    Keep your finger in the splint for at least the next 2 weeks.    - Follow up with orthopedics within 1 week if not improved.  You can call [phone] to schedule an appointment with the appropriate provider.    - Go to the ER or seek medical attention immediately if you develop new or worsening symptoms.

## 2025-03-07 NOTE — PROGRESS NOTES
"Subjective:      Patient ID: Denise Mosher is a 24 y.o. female.    Vitals:  height is 5' 2" (1.575 m) and weight is 106.1 kg (234 lb). Her oral temperature is 98.5 °F (36.9 °C). Her blood pressure is 121/76 and her pulse is 87. Her respiration is 19 and oxygen saturation is 98%.     Chief Complaint: Hand Pain    24-year-old female here for left index finger pain for the past 3 days.  She 1st noticed the pain 2 days ago after playing her [employer] switch.  States that it felt a little sore.  Pain was initially intermittent, but has become constant today.  Described as 8/10 sharp and throbbing pain, mostly to the distal finger She denies any  trauma or other injury.  Has tried ice and taking ibuprofen.    Hand Pain   Her dominant hand is their right hand. The incident occurred 3 to 5 days ago. The incident occurred at home. The injury mechanism is unknown. The pain is present in the left hand. The pain does not radiate. The pain is at a severity of 5/10. The pain is mild. The pain has been Intermittent since the incident. Associated symptoms include tingling. Pertinent negatives include no numbness. The symptoms are aggravated by movement. She has tried acetaminophen for the symptoms.       Constitution: Negative for chills and fever.   Musculoskeletal:  Positive for pain (L index finger), joint pain and abnormal ROM of joint. Negative for trauma and joint swelling.   Skin:  Positive for bruising. Negative for erythema.   Neurological:  Negative for numbness and tingling.      Objective:     Physical Exam   Constitutional: She is oriented to person, place, and time. She appears well-developed.   HENT:   Head: Normocephalic and atraumatic.   Ears:   Right Ear: External ear normal.   Left Ear: External ear normal.   Nose: Nose normal.   Mouth/Throat: Oropharynx is clear and moist.   Eyes: Conjunctivae, EOM and lids are normal.   Neck: Trachea normal and phonation normal. Neck supple.   Cardiovascular: Normal rate " and regular rhythm.   Pulmonary/Chest: Effort normal and breath sounds normal.   Musculoskeletal:      Left hand: Left index finger: Exhibits decreased ROM, swelling and tenderness.      Comments: Tenderness and swelling over the distal phalanx.  Limited range of motion of the left index finger at PIP and D IP joints.  Neurovascularly intact distally.   Neurological: She is alert and oriented to person, place, and time.   Skin: Skin is warm, dry and intact. No erythema         Comments: No erythema, wounds, or other rashes to left index finger.  Mild bruising noted to the distal phalanx of left index finger.  No subungual hematoma.   Psychiatric: Her speech is normal and behavior is normal. Judgment and thought content normal.   Nursing note and vitals reviewed.    X-Ray Finger 2 or More Views Left  Result Date: 3/7/2025  EXAMINATION: XR FINGER 2 OR MORE VIEWS LEFT CLINICAL HISTORY: Pain to L index finger.;  Pain in left finger(s) TECHNIQUE: PA, oblique, and lateral radiographs of the left index finger. COMPARISON: 08/22/2014. FINDINGS: There is a tiny density adjacent to the ulnar aspect of the index finger distal phalangeal tuft, seen on a single view only, may represent an acute avulsion fracture.  There is soft tissue edema.  Remaining osseous structures are intact.  No additional fractures are seen.  Joint spaces are preserved.  Alignment is normal.     Questionable tiny avulsion fracture of the index finger distal phalangeal tuft as above.  Recommend correlation with point tenderness. Electronically signed by: Rich Frank Date:    03/07/2025 Time:    17:17    Assessment:     1. Closed avulsion fracture of distal phalanx of finger, initial encounter        Plan:     Closed avulsion fracture of distal phalanx of finger, initial encounter  -     X-Ray Finger 2 or More Views Left; Future; Expected date: 03/07/2025  -     ibuprofen (ADVIL,MOTRIN) 600 MG tablet; Take 1 tablet (600 mg total) by mouth every 6 (six)  hours as needed for Pain.  Dispense: 20 tablet; Refill: 0  -     Ambulatory referral/consult to Orthopedics    X-ray shows tiny avulsion fracture to distal phalanx of left index finger.  Discussed findings with patient.  Finger placed in a splint and ibuprofen prescribed for pain.  Orthopedic referral placed        Patient Instructions   You can take ibuprofen as needed for pain.    Keep your finger in the splint for at least the next 2 weeks.    - Follow up with orthopedics within 1 week if not improved.  You can call [phone] to schedule an appointment with the appropriate provider.    - Go to the ER or seek medical attention immediately if you develop new or worsening symptoms.

## 2025-03-07 NOTE — PROGRESS NOTES
Subjective:      Patient ID: Denise Mosher is a 24 y.o. female.    Vitals:  vitals were not taken for this visit.     Chief Complaint: Hand Pain    Hand Pain   Her dominant hand is their right hand. The incident occurred 3 to 5 days ago. The incident occurred at home. The injury mechanism is unknown. The pain is present in the left hand. The pain does not radiate. The pain is at a severity of 5/10. The pain is mild. The pain has been Intermittent since the incident. Associated symptoms include numbness and tingling. The symptoms are aggravated by movement. She has tried acetaminophen for the symptoms.     Neurological:  Positive for numbness.    Objective:     Physical Exam    Assessment:     No diagnosis found.    Plan:       There are no diagnoses linked to this encounter.

## 2025-03-21 ENCOUNTER — OFFICE VISIT (OUTPATIENT)
Dept: URGENT CARE | Facility: CLINIC | Age: 25
End: 2025-03-21
Payer: MEDICAID

## 2025-03-21 VITALS
RESPIRATION RATE: 20 BRPM | HEART RATE: 77 BPM | OXYGEN SATURATION: 97 % | TEMPERATURE: 99 F | DIASTOLIC BLOOD PRESSURE: 70 MMHG | WEIGHT: 234 LBS | SYSTOLIC BLOOD PRESSURE: 103 MMHG | BODY MASS INDEX: 45.94 KG/M2 | HEIGHT: 60 IN

## 2025-03-21 DIAGNOSIS — R09.81 SINUS CONGESTION: Primary | ICD-10-CM

## 2025-03-21 DIAGNOSIS — S62.639A CLOSED AVULSION FRACTURE OF DISTAL PHALANX OF FINGER, INITIAL ENCOUNTER: ICD-10-CM

## 2025-03-21 DIAGNOSIS — J34.9 SINUS PROBLEM: ICD-10-CM

## 2025-03-21 LAB
CTP QC/QA: YES
POC MOLECULAR INFLUENZA A AGN: NEGATIVE
POC MOLECULAR INFLUENZA B AGN: NEGATIVE

## 2025-03-21 RX ORDER — IBUPROFEN 600 MG/1
600 TABLET ORAL 3 TIMES DAILY
Qty: 21 TABLET | Refills: 0 | Status: SHIPPED | OUTPATIENT
Start: 2025-03-21 | End: 2025-03-28

## 2025-03-21 NOTE — PROGRESS NOTES
Subjective:      Patient ID: Denise Mosher is a 24 y.o. female.    Vitals:  height is 5' (1.524 m) and weight is 106.1 kg (234 lb). Her oral temperature is 98.5 °F (36.9 °C). Her blood pressure is 103/70 and her pulse is 77. Her respiration is 20 and oxygen saturation is 97%.     Chief Complaint: Sinus Problem and Hand Pain    Pt is here for sinus problem that onset yesterday. Pt states symptoms of cough, sinus pressure, headaches, and congestion. Pt is alos here for finger pain that onset 2 weeks ago. Pt states she was seen 2 weeks ago and pain hasn't resolved in index finger now middle finger is in pain.  She was wearing a finger splint but it got wet so patient would like another one.  Patient has not followed up with orthopedics for her finger injury. No medications taken for sinus concerns.     Sinus Problem  This is a new problem. The current episode started yesterday. The problem has been gradually worsening since onset. There has been no fever. She is experiencing no pain. Associated symptoms include congestion, coughing, headaches and sinus pressure. Pertinent negatives include no ear pain, neck pain, shortness of breath or sore throat. (Chest pain) Past treatments include antibiotics. The treatment provided no relief.   Hand Pain   The incident occurred more than 1 week ago. The injury mechanism is unknown. The pain is present in the left fingers. The pain radiates to the left hand. The pain is at a severity of 3/10. The pain has been Constant since the incident. Associated symptoms include tingling. Pertinent negatives include no chest pain. Associated symptoms comments: swelling. The symptoms are aggravated by movement, lifting and palpation. She has tried nothing for the symptoms.       Constitution: Negative for fever and generalized weakness.   HENT:  Positive for congestion and sinus pressure. Negative for ear pain, sinus pain and sore throat.    Neck: Negative for neck pain.   Cardiovascular:   Negative for chest pain.   Respiratory:  Positive for cough. Negative for shortness of breath.    Gastrointestinal:  Negative for abdominal pain.   Musculoskeletal:  Positive for pain (left index finger).   Neurological:  Positive for headaches.      Objective:     Physical Exam   Constitutional: She is oriented to person, place, and time. She appears well-developed. She is cooperative.  Non-toxic appearance. She does not appear ill. No distress. overweight  HENT:   Head: Normocephalic and atraumatic.   Ears:   Right Ear: Hearing, tympanic membrane, external ear and ear canal normal.   Left Ear: Hearing, tympanic membrane, external ear and ear canal normal.   Nose: Rhinorrhea present. No mucosal edema or nasal deformity. No epistaxis. Right sinus exhibits no maxillary sinus tenderness and no frontal sinus tenderness. Left sinus exhibits no maxillary sinus tenderness and no frontal sinus tenderness.   Mouth/Throat: Uvula is midline, oropharynx is clear and moist and mucous membranes are normal. No trismus in the jaw. Normal dentition. No uvula swelling. No oropharyngeal exudate, posterior oropharyngeal edema or posterior oropharyngeal erythema.   Eyes: Conjunctivae and lids are normal. No scleral icterus.   Neck: Trachea normal and phonation normal. Neck supple. No edema present. No erythema present. No neck rigidity present.   Cardiovascular: Normal rate, regular rhythm, normal heart sounds and normal pulses.   Pulmonary/Chest: Effort normal and breath sounds normal. No respiratory distress. She has no decreased breath sounds. She has no rhonchi.   Abdominal: Normal appearance.   Musculoskeletal: Normal range of motion.         General: No deformity. Normal range of motion.      Right hand: Right index finger: Exhibits decreased ROM, tenderness and swelling.   Neurological: She is alert and oriented to person, place, and time. She exhibits normal muscle tone. Coordination normal.   Skin: Skin is warm, dry, intact,  not diaphoretic and not pale.   Psychiatric: Her speech is normal and behavior is normal. Judgment and thought content normal.   Nursing note and vitals reviewed.      Assessment:     1. Sinus congestion    2. Closed avulsion fracture of distal phalanx of finger, initial encounter    3. Sinus problem        Plan:   Physical exam consistent with times congestion due to viral URI or seasonal allergies.  Discussed use of over-the-counter medications such as Claritin and other supportive care for her symptoms.  Flu testing negative today.  New splint provided for patient's avulsion fracture, discussed with patient to follow up with Orthopedics if she continues to have concerns about her finger, phone number provided for scheduling.  Patient acknowledges understanding of plan of care and we will follow up as needed,    Results for orders placed or performed in visit on 03/21/25   POCT Influenza A/B MOLECULAR    Collection Time: 03/21/25 11:03 AM   Result Value Ref Range    POC Molecular Influenza A Ag Negative Negative    POC Molecular Influenza B Ag Negative Negative     Acceptable Yes          Sinus congestion  -     POCT Influenza A/B MOLECULAR    Closed avulsion fracture of distal phalanx of finger, initial encounter  -     ibuprofen (ADVIL,MOTRIN) 600 MG tablet; Take 1 tablet (600 mg total) by mouth 3 (three) times daily. for 7 days  Dispense: 21 tablet; Refill: 0    Sinus problem

## 2025-03-21 NOTE — PATIENT INSTRUCTIONS
Call to make an appointment with orthopedics at [phone].     your illness is caused by a virus and antibiotics would not be beneficial at this time.    Please drink plenty of fluids and get plenty of rest.    For Congestion:     --  Take over the counter antihistamine such as Claritin, Zyrtec or Allegra to dry you out.     --  Use pseudoephedrine (from behind the pharmacy counter) for decongestant.  You may start with a low dose (30 mg) with a max dose of 240 mg /day.  This medication may raise your blood pressure and give you palpitations, if this occurs, please lower your daily dose or stop taking the medication.     --  Use Flonase 2 sprays/nostril twice per day. It is a local acting steroid nasal spray and will take 3-4 days to work at full strength so please use this consistently.  If you develop a bloody nose, stop using the medication immediately.      For Cough:   --  Use Mucinex (guaifenisin) to break up mucous up to 2400mg/day to loosen any mucous. The Mucinex DM pill has a cough suppressant that can be sedating. It can be used at night to stop the tickle at the back of your throat.      --  You can use Mucinex D (it has guaifenesin and a high dose of pseudoephedrine) in the mornings as a combination medication to break up mucus and help with congestion.    --  May gargle salt water for sore throat, a tablespoon of honey is helpful for cough, especially at night.     If not allergic, please take over the counter Tylenol (Acetaminophen) and/or Motrin (Ibuprofen) as directed for control of pain and/or fever.    Please follow up with your primary care doctor or specialist as needed.    - You must understand that you have received an Urgent Care treatment only and that you may be released before all of your medical problems are known or treated.   - You, the patient, will arrange for follow up care as instructed.   - If your condition worsens or fails to improve we recommend that you receive another  evaluation at the ER immediately or contact your PCP to discuss your concerns or return here.   - Follow up with your PCP or specialty clinic as directed in the next 1-2 weeks if not improved or as needed.  You can call [phone] to schedule an appointment with the appropriate provider.      If your symptoms do not improve or worsen, go to the emergency room immediately.

## 2025-03-28 ENCOUNTER — TELEPHONE (OUTPATIENT)
Dept: ORTHOPEDICS | Facility: CLINIC | Age: 25
End: 2025-03-28
Payer: MEDICAID

## 2025-04-11 ENCOUNTER — OFFICE VISIT (OUTPATIENT)
Dept: URGENT CARE | Facility: CLINIC | Age: 25
End: 2025-04-11
Payer: MEDICAID

## 2025-04-11 VITALS
BODY MASS INDEX: 45.93 KG/M2 | SYSTOLIC BLOOD PRESSURE: 115 MMHG | TEMPERATURE: 99 F | WEIGHT: 233.94 LBS | DIASTOLIC BLOOD PRESSURE: 79 MMHG | RESPIRATION RATE: 18 BRPM | OXYGEN SATURATION: 98 % | HEART RATE: 78 BPM | HEIGHT: 60 IN

## 2025-04-11 DIAGNOSIS — R11.2 NAUSEA AND VOMITING, UNSPECIFIED VOMITING TYPE: ICD-10-CM

## 2025-04-11 DIAGNOSIS — R19.7 DIARRHEA, UNSPECIFIED TYPE: ICD-10-CM

## 2025-04-11 DIAGNOSIS — A08.4 VIRAL GASTROENTERITIS: Primary | ICD-10-CM

## 2025-04-11 DIAGNOSIS — R09.81 NASAL CONGESTION: ICD-10-CM

## 2025-04-11 DIAGNOSIS — J02.9 SORE THROAT: ICD-10-CM

## 2025-04-11 LAB
CTP QC/QA: YES
MOLECULAR STREP A: NEGATIVE

## 2025-04-11 RX ORDER — TRAZODONE HYDROCHLORIDE 100 MG/1
100 TABLET ORAL NIGHTLY PRN
COMMUNITY

## 2025-04-11 RX ORDER — FLUTICASONE PROPIONATE 50 MCG
1 SPRAY, SUSPENSION (ML) NASAL DAILY
Qty: 11.1 ML | Refills: 0 | Status: SHIPPED | OUTPATIENT
Start: 2025-04-11

## 2025-04-11 RX ORDER — ONDANSETRON 4 MG/1
4 TABLET, ORALLY DISINTEGRATING ORAL
Status: COMPLETED | OUTPATIENT
Start: 2025-04-11 | End: 2025-04-11

## 2025-04-11 RX ORDER — ONDANSETRON 2 MG/ML
4 INJECTION INTRAMUSCULAR; INTRAVENOUS
Status: DISCONTINUED | OUTPATIENT
Start: 2025-04-11 | End: 2025-04-11

## 2025-04-11 RX ORDER — PROMETHAZINE HYDROCHLORIDE 12.5 MG/1
12.5 TABLET ORAL EVERY 6 HOURS PRN
Qty: 12 TABLET | Refills: 0 | Status: SHIPPED | OUTPATIENT
Start: 2025-04-11

## 2025-04-11 RX ADMIN — ONDANSETRON 4 MG: 4 TABLET, ORALLY DISINTEGRATING ORAL at 11:04

## 2025-04-11 NOTE — PROGRESS NOTES
Subjective:      Patient ID: Denise Mosher is a 24 y.o. female.    Vitals:  height is 5' (1.524 m) and weight is 106.1 kg (233 lb 14.5 oz). Her oral temperature is 98.6 °F (37 °C). Her blood pressure is 115/79 and her pulse is 78. Her respiration is 18 and oxygen saturation is 98%.     Chief Complaint: Emesis    24-year-old female presents with nausea, vomiting, diarrhea started yesterday.  She states vomiting improved after taking Zofran at home but still feels a little nauseous.  She has not taken any medicine for her diarrhea.  She states her diarrhea is wet and semi soft.  She reports 2 episodes of diarrhea today.  She also reports of epigastric abdominal pain.  No fever or urinary complaints.  She also reports of a mild sore throat with congestion.        Emesis   This is a new problem. The current episode started today. The problem occurs less than 2 times per day. The problem has been unchanged. There has been no fever. Associated symptoms include diarrhea. Pertinent negatives include no abdominal pain, arthralgias, chest pain or dizziness. She has tried nothing for the symptoms. The treatment provided no relief.     Constitution: Negative for activity change and appetite change.   HENT:  Positive for congestion and sore throat. Negative for ear pain, ear discharge, foreign body in ear, hearing loss, nosebleeds, foreign body in nose, postnasal drip, sinus pain, sinus pressure, trouble swallowing and voice change.    Neck: Negative for neck pain, neck stiffness and painful lymph nodes.   Cardiovascular:  Negative for chest trauma and chest pain.   Eyes:  Negative for eye trauma, foreign body in eye, eye discharge and eye itching.   Respiratory:  Negative for sleep apnea and chest tightness.    Gastrointestinal:  Positive for nausea, vomiting and diarrhea. Negative for abdominal trauma, abdominal pain, abdominal bloating, history of abdominal surgery, constipation, bright red blood in stool and dark  colored stools.   Endocrine: hair loss, cold intolerance and heat intolerance.   Genitourinary:  Negative for dysuria, frequency and urgency.   Musculoskeletal:  Negative for pain, trauma, joint pain and joint swelling.   Skin:  Negative for color change, pale and rash.   Allergic/Immunologic: Negative for environmental allergies and seasonal allergies.   Neurological:  Negative for dizziness, history of vertigo, light-headedness and disorientation.   Hematologic/Lymphatic: Negative for swollen lymph nodes, easy bruising/bleeding and trouble clotting. Does not bruise/bleed easily.   Psychiatric/Behavioral:  Negative for disorientation and confusion.       Objective:     Physical Exam   Constitutional: She is oriented to person, place, and time. She appears well-developed. She is cooperative.  Non-toxic appearance. She does not appear ill. No distress.   HENT:   Head: Normocephalic and atraumatic.   Ears:   Right Ear: Hearing, tympanic membrane, external ear and ear canal normal.   Left Ear: Hearing, tympanic membrane, external ear and ear canal normal.   Nose: Congestion present. No mucosal edema, rhinorrhea or nasal deformity. No epistaxis. Right sinus exhibits no maxillary sinus tenderness and no frontal sinus tenderness. Left sinus exhibits no maxillary sinus tenderness and no frontal sinus tenderness.   Mouth/Throat: Uvula is midline, oropharynx is clear and moist and mucous membranes are normal. Mucous membranes are moist. No trismus in the jaw. Normal dentition. No uvula swelling. No oropharyngeal exudate, posterior oropharyngeal edema or posterior oropharyngeal erythema. Oropharynx is clear.   Eyes: Conjunctivae and lids are normal. No scleral icterus.   Neck: Trachea normal and phonation normal. Neck supple. No edema present. No erythema present. No neck rigidity present.   Cardiovascular: Normal rate, regular rhythm, normal heart sounds and normal pulses.   Pulmonary/Chest: Effort normal and breath sounds  normal. No respiratory distress. She has no decreased breath sounds. She has no rhonchi.   Abdominal: Normal appearance and bowel sounds are normal. She exhibits no distension and no mass. Soft. There is abdominal tenderness in the epigastric area. There is no rebound, no guarding, no left CVA tenderness and no right CVA tenderness. No hernia.   Musculoskeletal: Normal range of motion.         General: No deformity. Normal range of motion.   Neurological: She is alert and oriented to person, place, and time. She has normal strength. She exhibits normal muscle tone. Coordination normal.   Skin: Skin is warm, dry, intact, not diaphoretic and not pale.   Psychiatric: Her speech is normal and behavior is normal. Judgment and thought content normal.   Nursing note and vitals reviewed.    Results for orders placed or performed in visit on 04/11/25   POCT Strep A, Molecular    Collection Time: 04/11/25 11:47 AM   Result Value Ref Range    Molecular Strep A, POC Negative Negative     Acceptable Yes         Assessment:     1. Viral gastroenteritis    2. Nausea and vomiting, unspecified vomiting type    3. Diarrhea, unspecified type    4. Sore throat    5. Nasal congestion        Plan:       Viral gastroenteritis    Nausea and vomiting, unspecified vomiting type  -     Discontinue: ondansetron injection 4 mg  -     ondansetron disintegrating tablet 4 mg  -     promethazine (PHENERGAN) 12.5 MG Tab; Take 1 tablet (12.5 mg total) by mouth every 6 (six) hours as needed (nausea).  Dispense: 12 tablet; Refill: 0    Diarrhea, unspecified type    Sore throat  -     POCT Strep A, Molecular    Nasal congestion  -     fluticasone propionate (FLONASE) 50 mcg/actuation nasal spray; 1 spray (50 mcg total) by Each Nostril route once daily.  Dispense: 11.1 mL; Refill: 0

## 2025-04-11 NOTE — PATIENT INSTRUCTIONS
Rest and push fluids.  [place], soft diet at this time.  Continue Zofran as needed for nausea.  If no improvement of nausea please take Phenergan.  Please be aware Phenergan can cause drowsiness.  Continue Pepto-Bismol or Imodium as needed for diarrhea.    When do I need to call the doctor?   You feel very weak, like you cant stand up, and your skin is cool, clammy, or looks blue or gray.  You have severe abdominal pain.  You have chest pain or trouble breathing.  You have signs of severe fluid loss, such as:  No urine for more than 8 hours.  You feel very lightheaded or like you are going to pass out.  You feel weak like you are going to fall.  You are not able to keep any fluids down.  You develop early signs of fluid loss again, such as:  Your urine is very dark colored.  Your mouth is dry.  You have muscle cramps.  You have a lack of energy.  You feel light-headed when you get up.  - Rest.    - Drink plenty of fluids.    - Acetaminophen (tylenol) or Ibuprofen (advil,motrin) as directed as needed for fever/pain. Avoid tylenol if you have a history of liver disease. Do not take ibuprofen if you have a history of GI bleeding, kidney disease, or if you take blood thinners.     - Follow up with your PCP or specialty clinic as directed in the next 1-2 weeks if not improved or as needed.  You can call [phone] to schedule an appointment with the appropriate provider.    - Go to the ER or seek medical attention immediately if you develop new or worsening symptoms.     - You must understand that you have received an Urgent Care treatment only and that you may be released before all of your medical problems are known or treated.   - You, the patient, will arrange for follow up care as instructed.   - If your condition worsens or fails to improve we recommend that you receive another evaluation at the ER immediately or contact your PCP to discuss your concerns or return here.

## 2025-04-20 ENCOUNTER — OFFICE VISIT (OUTPATIENT)
Dept: URGENT CARE | Facility: CLINIC | Age: 25
End: 2025-04-20
Payer: MEDICAID

## 2025-04-20 VITALS
TEMPERATURE: 98 F | HEART RATE: 58 BPM | WEIGHT: 233 LBS | BODY MASS INDEX: 45.75 KG/M2 | HEIGHT: 60 IN | DIASTOLIC BLOOD PRESSURE: 63 MMHG | OXYGEN SATURATION: 96 % | RESPIRATION RATE: 18 BRPM | SYSTOLIC BLOOD PRESSURE: 106 MMHG

## 2025-04-20 DIAGNOSIS — R05.1 ACUTE COUGH: ICD-10-CM

## 2025-04-20 DIAGNOSIS — H66.002 NON-RECURRENT ACUTE SUPPURATIVE OTITIS MEDIA OF LEFT EAR WITHOUT SPONTANEOUS RUPTURE OF TYMPANIC MEMBRANE: Primary | ICD-10-CM

## 2025-04-20 PROCEDURE — 99213 OFFICE O/P EST LOW 20 MIN: CPT | Mod: S$GLB,,, | Performed by: SURGERY

## 2025-04-20 RX ORDER — BROMPHENIRAMINE MALEATE, PSEUDOEPHEDRINE HYDROCHLORIDE, AND DEXTROMETHORPHAN HYDROBROMIDE 2; 30; 10 MG/5ML; MG/5ML; MG/5ML
10 SYRUP ORAL EVERY 4 HOURS PRN
Qty: 118 ML | Refills: 0 | Status: SHIPPED | OUTPATIENT
Start: 2025-04-20 | End: 2025-04-27

## 2025-04-20 RX ORDER — AMOXICILLIN AND CLAVULANATE POTASSIUM 875; 125 MG/1; MG/1
1 TABLET, FILM COATED ORAL EVERY 12 HOURS
Qty: 14 TABLET | Refills: 0 | Status: SHIPPED | OUTPATIENT
Start: 2025-04-20

## 2025-04-20 NOTE — PROGRESS NOTES
Subjective:      Patient ID: Denise Mosher is a 24 y.o. female.    Vitals:  height is 5' (1.524 m) and weight is 105.7 kg (233 lb). Her oral temperature is 98.3 °F (36.8 °C). Her blood pressure is 106/63 and her pulse is 58 (abnormal). Her respiration is 18 and oxygen saturation is 96%.     Chief Complaint: Cough    Pt states that she is having cough/dry and sore throat for the past 3 days . Pt states that she is taking mucinex .     Cough  This is a new problem. The current episode started in the past 7 days. The problem has been unchanged. The problem occurs constantly. The cough is Non-productive. Associated symptoms include a sore throat. She has tried OTC cough suppressant for the symptoms.       HENT:  Positive for sore throat.    Respiratory:  Positive for cough.       Objective:     Physical Exam   Constitutional: She is oriented to person, place, and time. She appears well-developed. She is cooperative.  Non-toxic appearance. She does not appear ill. No distress.   HENT:   Head: Normocephalic and atraumatic.   Ears:   Right Ear: Hearing, tympanic membrane, external ear and ear canal normal.   Left Ear: Hearing, external ear and ear canal normal. Tympanic membrane is injected and bulging.   Nose: Nose normal. No mucosal edema, rhinorrhea or nasal deformity. No epistaxis. Right sinus exhibits no maxillary sinus tenderness and no frontal sinus tenderness. Left sinus exhibits no maxillary sinus tenderness and no frontal sinus tenderness.   Mouth/Throat: Uvula is midline and mucous membranes are normal. No trismus in the jaw. Normal dentition. No uvula swelling. Posterior oropharyngeal edema and posterior oropharyngeal erythema present. No oropharyngeal exudate.   Eyes: Conjunctivae and lids are normal. No scleral icterus.   Neck: Trachea normal and phonation normal. Neck supple. No edema present. No erythema present. No neck rigidity present.   Cardiovascular: Normal rate, regular rhythm, normal heart  sounds and normal pulses.   Pulmonary/Chest: Effort normal and breath sounds normal. No respiratory distress. She has no decreased breath sounds. She has no wheezes. She has no rhonchi. She has no rales.   Frequent dry cough         Comments: Frequent dry cough    Abdominal: Normal appearance.   Musculoskeletal: Normal range of motion.         General: No deformity. Normal range of motion.   Neurological: She is alert and oriented to person, place, and time. She exhibits normal muscle tone. Coordination normal.   Skin: Skin is warm, dry, intact, not diaphoretic and not pale.   Psychiatric: Her speech is normal and behavior is normal. Judgment and thought content normal.   Nursing note and vitals reviewed.      Assessment:     1. Non-recurrent acute suppurative otitis media of left ear without spontaneous rupture of tympanic membrane    2. Acute cough        Plan:       Non-recurrent acute suppurative otitis media of left ear without spontaneous rupture of tympanic membrane  -     amoxicillin-clavulanate 875-125mg (AUGMENTIN) 875-125 mg per tablet; Take 1 tablet by mouth every 12 (twelve) hours.  Dispense: 14 tablet; Refill: 0    Acute cough  -     brompheniramine-pseudoeph-DM (BROMFED DM) 2-30-10 mg/5 mL Syrp; Take 10 mLs by mouth every 4 (four) hours as needed (cough).  Dispense: 118 mL; Refill: 0

## 2025-04-28 DIAGNOSIS — R52 PAIN: Primary | ICD-10-CM

## 2025-04-30 ENCOUNTER — OFFICE VISIT (OUTPATIENT)
Dept: ORTHOPEDICS | Facility: CLINIC | Age: 25
End: 2025-04-30
Payer: MEDICAID

## 2025-04-30 DIAGNOSIS — S62.631D CLOSED DISPLACED FRACTURE OF DISTAL PHALANX OF LEFT INDEX FINGER WITH ROUTINE HEALING, SUBSEQUENT ENCOUNTER: Primary | ICD-10-CM

## 2025-04-30 DIAGNOSIS — M79.645 FINGER PAIN, LEFT: ICD-10-CM

## 2025-04-30 PROCEDURE — 99999 PR PBB SHADOW E&M-EST. PATIENT-LVL III: CPT | Mod: PBBFAC,,,

## 2025-04-30 PROCEDURE — 99213 OFFICE O/P EST LOW 20 MIN: CPT | Mod: PBBFAC,PN

## 2025-04-30 RX ORDER — DICLOFENAC SODIUM 10 MG/G
2 GEL TOPICAL 4 TIMES DAILY
Qty: 50 G | Refills: 1 | Status: SHIPPED | OUTPATIENT
Start: 2025-04-30

## 2025-04-30 NOTE — PROGRESS NOTES
Subjective:      Patient ID: Denise Mosher is a 24 y.o. female.    Chief Complaint: Pain of the Left Hand      HPI:  Denise Mosher is a 24 y.o. right hand dominant female who presents to clinic for follow up of closed avulsion fracture of distal phalanx of index finger of left hand sustained on 3/5/25. Patient went to Urgent Care where x-rays confirmed fracture and patient was splinted.  The patient was prescribed ibuprofen 600 mg for pain.  Patient has been WBAT.  Patient wore finger splint for approximately 1 month.  Pain today 6/10. Denies numbness and tingling. Denies any prior surgeries to hand. At baseline, patient is independent regarding ADLs.  Patient reports that she has ADHD that causes her to fidget with her fingers.  She reports frequent twisting of her shirt and shorts around her index finger.    Ambulating: unassisted  Diabetic:  No  Smoking:  She has never smoked.  History of DVT/PE: Negative    PAST MEDICAL HISTORY:    Past Medical History:   Diagnosis Date    Anxiety     reports panic attacks    Cancer 2024    Depression     Fibromyalgia     per pt    Insomnia     Precocious female puberty     Seizures     reports 2 incidents of possible seizures while giving blood    Syncope and collapse     Wrist fracture, bilateral      PAST SURGICAL HISTORY:    Past Surgical History:   Procedure Laterality Date    ARTHROSCOPY OF KNEE Right 12/19/2019    Procedure: ARTHROSCOPY, KNEE (Right);  Surgeon: Ranulfo House MD;  Location: [address];  Service: Orthopedics;  Laterality: Right;    ARTHROSCOPY OF KNEE Left 07/09/2020    Procedure: ARTHROSCOPY, KNEE;  Surgeon: Ranulfo House MD;  Location: [address];  Service: Orthopedics;  Laterality: Left;  WITH CHRONDROPLASTY     ARTHROTOMY OF KNEE Left 07/09/2020    Procedure: ARTHROTOMY, KNEE;  Surgeon: Ranulfo House MD;  Location: [address];  Service: Orthopedics;  Laterality: Left;  WITH CHRONDRAL ALLOGRAFT IMPLANTATION    chip       chip implant    CHONDROPLASTY OF KNEE Right 12/19/2019    Procedure: CHONDROPLASTY, KNEE patella;  Surgeon: Ranlufo House MD;  Location: [facility] OR [address] [place] [address];  Service: Orthopedics;  Laterality: Right;    EGD, WITH CLOSED BIOPSY  07/30/2024    ESOPHAGOGASTRODUODENOSCOPY N/A 7/30/2024    Procedure: EGD (ESOPHAGOGASTRODUODENOSCOPY);  Surgeon: Boston Dumont MD;  Location: [facility] ENDO;  Service: Endoscopy;  Laterality: N/A;    FEMUR OSTEOTOMY Right 12/13/2018    Procedure: OSTEOTOMY, FEMUR - distal to correct valgus (Orthopediatrics plate, MTF bone wedge).  Right knee arthroscopy with MPFL reconstruction, gracilis allograft (Linvatec).  3 hrs.;  Surgeon: Ranulfo House MD;  Location: [facility] OR [address] [place] [address];  Service: Orthopedics;  Laterality: Right;    FEMUR OSTEOTOMY Left 07/09/2020    Procedure: OSTEOTOMY, FEMUR (Left) - distal femoral opening wedge (Orthopediatrics, MTF), knee scope, removal of tibial tubercle screws (Orthopediatrics 4.0 cannulated screws);  Surgeon: Ranulfo House MD;  Location: [facility] OR [address] [place] [address];  Service: Orthopedics;  Laterality: Left;  RQ KIARAGianna [place] notified 7/8/20 [facility] RN    HARDWARE REMOVAL Right 12/19/2019    Procedure: REMOVAL, HARDWARE righ distal femoral.;  Surgeon: Ranulfo House MD;  Location: [address];  Service: Orthopedics;  Laterality: Right;    HARDWARE REMOVAL Left 07/09/2020    Procedure: REMOVAL, HARDWARE;  Surgeon: Ranulfo House MD;  Location: [address];  Service: Orthopedics;  Laterality: Left;  TIBIA    HEMANGIOMA EXCISION      scalp    INGUINAL HERNIA REPAIR Left     INJECTION OF STEROID Right 07/05/2019    Procedure: INJECTION, STEROID;  Surgeon: Ranulfo House MD;  Location: [address];  Service: Orthopedics;  Laterality: Right;    KNEE JOINT MANIPULATION Right 07/05/2019    Procedure: MANIPULATION, KNEE;  Surgeon: Ranulfo House MD;  Location: [facility] OR [address] [place] [address];  Service: Orthopedics;  Laterality: Right;    KNEE SURGERY Left 02/16/2015     TOE SURGERY Right     5 th toe     FAMILY HISTORY:    Family History   Problem Relation Name Age of Onset    Seizures Father      Anxiety disorder Father      Depression Father      Mental illness Father      Schizophrenia Father      Deep vein thrombosis Father       SOCIAL HISTORY:    Social History     Occupational History    Not on file   Tobacco Use    Smoking status: Never    Smokeless tobacco: Never   Substance and Sexual Activity    Alcohol use: Yes     Comment: rarely    Drug use: No    Sexual activity: Never     Birth control/protection: Abstinence      MEDICATIONS: Current Medications[1]  ALLERGIES: Review of patient's allergies indicates:  No Known Allergies    Review of Systems:  Constitution: Negative for chills, fever and night sweats.   HENT: Negative for congestion and headaches.    Eyes: Negative for blurred vision or vision loss.  Cardiovascular: Negative for chest pain and syncope.   Respiratory: Negative for cough and shortness of breath.    Endocrine: Negative for polydipsia, polyphagia and polyuria.   Hematologic/Lymphatic: Negative for bleeding problem. Does not bruise/bleed easily.   Skin: Negative for dry skin, itching and rash.   Musculoskeletal: See HPI.   Gastrointestinal: Negative for abdominal pain and bowel incontinence.   Genitourinary: Negative for bladder incontinence and nocturia.   Neurological: Negative for disturbances in coordination, loss of balance and seizures.   Psychiatric/Behavioral: Negative for depression. The patient does not have insomnia.    Allergic/Immunologic: Negative for hives and persistent infections.          Objective:      There were no vitals filed for this visit.    PHYSICAL EXAM:  General: Alert & oriented x3, well-developed and well-nourished, in no acute distress, sitting comfortably in the exam room.  Skin: Warm and dry. Capillary refill less than 2 seconds.   Head: Normocephalic and atraumatic.   Eyes: Sclera appear normal.   Nose: No deformities  seen.   Ears: No deformities seen.   Neck: No tracheal deviation present.   Pulmonary/Chest: Breathing unlabored.   Neurological: Alert and oriented to person, place, and time.   Psychiatric: Mood is pleasant and affect appropriate.       LEFT HAND/WRIST:        Observation/Inspection:    No evidence of visible deformity, swelling, discoloration, scars, or atrophy.         Palpation:   Tenderness to palpation to the DIP joint.   Otherwise, negative tenderness to palpation to bony prominences and soft tissues throughout.        Range of Motion:  Wrist: Full to wrist flexion, extension, radial, and ulnar deviation.  Digits: Full to digit MCP, PIP, and DIP flexion and extension with some pain to the left index finger         Strength:   Not tested today.        Neurovascular Exam:  Digits warm and well perfused, brisk capillary refill <3 seconds throughout  NVI motor/LTS to median, radial, and ulnar nerves, radial pulse 2+      Imaging:   X-ray finger three-view left from 03/07/2025, reviewed: There is a tiny density adjacent to the ulnar aspect of the index finger distal phalangeal tuft, seen on a single view only, may represent an acute avulsion fracture. There is soft tissue edema. Remaining osseous structures are intact. No additional fractures are seen. Joint spaces are preserved. Alignment is normal.     X-Rays: 3 views of left index finger obtained in the Orthopedic clinic today, and independently reviewed, show:  Progressive healing of tuft fracture of distal phalanx of left index finger. No acute dislocations. Joint spaces are preserved. No evidence of foreign bodies.           Assessment:       1. Closed displaced fracture of distal phalanx of left index finger with routine healing, subsequent encounter    2. Finger pain, left          Plan:       Orders Placed This Encounter    Ambulatory Referral/Consult to Occupational Therapy    diclofenac sodium (VOLTAREN) 1 % Gel       I made the decision to obtain old  records of the patient including previous notes and imaging. I independently reviewed and interpreted lab results today as well as prior imaging.     I explained the nature of the problem to the patient.     I discussed at length with the patient all the different treatment options available for her left index finger including analgesics, anti-inflammatories, acetaminophen, bracing, rest, ice, heat, physical therapy, and corticosteroid injections. I explained the potential role of surgery in the treatment of this condition. The patient understands that if non-surgical measures do not adequately control symptoms, surgery will be considered in the future.     Medications:  Prescription for Voltaren Gel TID to affected area as needed provided today for PRN pain management.  Occupational Therapy:  Ambulatory referral to occupational therapy for left index finger ROM, stretching, strengthening, and conditioning.  Pain Management: Ice compress to the affected area 2-3x a day for 15-20 minutes as needed for pain management.    I recommended patient follow-up with PCP to discuss evaluation and treatment for ADHD related repetitive movements.      Follow-Up:  2-3 weeks for follow-up. No new x-rays next visit.     All of the patient's questions were answered and the patient will contact us if they have any questions or concerns in the interim.    Carmelita Ty PA-C  [facility]  Orthopedic Surgery    Medical Dictation software was used during the dictation of portions or the entirety of this medical record.  Phonetic or grammatic errors may exist due to the use of this software. For clarification, refer to the author of the document.   This note was generated with the assistance of ambient listening technology. Verbal consent was obtained by the patient and accompanying visitor(s) for the recording of patient appointment to facilitate this note. I attest to having reviewed and edited the generated note for accuracy,  though some syntax or spelling errors may persist. Please contact the author of this note for any clarification.               [1]   Current Outpatient Medications:     amoxicillin-clavulanate 875-125mg (AUGMENTIN) 875-125 mg per tablet, Take 1 tablet by mouth every 12 (twelve) hours., Disp: 14 tablet, Rfl: 0    busPIRone (BUSPAR) 10 MG tablet, Take 10 mg by mouth 3 (three) times daily., Disp: , Rfl:     diclofenac sodium (VOLTAREN) 1 % Gel, Apply 2 g topically 4 (four) times daily. To affected area, Disp: 50 g, Rfl: 1    fluticasone propionate (FLONASE) 50 mcg/actuation nasal spray, 1 spray (50 mcg total) by Each Nostril route once daily., Disp: 11.1 mL, Rfl: 0    ibuprofen (ADVIL,MOTRIN) 600 MG tablet, Take 1 tablet (600 mg total) by mouth every 6 (six) hours as needed for Pain., Disp: 20 tablet, Rfl: 0    oxyCODONE-acetaminophen (PERCOCET) 5-325 mg per tablet, Take 1 tablet by mouth every 4 (four) hours as needed for Pain. (Patient not taking: Reported on 4/30/2025), Disp: , Rfl:     pantoprazole (PROTONIX) 40 MG tablet, Take 1 tablet (40 mg total) by mouth once daily., Disp: 30 tablet, Rfl: 5    promethazine (PHENERGAN) 12.5 MG Tab, Take 1 tablet (12.5 mg total) by mouth every 6 (six) hours as needed (nausea)., Disp: 12 tablet, Rfl: 0    traZODone (DESYREL) 100 MG tablet, Take 100 mg by mouth nightly as needed., Disp: , Rfl:

## 2025-05-06 ENCOUNTER — CLINICAL SUPPORT (OUTPATIENT)
Dept: REHABILITATION | Facility: HOSPITAL | Age: 25
End: 2025-05-06
Payer: MEDICAID

## 2025-05-06 DIAGNOSIS — S62.631D CLOSED DISPLACED FRACTURE OF DISTAL PHALANX OF LEFT INDEX FINGER WITH ROUTINE HEALING, SUBSEQUENT ENCOUNTER: ICD-10-CM

## 2025-05-06 DIAGNOSIS — M79.645 FINGER PAIN, LEFT: ICD-10-CM

## 2025-05-06 DIAGNOSIS — M79.642 PAIN IN LEFT HAND: ICD-10-CM

## 2025-05-06 DIAGNOSIS — R26.2 DIFFICULTY WALKING: Primary | ICD-10-CM

## 2025-05-06 PROBLEM — M25.562 CHRONIC PAIN OF LEFT KNEE: Status: RESOLVED | Noted: 2023-01-06 | Resolved: 2025-05-06

## 2025-05-06 PROBLEM — G89.29 CHRONIC PAIN OF LEFT KNEE: Status: RESOLVED | Noted: 2023-01-06 | Resolved: 2025-05-06

## 2025-05-06 PROBLEM — M25.562 LEFT KNEE PAIN: Status: RESOLVED | Noted: 2020-06-14 | Resolved: 2025-05-06

## 2025-05-06 PROCEDURE — 97530 THERAPEUTIC ACTIVITIES: CPT

## 2025-05-06 PROCEDURE — 97166 OT EVAL MOD COMPLEX 45 MIN: CPT

## 2025-05-06 NOTE — PROGRESS NOTES
Outpatient Rehab    Occupational Therapy Evaluation    Patient Name: Denise Mosher  MRN: [id number]  YOB: 2000  Encounter Date: 5/6/2025    Therapy Diagnosis:   Encounter Diagnoses   Name Primary?    Closed displaced fracture of distal phalanx of left index finger with routine healing, subsequent encounter     Finger pain, left     Difficulty walking Yes    Pain in left hand      Physician: Laine Ty, *    Physician Orders: Eval and Treat  Medical Diagnosis: Closed displaced fracture of distal phalanx of left index finger with routine healing, subsequent encounter  Finger pain, left    Visit # / Visits Authorized: 1 / 1  Insurance Authorization Period: 4/30/2025 to 4/30/2026  Date of Evaluation: 5/6/2025  Plan of Care Certification: 5/6/2025 to 06/17/2025     Time In: 0830   Time Out: 0935  Total Time (in minutes): 65   Total Billable Time (in minutes): 65    Intake Outcome Measure for FOTO Survey    Therapist reviewed FOTO scores for Denise Mosher on 5/6/2025.   FOTO report - see Media section or FOTO account episode details.     Intake Score: 67%    Precautions  Left Upper Extremity Weight-Bearing Status: Full weight-bearing  Range of Motion Restrictions: as tolerated  Strengthening Precautions: as tolerated         Subjective   History of Present Illness  Denise is a 24 y.o. female who reports to occupational therapy with a chief concern of pain in left hand from tick, weakness in left hand.     The patient reports a medical diagnosis of Closed displaced fracture of distal phalanx of left index finger with routine healing, subsequent encounter (S62.631D), Finger pain, left (M79.645).    Diagnostic tests related to this condition: X-ray.   X-Ray Details: healing distal phalanx Index Finger    Dominant Hand: Right  History of Present Condition/Illness: Pt. Reports she has a tick and she has a habit of wrapping her index finger around her shirt or sheets or any paper. She reports her  hand feels painful, numb and tingling, and she has weakness in the hand. She reports she has tried multiple fidget toys and ways to decrease the fidgeting without much success. Her biggest concern is how to stop the fidgeting so she doesn't damage her hands further.     Activities of Daily Living  Social history was obtained from Patient.       General Current Level of Function Comments: washing hair, opening containers,  Patient Responsibilities: Personal ADL    Previously independent with activities of daily living? Yes     Currently independent with activities of daily living? No  Activities currently needing assistance include Grooming.   Washing hair,     Previously independent with instrumental activities of daily living? Yes                    Pain     Patient reports a current pain level of 6/10. Pain at best is reported as 3/10. Pain at worst is reported as 9/10.   Location: Left index finger,  Clinical Progression (since onset): Worsening  Pain Qualities: Throbbing, Aching, Pressure  Pain-Relieving Factors: Heat, Ice  Pain-Aggravating Factors: Bending, Computer work, Movement         Treatment History  Treatments  Previously Received Treatments: No  Currently Receiving Treatments: No    Living Arrangements  Living Situation  Housing: Home independently  Living Arrangements: Family members  Support Systems: Parent, Family members, Friends/neighbors        Employment  Patient reports: Does the patient's condition impact their ability to work?  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    Denise OSBORN  Nomi  has a past surgical history that includes Knee surgery (Left, 02/16/2015); Toe Surgery (Right); Inguinal hernia repair (Left); Hemangioma excision; chip; Femur Osteotomy (Right, 12/13/2018); Knee joint manipulation (Right, 07/05/2019); Injection of steroid (Right, 07/05/2019); Arthroscopy of knee (Right, 12/19/2019); Hardware Removal (Right, 12/19/2019); Chondroplasty of knee (Right, 12/19/2019); Femur Osteotomy (Left, 07/09/2020); Arthroscopy of knee (Left, 07/09/2020); Hardware Removal (Left, 07/09/2020); Arthrotomy of knee (Left, 07/09/2020); egd, with closed biopsy (07/30/2024); and Esophagogastroduodenoscopy (N/A, 7/30/2024).    Denise OSBORN has a current medication list which includes the following prescription(s): amoxicillin-clavulanate 875-125mg, buspirone, diclofenac sodium, fluticasone propionate, ibuprofen, oxycodone-acetaminophen, pantoprazole, promethazine, and trazodone.    Review of patient's allergies indicates:  No Known Allergies     Objective   Wrist/Hand Observations   Pt.  With swan necking of ring finger. Pt. Reports difficulty putting IF and Long finger flat. DIP of IF with some darkening of skin about distal phalanx.       Upper Extremity Sensation            Pt reports numbness and tingling that comes and goes perpetually, not localized         Digit 2 - Index Finger Active Range of Motion  Right Index Finger   Extension (deg) Flexion (deg) Pain   MCP 0 90     PIP 0 80     DIP 0 85     FERRER: 255    Left Index Finger   Extension (deg) Flexion (deg) Pain   MCP 0 75     PIP 0 85     DIP 0 75     FERRER: 235    RF swan neck                 Right  Strength  Right Hand Dynamometer Position: 2  Elbow Position Forearm Position Trial 1 (lbs) Trial 2  (lbs) Trial 3  (lbs) Average  (lbs) Pain   Flexed Neutral 37 35 35 35.67         Left  Strength  Left Hand Dynamometer Position: 2  Elbow Position Forearm Position Trial 1 (lbs) Trial 2 (lbs) Trial 3 (lbs) Average (lbs) Pain   Flexed Neutral  17 12 14 14.33 Yes                 Treatment:  Therapeutic Activity  TA 1: rolling medium soft putty, squeezing putty  TA 2: provided hand out with information of sensory rings  Other Activities  Activity 1: oval 8 size 7 provided with instructions on wear care and precautions    Time Entry(in minutes):  OT Evaluation (Moderate) Time Entry: 50  Therapeutic Activity Time Entry: 15    Assessment & Plan   Assessment  Denise presents with a condition of Moderate complexity.   Presentation of Symptoms: Unpredictable  Will Comorbidities Impact Care: Yes  Pt. Has tried unsuccessfully to decrease or inhibit sensory seeking behavior of twisting shirt or fabric on fingers.     ADL Limitations : Bathing/showering, Personal hygiene and grooming  Rest and Sleep Limitations: Disrupted sleep pattern  Work Limitations: Job performance  Functional Limitations: Fine motor coordination, Pain with ADLs/IADLs, Sensory processing                 Evaluation/Treatment Response: Patient responded to treatment well  Response Details: Pt. Hopeful the sensory rings will help her, however, due to unsuccessful attempts throughout her life to diminish this behavior, she is unsure if it will ultimately help.   Patient Goal for Therapy (OT): to decrese fidget/ activity in hands to decrease pain.  Prognosis: Poor  Assessment Details: Pt. Has a lifelong habit that has led to increase in pain in her hand. And has attempted multiple ways to inhibit or change the behavior unsuccessfully. Orthopedic wise, her range of motion is adequate and  strength is fair.  From an Orthopedic OT perspective, she has limited treatment needs. Therapist will attempt to problem solve with patient ways to decrease behavior, however, ultimately this is not an orthopedic issue at the route of the issue.     Plan  From an occupational therapy perspective, the patient would benefit from: Skilled Rehab Services    Planned therapy interventions include: Therapeutic  activities.    Planned modalities to include: Fluidotherapy, Ultrasound, Thermotherapy (hot pack), and Whirlpool.        Visit Frequency: 1 times Per Week for 6 Weeks.       This plan was discussed with Patient.   Discussion participants: Agreed Upon Plan of Care  Plan details: Pt plans to attempt use of theraputty for strength, sensory rings, and oval 8 orthosis wear over next week and will call clinic to discuss progress and determine if additional appointments are required. Therapist in agreement          Patient's spiritual, cultural, and educational needs considered and patient agreeable to plan of care and goals.           Goals:   Active       Long term goals       Pt. will report decrease in pain to level 4/10       Start:  05/06/25    Expected End:  06/17/25            Pt. will demo increase in  strength by 5# for decreased pain during work tasks.       Start:  05/06/25    Expected End:  06/17/25                ERIBERTO Wallace/KAYLENE,CHT

## 2025-05-10 ENCOUNTER — HOSPITAL ENCOUNTER (EMERGENCY)
Facility: HOSPITAL | Age: 25
Discharge: HOME | End: 2025-05-11
Attending: STUDENT IN AN ORGANIZED HEALTH CARE EDUCATION/TRAINING PROGRAM
Payer: MEDICAID

## 2025-05-10 DIAGNOSIS — R07.9 CHEST PAIN: ICD-10-CM

## 2025-05-10 DIAGNOSIS — R21 RASH: Primary | ICD-10-CM

## 2025-05-10 PROCEDURE — 99283 EMERGENCY DEPT VISIT LOW MDM: CPT | Mod: 25,ER

## 2025-05-10 PROCEDURE — 93010 ELECTROCARDIOGRAM REPORT: CPT | Mod: ,,, | Performed by: INTERNAL MEDICINE

## 2025-05-10 PROCEDURE — 81025 URINE PREGNANCY TEST: CPT | Mod: ER | Performed by: STUDENT IN AN ORGANIZED HEALTH CARE EDUCATION/TRAINING PROGRAM

## 2025-05-10 PROCEDURE — 93005 ELECTROCARDIOGRAM TRACING: CPT | Mod: ER

## 2025-05-11 VITALS
TEMPERATURE: 98 F | SYSTOLIC BLOOD PRESSURE: 101 MMHG | DIASTOLIC BLOOD PRESSURE: 70 MMHG | WEIGHT: 232 LBS | HEIGHT: 62 IN | RESPIRATION RATE: 20 BRPM | BODY MASS INDEX: 42.69 KG/M2 | OXYGEN SATURATION: 98 % | HEART RATE: 86 BPM

## 2025-05-11 LAB
B-HCG UR QL: NEGATIVE
CTP QC/QA: YES
OHS QRS DURATION: 88 MS
OHS QTC CALCULATION: 433 MS

## 2025-05-11 PROCEDURE — 81025 URINE PREGNANCY TEST: CPT | Mod: ER

## 2025-05-11 PROCEDURE — 25000003 PHARM REV CODE 250: Mod: ER | Performed by: STUDENT IN AN ORGANIZED HEALTH CARE EDUCATION/TRAINING PROGRAM

## 2025-05-11 RX ORDER — TRIAMCINOLONE ACETONIDE 1 MG/G
CREAM TOPICAL 2 TIMES DAILY
Qty: 80 G | Refills: 0 | Status: SHIPPED | OUTPATIENT
Start: 2025-05-11

## 2025-05-11 RX ORDER — DIPHENHYDRAMINE HCL 25 MG
50 CAPSULE ORAL
Status: COMPLETED | OUTPATIENT
Start: 2025-05-11 | End: 2025-05-11

## 2025-05-11 RX ADMIN — DIPHENHYDRAMINE HYDROCHLORIDE 50 MG: 25 CAPSULE ORAL at 01:05

## 2025-05-11 NOTE — ED PROVIDER NOTES
Encounter Date: 5/10/2025       History     Chief Complaint   Patient presents with    Rash     Pt reports rashes on the abdomen, itchy in nature 30 min PTA. Also states chest pain after rash.     HPI    24-year-old female with a history of bipolar, schizoaffective, depression, anxiety, fibromyalgia, unspecified dermatologic condition involving her face presents to ED for evaluation of a rash across the top of her abdomen that she noticed started 30 minutes prior to arrival.  She notes this started it itching and mild discomfort with the itching has resolved.  She notes she has never had a rash like this before.  She denies any new lotions, detergents, medications or other potential exposures.  She denies any fevers chills, nausea, vomiting, sore throat, cough, congestion.    Review of patient's allergies indicates:  No Known Allergies  Past Medical History:   Diagnosis Date    Anxiety     reports panic attacks    Cancer 2024    Depression     Fibromyalgia     per pt    Insomnia     Precocious female puberty     Seizures     reports 2 incidents of possible seizures while giving blood    Syncope and collapse     Wrist fracture, bilateral      Past Surgical History:   Procedure Laterality Date    ARTHROSCOPY OF KNEE Right 12/19/2019    Procedure: ARTHROSCOPY, KNEE (Right);  Surgeon: Ranulfo House MD;  Location: [address];  Service: Orthopedics;  Laterality: Right;    ARTHROSCOPY OF KNEE Left 07/09/2020    Procedure: ARTHROSCOPY, KNEE;  Surgeon: Ranulfo House MD;  Location: [address];  Service: Orthopedics;  Laterality: Left;  WITH CHRONDROPLASTY     ARTHROTOMY OF KNEE Left 07/09/2020    Procedure: ARTHROTOMY, KNEE;  Surgeon: Ranulfo House MD;  Location: [address];  Service: Orthopedics;  Laterality: Left;  WITH CHRONDRAL ALLOGRAFT IMPLANTATION    chip      chip implant    CHONDROPLASTY OF KNEE Right 12/19/2019    Procedure: CHONDROPLASTY, KNEE patella;  Surgeon: Ranulfo House MD;   [address];  Service: Orthopedics;  Laterality: Right;    EGD, WITH CLOSED BIOPSY  07/30/2024    ESOPHAGOGASTRODUODENOSCOPY N/A 7/30/2024    Procedure: EGD (ESOPHAGOGASTRODUODENOSCOPY);  Surgeon: Boston Dumont MD;  Location: [facility];  Service: Endoscopy;  Laterality: N/A;    FEMUR OSTEOTOMY Right 12/13/2018    Procedure: OSTEOTOMY, FEMUR - distal to correct valgus (Orthopediatrics plate, MTF bone wedge).  Right knee arthroscopy with MPFL reconstruction, gracilis allograft (Linvatec).  3 hrs.;  Surgeon: Ranulfo House MD;  Location: [address];  Service: Orthopedics;  Laterality: Right;    FEMUR OSTEOTOMY Left 07/09/2020    Procedure: OSTEOTOMY, FEMUR (Left) - distal femoral opening wedge (Orthopediatrics, MTF), knee scope, removal of tibial tubercle screws (Orthopediatrics 4.0 cannulated screws);  Surgeon: Ranulfo House MD;  Location: [address];  Service: Orthopedics;  Laterality: Left;  RQ NOON THOMAS,  Jovanykianarajani notified 7/8/20 HDavis RN    HARDWARE REMOVAL Right 12/19/2019    Procedure: REMOVAL, HARDWARE righ distal femoral.;  Surgeon: Ranulfo House MD;  Location: [address];  Service: Orthopedics;  Laterality: Right;    HARDWARE REMOVAL Left 07/09/2020    Procedure: REMOVAL, HARDWARE;  Surgeon: Ranulfo House MD;  Location: [address];  Service: Orthopedics;  Laterality: Left;  TIBIA    HEMANGIOMA EXCISION      scalp    INGUINAL HERNIA REPAIR Left     INJECTION OF STEROID Right 07/05/2019    Procedure: INJECTION, STEROID;  Surgeon: Ranulfo House MD;  Location: [address];  Service: Orthopedics;  Laterality: Right;    KNEE JOINT MANIPULATION Right 07/05/2019    Procedure: MANIPULATION, KNEE;  Surgeon: Ranulfo House MD;  Location: [address];  Service: Orthopedics;  Laterality: Right;    KNEE SURGERY Left 02/16/2015    TOE SURGERY Right     5 th toe     Family History   Problem Relation Name Age of Onset    Seizures Father      Anxiety disorder  Father      Depression Father      Mental illness Father      Schizophrenia Father      Deep vein thrombosis Father       Social History[1]  Review of Systems   Constitutional:  Negative for chills and fever.   HENT:  Negative for congestion and sore throat.    Respiratory:  Negative for shortness of breath.    Cardiovascular:  Negative for chest pain.   Gastrointestinal:  Negative for nausea.   Genitourinary:  Negative for dysuria.   Musculoskeletal:  Negative for back pain.   Skin:  Positive for rash.   Neurological:  Negative for weakness.   Hematological:  Does not bruise/bleed easily.       Physical Exam     Initial Vitals [05/10/25 2238]   BP Pulse Resp Temp SpO2   106/74 95 20 98.1 °F (36.7 °C) 98 %      MAP       --         Physical Exam    Constitutional: Vital signs are normal. She appears well-developed and well-nourished.  Non-toxic appearance. She does not have a sickly appearance. She does not appear ill.   HENT:   Head: Normocephalic and atraumatic. Mouth/Throat: Mucous membranes are normal.   Eyes: EOM are normal.   Neck: Neck supple.   Cardiovascular:  Normal rate and regular rhythm.           Abdominal: Abdomen is soft. Bowel sounds are normal. There is no abdominal tenderness.   Musculoskeletal:      Cervical back: Neck supple.     Neurological: She is alert.   Skin: Skin is warm and dry. No rash noted.   Patient has a focal area that almost looks like mild excoriations and tracts in the right upper quadrant than has some punctate lesions in the left upper quadrant that appear old; she does appear to have a small focus of what looks like urticaria that she notes his newly developed since she has been here   Psychiatric: She has a normal mood and affect.         ED Course   Procedures  Labs Reviewed   POCT URINE PREGNANCY - Abnormal       Result Value    POC Preg Test, Ur Negative (*)      Acceptable Yes          ECG Results              EKG 12-lead (Final result)        Collection  Time Result Time QRS Duration OHS QTC Calculation    05/10/25 22:46:40 05/11/25 15:26:37 88 433                     Final result by Interface, Lab In [facility] (05/11/25 15:26:41)                   Narrative:    Test Reason : R07.9,    Vent. Rate :  87 BPM     Atrial Rate :  87 BPM     P-R Int : 152 ms          QRS Dur :  88 ms      QT Int : 360 ms       P-R-T Axes :  42  25  22 degrees    QTcB Int : 433 ms    Normal sinus rhythm  Normal ECG  When compared with ECG of 25-Apr-2024 03:31,  No significant change was found  Confirmed by Mikie Paniagua (58) on 5/11/2025 3:26:34 PM    Referred By: AAAREFERRAL SELF           Confirmed By: Mikie Paniagua                                  Imaging Results    None          Medications   diphenhydrAMINE capsule 50 mg (50 mg Oral Given 5/11/25 0101)     Medical Decision Making  Amount and/or Complexity of Data Reviewed  Labs: ordered.    Risk  OTC drugs.  Prescription drug management.    Vitals unremarkable   Patient is well-appearing and in no acute distress  Considered but doubt DRESS, SJS/TEN, staph scalded skin, toxic shock, cellulitis  Could be a focus of atopic dermatitis given her history of unusual rashes  Also considered contact dermatitis and scabies   Feel scabies is less likely however advised the patient that is treat Rocephin home contacts with the permethrin and to wash all of clothes and bedding if she does not improve with the triamcinolone and Benadryl  Referred to Dermatology   Advised to return for high fevers, worsening rash, nausea, vomiting, lethargy or other concerning symptoms   Patient verbalized understanding agreement with the plan   Patient is stable for discharge    I discussed with the patient/family the diagnosis, treatment plan, indications for return to the emergency department, and for expected follow-up. The patient/family verbalized an understanding. The patient/family is asked if there are any questions or concerns. We discuss the case, until  all issues are addressed to the patient/familys satisfaction. Patient/family understands and is agreeable to the plan.   Jose Jones    DISCLAIMER: This note was prepared with [employer] (air [employer]) voice recognition transcription software.                                       Clinical Impression:  Final diagnoses:  [R07.9] Chest pain  [R21] Rash (Primary)          ED Disposition Condition    Discharge Stable          ED Prescriptions       Medication Sig Dispense Start Date End Date Auth. Provider    triamcinolone acetonide 0.1% (KENALOG) 0.1 % cream Apply topically 2 (two) times daily. 80 g 5/11/2025 -- Jose Jones MD          Follow-up Information    None            [1]   Social History  Tobacco Use    Smoking status: Never    Smokeless tobacco: Never   Substance Use Topics    Alcohol use: Yes     Comment: rarely    Drug use: No        Jose Jones MD  05/11/25 2000

## 2025-05-11 NOTE — ED TRIAGE NOTES
Patient presents to the ED with complaints of having a rash to the upper abdomen and under each breast. States rash started tonight. Symptoms include having chest pain when rash started. Denies any fever, chills, or shortness of breath. No treatment attempted prior to arrival. No distress noted at this time.     Review of patient's allergies indicates:  No Known Allergies

## 2025-06-01 ENCOUNTER — OFFICE VISIT (OUTPATIENT)
Dept: URGENT CARE | Facility: CLINIC | Age: 25
End: 2025-06-01
Payer: MEDICAID

## 2025-06-01 VITALS
TEMPERATURE: 99 F | BODY MASS INDEX: 42.69 KG/M2 | SYSTOLIC BLOOD PRESSURE: 105 MMHG | HEART RATE: 91 BPM | RESPIRATION RATE: 20 BRPM | HEIGHT: 62 IN | DIASTOLIC BLOOD PRESSURE: 70 MMHG | WEIGHT: 232 LBS | OXYGEN SATURATION: 96 %

## 2025-06-01 DIAGNOSIS — J20.9 ACUTE BRONCHITIS, UNSPECIFIED ORGANISM: Primary | ICD-10-CM

## 2025-06-01 DIAGNOSIS — R05.1 ACUTE COUGH: ICD-10-CM

## 2025-06-01 PROCEDURE — 71046 X-RAY EXAM CHEST 2 VIEWS: CPT | Mod: S$GLB,,, | Performed by: RADIOLOGY

## 2025-06-01 PROCEDURE — 99214 OFFICE O/P EST MOD 30 MIN: CPT | Mod: S$GLB,,,

## 2025-06-01 RX ORDER — PREDNISONE 20 MG/1
40 TABLET ORAL DAILY
Qty: 10 TABLET | Refills: 0 | Status: SHIPPED | OUTPATIENT
Start: 2025-06-01 | End: 2025-06-01

## 2025-06-01 RX ORDER — DOCUSATE SODIUM 100 MG/1
100 CAPSULE, LIQUID FILLED ORAL 2 TIMES DAILY
COMMUNITY
Start: 2024-12-05

## 2025-06-01 RX ORDER — BENZONATATE 200 MG/1
200 CAPSULE ORAL 3 TIMES DAILY PRN
Qty: 30 CAPSULE | Refills: 0 | Status: SHIPPED | OUTPATIENT
Start: 2025-06-01 | End: 2025-06-11

## 2025-06-01 RX ORDER — ALBUTEROL SULFATE 90 UG/1
2 INHALANT RESPIRATORY (INHALATION) EVERY 4 HOURS PRN
COMMUNITY
Start: 2025-05-18 | End: 2026-05-18

## 2025-06-01 RX ORDER — NORETHINDRONE 0.35 MG/1
1 TABLET ORAL
COMMUNITY

## 2025-06-01 RX ORDER — PROMETHAZINE HYDROCHLORIDE AND DEXTROMETHORPHAN HYDROBROMIDE 6.25; 15 MG/5ML; MG/5ML
5 SYRUP ORAL EVERY 4 HOURS PRN
Qty: 180 ML | Refills: 0 | Status: SHIPPED | OUTPATIENT
Start: 2025-06-01 | End: 2025-06-08

## 2025-06-01 RX ORDER — LEVOTHYROXINE SODIUM 175 UG/1
175 TABLET ORAL
COMMUNITY
Start: 2025-04-11

## 2025-06-01 RX ORDER — DICYCLOMINE HYDROCHLORIDE 20 MG/1
20 TABLET ORAL 2 TIMES DAILY PRN
COMMUNITY
Start: 2025-02-19

## 2025-06-01 RX ORDER — DEXAMETHASONE 1 MG/1
TABLET ORAL
COMMUNITY
Start: 2025-04-15 | End: 2025-06-01

## 2025-06-01 RX ORDER — PREDNISONE 20 MG/1
40 TABLET ORAL DAILY
Qty: 10 TABLET | Refills: 0 | Status: SHIPPED | OUTPATIENT
Start: 2025-06-01 | End: 2025-06-06

## 2025-06-01 RX ORDER — ONDANSETRON 4 MG/1
4 TABLET, ORALLY DISINTEGRATING ORAL EVERY 8 HOURS PRN
COMMUNITY
Start: 2025-02-19 | End: 2025-06-01

## 2025-06-01 RX ORDER — CETIRIZINE HYDROCHLORIDE 10 MG/1
10 TABLET ORAL DAILY
Qty: 30 TABLET | Refills: 0 | Status: SHIPPED | OUTPATIENT
Start: 2025-06-01 | End: 2025-06-01

## 2025-06-01 RX ORDER — LEVOTHYROXINE SODIUM 150 UG/1
150 TABLET ORAL
COMMUNITY

## 2025-06-01 RX ORDER — PROMETHAZINE HYDROCHLORIDE 6.25 MG/5ML
10 SYRUP ORAL EVERY 6 HOURS
COMMUNITY
Start: 2025-04-29

## 2025-06-01 RX ORDER — PROMETHAZINE HYDROCHLORIDE AND DEXTROMETHORPHAN HYDROBROMIDE 6.25; 15 MG/5ML; MG/5ML
5 SYRUP ORAL EVERY 4 HOURS PRN
Qty: 180 ML | Refills: 0 | Status: SHIPPED | OUTPATIENT
Start: 2025-06-01 | End: 2025-06-01

## 2025-06-01 RX ORDER — AZITHROMYCIN 250 MG/1
TABLET, FILM COATED ORAL
COMMUNITY
Start: 2025-05-18 | End: 2025-06-01

## 2025-06-01 RX ORDER — PANTOPRAZOLE SODIUM 40 MG/1
1 TABLET, DELAYED RELEASE ORAL EVERY MORNING
COMMUNITY
Start: 2024-07-30 | End: 2025-07-30

## 2025-06-01 RX ORDER — RIMEGEPANT SULFATE 75 MG/75MG
TABLET, ORALLY DISINTEGRATING ORAL
COMMUNITY
Start: 2025-05-06

## 2025-06-01 RX ORDER — CETIRIZINE HYDROCHLORIDE 10 MG/1
10 TABLET ORAL DAILY
Qty: 30 TABLET | Refills: 0 | Status: SHIPPED | OUTPATIENT
Start: 2025-06-01 | End: 2025-07-01

## 2025-06-01 RX ORDER — BENZONATATE 200 MG/1
200 CAPSULE ORAL 3 TIMES DAILY PRN
Qty: 30 CAPSULE | Refills: 0 | Status: SHIPPED | OUTPATIENT
Start: 2025-06-01 | End: 2025-06-01

## 2025-06-01 RX ORDER — ONDANSETRON 8 MG/1
8 TABLET, ORALLY DISINTEGRATING ORAL EVERY 8 HOURS PRN
COMMUNITY
Start: 2025-05-18 | End: 2025-06-01

## 2025-06-01 RX ORDER — IBUPROFEN 800 MG/1
800 TABLET, FILM COATED ORAL EVERY 6 HOURS PRN
COMMUNITY
Start: 2024-12-05

## 2025-07-14 ENCOUNTER — OFFICE VISIT (OUTPATIENT)
Dept: URGENT CARE | Facility: CLINIC | Age: 25
End: 2025-07-14
Payer: MEDICAID

## 2025-07-14 VITALS
OXYGEN SATURATION: 98 % | TEMPERATURE: 98 F | RESPIRATION RATE: 19 BRPM | SYSTOLIC BLOOD PRESSURE: 98 MMHG | BODY MASS INDEX: 42.69 KG/M2 | HEIGHT: 62 IN | DIASTOLIC BLOOD PRESSURE: 65 MMHG | HEART RATE: 73 BPM | WEIGHT: 232 LBS

## 2025-07-14 DIAGNOSIS — R10.84 GENERALIZED ABDOMINAL PAIN: ICD-10-CM

## 2025-07-14 DIAGNOSIS — N39.0 ACUTE UTI: Primary | ICD-10-CM

## 2025-07-14 DIAGNOSIS — R52 BODY ACHES: ICD-10-CM

## 2025-07-14 LAB
B-HCG UR QL: NEGATIVE
BILIRUBIN, UA POC OHS: NEGATIVE
BLOOD, UA POC OHS: ABNORMAL
CLARITY, UA POC OHS: CLEAR
COLOR, UA POC OHS: YELLOW
CTP QC/QA: YES
GLUCOSE, UA POC OHS: NEGATIVE
KETONES, UA POC OHS: NEGATIVE
LEUKOCYTES, UA POC OHS: ABNORMAL
NITRITE, UA POC OHS: NEGATIVE
PH, UA POC OHS: 6
PROTEIN, UA POC OHS: NEGATIVE
SPECIFIC GRAVITY, UA POC OHS: >=1.03
UROBILINOGEN, UA POC OHS: 1

## 2025-07-14 PROCEDURE — 81003 URINALYSIS AUTO W/O SCOPE: CPT | Mod: QW,S$GLB,, | Performed by: FAMILY MEDICINE

## 2025-07-14 PROCEDURE — 81025 URINE PREGNANCY TEST: CPT | Mod: S$GLB,,, | Performed by: FAMILY MEDICINE

## 2025-07-14 PROCEDURE — 99213 OFFICE O/P EST LOW 20 MIN: CPT | Mod: S$GLB,,, | Performed by: FAMILY MEDICINE

## 2025-07-14 RX ORDER — DICYCLOMINE HYDROCHLORIDE 20 MG/1
20 TABLET ORAL
Status: COMPLETED | OUTPATIENT
Start: 2025-07-14 | End: 2025-07-14

## 2025-07-14 RX ORDER — SULFAMETHOXAZOLE AND TRIMETHOPRIM 800; 160 MG/1; MG/1
1 TABLET ORAL 2 TIMES DAILY
Qty: 6 TABLET | Refills: 0 | Status: SHIPPED | OUTPATIENT
Start: 2025-07-14 | End: 2025-07-17

## 2025-07-14 RX ORDER — ALUMINUM HYDROXIDE, MAGNESIUM HYDROXIDE, AND SIMETHICONE 1200; 120; 1200 MG/30ML; MG/30ML; MG/30ML
30 SUSPENSION ORAL
Status: COMPLETED | OUTPATIENT
Start: 2025-07-14 | End: 2025-07-14

## 2025-07-14 RX ADMIN — ALUMINUM HYDROXIDE, MAGNESIUM HYDROXIDE, AND SIMETHICONE 30 ML: 1200; 120; 1200 SUSPENSION ORAL at 04:07

## 2025-07-14 RX ADMIN — DICYCLOMINE HYDROCHLORIDE 20 MG: 20 TABLET ORAL at 04:07

## 2025-07-14 NOTE — PATIENT INSTRUCTIONS
General Discharge Instructions   PLEASE READ YOUR DISCHARGE INSTRUCTIONS ENTIRELY AS IT CONTAINS IMPORTANT INFORMATION.  If you were prescribed a narcotic or controlled medication, do not drive or operate heavy equipment or machinery while taking these medications.  If you were prescribed antibiotics, please take them to completion.  You must understand that you've received an Urgent Care treatment only and that you may be released before all your medical problems are known or treated. You, the patient, will arrange for follow up care as instructed.    OVER THE COUNTER RECOMMENDATIONS/SUGGESTIONS.     Follow up with your PCP or specialty clinic as instructed in the next 2-3 days if not improved or as needed. You can call [phone] to schedule an appointment with appropriate provider.      If you condition worsens, we recommend that you receive another evaluation at the emergency room immediately or contact your primary medical clinic's after hours call service to discuss your concerns.      Please return here or go to the Emergency Department for any concerns or worsening condition.   You can also call [phone] to schedule an appointment with the appropriate provider.    Please return here or go to the Emergency Department for any concerns or worsening of condition.    Thank you for choosing [facility]!    Our goal in the Urgent Care is to always provide outstanding medical care. You may receive a survey by mail or e-mail in the next week regarding your experience today. We would greatly appreciate you completing and returning the survey. Your feedback provides us with a way to recognize our staff who provide very good care, and it helps us learn how to improve when your experience was below our aspiration of excellence.      We appreciate you trusting us with your medical care. We hope you feel better soon. We will be happy to take care of you for all of your future medical  needs.    Sincerely,    CHRIST Torres  Abdominal Pain   If your condition worsens or fails to improve we recommend that you receive another evaluation at the ER immediately or contact your PCP to discuss your concerns or return here. You must understand that you've received an urgent care treatment only and that you may be released before all your medical problems are known or treated. You the patient will arrange for followup care as instructed.   Watch for any increase pain, fever, localized pain to right lower abdomen or continued vomiting or diarrhea.   If you have diarrhea you can use Pepto Bismol; Avoid Immodium

## 2025-07-14 NOTE — PROGRESS NOTES
"Subjective:      Patient ID: Denise Mosher is a 25 y.o. female.    Vitals:  height is 5' 2" (1.575 m) and weight is 105.2 kg (232 lb). Her temperature is 98.4 °F (36.9 °C). Her blood pressure is 98/65 and her pulse is 73. Her respiration is 19 and oxygen saturation is 98%.     Chief Complaint: Abdominal Pain    Pt reports she has body aches that started yesterday, she says she has chills as well. She describes as a cramping pain in "my ovaries and kidneys at the same time, the chest pain that comes and goes when I breathe, the nausea that comes and goes, goes away with eating for 5 minutes then comes back, and the headaches that dance on the verge of migraines are not helpful." She denies any fever, she vomited 1x yesterday and then 2 days prior to that. Has good uop, denies brown or tea colored urine. Denies any cough, congestion, sore throat. Says she has allergies. She is est with [facility]. Lmp over 2 years ago says she has PCOS. Denies any hx of abd surgeries. Lbm yesterday, normal for her. Denies any std concerns.     Pain  This is a new problem. The current episode started yesterday. Associated symptoms include abdominal pain, headaches, myalgias, nausea and vomiting. Pertinent negatives include no anorexia, arthralgias, change in bowel habit, chest pain, chills, congestion, coughing, diaphoresis, fatigue, fever, joint swelling, neck pain, numbness, rash, sore throat, swollen glands, urinary symptoms, vertigo, visual change or weakness. She has tried acetaminophen and NSAIDs (zofran) for the symptoms. The treatment provided no relief.       Constitution: Negative for activity change, appetite change, chills, sweating, fatigue and fever.   HENT:  Negative for congestion and sore throat.    Neck: Negative for neck pain.   Cardiovascular:  Negative for chest pain.   Respiratory:  Negative for cough.    Gastrointestinal:  Positive for abdominal pain, nausea and vomiting. Negative for abdominal " bloating, history of abdominal surgery, constipation, diarrhea, bright red blood in stool and dark colored stools.   Musculoskeletal:  Positive for muscle ache. Negative for joint pain and joint swelling.   Skin:  Negative for rash.   Neurological:  Positive for headaches and history of migraines. Negative for dizziness, history of vertigo, light-headedness, passing out, facial drooping, speech difficulty, coordination disturbances, loss of balance, disorientation, altered mental status, loss of consciousness, numbness, tingling, seizures and tremors.   Psychiatric/Behavioral:  Negative for altered mental status and disorientation.       Objective:     Physical Exam   Constitutional: She is oriented to person, place, and time. She appears well-developed.  Non-toxic appearance. She does not appear ill. No distress. obesity  HENT:   Head: Normocephalic and atraumatic.   Ears:   Right Ear: External ear normal.   Left Ear: External ear normal.   Nose: Nose normal.   Mouth/Throat: Oropharynx is clear and moist.   Eyes: Conjunctivae, EOM and lids are normal. Pupils are equal, round, and reactive to light.   Neck: Trachea normal and phonation normal. Neck supple.   Abdominal: Bowel sounds are normal. Soft. There is generalized abdominal tenderness. There is no rebound, no guarding, no tenderness at McBurney's point, negative Cevallos's sign, no left CVA tenderness, negative Rovsing's sign, negative psoas sign, no right CVA tenderness and negative obturator sign.      Comments: Patient able to transition from heel to toe without pain or grimacing. No sign of acute abdomen at this time, ambulatory without grimacing, Abdomen is soft there is generalized tenderness present.    Musculoskeletal: Normal range of motion.         General: Normal range of motion.   Neurological: She is alert and oriented to person, place, and time.   Skin: Skin is warm, dry, intact and not diaphoretic.   Psychiatric: Her speech is normal and behavior  is normal. Judgment and thought content normal.   Nursing note and vitals reviewed.    Results for orders placed or performed in visit on 07/14/25   POCT urine pregnancy    Collection Time: 07/14/25  3:54 PM   Result Value Ref Range    POC Preg Test, Ur Negative Negative     Acceptable Yes    POCT Urinalysis(Instrument)    Collection Time: 07/14/25  4:19 PM   Result Value Ref Range    Color, POC UA Yellow Yellow, Straw, Colorless    Clarity, POC UA Clear Clear    Glucose, POC UA Negative Negative    Bilirubin, POC UA Negative Negative    Ketones, POC UA Negative Negative    Spec Grav POC UA >=1.030 1.005 - 1.030    Blood, POC UA Trace-intact (A) Negative    pH, POC UA 6.0 5.0 - 8.0    Protein, POC UA Negative Negative    Urobilinogen, POC UA 1.0 <=1.0    Nitrite, POC UA Negative Negative    WBC, POC UA Trace (A) Negative      Assessment:     1. Acute UTI    2. Generalized abdominal pain    3. Body aches        Plan:     Ua with trace blood and wbc, possibly uncomplicated UTI, will try bactrim bid x 3 days   F/u with pcp in 2-3 days, stressed with any worsening symptoms for ed evaluation. Pt with generalized abd tenderness, no focal tenderness today on exam. No cvat.   Denies relief with bentyl or Maalox.   Vss, nad.     Discussed results/diagnosis/plan with patient in clinic. Strict precautions given to patient to monitor for worsening signs and symptoms. Advised to follow up with PCP or specialist.    Explained side effects of medications prescribed with patient and informed him/her to discontinue use if he/she has any side effects and to inform UC or PCP if this occurs. All questions answered. Strict ED verses clinic return precautions stressed and given in depth. Advised if symptoms worsens of fail to improve he/she should go to the Emergency Room. Discharge and follow-up instructions given verbally/printed with the patient who expressed understanding and willingness to comply with my  recommendations. Patient voiced understanding and in agreement with current treatment plan. Patient exits the exam room in no acute distress. Conversant and engaged during discharge discussion, verbalized understanding.        Acute UTI  -     sulfamethoxazole-trimethoprim 800-160mg (BACTRIM DS) 800-160 mg Tab; Take 1 tablet by mouth 2 (two) times daily. for 3 days  Dispense: 6 tablet; Refill: 0    Generalized abdominal pain  -     POCT urine pregnancy  -     POCT Urinalysis(Instrument)  -     dicyclomine tablet 20 mg  -     aluminum-magnesium hydroxide-simethicone 200-200-20 mg/5 mL suspension 30 mL    Body aches

## 2025-07-25 ENCOUNTER — OFFICE VISIT (OUTPATIENT)
Dept: URGENT CARE | Facility: CLINIC | Age: 25
End: 2025-07-25
Payer: MEDICAID

## 2025-07-25 VITALS
TEMPERATURE: 98 F | WEIGHT: 233.94 LBS | OXYGEN SATURATION: 95 % | BODY MASS INDEX: 43.05 KG/M2 | SYSTOLIC BLOOD PRESSURE: 102 MMHG | RESPIRATION RATE: 19 BRPM | HEART RATE: 65 BPM | HEIGHT: 62 IN | DIASTOLIC BLOOD PRESSURE: 71 MMHG

## 2025-07-25 DIAGNOSIS — R10.9 LEFT FLANK PAIN: ICD-10-CM

## 2025-07-25 DIAGNOSIS — G44.209 TENSION HEADACHE: ICD-10-CM

## 2025-07-25 DIAGNOSIS — R31.9 HEMATURIA, UNSPECIFIED TYPE: ICD-10-CM

## 2025-07-25 DIAGNOSIS — R11.0 NAUSEA: Primary | ICD-10-CM

## 2025-07-25 LAB
B-HCG UR QL: NEGATIVE
BILIRUBIN, UA POC OHS: NEGATIVE
BLOOD, UA POC OHS: ABNORMAL
CLARITY, UA POC OHS: CLEAR
COLOR, UA POC OHS: YELLOW
CTP QC/QA: YES
GLUCOSE, UA POC OHS: NEGATIVE
KETONES, UA POC OHS: NEGATIVE
LEUKOCYTES, UA POC OHS: ABNORMAL
NITRITE, UA POC OHS: NEGATIVE
PH, UA POC OHS: 7
PROTEIN, UA POC OHS: NEGATIVE
SPECIFIC GRAVITY, UA POC OHS: 1.01
UROBILINOGEN, UA POC OHS: 0.2

## 2025-07-25 PROCEDURE — 87086 URINE CULTURE/COLONY COUNT: CPT | Performed by: FAMILY MEDICINE

## 2025-07-25 RX ORDER — KETOROLAC TROMETHAMINE 30 MG/ML
15 INJECTION, SOLUTION INTRAMUSCULAR; INTRAVENOUS
Status: COMPLETED | OUTPATIENT
Start: 2025-07-25 | End: 2025-07-25

## 2025-07-25 RX ADMIN — KETOROLAC TROMETHAMINE 15 MG: 30 INJECTION, SOLUTION INTRAMUSCULAR; INTRAVENOUS at 11:07

## 2025-07-25 NOTE — PATIENT INSTRUCTIONS
General Discharge Instructions   PLEASE READ YOUR DISCHARGE INSTRUCTIONS ENTIRELY AS IT CONTAINS IMPORTANT INFORMATION.  If you were prescribed a narcotic or controlled medication, do not drive or operate heavy equipment or machinery while taking these medications.  If you were prescribed antibiotics, please take them to completion.  You must understand that you've received an Urgent Care treatment only and that you may be released before all your medical problems are known or treated. You, the patient, will arrange for follow up care as instructed.    Tylenol up to 4,000 mg a day is safe for short periods and can be used for body aches, pain, and fever. However in high doses and prolonged use it can cause liver irritation.    Ibuprofen is a non-steroidal anti-inflammatory that can be used for body aches, pain, and fever.However it can also cause stomach irritation if over used.     Follow up with your PCP or specialty clinic as instructed in the next 2-3 days if not improved or as needed. You can call [phone] to schedule an appointment with appropriate provider.      If you condition worsens, we recommend that you receive another evaluation at the emergency room immediately or contact your primary medical clinic's after hours call service to discuss your concerns.      Please return here or go to the Emergency Department for any concerns or worsening condition.   You can also call [phone] to schedule an appointment with the appropriate provider.    Please return here or go to the Emergency Department for any concerns or worsening of condition.    Thank you for choosing [facility]!    Our goal in the Urgent Care is to always provide outstanding medical care. You may receive a survey by mail or e-mail in the next week regarding your experience today. We would greatly appreciate you completing and returning the survey. Your feedback provides us with a way to recognize our staff who provide very  good care, and it helps us learn how to improve when your experience was below our aspiration of excellence.      We appreciate you trusting us with your medical care. We hope you feel better soon. We will be happy to take care of you for all of your future medical needs.    Sincerely,    CHRIST Torres  Nausea & Vomiting  If your condition worsens or fails to improve we recommend that you receive another evaluation at the ER immediately or contact your PCP to discuss your concerns or return here. You must understand that you've received an urgent care treatment only and that you may be released before all your medical problems are known or treated. You the patient will arrange for followup care as instructed.   Use prescribed anti-nausea medicine as needed and prescribed   Increase fluids and rest is important   Start off with liquid diet and progress as tolerated (see below)  Water and clear liquids are important so you do not get dehydrated. Drink a small amount at a time.  Do not force yourself to eat, especially if you have cramps, vomiting, or diarrhea. When you finally decide to start eating, do not eat large amounts at a time, even if you are hungry.  If you eat, avoid fatty, greasy, spicy, or fried foods.  Do not eat dairy products if you have diarrhea; they can make the diarrhea worse  Watch for any increase pain, fever, localized pain to right lower abdomen or continued vomiting or diarrhea.  You received an injection of a powerful NSAID today (Toradol).  It's effects will last up to 24 hours. Please do not take another NSAID (ie aspirin, ibuprofen, Aleve, Advil or Motrin) until this time tomorrow.  If you continue to have pain, you may take Tylenol (acetaminophen) if you are not allergic to this medication.                                                        Headache   If your condition worsens or fails to improve we recommend that you receive another evaluation at the ER immediately or contact  your PCP to discuss your concerns or return here. You must understand that you've received an urgent care treatment only and that you may be released before all your medical problems are known or treated. You the patient will arrange for follouwp care as instructed.   If you were given narcotic prescriptions or muscle relaxants do not operate heavy equipment or machinery while taking these medications   Get rest and drink fluids. Get plenty of rest.  Watch for increase pain, fever, vomiting, neck pain or mental confusion.   You can also use tylenol or ibuprofen as directed as long as you don't have any allergies to these medications or medical conditions such as ulcers, liver or kidney disease or blood thinners etc that would prevent you from taking these meds.

## 2025-07-25 NOTE — LETTER
July 25, 2025      [facility]  [address]  [place] [address]  Phone: [phone]  Fax: [phone]       Patient: Denise Mosher   YOB: 2000  Date of Visit: 07/25/2025    To Whom It May Concern:    Saima Mosher  was at [facility] on 07/25/2025. The patient may return to work/school on 7/26/2025 with no restrictions. If you have any questions or concerns, or if I can be of further assistance, please do not hesitate to contact me.    Sincerely,    Jacquelyn Ramírez NP

## 2025-07-25 NOTE — PROGRESS NOTES
"Subjective:      Patient ID: Denise Mosher is a 25 y.o. female.    Vitals:  height is 5' 2" (1.575 m) and weight is 106.1 kg (233 lb 14.5 oz). Her oral temperature is 98.1 °F (36.7 °C). Her blood pressure is 102/71 and her pulse is 65. Her respiration is 19 and oxygen saturation is 95%.     Chief Complaint: Nausea    Pt is here for nausea, vomiting and headaches which started 2 days ago. Pt took Zofran last dose last night. She had uc and ed visit 7/14 for abd pain, those symptoms resolved. But now for 2 days having NV, ha. Wed vomited once, Thursday vomited once, today she says she vomited twice. Denies any bloody emesis. Denies any diarrhea, fever, chills. She c/o lower abd pain, intermittent, denies alleviating or worsening factors. She denies any dysuria, urgency, frequency, flank pain, pelvic pain. She says last cycle was over 2 years ago and says she PCOS. She says she used to acid reflux a long time ago. She does not think acid reflux is causing her problems. She was able to tolerate PO today. She says she missed her [employer] appt recently. Denies any neck stiffness. She says she has a hx of migraines, but her current ha is not as severe as her migraines. 7/14 I gave her bactrim bid x 3 days for uti and then ed same day gave keflex for uti she says she completed both. Lbm yesterday. Denies any dc, rashes, lesions. She says she is sexually active with one male, no protection. Denies any hx of kidney stone, she says she has family hx of kidney stones. Pt reports she get nausea, she says this is an ongoing problem she says "I've had scopes in the past and they could never find anything." She has chronic nausea, denies any thc use. She seldom drinks alcohol. She missed work today. Denies decreased UOP.     Nausea  This is a chronic problem. The current episode started in the past 7 days. The problem occurs constantly. The problem has been gradually worsening. Associated symptoms include abdominal pain, " fatigue, headaches, nausea and vomiting. Pertinent negatives include no anorexia, arthralgias, change in bowel habit, chest pain, chills, congestion, coughing, diaphoresis, fever, joint swelling, myalgias, neck pain, numbness, rash, sore throat, swollen glands, urinary symptoms, vertigo, visual change or weakness. Treatments tried: zofran, pepto, tylenol, ibu. The treatment provided no relief.       Constitution: Positive for fatigue. Negative for activity change, appetite change, chills, sweating and fever.   HENT:  Negative for congestion and sore throat.    Neck: Negative for neck pain.   Cardiovascular:  Negative for chest pain.   Respiratory:  Negative for cough.    Gastrointestinal:  Positive for abdominal pain, nausea and vomiting. Negative for abdominal bloating, history of abdominal surgery, constipation, diarrhea, bright red blood in stool, dark colored stools, rectal bleeding, rectal pain, hemorrhoids, heartburn and bowel incontinence.   Genitourinary:  Positive for irregular menstruation, missed menses and ovarian cysts. Negative for dysuria, frequency, urgency, urine decreased, flank pain, bed wetting, hematuria, history of kidney stones, heavy menstrual bleeding, vaginal pain, vaginal discharge, vaginal bleeding, vaginal odor, genital sore and pelvic pain.   Musculoskeletal:  Negative for joint pain, joint swelling and muscle ache.   Skin:  Negative for rash.   Neurological:  Positive for headaches. Negative for history of vertigo and numbness.      Objective:     Physical Exam   Constitutional: She is oriented to person, place, and time. She appears well-developed.  Non-toxic appearance. She does not appear ill. No distress. obesity  HENT:   Head: Normocephalic and atraumatic.   Ears:   Right Ear: External ear normal.   Left Ear: External ear normal.   Nose: Nose normal.   Mouth/Throat: Oropharynx is clear and moist.   Eyes: Conjunctivae, EOM and lids are normal. Pupils are equal, round, and reactive  to light.   Neck: Trachea normal and phonation normal. Neck supple.   Abdominal: Bowel sounds are normal. Soft. There is generalized abdominal tenderness. There is left CVA tenderness. There is no rebound, no guarding, no tenderness at McBurney's point, negative Cevallos's sign, negative Rovsing's sign, negative psoas sign, no right CVA tenderness and negative obturator sign.   Musculoskeletal: Normal range of motion.         General: Normal range of motion.   Neurological: She is alert and oriented to person, place, and time.   Skin: Skin is warm, dry, intact and not diaphoretic.   Psychiatric: Her speech is normal and behavior is normal. Judgment and thought content normal.   Nursing note and vitals reviewed.      Results for orders placed or performed in visit on 07/25/25   POCT urine pregnancy    Collection Time: 07/25/25 10:26 AM   Result Value Ref Range    POC Preg Test, Ur Negative Negative     Acceptable Yes    POCT Urinalysis(Instrument)    Collection Time: 07/25/25 10:40 AM   Result Value Ref Range    Color, POC UA Yellow Yellow, Straw, Colorless    Clarity, POC UA Clear Clear    Glucose, POC UA Negative Negative    Bilirubin, POC UA Negative Negative    Ketones, POC UA Negative Negative    Spec Grav POC UA 1.010 1.005 - 1.030    Blood, POC UA Small (A) Negative    pH, POC UA 7.0 5.0 - 8.0    Protein, POC UA Negative Negative    Urobilinogen, POC UA 0.2 <=1.0    Nitrite, POC UA Negative Negative    WBC, POC UA Small (A) Negative      Assessment:     1. Nausea    2. Hematuria, unspecified type    3. Left flank pain    4. Tension headache        Plan:     -Pt tolerating PO in clinic  -Pt was advised to f/u with pcp at last uc visit, she has not done this yet  -She was advised to keep a food log but she says this does not help and does not work  -benign abd exam without focal tenderness  -advised on us today for left flank pain, r/o kidney stones, pt not in significant pain, she does have the  hematuria, noted today, last uc visit and ed. Advised on close f/u with pcp, recheck and if continues to see urology, will check culture today as well.   -pt was offered promethazine suppository, she declined  -rtw note provided, increase diet as tolerated    Discussed results/diagnosis/plan with patient in clinic. Strict precautions given to patient to monitor for worsening signs and symptoms. Advised to follow up with PCP or specialist.    Explained side effects of medications prescribed with patient and informed him/her to discontinue use if he/she has any side effects and to inform UC or PCP if this occurs. All questions answered. Strict ED verses clinic return precautions stressed and given in depth. Advised if symptoms worsens of fail to improve he/she should go to the Emergency Room. Discharge and follow-up instructions given verbally/printed with the patient who expressed understanding and willingness to comply with my recommendations. Patient voiced understanding and in agreement with current treatment plan. Patient exits the exam room in no acute distress. Conversant and engaged during discharge discussion, verbalized understanding.      Nausea  Comments:  chronic, advised to f/u with pcp and see GI again.  Orders:  -     POCT urine pregnancy  -     Cancel: POCT Urinalysis(Instrument)  -     POCT Urinalysis(Instrument)    Hematuria, unspecified type  Comments:  recheck and f/u with pcp  Orders:  -     Urine Culture High Risk ($$)    Left flank pain  -     Urine Culture High Risk ($$)    Tension headache  -     ketorolac injection 15 mg          Medical Decision Making:   History:   Old Medical Records: I decided to obtain old medical records.  Old Records Summarized: records from clinic visits and records from another hospital.  Clinical Tests:   Lab Tests: Reviewed       <> Summary of Lab: Lipase within normal limits, CMP and CBC within limits.  Radiological Study: Reviewed  Urgent Care Management:  CT  reviewed with normal appendix.  Possible diverticula in the sigmoid colon.

## 2025-07-26 LAB — BACTERIA UR CULT: NORMAL

## (undated) DEVICE — Device

## (undated) DEVICE — SEE MEDLINE ITEM 146298

## (undated) DEVICE — PAD COLD THERAPY KNEE WRAP ON

## (undated) DEVICE — SEE MEDLINE ITEM 157216

## (undated) DEVICE — SUT LOOP HI-FI 20 WHT/BLU

## (undated) DEVICE — SUT MCRYL PLUS 4-0 PS2 27IN

## (undated) DEVICE — BLADE SAG 13.0 X1.27X90

## (undated) DEVICE — PACK ARTHROSCOPY

## (undated) DEVICE — BANDAGE ACE ELASTIC 6"

## (undated) DEVICE — KIT IRR SUCTION HND PIECE

## (undated) DEVICE — GAUZE SPONGE 4'X4 12 PLY

## (undated) DEVICE — BLADE ULTRACUT 3.5MM

## (undated) DEVICE — SUT VICRYL+ 1 CT1 18IN

## (undated) DEVICE — GAUZE SPONGE 4X4 12PLY

## (undated) DEVICE — SOL IRR NACL .9% 3000ML

## (undated) DEVICE — BRACE KNEE T SCOPE PREMIER

## (undated) DEVICE — NDL SPINAL 18GX3.5 SPINOCAN

## (undated) DEVICE — BLADE SURG CARBON STEEL SZ11

## (undated) DEVICE — SEE MEDLINE ITEM 152530

## (undated) DEVICE — DRAPE INCISE IOBAN 2 23X17IN

## (undated) DEVICE — PADDING CAST 4IN SPECIALIST

## (undated) DEVICE — PAD CAST SPECIALIST STRL 6

## (undated) DEVICE — DRESSING AQUACEL AG ADV 3.5X6

## (undated) DEVICE — DRAPE C ARM 42 X 120 10/BX

## (undated) DEVICE — GUIDE WIRE 2.0MMX150MM COCR
Type: IMPLANTABLE DEVICE | Site: KNEE | Status: NON-FUNCTIONAL
Removed: 2018-12-13

## (undated) DEVICE — DRESSING N ADH OIL EMUL 3X8

## (undated) DEVICE — TUBE SET INFLOW/OUTFLOW

## (undated) DEVICE — TOURNIQUET HEMACLEAR X-LARGE

## (undated) DEVICE — BANDAGE ADHESIVE

## (undated) DEVICE — DRESSING XEROFORM FOIL PK 1X8

## (undated) DEVICE — SUT VICRYL 3-0 27 SH

## (undated) DEVICE — DRAPE PLASTIC U 60X72

## (undated) DEVICE — STOCKINET TUBULAR 1 PLY 6X60IN

## (undated) DEVICE — BLADE SHAVER LANZA 4.2X13CM

## (undated) DEVICE — PADDING CAST 6IN DELTA ROLL

## (undated) DEVICE — SHUTTLE SUPER

## (undated) DEVICE — TRAY ARTHROSCOPY 2/CS

## (undated) DEVICE — DRESSING XEROFORM 1X8IN

## (undated) DEVICE — TRAY MINOR ORTHO

## (undated) DEVICE — IMPLANTABLE DEVICE
Type: IMPLANTABLE DEVICE | Site: LEG | Status: NON-FUNCTIONAL
Removed: 2020-07-09

## (undated) DEVICE — SEE MEDLINE ITEM 157131

## (undated) DEVICE — SEE MEDLINE ITEM 146271

## (undated) DEVICE — PAD ABDOMINAL 5X9 STERILE

## (undated) DEVICE — GOWN SMART IMP BREATHABLE XXLG

## (undated) DEVICE — SUT VICRYL PLUS 2-0 CT1 18

## (undated) DEVICE — SEE MEDLINE ITEM 152515

## (undated) DEVICE — PAD CAST SPECIALIST STRL 4

## (undated) DEVICE — STAPLER SKIN PROXIMATE WIDE

## (undated) DEVICE — ADHESIVE DERMABOND ADVANCED

## (undated) DEVICE — TOWEL OR STER DISP BLUE 17X27

## (undated) DEVICE — ELECTRODE REM PLYHSV RETURN 9

## (undated) DEVICE — PADDING CAST 6IN(OR)

## (undated) DEVICE — BLADE MICRO STR COARSE 9.0MM

## (undated) DEVICE — DRESSING SPONGE 8PLY 4X4 STRL

## (undated) DEVICE — GUIDEWIRE 1.25MM
Type: IMPLANTABLE DEVICE | Site: LEG | Status: NON-FUNCTIONAL
Removed: 2020-07-09

## (undated) DEVICE — DRAPE STERI-DRAPE 1000 17X11IN

## (undated) DEVICE — BIT DRILL CANN BADGER 5X229

## (undated) DEVICE — WRAP KNEE DURA*SOFT W/2 INSERT

## (undated) DEVICE — DRAPE STERI U-SHAPED 47X51IN

## (undated) DEVICE — HOOK SUTURE SPECTRUM II DISP

## (undated) DEVICE — BANDAGE ACE DOUBLE STER 6IN

## (undated) DEVICE — DRESSING TRANS 4X4 TEGADERM

## (undated) DEVICE — TEGADERM IV

## (undated) DEVICE — DRESSING AQUACEL AG ADV 3.5X12

## (undated) DEVICE — DRAPE HIP TIBURON 87X115X134

## (undated) DEVICE — PIN GUIDE GRAFT PASS XACTPIN
Type: IMPLANTABLE DEVICE | Site: KNEE | Status: NON-FUNCTIONAL
Removed: 2018-12-13

## (undated) DEVICE — SPONGE GAUZE 4X4 12 PLY STRL

## (undated) DEVICE — BLADE ELECTRO EDGE INSULATED